# Patient Record
Sex: FEMALE | Race: WHITE | Employment: FULL TIME | URBAN - METROPOLITAN AREA
[De-identification: names, ages, dates, MRNs, and addresses within clinical notes are randomized per-mention and may not be internally consistent; named-entity substitution may affect disease eponyms.]

---

## 2017-01-30 ENCOUNTER — OFFICE VISIT (OUTPATIENT)
Dept: INTERNAL MEDICINE | Age: 62
End: 2017-01-30

## 2017-01-30 VITALS
TEMPERATURE: 101.2 F | BODY MASS INDEX: 50.94 KG/M2 | HEIGHT: 62 IN | HEART RATE: 100 BPM | WEIGHT: 276.8 LBS | SYSTOLIC BLOOD PRESSURE: 158 MMHG | DIASTOLIC BLOOD PRESSURE: 90 MMHG | OXYGEN SATURATION: 98 %

## 2017-01-30 DIAGNOSIS — J45.909 ASTHMA WITH BRONCHITIS: Primary | ICD-10-CM

## 2017-01-30 DIAGNOSIS — R50.9 FEVER, UNSPECIFIED FEVER CAUSE: ICD-10-CM

## 2017-01-30 DIAGNOSIS — R52 BODY ACHES: ICD-10-CM

## 2017-01-30 LAB
INFLUENZA A ANTIBODY: NORMAL
INFLUENZA B ANTIBODY: NORMAL

## 2017-01-30 PROCEDURE — 99213 OFFICE O/P EST LOW 20 MIN: CPT | Performed by: PHYSICIAN ASSISTANT

## 2017-01-30 PROCEDURE — 87804 INFLUENZA ASSAY W/OPTIC: CPT | Performed by: PHYSICIAN ASSISTANT

## 2017-01-30 RX ORDER — METHYLPREDNISOLONE 4 MG/1
TABLET ORAL
Qty: 1 KIT | Refills: 0 | Status: SHIPPED | OUTPATIENT
Start: 2017-01-30 | End: 2017-02-05

## 2017-01-30 RX ORDER — CLINDAMYCIN HYDROCHLORIDE 150 MG/1
150 CAPSULE ORAL 3 TIMES DAILY
Qty: 30 CAPSULE | Refills: 0 | Status: SHIPPED | OUTPATIENT
Start: 2017-01-30 | End: 2017-02-09 | Stop reason: ALTCHOICE

## 2017-01-30 RX ORDER — BENZONATATE 200 MG/1
200 CAPSULE ORAL 3 TIMES DAILY PRN
Qty: 30 CAPSULE | Refills: 0 | Status: SHIPPED | OUTPATIENT
Start: 2017-01-30 | End: 2017-02-09 | Stop reason: ALTCHOICE

## 2017-01-30 RX ORDER — ALBUTEROL SULFATE 90 UG/1
2 AEROSOL, METERED RESPIRATORY (INHALATION) 4 TIMES DAILY PRN
Qty: 3 INHALER | Refills: 3 | Status: SHIPPED | OUTPATIENT
Start: 2017-01-30 | End: 2018-06-25 | Stop reason: SDUPTHER

## 2017-01-30 ASSESSMENT — ENCOUNTER SYMPTOMS
SINUS PRESSURE: 1
COUGH: 1

## 2017-02-01 ENCOUNTER — TELEPHONE (OUTPATIENT)
Dept: INTERNAL MEDICINE | Age: 62
End: 2017-02-01

## 2017-02-02 ENCOUNTER — OFFICE VISIT (OUTPATIENT)
Dept: INTERNAL MEDICINE | Age: 62
End: 2017-02-02

## 2017-02-02 ENCOUNTER — TELEPHONE (OUTPATIENT)
Dept: INTERNAL MEDICINE | Age: 62
End: 2017-02-02

## 2017-02-02 VITALS
RESPIRATION RATE: 20 BRPM | OXYGEN SATURATION: 99 % | BODY MASS INDEX: 50.68 KG/M2 | TEMPERATURE: 97.9 F | HEART RATE: 57 BPM | WEIGHT: 275.4 LBS | HEIGHT: 62 IN | SYSTOLIC BLOOD PRESSURE: 136 MMHG | DIASTOLIC BLOOD PRESSURE: 86 MMHG

## 2017-02-02 DIAGNOSIS — J45.31 MILD PERSISTENT ASTHMA WITH ACUTE EXACERBATION: Primary | ICD-10-CM

## 2017-02-02 PROCEDURE — 99213 OFFICE O/P EST LOW 20 MIN: CPT | Performed by: FAMILY MEDICINE

## 2017-02-02 PROCEDURE — 96372 THER/PROPH/DIAG INJ SC/IM: CPT | Performed by: FAMILY MEDICINE

## 2017-02-02 RX ORDER — ALBUTEROL SULFATE 2.5 MG/3ML
2.5 SOLUTION RESPIRATORY (INHALATION) EVERY 6 HOURS PRN
Qty: 120 VIAL | Refills: 6 | Status: SHIPPED | OUTPATIENT
Start: 2017-02-02 | End: 2017-02-09 | Stop reason: SDUPTHER

## 2017-02-02 RX ORDER — PREDNISONE 10 MG/1
TABLET ORAL
Qty: 40 TABLET | Refills: 0 | Status: SHIPPED | OUTPATIENT
Start: 2017-02-02 | End: 2017-03-07 | Stop reason: ALTCHOICE

## 2017-02-02 RX ORDER — METHYLPREDNISOLONE SODIUM SUCCINATE 125 MG/2ML
125 INJECTION, POWDER, LYOPHILIZED, FOR SOLUTION INTRAMUSCULAR; INTRAVENOUS ONCE
Status: COMPLETED | OUTPATIENT
Start: 2017-02-02 | End: 2017-02-02

## 2017-02-02 RX ADMIN — METHYLPREDNISOLONE SODIUM SUCCINATE 125 MG: 125 INJECTION, POWDER, LYOPHILIZED, FOR SOLUTION INTRAMUSCULAR; INTRAVENOUS at 15:54

## 2017-02-09 ENCOUNTER — TELEPHONE (OUTPATIENT)
Dept: INTERNAL MEDICINE | Age: 62
End: 2017-02-09

## 2017-02-09 ENCOUNTER — OFFICE VISIT (OUTPATIENT)
Dept: INTERNAL MEDICINE | Age: 62
End: 2017-02-09

## 2017-02-09 VITALS
HEART RATE: 77 BPM | SYSTOLIC BLOOD PRESSURE: 128 MMHG | BODY MASS INDEX: 50.42 KG/M2 | DIASTOLIC BLOOD PRESSURE: 86 MMHG | OXYGEN SATURATION: 98 % | RESPIRATION RATE: 16 BRPM | TEMPERATURE: 98.4 F | HEIGHT: 62 IN | WEIGHT: 274 LBS

## 2017-02-09 DIAGNOSIS — J45.41 MODERATE PERSISTENT ASTHMA WITH ACUTE EXACERBATION: Primary | ICD-10-CM

## 2017-02-09 PROCEDURE — 99213 OFFICE O/P EST LOW 20 MIN: CPT | Performed by: FAMILY MEDICINE

## 2017-02-09 RX ORDER — CLOTRIMAZOLE AND BETAMETHASONE DIPROPIONATE 10; .64 MG/G; MG/G
CREAM TOPICAL
Qty: 15 G | Refills: 0 | Status: SHIPPED | OUTPATIENT
Start: 2017-02-09 | End: 2017-06-13 | Stop reason: ALTCHOICE

## 2017-02-09 RX ORDER — FLUTICASONE FUROATE AND VILANTEROL 100; 25 UG/1; UG/1
1 POWDER RESPIRATORY (INHALATION) DAILY
Qty: 1 EACH | Refills: 5 | Status: SHIPPED | OUTPATIENT
Start: 2017-02-09 | End: 2017-03-07

## 2017-02-09 ASSESSMENT — PATIENT HEALTH QUESTIONNAIRE - PHQ9
SUM OF ALL RESPONSES TO PHQ9 QUESTIONS 1 & 2: 0
1. LITTLE INTEREST OR PLEASURE IN DOING THINGS: 0
2. FEELING DOWN, DEPRESSED OR HOPELESS: 0
SUM OF ALL RESPONSES TO PHQ QUESTIONS 1-9: 0

## 2017-03-07 ENCOUNTER — OFFICE VISIT (OUTPATIENT)
Dept: INTERNAL MEDICINE | Age: 62
End: 2017-03-07

## 2017-03-07 VITALS
HEART RATE: 73 BPM | TEMPERATURE: 98.6 F | BODY MASS INDEX: 51.45 KG/M2 | WEIGHT: 279.6 LBS | HEIGHT: 62 IN | RESPIRATION RATE: 16 BRPM | DIASTOLIC BLOOD PRESSURE: 88 MMHG | SYSTOLIC BLOOD PRESSURE: 134 MMHG | OXYGEN SATURATION: 98 %

## 2017-03-07 DIAGNOSIS — J45.40 MODERATE PERSISTENT ASTHMA WITHOUT COMPLICATION: Primary | ICD-10-CM

## 2017-03-07 DIAGNOSIS — M19.90 ARTHRITIS: ICD-10-CM

## 2017-03-07 DIAGNOSIS — J34.89 RHINORRHEA: ICD-10-CM

## 2017-03-07 DIAGNOSIS — I10 ESSENTIAL HYPERTENSION: ICD-10-CM

## 2017-03-07 PROCEDURE — 99213 OFFICE O/P EST LOW 20 MIN: CPT | Performed by: FAMILY MEDICINE

## 2017-03-07 RX ORDER — IPRATROPIUM BROMIDE 42 UG/1
2 SPRAY, METERED NASAL 3 TIMES DAILY
Qty: 1 BOTTLE | Refills: 3 | Status: SHIPPED | OUTPATIENT
Start: 2017-03-07 | End: 2017-06-13

## 2017-06-02 ENCOUNTER — OFFICE VISIT (OUTPATIENT)
Dept: CARDIOLOGY | Age: 62
End: 2017-06-02

## 2017-06-02 VITALS
HEART RATE: 79 BPM | OXYGEN SATURATION: 99 % | DIASTOLIC BLOOD PRESSURE: 84 MMHG | SYSTOLIC BLOOD PRESSURE: 136 MMHG | RESPIRATION RATE: 16 BRPM | WEIGHT: 270 LBS | BODY MASS INDEX: 49.69 KG/M2 | HEIGHT: 62 IN

## 2017-06-02 DIAGNOSIS — I25.10 CORONARY ARTERY DISEASE INVOLVING NATIVE CORONARY ARTERY OF NATIVE HEART, ANGINA PRESENCE UNSPECIFIED: Primary | ICD-10-CM

## 2017-06-02 PROCEDURE — 99212 OFFICE O/P EST SF 10 MIN: CPT | Performed by: INTERNAL MEDICINE

## 2017-06-02 RX ORDER — AMLODIPINE BESYLATE 10 MG/1
10 TABLET ORAL DAILY
Qty: 90 TABLET | Refills: 3 | Status: SHIPPED | OUTPATIENT
Start: 2017-06-02 | End: 2018-07-27 | Stop reason: SDUPTHER

## 2017-06-02 RX ORDER — LISINOPRIL AND HYDROCHLOROTHIAZIDE 20; 12.5 MG/1; MG/1
1 TABLET ORAL DAILY
Qty: 90 TABLET | Refills: 3 | Status: SHIPPED | OUTPATIENT
Start: 2017-06-02 | End: 2019-06-04 | Stop reason: SDUPTHER

## 2017-06-09 DIAGNOSIS — Z87.11 PERSONAL HISTORY OF GASTRIC ULCER: ICD-10-CM

## 2017-06-09 RX ORDER — PANTOPRAZOLE SODIUM 40 MG/1
40 TABLET, DELAYED RELEASE ORAL DAILY
Qty: 90 TABLET | Refills: 3 | Status: SHIPPED | OUTPATIENT
Start: 2017-06-09 | End: 2018-11-05 | Stop reason: SDUPTHER

## 2017-06-09 RX ORDER — CHOLESTYRAMINE 4 G/9G
1 POWDER, FOR SUSPENSION ORAL 2 TIMES DAILY
Qty: 180 PACKET | Refills: 5 | Status: SHIPPED | OUTPATIENT
Start: 2017-06-09 | End: 2019-06-04 | Stop reason: SDUPTHER

## 2017-06-13 ENCOUNTER — OFFICE VISIT (OUTPATIENT)
Dept: INTERNAL MEDICINE | Age: 62
End: 2017-06-13

## 2017-06-13 VITALS
OXYGEN SATURATION: 98 % | SYSTOLIC BLOOD PRESSURE: 130 MMHG | DIASTOLIC BLOOD PRESSURE: 74 MMHG | HEIGHT: 62 IN | RESPIRATION RATE: 16 BRPM | WEIGHT: 268.4 LBS | HEART RATE: 66 BPM | TEMPERATURE: 98.3 F | BODY MASS INDEX: 49.39 KG/M2

## 2017-06-13 DIAGNOSIS — J45.40 MODERATE PERSISTENT ASTHMA WITHOUT COMPLICATION: ICD-10-CM

## 2017-06-13 DIAGNOSIS — E55.9 HYPOVITAMINOSIS D: ICD-10-CM

## 2017-06-13 DIAGNOSIS — M25.562 ACUTE PAIN OF LEFT KNEE: ICD-10-CM

## 2017-06-13 DIAGNOSIS — I10 ESSENTIAL HYPERTENSION: Primary | ICD-10-CM

## 2017-06-13 DIAGNOSIS — R10.12 ABDOMINAL PAIN, CHRONIC, LEFT UPPER QUADRANT: ICD-10-CM

## 2017-06-13 DIAGNOSIS — I10 ESSENTIAL HYPERTENSION: ICD-10-CM

## 2017-06-13 DIAGNOSIS — G89.29 ABDOMINAL PAIN, CHRONIC, LEFT UPPER QUADRANT: ICD-10-CM

## 2017-06-13 LAB
ANION GAP SERPL CALCULATED.3IONS-SCNC: 13 MEQ/L (ref 7–13)
BUN BLDV-MCNC: 17 MG/DL (ref 8–23)
CALCIUM SERPL-MCNC: 8.4 MG/DL (ref 8.6–10.2)
CHLORIDE BLD-SCNC: 99 MEQ/L (ref 98–107)
CO2: 26 MEQ/L (ref 22–29)
CREAT SERPL-MCNC: 0.47 MG/DL (ref 0.5–0.9)
GFR AFRICAN AMERICAN: >60
GFR NON-AFRICAN AMERICAN: >60
GLUCOSE BLD-MCNC: 97 MG/DL (ref 74–109)
POTASSIUM SERPL-SCNC: 3.4 MEQ/L (ref 3.5–5.1)
SODIUM BLD-SCNC: 138 MEQ/L (ref 132–144)

## 2017-06-13 PROCEDURE — 99214 OFFICE O/P EST MOD 30 MIN: CPT | Performed by: FAMILY MEDICINE

## 2017-06-14 ENCOUNTER — TELEPHONE (OUTPATIENT)
Dept: INTERNAL MEDICINE | Age: 62
End: 2017-06-14

## 2017-06-14 DIAGNOSIS — M25.561 ACUTE PAIN OF RIGHT KNEE: Primary | ICD-10-CM

## 2017-06-14 RX ORDER — POTASSIUM CHLORIDE 750 MG/1
10 TABLET, EXTENDED RELEASE ORAL DAILY
Qty: 30 TABLET | Refills: 5 | Status: SHIPPED | OUTPATIENT
Start: 2017-06-14 | End: 2018-06-12 | Stop reason: ALTCHOICE

## 2017-12-12 ENCOUNTER — OFFICE VISIT (OUTPATIENT)
Dept: INTERNAL MEDICINE | Age: 62
End: 2017-12-12

## 2017-12-12 VITALS
OXYGEN SATURATION: 97 % | HEART RATE: 76 BPM | WEIGHT: 276 LBS | HEIGHT: 62 IN | TEMPERATURE: 98.5 F | DIASTOLIC BLOOD PRESSURE: 86 MMHG | BODY MASS INDEX: 50.79 KG/M2 | RESPIRATION RATE: 16 BRPM | SYSTOLIC BLOOD PRESSURE: 136 MMHG

## 2017-12-12 DIAGNOSIS — I10 ESSENTIAL HYPERTENSION: Primary | ICD-10-CM

## 2017-12-12 DIAGNOSIS — J45.40 MODERATE PERSISTENT ASTHMA WITHOUT COMPLICATION: ICD-10-CM

## 2017-12-12 DIAGNOSIS — Z23 NEED FOR PNEUMOCOCCAL VACCINATION: ICD-10-CM

## 2017-12-12 PROBLEM — J34.89 RHINORRHEA: Status: RESOLVED | Noted: 2017-03-07 | Resolved: 2017-12-12

## 2017-12-12 PROCEDURE — 90471 IMMUNIZATION ADMIN: CPT | Performed by: FAMILY MEDICINE

## 2017-12-12 PROCEDURE — 99213 OFFICE O/P EST LOW 20 MIN: CPT | Performed by: FAMILY MEDICINE

## 2017-12-12 PROCEDURE — 90670 PCV13 VACCINE IM: CPT | Performed by: FAMILY MEDICINE

## 2017-12-12 NOTE — PROGRESS NOTES
Patient: Cary Taylor    YOB: 1955    Date: 12/13/17    Chief Complaint   Patient presents with    Hypertension     6 month f/u     Health Maintenance     declined Tdap will have Pneumonia and Dm screen after speaking with Dr       Patient Active Problem List    Diagnosis Date Noted    Essential hypertension 06/13/2017    Arthritis 03/07/2017    Hypovitaminosis D 09/15/2014    BUNNY (obstructive sleep apnea) 07/25/2012    CAD (coronary artery disease) 04/25/2012    BMI 45.0-49.9, adult ([facility] [place] [address] 03/28/2012    Asthma     HA (headache)        Allergies   Allergen Reactions    Codeine      Cramps    Imitrex [Sumatriptan]     Theophyllines     Diflucan [Fluconazole] Rash    Nizoral [Ketoconazole] Rash    Zoloft Rash       Vitals:    12/12/17 1646 12/12/17 1650   BP: (!) 170/100 136/86   Site: Right Arm    Position: Sitting    Cuff Size: Large Adult    Pulse: 76    Resp: 16    Temp: 98.5 °F (36.9 °C)    TempSrc: Oral    SpO2: 97%    Weight: 276 lb (125.2 kg)    Height: 5' 2\" (1.575 m)       Body mass index is 50.48 kg/m². HPI She feels good and is her for Fu on her BP and her breathing. Headaches OK. Gaining weight. Review of Systems    Constitutional: Negative for fatigue, fever and sweats. HEENT: Negative for eye discharge and vision loss. Negative for ear drainage, hearing loss and nasal drainage. Respiratory: Negative for cough, dyspnea and wheezing. Cardiovascular:  Negative for chest pain, claudication and irregular heartbeat/palpitations. Gastrointestinal: Negative for abdominal pain, nausea, constipation and diarrhea. Genitourinary: Negative for dysuria, hematuria. Patient had hysterectomy. Metabolic/Endocrine: Negative for cold intolerance, heat intolerance, polydipsia and polyphagia. No unintended weight loss or weight gain. Neuro/Psychiatric: Negative for gait disturbance. Negative for psychiatric symptoms.   Dermatologic: Negative for pruritus and rash.  Musculoskeletal: Negative for bone/joint symptoms. No numbness or tingling. No loss of function. Hematology: Negative for bleeding and easy bruising. Immunology:  Negative for environmental allergies and food allergies. Physical Exam    Patient's medication, allergies, past medical, surgical, social and family histories were reviewed and updated as appropriate. PHYSICAL EXAM   General Appearance:  awake, alert, oriented, in no acute distress  Skin:  Skin color, texture, turgor normal. No rashes or lesions. Head/face:  NCAT  Eyes:  No gross abnormalities. Ears:  Hearing- normal to conversation  Neck:  neck- supple, no mass, non-tender  Back:  no pain to palpation  Lungs:  No kyphosis or scoliosis. , Breathing Pattern: regular, no distress,Slightly decreased through out  Heart:  Heart sounds are normal.  Regular rate and rhythm without murmur, gallop or rub. Extremities: Extremities warm to touch, pink, with no edema. Assessment:  1. Essential hypertension  Good, better if she will loose some weight. 2. Moderate persistent asthma without complication  Stable   3.  Need for pneumococcal vaccination  Pneumococcal conjugate vaccine 13-valent IM (PREVNAR 13)               Plan:  Current Outpatient Prescriptions   Medication Sig Dispense Refill    potassium chloride (KLOR-CON M) 10 MEQ extended release tablet Take 1 tablet by mouth daily 30 tablet 5    Cholecalciferol (VITAMIN D3) 5000 units CAPS Take 1 capsule by mouth daily 30 capsule 5    cholestyramine (QUESTRAN) 4 G packet Take 1 packet by mouth 2 times daily 180 packet 5    pantoprazole (PROTONIX) 40 MG tablet Take 1 tablet by mouth daily 90 tablet 3    amLODIPine (NORVASC) 10 MG tablet Take 1 tablet by mouth daily 90 tablet 3    lisinopril-hydrochlorothiazide (PRINZIDE;ZESTORETIC) 20-12.5 MG per tablet Take 1 tablet by mouth daily 90 tablet 3    diclofenac sodium 1 % GEL Apply 2 g topically 3 times daily as needed for Pain 1 Tube 3    albuterol sulfate  (90 BASE) MCG/ACT inhaler Inhale 2 puffs into the lungs 4 times daily as needed for Wheezing 3 Inhaler 3    albuterol (PROVENTIL) (2.5 MG/3ML) 0.083% nebulizer solution Take 3 mLs by nebulization every 6 hours as needed for Wheezing 300 vial 3    Loperamide HCl (IMODIUM PO) Take by mouth      CPAP Machine MISC        No current facility-administered medications for this visit. Orders Placed This Encounter   Procedures    Pneumococcal conjugate vaccine 13-valent IM (PREVNAR 13)       No orders of the defined types were placed in this encounter. Return in about 6 months (around 6/12/2018).     Dr. Tita Cabello      12/13/17  6:58 AM

## 2018-01-25 ENCOUNTER — APPOINTMENT (OUTPATIENT)
Dept: ULTRASOUND IMAGING | Age: 63
End: 2018-01-25
Payer: COMMERCIAL

## 2018-01-25 ENCOUNTER — HOSPITAL ENCOUNTER (OUTPATIENT)
Dept: WOMENS IMAGING | Age: 63
Discharge: HOME | End: 2018-01-25
Payer: COMMERCIAL

## 2018-01-25 DIAGNOSIS — Z85.3 HX OF BREAST CANCER: ICD-10-CM

## 2018-01-25 PROCEDURE — G0279 TOMOSYNTHESIS, MAMMO: HCPCS

## 2018-06-12 ENCOUNTER — OFFICE VISIT (OUTPATIENT)
Dept: INTERNAL MEDICINE CLINIC | Age: 63
End: 2018-06-12
Payer: COMMERCIAL

## 2018-06-12 VITALS
BODY MASS INDEX: 48.55 KG/M2 | WEIGHT: 274 LBS | HEART RATE: 71 BPM | SYSTOLIC BLOOD PRESSURE: 130 MMHG | RESPIRATION RATE: 16 BRPM | DIASTOLIC BLOOD PRESSURE: 82 MMHG | OXYGEN SATURATION: 98 % | TEMPERATURE: 98.2 F | HEIGHT: 63 IN

## 2018-06-12 DIAGNOSIS — L30.9 DERMATITIS: ICD-10-CM

## 2018-06-12 DIAGNOSIS — Z23 NEED FOR PNEUMOCOCCAL VACCINATION: ICD-10-CM

## 2018-06-12 DIAGNOSIS — I10 ESSENTIAL HYPERTENSION: Primary | ICD-10-CM

## 2018-06-12 DIAGNOSIS — Z13.1 SCREENING FOR DIABETES MELLITUS: ICD-10-CM

## 2018-06-12 DIAGNOSIS — R53.83 FATIGUE, UNSPECIFIED TYPE: ICD-10-CM

## 2018-06-12 DIAGNOSIS — E55.9 HYPOVITAMINOSIS D: ICD-10-CM

## 2018-06-12 DIAGNOSIS — Z13.220 SCREENING FOR CHOLESTEROL LEVEL: ICD-10-CM

## 2018-06-12 DIAGNOSIS — J45.40 MODERATE PERSISTENT ASTHMA WITHOUT COMPLICATION: ICD-10-CM

## 2018-06-12 PROCEDURE — 90471 IMMUNIZATION ADMIN: CPT | Performed by: FAMILY MEDICINE

## 2018-06-12 PROCEDURE — 90732 PPSV23 VACC 2 YRS+ SUBQ/IM: CPT | Performed by: FAMILY MEDICINE

## 2018-06-12 PROCEDURE — 99214 OFFICE O/P EST MOD 30 MIN: CPT | Performed by: FAMILY MEDICINE

## 2018-06-12 RX ORDER — HYDROXYZINE HYDROCHLORIDE 25 MG/1
25 TABLET, FILM COATED ORAL 3 TIMES DAILY PRN
Qty: 50 TABLET | Refills: 1 | Status: SHIPPED | OUTPATIENT
Start: 2018-06-12 | End: 2018-06-22

## 2018-06-12 RX ORDER — DIAPER,BRIEF,INFANT-TODD,DISP
EACH MISCELLANEOUS
Qty: 453 G | Refills: 1 | Status: SHIPPED | OUTPATIENT
Start: 2018-06-12 | End: 2018-06-19

## 2018-06-12 RX ORDER — METHYLPREDNISOLONE 4 MG/1
TABLET ORAL
Qty: 1 KIT | Refills: 0 | Status: SHIPPED | OUTPATIENT
Start: 2018-06-12 | End: 2018-10-25 | Stop reason: ALTCHOICE

## 2018-06-12 ASSESSMENT — PATIENT HEALTH QUESTIONNAIRE - PHQ9
1. LITTLE INTEREST OR PLEASURE IN DOING THINGS: 0
SUM OF ALL RESPONSES TO PHQ9 QUESTIONS 1 & 2: 0
2. FEELING DOWN, DEPRESSED OR HOPELESS: 0
SUM OF ALL RESPONSES TO PHQ QUESTIONS 1-9: 0

## 2018-06-16 DIAGNOSIS — R53.83 FATIGUE, UNSPECIFIED TYPE: ICD-10-CM

## 2018-06-16 DIAGNOSIS — E55.9 HYPOVITAMINOSIS D: ICD-10-CM

## 2018-06-16 DIAGNOSIS — Z13.1 SCREENING FOR DIABETES MELLITUS: ICD-10-CM

## 2018-06-16 DIAGNOSIS — Z13.220 SCREENING FOR CHOLESTEROL LEVEL: ICD-10-CM

## 2018-06-16 DIAGNOSIS — I10 ESSENTIAL HYPERTENSION: ICD-10-CM

## 2018-06-16 LAB
ALBUMIN SERPL-MCNC: 4.1 G/DL (ref 3.9–4.9)
ALP BLD-CCNC: 133 U/L (ref 40–130)
ALT SERPL-CCNC: 14 U/L (ref 0–33)
ANION GAP SERPL CALCULATED.3IONS-SCNC: 14 MEQ/L (ref 7–13)
AST SERPL-CCNC: 10 U/L (ref 0–35)
BILIRUB SERPL-MCNC: 0.4 MG/DL (ref 0–1.2)
BUN BLDV-MCNC: 17 MG/DL (ref 8–23)
CALCIUM SERPL-MCNC: 8.8 MG/DL (ref 8.6–10.2)
CHLORIDE BLD-SCNC: 97 MEQ/L (ref 98–107)
CHOLESTEROL, FASTING: 189 MG/DL (ref 0–199)
CO2: 28 MEQ/L (ref 22–29)
CREAT SERPL-MCNC: 0.66 MG/DL (ref 0.5–0.9)
GFR AFRICAN AMERICAN: >60
GFR NON-AFRICAN AMERICAN: >60
GLOBULIN: 2.7 G/DL (ref 2.3–3.5)
GLUCOSE BLD-MCNC: 196 MG/DL (ref 74–109)
HBA1C MFR BLD: 6.6 % (ref 4.8–5.9)
HDLC SERPL-MCNC: 76 MG/DL (ref 40–59)
LDL CHOLESTEROL CALCULATED: 97 MG/DL (ref 0–129)
POTASSIUM SERPL-SCNC: 4.4 MEQ/L (ref 3.5–5.1)
SODIUM BLD-SCNC: 139 MEQ/L (ref 132–144)
TOTAL PROTEIN: 6.8 G/DL (ref 6.4–8.1)
TRIGLYCERIDE, FASTING: 78 MG/DL (ref 0–200)
TSH REFLEX: 0.89 UIU/ML (ref 0.27–4.2)
VITAMIN D 25-HYDROXY: 9.4 NG/ML (ref 30–100)

## 2018-06-21 DIAGNOSIS — Z87.11 PERSONAL HISTORY OF GASTRIC ULCER: ICD-10-CM

## 2018-06-21 RX ORDER — ERGOCALCIFEROL 1.25 MG/1
50000 CAPSULE ORAL WEEKLY
Qty: 12 CAPSULE | Refills: 3 | Status: SHIPPED | OUTPATIENT
Start: 2018-06-21 | End: 2018-10-25

## 2018-06-22 RX ORDER — LISINOPRIL AND HYDROCHLOROTHIAZIDE 20; 12.5 MG/1; MG/1
TABLET ORAL
Qty: 90 TABLET | Refills: 2 | OUTPATIENT
Start: 2018-06-22

## 2018-06-22 RX ORDER — PANTOPRAZOLE SODIUM 40 MG/1
TABLET, DELAYED RELEASE ORAL
Qty: 30 TABLET | Refills: 2 | Status: SHIPPED | OUTPATIENT
Start: 2018-06-22 | End: 2018-10-25 | Stop reason: SDUPTHER

## 2018-06-22 RX ORDER — ALBUTEROL SULFATE 2.5 MG/3ML
SOLUTION RESPIRATORY (INHALATION)
Qty: 375 ML | Refills: 5 | Status: SHIPPED | OUTPATIENT
Start: 2018-06-22 | End: 2018-10-25 | Stop reason: SDUPTHER

## 2018-06-25 RX ORDER — ALBUTEROL SULFATE 90 UG/1
2 AEROSOL, METERED RESPIRATORY (INHALATION) 4 TIMES DAILY PRN
Qty: 3 INHALER | Refills: 3 | Status: SHIPPED | OUTPATIENT
Start: 2018-06-25 | End: 2019-06-04 | Stop reason: SDUPTHER

## 2018-08-13 ENCOUNTER — TELEPHONE (OUTPATIENT)
Dept: INTERNAL MEDICINE CLINIC | Age: 63
End: 2018-08-13

## 2018-08-31 ENCOUNTER — TELEPHONE (OUTPATIENT)
Dept: CARDIOLOGY CLINIC | Age: 63
End: 2018-08-31

## 2018-08-31 ENCOUNTER — OFFICE VISIT (OUTPATIENT)
Dept: CARDIOLOGY CLINIC | Age: 63
End: 2018-08-31
Payer: COMMERCIAL

## 2018-08-31 VITALS
HEIGHT: 63 IN | SYSTOLIC BLOOD PRESSURE: 126 MMHG | TEMPERATURE: 96.9 F | RESPIRATION RATE: 20 BRPM | HEART RATE: 74 BPM | OXYGEN SATURATION: 98 % | DIASTOLIC BLOOD PRESSURE: 84 MMHG | WEIGHT: 270.6 LBS | BODY MASS INDEX: 47.95 KG/M2

## 2018-08-31 DIAGNOSIS — I25.10 CORONARY ARTERY DISEASE INVOLVING NATIVE CORONARY ARTERY OF NATIVE HEART, ANGINA PRESENCE UNSPECIFIED: Primary | ICD-10-CM

## 2018-08-31 DIAGNOSIS — I10 ESSENTIAL HYPERTENSION: ICD-10-CM

## 2018-08-31 DIAGNOSIS — G47.33 OSA (OBSTRUCTIVE SLEEP APNEA): ICD-10-CM

## 2018-08-31 PROCEDURE — 93000 ELECTROCARDIOGRAM COMPLETE: CPT | Performed by: INTERNAL MEDICINE

## 2018-08-31 PROCEDURE — 99213 OFFICE O/P EST LOW 20 MIN: CPT | Performed by: INTERNAL MEDICINE

## 2018-08-31 RX ORDER — PHENTERMINE HYDROCHLORIDE 37.5 MG/1
37.5 TABLET ORAL
Qty: 30 TABLET | Refills: 0 | Status: SHIPPED | OUTPATIENT
Start: 2018-08-31 | End: 2018-09-28 | Stop reason: SDUPTHER

## 2018-08-31 NOTE — PATIENT INSTRUCTIONS
Patient Education        Starting a Weight Loss Plan: Care Instructions  Your Care Instructions    If you are thinking about losing weight, it can be hard to know where to start. Your doctor can help you set up a weight loss plan that best meets your needs. You may want to take a class on nutrition or exercise, or join a weight loss support group. If you have questions about how to make changes to your eating or exercise habits, ask your doctor about seeing a registered dietitian or an exercise specialist.  It can be a big challenge to lose weight. But you do not have to make huge changes at once. Make small changes, and stick with them. When those changes become habit, add a few more changes. If you do not think you are ready to make changes right now, try to pick a date in the future. Make an appointment to see your doctor to discuss whether the time is right for you to start a plan. Follow-up care is a key part of your treatment and safety. Be sure to make and go to all appointments, and call your doctor if you are having problems. It's also a good idea to know your test results and keep a list of the medicines you take. How can you care for yourself at home? · Set realistic goals. Many people expect to lose much more weight than is likely. A weight loss of 5% to 10% of your body weight may be enough to improve your health. · Get family and friends involved to provide support. Talk to them about why you are trying to lose weight, and ask them to help. They can help by participating in exercise and having meals with you, even if they may be eating something different. · Find what works best for you. If you do not have time or do not like to cook, a program that offers meal replacement bars or shakes may be better for you.  Or if you like to prepare meals, finding a plan that includes daily menus and recipes may be best.  · Ask your doctor about other health professionals who can help you achieve your weight loss goals. ¨ A dietitian can help you make healthy changes in your diet. ¨ An exercise specialist or  can help you develop a safe and effective exercise program.  ¨ A counselor or psychiatrist can help you cope with issues such as depression, anxiety, or family problems that can make it hard to focus on weight loss. · Consider joining a support group for people who are trying to lose weight. Your doctor can suggest groups in your area. Where can you learn more? Go to https://fitaborate.[employer]at[employer]. org and sign in to your [employer] account. Enter U711 in the [employer] box to learn more about \"Starting a Weight Loss Plan: Care Instructions. \"     If you do not have an account, please click on the \"Sign Up Now\" link. Current as of: October 9, 2017  Content Version: 11.7  © 4135-5391 [employer]. Care instructions adapted under license by [facility] [place] ([facility]). If you have questions about a medical condition or this instruction, always ask your healthcare professional. [address] any warranty or liability for your use of this information.

## 2018-08-31 NOTE — PROGRESS NOTES
Chief Complaint   Patient presents with    Follow-up     9 month f/u    Coronary Artery Disease    Hypertension       10-17-14: Patient presents for initial medical evaluation. Patient is followed on a regular basis by Dr. Helen Villarreal MD. [facility] office) Previous [facility] pt. Hx of mild CAD s/p LHC in 4/2012. Echo in 3/2012 with EF of 55-60%, mild LVH, grade II DD. Pt denies   nausea, vomiting, diarrhea, constipation, motor weakness, insomnia, weight loss, syncope, dizziness, lightheadedness, palpitations, PND, orthopnea, or claudication. Admits to \"real bad\" CP last week, feels like stretching pain in midsternal area, associated with SOB. Gets fatigued easily. Denies any GERD type symptoms. Compliant with CPAP machine. Does have dyspnea with exertion. Does have some LE edema. 4-17-15:  As above, diagnosed with left breast cancer, s/p lumpectomy, chemo, will undergo radiation, following with dr. Deandre Petersen. Does have SOB at baseline, has not lost weight. Compliant with meds. Did have PUD/bleeding ulcers likely from chemotherapy, s/p EGD with DR. Moody Jules. Pt denies chest pain,nausea, vomiting, diarrhea, constipation, motor weakness, insomnia, weight loss, syncope, dizziness, lightheadedness, palpitations, PND, orthopnea, or claudication. Does have HA on a daily basis. Compliant with CPAP. No LE edema. 5-1-15: as above, s/p Echo with EF of 60%, no valve abn, no effusion, normal diastology. Undergoing radiation to left breast. Compliant with meds. No bleeding issues. 11-20-15: as above, Pt denies chest pain,fatigue,  nausea, vomiting, diarrhea, constipation, motor weakness, insomnia, weight loss, syncope, dizziness, lightheadedness, palpitations, PND, orthopnea, or claudication. Has had a couple of mechanical falls. BP is normal. Compliant with meds. No LE edema. Does have some SOB with exertion. 5-20-16: as above, gave up pop 6 weeks ago.  Pt denies chest pain, dyspnea, dyspnea on exertion, discoloration or ulcers. No LE edema. No CHF type symptoms. Lipid profile is normal. No recent hospitalization. No change in meds. EKG with NSR, no ischemia. LDL is normal. HDL is 76. Does have worse left LE edema than right. She is very sensitive about her weight and tearful in office.          Patient Active Problem List   Diagnosis    Asthma    HA (headache)    BMI 45.0-49.9, adult ([facility] [address]    CAD (coronary artery disease)    BUNNY (obstructive sleep apnea)    Hypovitaminosis D    Arthritis    Essential hypertension       Past Surgical History:   Procedure Laterality Date    BARIATRIC SURGERY  2004    BREAST BIOPSY Left 14    BREAST BIOPSY Left 12/10/14    BREAST LUMPECTOMY Left     with left SNB    BREAST LUMPECTOMY Left     BREAST LUMPECTOMY Left      SECTION      CHOLECYSTECTOMY  2007    KNEE SURGERY  1992    Right knee    LYMPH NODE BIOPSY Left 14    ANDREW AND BSO      Dr. Reed Romero      TUBAL LIGATION      UPPER GASTROINTESTINAL ENDOSCOPY  2/18/15    w/bx,polypectomy        Social History     Social History    Marital status:      Spouse name: N/A    Number of children: N/A    Years of education: N/A     Social History Main Topics    Smoking status: Never Smoker    Smokeless tobacco: Never Used    Alcohol use No    Drug use: No    Sexual activity: Not Currently     Other Topics Concern    None     Social History Narrative    None       Family History   Problem Relation Age of Onset    Cancer Father         melanoma       Current Outpatient Prescriptions   Medication Sig Dispense Refill    amLODIPine (NORVASC) 10 MG tablet Take 1 tablet by mouth daily 90 tablet 0    albuterol sulfate  (90 Base) MCG/ACT inhaler Inhale 2 puffs into the lungs 4 times daily as needed for Wheezing 3 Inhaler 3    pantoprazole (PROTONIX) 40 MG tablet TAKE ONE TABLET BY MOUTH EVERY DAY 30 tablet 2    albuterol (PROVENTIL) (2.5 MG/3ML) 98 %, not currently breastfeeding. Body mass index is 48.7 kg/m². Physical Examination:  General appearance - alert, well appearing, and in no distress and overweight  Mental status - alert, oriented to person, place, and time  Neck - Neck is supple, no JVD or carotid bruits. No thyromegaly or adenopathy. Chest - clear to auscultation, no wheezes, rales or rhonchi, symmetric air entry  Heart - normal rate, regular rhythm, normal S1, S2, no murmurs, rubs, clicks or gallops  Abdomen - soft, nontender, nondistended, no masses or organomegaly  Neurological - alert, oriented, normal speech, no focal findings or movement disorder noted  Extremities - peripheral pulses normal, no pedal edema, no clubbing or cyanosis  Skin - normal coloration and turgor, no rashes, no suspicious skin lesions noted    ASSESSMENT:     Diagnosis Orders   1. Coronary artery disease involving native coronary artery of native heart, angina presence unspecified  EKG 12 Lead   2. BMI 45.0-49.9, adult ([facility] [address]     3. Essential hypertension     4. BUNNY (obstructive sleep apnea)           PLAN:     Patient will need to continue to follow up with you for their general medical care and return to see me in the office in 6 months    As always, aggressive risk factor modification is strongly recommended. We should adhere to the [address] guidelines for HTN management and the NCEP ATP III guidelines for LDL-C management. Cardiac diet is always recommended with low fat, cholesterol, calories and sodium. Continue medications at current doses. Diet and exercise discussed. Adipex per patient request.     Patient was advised and encouraged to check blood pressure at home or at a pharmacy, maintain a logbook, and also call us back if blood pressure are above the target ranges or if it is low. Patient clearly understands and agrees to the instructions.      We will need to continue to monitor muscle and liver enzymes, BUN, CR, and

## 2018-09-28 ENCOUNTER — CLINICAL DOCUMENTATION (OUTPATIENT)
Dept: CARDIOLOGY CLINIC | Age: 63
End: 2018-09-28

## 2018-09-28 ENCOUNTER — OFFICE VISIT (OUTPATIENT)
Dept: CARDIOLOGY CLINIC | Age: 63
End: 2018-09-28
Payer: COMMERCIAL

## 2018-09-28 VITALS
HEART RATE: 79 BPM | SYSTOLIC BLOOD PRESSURE: 120 MMHG | WEIGHT: 259.4 LBS | HEIGHT: 63 IN | OXYGEN SATURATION: 98 % | BODY MASS INDEX: 45.96 KG/M2 | DIASTOLIC BLOOD PRESSURE: 80 MMHG

## 2018-09-28 DIAGNOSIS — I10 ESSENTIAL HYPERTENSION: ICD-10-CM

## 2018-09-28 DIAGNOSIS — I25.10 CORONARY ARTERY DISEASE INVOLVING NATIVE CORONARY ARTERY OF NATIVE HEART WITHOUT ANGINA PECTORIS: Primary | ICD-10-CM

## 2018-09-28 PROCEDURE — 99213 OFFICE O/P EST LOW 20 MIN: CPT | Performed by: INTERNAL MEDICINE

## 2018-09-28 RX ORDER — PHENTERMINE HYDROCHLORIDE 37.5 MG/1
37.5 TABLET ORAL
Qty: 30 TABLET | Refills: 0 | Status: SHIPPED | OUTPATIENT
Start: 2018-09-28 | End: 2018-10-26 | Stop reason: SDUPTHER

## 2018-09-28 NOTE — PROGRESS NOTES
PA for adipex approved    Prior Authorization: Approved      Prior authorization approved Case ID: [id number]      Payer: [id number] [address] [facility]; Review Type:Prior Auth; Coverage Start Date:08/29/2018; Coverage End Date:12/27/2018;

## 2018-09-28 NOTE — PROGRESS NOTES
Chief Complaint   Patient presents with    1 Month Follow-Up    Medication Check     ADIPEX       10-17-14: Patient presents for initial medical evaluation. Patient is followed on a regular basis by Dr. Carolin Taylor MD. [facility] office) Previous [facility] pt. Hx of mild CAD s/p LHC in 4/2012. Echo in 3/2012 with EF of 55-60%, mild LVH, grade II DD. Pt denies   nausea, vomiting, diarrhea, constipation, motor weakness, insomnia, weight loss, syncope, dizziness, lightheadedness, palpitations, PND, orthopnea, or claudication. Admits to \"real bad\" CP last week, feels like stretching pain in midsternal area, associated with SOB. Gets fatigued easily. Denies any GERD type symptoms. Compliant with CPAP machine. Does have dyspnea with exertion. Does have some LE edema. 4-17-15:  As above, diagnosed with left breast cancer, s/p lumpectomy, chemo, will undergo radiation, following with dr. Cherelle Daily. Does have SOB at baseline, has not lost weight. Compliant with meds. Did have PUD/bleeding ulcers likely from chemotherapy, s/p EGD with DR. Jc Sánchez. Pt denies chest pain,nausea, vomiting, diarrhea, constipation, motor weakness, insomnia, weight loss, syncope, dizziness, lightheadedness, palpitations, PND, orthopnea, or claudication. Does have HA on a daily basis. Compliant with CPAP. No LE edema. 5-1-15: as above, s/p Echo with EF of 60%, no valve abn, no effusion, normal diastology. Undergoing radiation to left breast. Compliant with meds. No bleeding issues. 11-20-15: as above, Pt denies chest pain,fatigue,  nausea, vomiting, diarrhea, constipation, motor weakness, insomnia, weight loss, syncope, dizziness, lightheadedness, palpitations, PND, orthopnea, or claudication. Has had a couple of mechanical falls. BP is normal. Compliant with meds. No LE edema. Does have some SOB with exertion. 5-20-16: as above, gave up pop 6 weeks ago.  Pt denies chest pain, dyspnea, dyspnea on exertion, change in exercise file     Social History Narrative    No narrative on file       Family History   Problem Relation Age of Onset    Cancer Father         melanoma       Current Outpatient Prescriptions   Medication Sig Dispense Refill    phentermine (ADIPEX-P) 37.5 MG tablet Take 1 tablet by mouth every morning (before breakfast) for 30 days. . 30 tablet 0    amLODIPine (NORVASC) 10 MG tablet Take 1 tablet by mouth daily 90 tablet 0    albuterol sulfate  (90 Base) MCG/ACT inhaler Inhale 2 puffs into the lungs 4 times daily as needed for Wheezing 3 Inhaler 3    pantoprazole (PROTONIX) 40 MG tablet TAKE ONE TABLET BY MOUTH EVERY DAY 30 tablet 2    albuterol (PROVENTIL) (2.5 MG/3ML) 0.083% nebulizer solution inhale 3 ml (1 vial) via nebulizer every 6 hours as needed for wheezing or shortness of breath 375 mL 5    vitamin D (ERGOCALCIFEROL) 99878 units CAPS capsule Take 1 capsule by mouth once a week 12 capsule 3    methylPREDNISolone (MEDROL, STACIE,) 4 MG tablet Take as directed 1 kit 0    Cholecalciferol (VITAMIN D3) 5000 units CAPS Take 1 capsule by mouth daily 30 capsule 5    cholestyramine (QUESTRAN) 4 G packet Take 1 packet by mouth 2 times daily 180 packet 5    pantoprazole (PROTONIX) 40 MG tablet Take 1 tablet by mouth daily 90 tablet 3    lisinopril-hydrochlorothiazide (PRINZIDE;ZESTORETIC) 20-12.5 MG per tablet Take 1 tablet by mouth daily 90 tablet 3    diclofenac sodium 1 % GEL Apply 2 g topically 3 times daily as needed for Pain 1 Tube 3    albuterol (PROVENTIL) (2.5 MG/3ML) 0.083% nebulizer solution Take 3 mLs by nebulization every 6 hours as needed for Wheezing 300 vial 3    Loperamide HCl (IMODIUM PO) Take by mouth      CPAP Machine MISC        No current facility-administered medications for this visit. Codeine; Imitrex [sumatriptan]; Theophyllines; Diflucan [fluconazole];  Nizoral [ketoconazole]; and Zoloft    Review of Systems:  General ROS: positive for  - fatigue  Psychological ROS:

## 2018-10-25 ENCOUNTER — OFFICE VISIT (OUTPATIENT)
Dept: INTERNAL MEDICINE CLINIC | Age: 63
End: 2018-10-25
Payer: COMMERCIAL

## 2018-10-25 VITALS
TEMPERATURE: 98.6 F | HEIGHT: 62 IN | DIASTOLIC BLOOD PRESSURE: 82 MMHG | BODY MASS INDEX: 45.93 KG/M2 | RESPIRATION RATE: 16 BRPM | SYSTOLIC BLOOD PRESSURE: 128 MMHG | HEART RATE: 87 BPM | OXYGEN SATURATION: 92 % | WEIGHT: 249.6 LBS

## 2018-10-25 DIAGNOSIS — J01.00 ACUTE NON-RECURRENT MAXILLARY SINUSITIS: Primary | ICD-10-CM

## 2018-10-25 DIAGNOSIS — J45.41 MODERATE PERSISTENT ASTHMA WITH ACUTE EXACERBATION: ICD-10-CM

## 2018-10-25 DIAGNOSIS — J20.9 ACUTE BRONCHITIS, UNSPECIFIED ORGANISM: ICD-10-CM

## 2018-10-25 PROCEDURE — 96372 THER/PROPH/DIAG INJ SC/IM: CPT | Performed by: FAMILY MEDICINE

## 2018-10-25 PROCEDURE — 99213 OFFICE O/P EST LOW 20 MIN: CPT | Performed by: FAMILY MEDICINE

## 2018-10-25 RX ORDER — METHYLPREDNISOLONE SODIUM SUCCINATE 125 MG/2ML
125 INJECTION, POWDER, LYOPHILIZED, FOR SOLUTION INTRAMUSCULAR; INTRAVENOUS ONCE
Status: COMPLETED | OUTPATIENT
Start: 2018-10-25 | End: 2018-10-25

## 2018-10-25 RX ORDER — BENZONATATE 200 MG/1
200 CAPSULE ORAL 3 TIMES DAILY PRN
Qty: 30 CAPSULE | Refills: 0 | Status: SHIPPED | OUTPATIENT
Start: 2018-10-25 | End: 2018-11-04

## 2018-10-25 RX ORDER — CEFUROXIME AXETIL 500 MG/1
500 TABLET ORAL 2 TIMES DAILY
Qty: 20 TABLET | Refills: 0 | Status: SHIPPED | OUTPATIENT
Start: 2018-10-25 | End: 2018-11-04

## 2018-10-25 RX ORDER — PREDNISONE 10 MG/1
TABLET ORAL
Qty: 30 TABLET | Refills: 0 | Status: SHIPPED | OUTPATIENT
Start: 2018-10-25 | End: 2019-02-04 | Stop reason: ALTCHOICE

## 2018-10-25 RX ADMIN — METHYLPREDNISOLONE SODIUM SUCCINATE 125 MG: 125 INJECTION, POWDER, LYOPHILIZED, FOR SOLUTION INTRAMUSCULAR; INTRAVENOUS at 10:49

## 2018-10-25 NOTE — PROGRESS NOTES
Metabolic/Endocrine: Negative for cold intolerance, heat intolerance, polydipsia and polyphagia. No unintended weight loss or weight gain. Neuro/Psychiatric: Negative for gait disturbance. Negative for psychiatric symptoms. Dermatologic: Negative for pruritus and rash. Musculoskeletal: positive for bone/joint symptoms. No numbness or tingling. No loss of function. Hematology: Negative for bleeding and easy bruising. Immunology:  Negative for environmental allergies and food allergies. Physical Exam    Patient's medication, allergies, past medical, surgical, social and family histories were reviewed and updated as appropriate. PHYSICAL EXAM   General appearance: Alert oriented pleasant cooperative in no acute distress but she does have laryngitis. HEENT: Eyes clear nonicteric ears TMs intact with erythema bilaterally oropharynx minimally hyperemic no pain to palpation of sinuses minimal nasal congestion  Neck: Soft nontender no adenopathy or masses [place] lungs: Patient no wheezes rhonchi or rales  Heart: Regular rate and rhythm without murmurs rubs or gallops. Assessment:   Diagnosis Orders   1. Acute non-recurrent maxillary sinusitis  cefUROXime (CEFTIN) 500 MG tablet    benzonatate (TESSALON) 200 MG capsule    methylPREDNISolone sodium (SOLU-MEDROL) injection 125 mg   2. Acute bronchitis, unspecified organism  cefUROXime (CEFTIN) 500 MG tablet   3.  Moderate persistent asthma with acute exacerbation  predniSONE (DELTASONE) 10 MG tablet    methylPREDNISolone sodium (SOLU-MEDROL) injection 125 mg               Plan:  Current Outpatient Prescriptions   Medication Sig Dispense Refill    predniSONE (DELTASONE) 10 MG tablet 4 PO each day for three days, 3 PO each day for three days, 2 PO each day for three days ,1 PO each day until gone 30 tablet 0    cefUROXime (CEFTIN) 500 MG tablet Take 1 tablet by mouth 2 times daily for 10 days 20 tablet 0    benzonatate (TESSALON) 200 MG capsule Take 1 capsule by mouth 3 times daily as needed for Cough 30 capsule 0    phentermine (ADIPEX-P) 37.5 MG tablet Take 1 tablet by mouth every morning (before breakfast) for 30 days. . 30 tablet 0    amLODIPine (NORVASC) 10 MG tablet Take 1 tablet by mouth daily 90 tablet 0    albuterol sulfate  (90 Base) MCG/ACT inhaler Inhale 2 puffs into the lungs 4 times daily as needed for Wheezing 3 Inhaler 3    cholestyramine (QUESTRAN) 4 G packet Take 1 packet by mouth 2 times daily 180 packet 5    pantoprazole (PROTONIX) 40 MG tablet Take 1 tablet by mouth daily 90 tablet 3    lisinopril-hydrochlorothiazide (PRINZIDE;ZESTORETIC) 20-12.5 MG per tablet Take 1 tablet by mouth daily 90 tablet 3    diclofenac sodium 1 % GEL Apply 2 g topically 3 times daily as needed for Pain 1 Tube 3    albuterol (PROVENTIL) (2.5 MG/3ML) 0.083% nebulizer solution Take 3 mLs by nebulization every 6 hours as needed for Wheezing 300 vial 3    Loperamide HCl (IMODIUM PO) Take by mouth      CPAP Machine MISC        No current facility-administered medications for this visit. No orders of the defined types were placed in this encounter.     Administrations This Visit     methylPREDNISolone sodium (SOLU-MEDROL) injection 125 mg     Admin Date  10/25/2018 Action  Given Dose  125 mg Route  Intravenous Administered By  Cierra Montaño MA              Orders Placed This Encounter   Medications    predniSONE (DELTASONE) 10 MG tablet     Si PO each day for three days, 3 PO each day for three days, 2 PO each day for three days ,1 PO each day until gone     Dispense:  30 tablet     Refill:  0    cefUROXime (CEFTIN) 500 MG tablet     Sig: Take 1 tablet by mouth 2 times daily for 10 days     Dispense:  20 tablet     Refill:  0    benzonatate (TESSALON) 200 MG capsule     Sig: Take 1 capsule by mouth 3 times daily as needed for Cough     Dispense:  30 capsule     Refill:  0    methylPREDNISolone sodium (SOLU-MEDROL) injection 125 mg

## 2018-10-26 ENCOUNTER — OFFICE VISIT (OUTPATIENT)
Dept: CARDIOLOGY CLINIC | Age: 63
End: 2018-10-26
Payer: COMMERCIAL

## 2018-10-26 VITALS
BODY MASS INDEX: 45.71 KG/M2 | SYSTOLIC BLOOD PRESSURE: 122 MMHG | WEIGHT: 248.4 LBS | HEIGHT: 62 IN | RESPIRATION RATE: 14 BRPM | OXYGEN SATURATION: 98 % | DIASTOLIC BLOOD PRESSURE: 88 MMHG | HEART RATE: 100 BPM

## 2018-10-26 DIAGNOSIS — G47.33 OSA (OBSTRUCTIVE SLEEP APNEA): ICD-10-CM

## 2018-10-26 DIAGNOSIS — I10 ESSENTIAL HYPERTENSION: Primary | ICD-10-CM

## 2018-10-26 DIAGNOSIS — I25.10 CORONARY ARTERY DISEASE INVOLVING NATIVE CORONARY ARTERY OF NATIVE HEART WITHOUT ANGINA PECTORIS: ICD-10-CM

## 2018-10-26 PROCEDURE — 99212 OFFICE O/P EST SF 10 MIN: CPT | Performed by: INTERNAL MEDICINE

## 2018-10-26 RX ORDER — PHENTERMINE HYDROCHLORIDE 37.5 MG/1
37.5 TABLET ORAL
Qty: 30 TABLET | Refills: 0 | Status: SHIPPED | OUTPATIENT
Start: 2018-10-26 | End: 2018-11-25

## 2018-11-05 ENCOUNTER — OFFICE VISIT (OUTPATIENT)
Dept: INTERNAL MEDICINE CLINIC | Age: 63
End: 2018-11-05
Payer: COMMERCIAL

## 2018-11-05 VITALS
HEIGHT: 63 IN | BODY MASS INDEX: 44.23 KG/M2 | HEART RATE: 86 BPM | TEMPERATURE: 98.1 F | OXYGEN SATURATION: 99 % | WEIGHT: 249.6 LBS | RESPIRATION RATE: 20 BRPM | DIASTOLIC BLOOD PRESSURE: 86 MMHG | SYSTOLIC BLOOD PRESSURE: 136 MMHG

## 2018-11-05 DIAGNOSIS — Z87.11 PERSONAL HISTORY OF GASTRIC ULCER: ICD-10-CM

## 2018-11-05 DIAGNOSIS — J01.90 ACUTE BACTERIAL SINUSITIS: Primary | ICD-10-CM

## 2018-11-05 DIAGNOSIS — E55.9 HYPOVITAMINOSIS D: ICD-10-CM

## 2018-11-05 DIAGNOSIS — K21.9 GASTROESOPHAGEAL REFLUX DISEASE WITHOUT ESOPHAGITIS: ICD-10-CM

## 2018-11-05 DIAGNOSIS — B96.89 ACUTE BACTERIAL SINUSITIS: Primary | ICD-10-CM

## 2018-11-05 PROCEDURE — 99213 OFFICE O/P EST LOW 20 MIN: CPT | Performed by: FAMILY MEDICINE

## 2018-11-05 RX ORDER — PANTOPRAZOLE SODIUM 40 MG/1
40 TABLET, DELAYED RELEASE ORAL DAILY
Qty: 90 TABLET | Refills: 3 | Status: SHIPPED | OUTPATIENT
Start: 2018-11-05 | End: 2019-06-04 | Stop reason: SDUPTHER

## 2018-11-05 RX ORDER — SULFAMETHOXAZOLE AND TRIMETHOPRIM 800; 160 MG/1; MG/1
1 TABLET ORAL 2 TIMES DAILY
Qty: 20 TABLET | Refills: 0 | Status: SHIPPED | OUTPATIENT
Start: 2018-11-05 | End: 2018-11-15

## 2018-11-05 NOTE — PROGRESS NOTES
Patient: Darryl Nguyen    YOB: 1955    Date: 11/5/18    Chief Complaint   Patient presents with    Sinusitis     2 week follow up, patient states she feels somewhat better, but still has some sinus drainage    Health Maintenance     wishes to discuss shingrix vaccine       Patient Active Problem List    Diagnosis Date Noted    GERD (gastroesophageal reflux disease)     Essential hypertension 06/13/2017    Arthritis 03/07/2017    Hypovitaminosis D 09/15/2014    BUNNY (obstructive sleep apnea) 07/25/2012    CAD (coronary artery disease) 04/25/2012    BMI 45.0-49.9, adult ([facility] [address] 03/28/2012    Asthma     HA (headache)        Allergies   Allergen Reactions    Codeine      Cramps    Imitrex [Sumatriptan]     Theophyllines     Diflucan [Fluconazole] Rash    Nizoral [Ketoconazole] Rash    Zoloft Rash       Vitals:    11/05/18 1705   BP: 136/86   Site: Right Upper Arm   Position: Sitting   Cuff Size: Large Adult   Pulse: 86   Resp: 20   Temp: 98.1 °F (36.7 °C)   TempSrc: Oral   SpO2: 99%   Weight: 249 lb 9.6 oz (113.2 kg)   Height: 5' 2.5\" (1.588 m)      Body mass index is 44.92 kg/m². HPI    She comes in to follow-up and is actually doing much better however her right ear still hurting her and she has a lot of postnasal drip with a mild sore throat. Her cough is throat clearing no chest pressure or shortness of breath. No wheezing    Review of Systems    Constitutional: Negative for fatigue, fever and sweats. HEENT: Negative for eye discharge and vision loss. Negative for ear drainage, hearing loss and nasal drainage. Respiratory: Positive for cough, negative for dyspnea and wheezing. Cardiovascular:  Negative for chest pain, claudication and irregular heartbeat/palpitations. Gastrointestinal: Negative for abdominal pain, nausea, constipation and diarrhea. Genitourinary: Negative for dysuria, hematuria, polyuria. Patient had a hysterectomy.   Metabolic/Endocrine: Negative for cold

## 2018-12-13 ENCOUNTER — OFFICE VISIT (OUTPATIENT)
Dept: INTERNAL MEDICINE CLINIC | Age: 63
End: 2018-12-13
Payer: COMMERCIAL

## 2018-12-13 VITALS
HEART RATE: 75 BPM | BODY MASS INDEX: 44.56 KG/M2 | WEIGHT: 247.6 LBS | SYSTOLIC BLOOD PRESSURE: 136 MMHG | DIASTOLIC BLOOD PRESSURE: 85 MMHG | TEMPERATURE: 97.6 F | RESPIRATION RATE: 12 BRPM | OXYGEN SATURATION: 98 %

## 2018-12-13 DIAGNOSIS — Z17.1 MALIGNANT NEOPLASM OF LOWER-INNER QUADRANT OF LEFT BREAST IN FEMALE, ESTROGEN RECEPTOR NEGATIVE (HCC): ICD-10-CM

## 2018-12-13 DIAGNOSIS — I10 ESSENTIAL HYPERTENSION: ICD-10-CM

## 2018-12-13 DIAGNOSIS — E55.9 HYPOVITAMINOSIS D: Primary | ICD-10-CM

## 2018-12-13 DIAGNOSIS — J01.91 ACUTE RECURRENT SINUSITIS, UNSPECIFIED LOCATION: ICD-10-CM

## 2018-12-13 DIAGNOSIS — J45.41 MODERATE PERSISTENT ASTHMA WITH ACUTE EXACERBATION: ICD-10-CM

## 2018-12-13 DIAGNOSIS — E11.9 TYPE 2 DIABETES MELLITUS WITHOUT COMPLICATION, WITHOUT LONG-TERM CURRENT USE OF INSULIN (HCC): ICD-10-CM

## 2018-12-13 DIAGNOSIS — C50.312 MALIGNANT NEOPLASM OF LOWER-INNER QUADRANT OF LEFT BREAST IN FEMALE, ESTROGEN RECEPTOR NEGATIVE (HCC): ICD-10-CM

## 2018-12-13 LAB
ALBUMIN SERPL-MCNC: 3.8 G/DL (ref 3.9–4.9)
ALP BLD-CCNC: 107 U/L (ref 40–130)
ALT SERPL-CCNC: 11 U/L (ref 0–33)
ANION GAP SERPL CALCULATED.3IONS-SCNC: 13 MEQ/L (ref 7–13)
AST SERPL-CCNC: 17 U/L (ref 0–35)
BILIRUB SERPL-MCNC: 0.8 MG/DL (ref 0–1.2)
BUN BLDV-MCNC: 11 MG/DL (ref 8–23)
CALCIUM SERPL-MCNC: 8.2 MG/DL (ref 8.6–10.2)
CHLORIDE BLD-SCNC: 102 MEQ/L (ref 98–107)
CO2: 26 MEQ/L (ref 22–29)
CREAT SERPL-MCNC: 0.65 MG/DL (ref 0.5–0.9)
GFR AFRICAN AMERICAN: >60
GFR NON-AFRICAN AMERICAN: >60
GLOBULIN: 2.8 G/DL (ref 2.3–3.5)
GLUCOSE BLD-MCNC: 114 MG/DL (ref 74–109)
HBA1C MFR BLD: 6.2 %
POTASSIUM SERPL-SCNC: 3.3 MEQ/L (ref 3.5–5.1)
SODIUM BLD-SCNC: 141 MEQ/L (ref 132–144)
TOTAL PROTEIN: 6.6 G/DL (ref 6.4–8.1)

## 2018-12-13 PROCEDURE — 99214 OFFICE O/P EST MOD 30 MIN: CPT | Performed by: FAMILY MEDICINE

## 2018-12-13 PROCEDURE — 83036 HEMOGLOBIN GLYCOSYLATED A1C: CPT | Performed by: FAMILY MEDICINE

## 2018-12-13 RX ORDER — AZITHROMYCIN 250 MG/1
TABLET, FILM COATED ORAL
Qty: 1 PACKET | Refills: 0 | Status: SHIPPED | OUTPATIENT
Start: 2018-12-13 | End: 2019-12-03 | Stop reason: SDUPTHER

## 2018-12-13 RX ORDER — MULTIVIT-MIN/IRON/FOLIC ACID/K 18-600-40
1 CAPSULE ORAL DAILY
Qty: 30 CAPSULE | Refills: 5 | Status: SHIPPED | OUTPATIENT
Start: 2018-12-13 | End: 2019-02-04

## 2018-12-13 ASSESSMENT — PATIENT HEALTH QUESTIONNAIRE - PHQ9
DEPRESSION UNABLE TO ASSESS: FUNCTIONAL CAPACITY MOTIVATION LIMITS ACCURACY
SUM OF ALL RESPONSES TO PHQ QUESTIONS 1-9: 0
2. FEELING DOWN, DEPRESSED OR HOPELESS: 0
SUM OF ALL RESPONSES TO PHQ9 QUESTIONS 1 & 2: 0
SUM OF ALL RESPONSES TO PHQ QUESTIONS 1-9: 0
1. LITTLE INTEREST OR PLEASURE IN DOING THINGS: 0

## 2018-12-13 NOTE — PROGRESS NOTES
Patient: Aubrie Avila    YOB: 1955    Date: 12/13/18    Chief Complaint   Patient presents with    Hypertension     Patient presents here for a 6 month follow up    Sinus Problem     Patient  is here complaining of sinus issues and is affecting her breathing.  Health Maintenance     declines shingles. Patient Active Problem List    Diagnosis Date Noted    Diabetes ([facility]     GERD (gastroesophageal reflux disease)     Essential hypertension 06/13/2017    Arthritis 03/07/2017    Hypovitaminosis D 09/15/2014    BUNNY (obstructive sleep apnea) 07/25/2012    CAD (coronary artery disease) 04/25/2012    BMI 45.0-49.9, adult ([facility] 03/28/2012    Asthma     HA (headache)        Allergies   Allergen Reactions    Codeine      Cramps    Imitrex [Sumatriptan]     Theophyllines     Diflucan [Fluconazole] Rash    Nizoral [Ketoconazole] Rash    Zoloft Rash       Vitals:    12/13/18 1554   BP: 136/85   Site: Right Upper Arm   Position: Sitting   Cuff Size: Large Adult   Pulse: 75   Resp: 12   Temp: 97.6 °F (36.4 °C)   TempSrc: Oral   SpO2: 98%   Weight: 247 lb 9.6 oz (112.3 kg)      Body mass index is 44.56 kg/m². HPI    She is here to follow-up on her blood pressure her asthma and her diabetes. She is actually starting to get sick again with postnasal drip sinus pressure headache and because of this her lips and nose are chapping. Her weight she's been doing a good job slowly getting it down again. She has no fever or chills occasional cough no nausea vomiting or diarrhea. She just saw her cancer doctors today. She's not taking her vitamin D is no reason to check in at this time. She declines going on cholesterol medicine at this time as she is afraid she will be up to afford it. Review of Systems    Constitutional: Negative for fatigue, fever and sweats. HEENT: Negative for eye discharge and vision loss. Negative for ear drainage, hearing loss and nasal drainage.

## 2018-12-15 LAB — CA 27-29: 20 U/ML (ref 0–38)

## 2019-01-28 ENCOUNTER — HOSPITAL ENCOUNTER (OUTPATIENT)
Dept: WOMENS IMAGING | Age: 64
Discharge: HOME | End: 2019-01-30
Payer: COMMERCIAL

## 2019-01-28 DIAGNOSIS — Z17.1 MALIGNANT NEOPLASM OF LOWER-INNER QUADRANT OF LEFT BREAST IN FEMALE, ESTROGEN RECEPTOR NEGATIVE (HCC): ICD-10-CM

## 2019-01-28 DIAGNOSIS — C50.312 MALIGNANT NEOPLASM OF LOWER-INNER QUADRANT OF LEFT BREAST IN FEMALE, ESTROGEN RECEPTOR NEGATIVE (HCC): ICD-10-CM

## 2019-01-28 PROCEDURE — G0279 TOMOSYNTHESIS, MAMMO: HCPCS

## 2019-02-04 ENCOUNTER — OFFICE VISIT (OUTPATIENT)
Dept: INTERNAL MEDICINE CLINIC | Age: 64
End: 2019-02-04
Payer: COMMERCIAL

## 2019-02-04 VITALS
TEMPERATURE: 98 F | BODY MASS INDEX: 46.85 KG/M2 | WEIGHT: 254.6 LBS | DIASTOLIC BLOOD PRESSURE: 80 MMHG | RESPIRATION RATE: 16 BRPM | OXYGEN SATURATION: 99 % | SYSTOLIC BLOOD PRESSURE: 138 MMHG | HEIGHT: 62 IN | HEART RATE: 79 BPM

## 2019-02-04 DIAGNOSIS — I10 ESSENTIAL HYPERTENSION: Primary | ICD-10-CM

## 2019-02-04 DIAGNOSIS — J45.41 MODERATE PERSISTENT ASTHMA WITH ACUTE EXACERBATION: ICD-10-CM

## 2019-02-04 DIAGNOSIS — E11.9 TYPE 2 DIABETES MELLITUS WITHOUT COMPLICATION, WITHOUT LONG-TERM CURRENT USE OF INSULIN (HCC): ICD-10-CM

## 2019-02-04 DIAGNOSIS — E55.9 HYPOVITAMINOSIS D: ICD-10-CM

## 2019-02-04 PROCEDURE — 99213 OFFICE O/P EST LOW 20 MIN: CPT | Performed by: FAMILY MEDICINE

## 2019-02-04 RX ORDER — ASPIRIN 81 MG/1
TABLET ORAL
Qty: 90 TABLET | Refills: 1
Start: 2019-02-04 | End: 2019-06-04

## 2019-06-04 ENCOUNTER — OFFICE VISIT (OUTPATIENT)
Dept: FAMILY MEDICINE CLINIC | Age: 64
End: 2019-06-04
Payer: COMMERCIAL

## 2019-06-04 VITALS
HEART RATE: 78 BPM | TEMPERATURE: 97.6 F | BODY MASS INDEX: 48.14 KG/M2 | SYSTOLIC BLOOD PRESSURE: 160 MMHG | DIASTOLIC BLOOD PRESSURE: 94 MMHG | OXYGEN SATURATION: 99 % | HEIGHT: 62 IN | RESPIRATION RATE: 16 BRPM | WEIGHT: 261.6 LBS

## 2019-06-04 DIAGNOSIS — M79.671 PAIN IN BOTH FEET: Primary | ICD-10-CM

## 2019-06-04 DIAGNOSIS — E11.9 TYPE 2 DIABETES MELLITUS WITHOUT COMPLICATION, WITHOUT LONG-TERM CURRENT USE OF INSULIN (HCC): ICD-10-CM

## 2019-06-04 DIAGNOSIS — Z87.11 PERSONAL HISTORY OF GASTRIC ULCER: ICD-10-CM

## 2019-06-04 DIAGNOSIS — K58.0 IRRITABLE BOWEL SYNDROME WITH DIARRHEA: ICD-10-CM

## 2019-06-04 DIAGNOSIS — M79.672 PAIN IN BOTH FEET: Primary | ICD-10-CM

## 2019-06-04 DIAGNOSIS — K21.9 GASTROESOPHAGEAL REFLUX DISEASE WITHOUT ESOPHAGITIS: ICD-10-CM

## 2019-06-04 DIAGNOSIS — J01.01 ACUTE RECURRENT MAXILLARY SINUSITIS: ICD-10-CM

## 2019-06-04 DIAGNOSIS — J45.41 MODERATE PERSISTENT ASTHMA WITH ACUTE EXACERBATION: ICD-10-CM

## 2019-06-04 DIAGNOSIS — I10 ESSENTIAL HYPERTENSION: ICD-10-CM

## 2019-06-04 DIAGNOSIS — E55.9 HYPOVITAMINOSIS D: ICD-10-CM

## 2019-06-04 PROCEDURE — 99214 OFFICE O/P EST MOD 30 MIN: CPT | Performed by: FAMILY MEDICINE

## 2019-06-04 RX ORDER — AMLODIPINE BESYLATE 10 MG/1
10 TABLET ORAL DAILY
Qty: 90 TABLET | Refills: 0 | Status: SHIPPED | OUTPATIENT
Start: 2019-06-04 | End: 2020-06-09 | Stop reason: SDUPTHER

## 2019-06-04 RX ORDER — METHYLPREDNISOLONE 4 MG/1
TABLET ORAL
Qty: 1 KIT | Refills: 0 | Status: SHIPPED | OUTPATIENT
Start: 2019-06-04 | End: 2019-12-03 | Stop reason: ALTCHOICE

## 2019-06-04 RX ORDER — AMOXICILLIN 500 MG/1
500 CAPSULE ORAL 3 TIMES DAILY
Qty: 30 CAPSULE | Refills: 0 | Status: SHIPPED | OUTPATIENT
Start: 2019-06-04 | End: 2019-06-14

## 2019-06-04 RX ORDER — ALBUTEROL SULFATE 90 UG/1
2 AEROSOL, METERED RESPIRATORY (INHALATION) 4 TIMES DAILY PRN
Qty: 3 INHALER | Refills: 3 | Status: SHIPPED | OUTPATIENT
Start: 2019-06-04

## 2019-06-04 RX ORDER — PANTOPRAZOLE SODIUM 40 MG/1
40 TABLET, DELAYED RELEASE ORAL DAILY
Qty: 90 TABLET | Refills: 3 | Status: SHIPPED | OUTPATIENT
Start: 2019-06-04 | End: 2020-06-09 | Stop reason: SDUPTHER

## 2019-06-04 RX ORDER — LISINOPRIL AND HYDROCHLOROTHIAZIDE 20; 12.5 MG/1; MG/1
1 TABLET ORAL DAILY
Qty: 90 TABLET | Refills: 3 | Status: SHIPPED | OUTPATIENT
Start: 2019-06-04 | End: 2021-09-28

## 2019-06-04 RX ORDER — ALBUTEROL SULFATE 2.5 MG/3ML
2.5 SOLUTION RESPIRATORY (INHALATION) EVERY 6 HOURS PRN
Qty: 300 VIAL | Refills: 3 | Status: SHIPPED | OUTPATIENT
Start: 2019-06-04

## 2019-06-04 RX ORDER — CHOLESTYRAMINE 4 G/9G
1 POWDER, FOR SUSPENSION ORAL 2 TIMES DAILY
Qty: 180 PACKET | Refills: 5 | Status: SHIPPED | OUTPATIENT
Start: 2019-06-04 | End: 2021-05-05 | Stop reason: SDUPTHER

## 2019-06-04 ASSESSMENT — PATIENT HEALTH QUESTIONNAIRE - PHQ9
SUM OF ALL RESPONSES TO PHQ9 QUESTIONS 1 & 2: 0
SUM OF ALL RESPONSES TO PHQ QUESTIONS 1-9: 0
2. FEELING DOWN, DEPRESSED OR HOPELESS: 0
SUM OF ALL RESPONSES TO PHQ QUESTIONS 1-9: 0
1. LITTLE INTEREST OR PLEASURE IN DOING THINGS: 0

## 2019-06-04 NOTE — PROGRESS NOTES
Patient: Usama De La Fuente    YOB: 1955    Date: 6/4/19    Chief Complaint   Patient presents with    Diabetes     4 month follow up    Hypertension     4 month follow up   [facility] Maintenance     She declines diabetic foot exam today. Patient Active Problem List    Diagnosis Date Noted    Diabetes ([address]     GERD (gastroesophageal reflux disease)     Essential hypertension 06/13/2017    Arthritis 03/07/2017    Hypovitaminosis D 09/15/2014    BUNNY (obstructive sleep apnea) 07/25/2012    CAD (coronary artery disease) 04/25/2012    Asthma     HA (headache)        Allergies   Allergen Reactions    Codeine      Cramps    Imitrex [Sumatriptan]     Theophyllines     Diflucan [Fluconazole] Rash    Nizoral [Ketoconazole] Rash    Zoloft Rash       Vitals:    06/04/19 1653 06/04/19 1654   BP: (!) 162/100 (!) 160/94   Site: Right Upper Arm Right Upper Arm   Position: Sitting Sitting   Cuff Size: Large Adult Large Adult   Pulse: 78    Resp: 16    Temp: 97.6 °F (36.4 °C)    TempSrc: Oral    SpO2: 99%    Weight: 261 lb 9.6 oz (118.7 kg)    Height: 5' 2\" (1.575 m)       Body mass index is 47.85 kg/m². Treatment Adherence:   Medication compliance:  noncompliant: does not take medications as directed  Diet compliance:  torres not follow diet  Weight trend: increasing  Current exercise: no regular exercise  Diabetes Mellitus Type 2: Current symptoms/problems include none. Home blood sugar records:  patient does not test  Any episodes of hypoglycemia? no  Eye exam current (within one year): yes  Tobacco history: She  reports that she has never smoked. She has never used smokeless tobacco.   Daily Aspirin? No:   Known diabetic complications: none        HPI    She comes in today for follow-up she hasn't taken her blood pressure medicine a few days if she just forgets and she feels very stressed. Her weight is going up and she's convinced if she goes back on Adipex a look it better.   She is looking forward to seeing cardiology who she says will put her on the Adipex. On forced Lantus Nava Bevels of the things can add to her blood pressure. She needs to get her lifestyle under control but she's not been able to do. Her asthma is under good control she still has some postnasal drip and facial pressure no ear pain sore throat cough no nausea vomiting or diarrhea she has some back pain that developed on the left she's not sure why she has the back of her left leg has a bit of pain every now and then she.she felt a cyst.  Her feet are hurting her more and more. She declined a foot exam.    Review of Systems    Constitutional: positive for fatigue, negative for fever and sweats. HEENT: Negative for eye discharge and vision loss. Negative for ear drainage, hearing loss and positive for nasal drainage. Respiratory: Negative for cough and wheezing. Positive for dyspnea  Cardiovascular:  Negative for chest pain, claudication and irregular heartbeat/palpitations. Gastrointestinal: Negative for abdominal pain, nausea, constipation and positive for diarrhea. Genitourinary: Negative for dysuria, patient had a hysterectomy. Metabolic/Endocrine: Negative for cold intolerance, heat intolerance, polydipsia and polyphagia. No unintended weight loss or weight gain. Neuro/Psychiatric: Negative for gait disturbance. Negative for psychiatric symptoms. Dermatologic: Negative for pruritus and rash. Musculoskeletal:positive for bone/joint symptoms. No numbness or tingling. No loss of function. Hematology: Negative for bleeding and easy bruising. Immunology:  Negative for environmental allergies and food allergies. Physical Exam    Patient's medication, allergies, past medical, surgical, social and family histories were reviewed and updated as appropriate.     PHYSICAL EXAM   General appearance: Alert oriented pleasant cooperative in no acute distress weight increased  HEENT: Eyes clear nonicteric oropharynx mildly hyperemic ears TMs intact no erythema some mild tenderness to palpation of the maxillary sinuses bilaterally  Neck: Soft nontender no adenopathy no carotid bruits  Lungs: Clear to auscultation without wheezes rhonchi or rales  Heart: Regular rate and rhythm no murmurs rubs or gallops  Extremities: She has no edema no calf tenderness no erythema of the left knee no synovitis I couldn't palpate any masses in the knee had full range of motion. Assessment:   Diagnosis Orders   1. Pain in both feet  Recommend over-the-counter shoe inserts to help with her foot pain and also neck it recommend weight loss. 2. Type 2 diabetes mellitus without complication, without long-term current use of insulin ([facility])  Microalbumin / Creatinine Urine Ratio    Hemoglobin A1C    Comprehensive Metabolic Panel   3. Hypovitaminosis D  Vitamin D 25 Hydroxy   4. Essential hypertension  Comprehensive Metabolic Panel    amLODIPine (NORVASC) 10 MG tablet    lisinopril-hydrochlorothiazide (PRINZIDE;ZESTORETIC) 20-12.5 MG per tablet  She needs this or taking her blood pressure medicine especially before she was that the cardiologist.     5. Moderate persistent asthma with acute exacerbation  albuterol sulfate  (90 Base) MCG/ACT inhaler    albuterol (PROVENTIL) (2.5 MG/3ML) 0.083% nebulizer solution   6. Personal history of gastric ulcer  pantoprazole (PROTONIX) 40 MG tablet   7. Gastroesophageal reflux disease without esophagitis  pantoprazole (PROTONIX) 40 MG tablet   8. Acute recurrent maxillary sinusitis  methylPREDNISolone (MEDROL, STACIE,) 4 MG tablet    amoxicillin (AMOXIL) 500 MG capsule   9.  Irritable bowel syndrome with diarrhea  cholestyramine (QUESTRAN) 4 g packet               Plan:  Current Outpatient Medications   Medication Sig Dispense Refill    methylPREDNISolone (MEDROL, STACIE,) 4 MG tablet Take as directed 1 kit 0    amoxicillin (AMOXIL) 500 MG capsule Take 1 capsule by mouth 3 times daily for 10 days 30 capsule 0  pantoprazole (PROTONIX) 40 MG tablet Take 1 tablet by mouth daily 90 tablet 3    amLODIPine (NORVASC) 10 MG tablet Take 1 tablet by mouth daily 90 tablet 0    albuterol sulfate  (90 Base) MCG/ACT inhaler Inhale 2 puffs into the lungs 4 times daily as needed for Wheezing 3 Inhaler 3    cholestyramine (QUESTRAN) 4 g packet Take 1 packet by mouth 2 times daily 180 packet 5    lisinopril-hydrochlorothiazide (PRINZIDE;ZESTORETIC) 20-12.5 MG per tablet Take 1 tablet by mouth daily 90 tablet 3    albuterol (PROVENTIL) (2.5 MG/3ML) 0.083% nebulizer solution Take 3 mLs by nebulization every 6 hours as needed for Wheezing 300 vial 3    Loperamide HCl (IMODIUM PO) Take by mouth      CPAP Machine MISC        No current facility-administered medications for this visit.       Orders Placed This Encounter   Procedures    Microalbumin / Creatinine Urine Ratio     Standing Status:   Future     Standing Expiration Date:   6/3/2020    Hemoglobin A1C     Standing Status:   Future     Standing Expiration Date:   6/4/2020    Comprehensive Metabolic Panel     Standing Status:   Future     Standing Expiration Date:   6/3/2020    Vitamin D 25 Hydroxy     Standing Status:   Future     Standing Expiration Date:   6/3/2020       Orders Placed This Encounter   Medications    methylPREDNISolone (MEDROL, STACIE,) 4 MG tablet     Sig: Take as directed     Dispense:  1 kit     Refill:  0    amoxicillin (AMOXIL) 500 MG capsule     Sig: Take 1 capsule by mouth 3 times daily for 10 days     Dispense:  30 capsule     Refill:  0    pantoprazole (PROTONIX) 40 MG tablet     Sig: Take 1 tablet by mouth daily     Dispense:  90 tablet     Refill:  3    amLODIPine (NORVASC) 10 MG tablet     Sig: Take 1 tablet by mouth daily     Dispense:  90 tablet     Refill:  0    albuterol sulfate  (90 Base) MCG/ACT inhaler     Sig: Inhale 2 puffs into the lungs 4 times daily as needed for Wheezing     Dispense:  3 Inhaler     Refill:  3    cholestyramine (QUESTRAN) 4 g packet     Sig: Take 1 packet by mouth 2 times daily     Dispense:  180 packet     Refill:  5    lisinopril-hydrochlorothiazide (PRINZIDE;ZESTORETIC) 20-12.5 MG per tablet     Sig: Take 1 tablet by mouth daily     Dispense:  90 tablet     Refill:  3    albuterol (PROVENTIL) (2.5 MG/3ML) 0.083% nebulizer solution     Sig: Take 3 mLs by nebulization every 6 hours as needed for Wheezing     Dispense:  300 vial     Refill:  3              Return in about 6 months (around 12/4/2019).     Dr. Miya Quan      6/4/19  5:31 PM

## 2019-06-07 ENCOUNTER — OFFICE VISIT (OUTPATIENT)
Dept: CARDIOLOGY CLINIC | Age: 64
End: 2019-06-07
Payer: COMMERCIAL

## 2019-06-07 VITALS
SYSTOLIC BLOOD PRESSURE: 136 MMHG | OXYGEN SATURATION: 98 % | HEART RATE: 74 BPM | DIASTOLIC BLOOD PRESSURE: 84 MMHG | BODY MASS INDEX: 47.37 KG/M2 | RESPIRATION RATE: 14 BRPM | WEIGHT: 259 LBS

## 2019-06-07 DIAGNOSIS — I25.10 CORONARY ARTERY DISEASE INVOLVING NATIVE CORONARY ARTERY OF NATIVE HEART WITHOUT ANGINA PECTORIS: Primary | ICD-10-CM

## 2019-06-07 DIAGNOSIS — I10 ESSENTIAL HYPERTENSION: ICD-10-CM

## 2019-06-07 PROCEDURE — 99213 OFFICE O/P EST LOW 20 MIN: CPT | Performed by: INTERNAL MEDICINE

## 2019-06-07 RX ORDER — PHENTERMINE HYDROCHLORIDE 37.5 MG/1
37.5 TABLET ORAL
Qty: 30 TABLET | Refills: 0 | Status: SHIPPED | OUTPATIENT
Start: 2019-06-07 | End: 2019-07-07

## 2019-06-07 NOTE — PROGRESS NOTES
Chief Complaint   Patient presents with    6 Month Follow-Up     [facility] 8/31/18    Coronary Artery Disease    Hypertension    Weight Gain     DISCUSS ADIPEX       10-17-14: Patient presents for initial medical evaluation. Patient is followed on a regular basis by Dr. Jose Byrnes MD. [facility]IL[facility]'s [facility] office) Previous [facility] pt. Hx of mild CAD s/p LHC in 4/2012. Echo in 3/2012 with EF of 55-60%, mild LVH, grade II DD. Pt denies   nausea, vomiting, diarrhea, constipation, motor weakness, insomnia, weight loss, syncope, dizziness, lightheadedness, palpitations, PND, orthopnea, or claudication. Admits to \"real bad\" CP last week, feels like stretching pain in midsternal area, associated with SOB. Gets fatigued easily. Denies any GERD type symptoms. Compliant with CPAP machine. Does have dyspnea with exertion. Does have some LE edema. 4-17-15:  As above, diagnosed with left breast cancer, s/p lumpectomy, chemo, will undergo radiation, following with dr. Tino Lainez. Does have SOB at baseline, has not lost weight. Compliant with meds. Did have PUD/bleeding ulcers likely from chemotherapy, s/p EGD with DR. Rome Bahena. Pt denies chest pain,nausea, vomiting, diarrhea, constipation, motor weakness, insomnia, weight loss, syncope, dizziness, lightheadedness, palpitations, PND, orthopnea, or claudication. Does have HA on a daily basis. Compliant with CPAP. No LE edema. 5-1-15: as above, s/p Echo with EF of 60%, no valve abn, no effusion, normal diastology. Undergoing radiation to left breast. Compliant with meds. No bleeding issues. 11-20-15: as above, Pt denies chest pain,fatigue,  nausea, vomiting, diarrhea, constipation, motor weakness, insomnia, weight loss, syncope, dizziness, lightheadedness, palpitations, PND, orthopnea, or claudication. Has had a couple of mechanical falls. BP is normal. Compliant with meds. No LE edema. Does have some SOB with exertion. 5-20-16: as above, gave up pop 6 weeks ago.  Pt denies chest pain, dyspnea, dyspnea on exertion, change in exercise capacity, fatigue,  nausea, vomiting, diarrhea, constipation, motor weakness, insomnia, weight loss, syncope, dizziness, lightheadedness, palpitations, PND, orthopnea, or claudication. Compliant with CPAP machine at night. Has lost 3 pounds. BP is good. HR is good. No hospitalizations. NO LE edema. 12-2-16: BP is high today. States has not been taking her meds lately. Pt denies chest pain, dyspnea, dyspnea on exertion, change in exercise capacity, fatigue,  nausea, vomiting, diarrhea, constipation, motor weakness, insomnia, weight loss, syncope, dizziness, lightheadedness, palpitations, PND, orthopnea. No nitro use. BP and hr are good. CAD is stable. No LE discoloration or ulcers. No LE edema. No CHF type symptoms. Lipid profile is normal. Non compliatnt with CPAP at night. Not been able to lose weight. Did have a mechanical fall and fractured ribs. EKG in NSR, no sig. ischemia    6-2-17: does have some ankle edema. Not really compliant with her CPAP machine. Has been taking her meds now. Pt denies chest pain, dyspnea, nausea, vomiting, diarrhea, constipation, motor weakness, insomnia, weight loss, syncope, dizziness, lightheadedness, palpitations, PND, orthopnea. No bleeding issues. No nitro use. BP and hr are good. CAD is stable. No LE discoloration or ulcers. No LE edema. No CHF type symptoms. Lipid profile is normal.   Does have SOB with exertion with walking less than 200 feet. No smoking hx. Does have some varicose veins. Has lost 14 pounds since last visit. 8-31-18: has sinus and OA issues. Somewhat compliant with CPAP. Does have SOB and [place]. Doing ok overall. Pt denies chest pain, dyspnea, dyspnea on exertion, change in exercise capacity, fatigue,  nausea, vomiting, diarrhea, constipation, motor weakness, insomnia, weight loss, syncope, dizziness, lightheadedness, palpitations, PND, orthopnea, or claudication. No nitro use.  BP and hr are good. CAD is stable. No LE discoloration or ulcers. No LE edema. No CHF type symptoms. Lipid profile is normal. No recent hospitalization. No change in meds. EKG with NSR, no ischemia. LDL is normal. HDL is 76. Does have worse left LE edema than right. She is very sensitive about her weight and tearful in office. 9-28-18: on adipex for almost one month now. Tolerating well. Has lost 11 pounds so far. Pt denies chest pain, dyspnea, dyspnea on exertion, change in exercise capacity, fatigue,  nausea, vomiting, diarrhea, constipation, motor weakness, insomnia, weight loss, syncope, dizziness, lightheadedness, palpitations, PND, orthopnea, or claudication. No nitro use. BP and hr are good. CAD is stable. No LE discoloration or ulcers. No LE edema. No CHF type symptoms. Lipid profile is normal. No recent hospitalization. No change in meds. Somewhat compliant with CPAP.     10-26-18: ON aDIPEX. Doing well overall. Has URI now. Eating better overall. Pt denies chest pain, dyspnea, dyspnea on exertion, change in exercise capacity, fatigue,  nausea, vomiting, diarrhea, constipation, motor weakness, insomnia,  syncope, dizziness, lightheadedness, palpitations, PND, orthopnea, or claudication. Has lost 22 pounds in 2 months.     6-7-19: Pt denies chest pain, dyspnea, dyspnea on exertion, change in exercise capacity, fatigue,  nausea, vomiting, diarrhea, constipation, motor weakness, insomnia, weight loss, syncope, dizziness, lightheadedness, palpitations, PND, orthopnea, or claudication. Under some stress. Did put some weight back on. She would like to go back on adipex.      Patient Active Problem List   Diagnosis    Asthma    HA (headache)    CAD (coronary artery disease)    BUNNY (obstructive sleep apnea)    Hypovitaminosis D    Arthritis    Essential hypertension    GERD (gastroesophageal reflux disease)    Diabetes ([facility]       Past Surgical History:   Procedure Laterality Date    BARIATRIC SURGERY phentermine (ADIPEX-P) 37.5 MG tablet Take 1 tablet by mouth every morning (before breakfast) for 30 days. Body mass index is 47.37 kg/m². 30 tablet 0    phentermine (ADIPEX-P) 37.5 MG tablet Take 1 tablet by mouth every morning (before breakfast) for 30 days. 30 tablet 0    methylPREDNISolone (MEDROL, STACIE,) 4 MG tablet Take as directed 1 kit 0    amoxicillin (AMOXIL) 500 MG capsule Take 1 capsule by mouth 3 times daily for 10 days 30 capsule 0    pantoprazole (PROTONIX) 40 MG tablet Take 1 tablet by mouth daily 90 tablet 3    amLODIPine (NORVASC) 10 MG tablet Take 1 tablet by mouth daily 90 tablet 0    albuterol sulfate  (90 Base) MCG/ACT inhaler Inhale 2 puffs into the lungs 4 times daily as needed for Wheezing 3 Inhaler 3    cholestyramine (QUESTRAN) 4 g packet Take 1 packet by mouth 2 times daily 180 packet 5    lisinopril-hydrochlorothiazide (PRINZIDE;ZESTORETIC) 20-12.5 MG per tablet Take 1 tablet by mouth daily 90 tablet 3    albuterol (PROVENTIL) (2.5 MG/3ML) 0.083% nebulizer solution Take 3 mLs by nebulization every 6 hours as needed for Wheezing 300 vial 3    Loperamide HCl (IMODIUM PO) Take by mouth      CPAP Machine MISC        No current facility-administered medications for this visit. Codeine; Imitrex [sumatriptan]; Theophyllines; Diflucan [fluconazole]; Nizoral [ketoconazole]; and Zoloft    Review of Systems:  General ROS: positive for  - fatigue  Psychological ROS: negative  Hematological and Lymphatic ROS: No history of blood clots or bleeding disorder.    Respiratory ROS: positive for - shortness of breath  Cardiovascular ROS: positive for - chest pain, dyspnea on exertion and shortness of breath  Gastrointestinal ROS: no abdominal pain, change in bowel habits, or black or bloody stools  negative  Genito-Urinary ROS: no dysuria, trouble voiding, or hematuria  Musculoskeletal ROS: negative  Neurological ROS: no TIA or stroke symptoms  Dermatological ROS: negative    VITALS:  Blood pressure 136/84, pulse 74, resp. rate 14, weight 259 lb (117.5 kg), SpO2 98 %, not currently breastfeeding. Body mass index is 47.37 kg/m². Physical Examination:  General appearance - alert, well appearing, and in no distress and overweight  Mental status - alert, oriented to person, place, and time  Neck - Neck is supple, no JVD or carotid bruits. No thyromegaly or adenopathy. Chest - clear to auscultation, no wheezes, rales or rhonchi, symmetric air entry  Heart - normal rate, regular rhythm, normal S1, S2, no murmurs, rubs, clicks or gallops  Abdomen - soft, nontender, nondistended, no masses or organomegaly  Neurological - alert, oriented, normal speech, no focal findings or movement disorder noted  Extremities - peripheral pulses normal, no pedal edema, no clubbing or cyanosis  Skin - normal coloration and turgor, no rashes, no suspicious skin lesions noted    ASSESSMENT:     Diagnosis Orders   1. Coronary artery disease involving native coronary artery of native heart without angina pectoris     2. Essential hypertension     3. BMI 45.0-49.9, adult (HCC)  phentermine (ADIPEX-P) 37.5 MG tablet    phentermine (ADIPEX-P) 37.5 MG tablet         PLAN:     Patient will need to continue to follow up with you for their general medical care and return to see me in the office in 6 months    As always, aggressive risk factor modification is strongly recommended. We should adhere to the [address] guidelines for HTN management and the NCEP ATP III guidelines for LDL-C management. Cardiac diet is always recommended with low fat, cholesterol, calories and sodium. Continue medications at current doses. Diet and exercise discussed. Patient was advised and encouraged to check blood pressure at home or at a pharmacy, maintain a logbook, and also call us back if blood pressure are above the target ranges or if it is low. Patient clearly understands and agrees to the instructions. We will need to continue to monitor muscle and liver enzymes, BUN, CR, and electrolytes. CPAP q hs. adipex for 3 months. Details of medical condition explained and patient was warned about adverse consequences of uncontrolled medical conditions and possible side effects of prescribed medications. Patient was advised to go to the ER if he starts experiencing adverse effects of the medications. patient was instructed to call us back or go to nearby emergency room immediately if symptoms get worse or do not improve. Thank you for allowing me to participate in the care of your patient, please don't hesitate to contact me if you have any further questions.

## 2019-06-08 DIAGNOSIS — E55.9 HYPOVITAMINOSIS D: ICD-10-CM

## 2019-06-08 DIAGNOSIS — I10 ESSENTIAL HYPERTENSION: ICD-10-CM

## 2019-06-08 DIAGNOSIS — E11.9 TYPE 2 DIABETES MELLITUS WITHOUT COMPLICATION, WITHOUT LONG-TERM CURRENT USE OF INSULIN (HCC): ICD-10-CM

## 2019-06-08 LAB
ALBUMIN SERPL-MCNC: 4 G/DL (ref 3.5–4.6)
ALP BLD-CCNC: 125 U/L (ref 40–130)
ALT SERPL-CCNC: 12 U/L (ref 0–33)
ANION GAP SERPL CALCULATED.3IONS-SCNC: 11 MEQ/L (ref 9–15)
AST SERPL-CCNC: 14 U/L (ref 0–35)
BILIRUB SERPL-MCNC: 0.8 MG/DL (ref 0.2–0.7)
BUN BLDV-MCNC: 11 MG/DL (ref 8–23)
CALCIUM SERPL-MCNC: 8.7 MG/DL (ref 8.5–9.9)
CHLORIDE BLD-SCNC: 102 MEQ/L (ref 95–107)
CO2: 28 MEQ/L (ref 20–31)
CREAT SERPL-MCNC: 0.41 MG/DL (ref 0.5–0.9)
CREATININE URINE: 139.9 MG/DL
GFR AFRICAN AMERICAN: >60
GFR NON-AFRICAN AMERICAN: >60
GLOBULIN: 2.8 G/DL (ref 2.3–3.5)
GLUCOSE BLD-MCNC: 153 MG/DL (ref 70–99)
HBA1C MFR BLD: 6 % (ref 4.8–5.9)
MICROALBUMIN UR-MCNC: <1.2 MG/DL
MICROALBUMIN/CREAT UR-RTO: NORMAL MG/G (ref 0–30)
POTASSIUM SERPL-SCNC: 3.3 MEQ/L (ref 3.4–4.9)
SODIUM BLD-SCNC: 141 MEQ/L (ref 135–144)
TOTAL PROTEIN: 6.8 G/DL (ref 6.3–8)
VITAMIN D 25-HYDROXY: 8.5 NG/ML (ref 30–100)

## 2019-06-09 DIAGNOSIS — E55.9 HYPOVITAMINOSIS D: ICD-10-CM

## 2019-06-09 DIAGNOSIS — I10 ESSENTIAL HYPERTENSION: Primary | ICD-10-CM

## 2019-06-09 RX ORDER — ERGOCALCIFEROL 1.25 MG/1
50000 CAPSULE ORAL WEEKLY
Qty: 12 CAPSULE | Refills: 1 | Status: SHIPPED | OUTPATIENT
Start: 2019-06-09 | End: 2019-06-10 | Stop reason: SDUPTHER

## 2019-06-09 RX ORDER — POTASSIUM CHLORIDE 750 MG/1
10 TABLET, FILM COATED, EXTENDED RELEASE ORAL DAILY
Qty: 90 TABLET | Refills: 3 | Status: SHIPPED | OUTPATIENT
Start: 2019-06-09 | End: 2019-06-10 | Stop reason: SDUPTHER

## 2019-06-10 DIAGNOSIS — E55.9 HYPOVITAMINOSIS D: Primary | ICD-10-CM

## 2019-06-10 DIAGNOSIS — E87.6 LOW SERUM POTASSIUM: ICD-10-CM

## 2019-06-10 RX ORDER — ERGOCALCIFEROL 1.25 MG/1
50000 CAPSULE ORAL WEEKLY
Qty: 12 CAPSULE | Refills: 1 | Status: SHIPPED | OUTPATIENT
Start: 2019-06-10 | End: 2019-12-03

## 2019-06-10 RX ORDER — POTASSIUM CHLORIDE 750 MG/1
10 TABLET, FILM COATED, EXTENDED RELEASE ORAL DAILY
Qty: 90 TABLET | Refills: 3 | Status: SHIPPED | OUTPATIENT
Start: 2019-06-10 | End: 2019-12-03

## 2019-12-03 ENCOUNTER — OFFICE VISIT (OUTPATIENT)
Dept: FAMILY MEDICINE CLINIC | Age: 64
End: 2019-12-03
Payer: COMMERCIAL

## 2019-12-03 VITALS
WEIGHT: 260 LBS | DIASTOLIC BLOOD PRESSURE: 78 MMHG | OXYGEN SATURATION: 98 % | SYSTOLIC BLOOD PRESSURE: 122 MMHG | HEART RATE: 68 BPM | TEMPERATURE: 97.8 F | RESPIRATION RATE: 22 BRPM | HEIGHT: 62 IN | BODY MASS INDEX: 47.84 KG/M2

## 2019-12-03 DIAGNOSIS — I10 ESSENTIAL HYPERTENSION: Primary | ICD-10-CM

## 2019-12-03 DIAGNOSIS — E55.9 HYPOVITAMINOSIS D: ICD-10-CM

## 2019-12-03 DIAGNOSIS — J01.91 ACUTE RECURRENT SINUSITIS, UNSPECIFIED LOCATION: ICD-10-CM

## 2019-12-03 DIAGNOSIS — Z12.31 VISIT FOR SCREENING MAMMOGRAM: ICD-10-CM

## 2019-12-03 DIAGNOSIS — L20.84 INTRINSIC ECZEMA: ICD-10-CM

## 2019-12-03 DIAGNOSIS — J45.41 MODERATE PERSISTENT ASTHMA WITH ACUTE EXACERBATION: ICD-10-CM

## 2019-12-03 DIAGNOSIS — J01.01 ACUTE RECURRENT MAXILLARY SINUSITIS: ICD-10-CM

## 2019-12-03 PROCEDURE — 99214 OFFICE O/P EST MOD 30 MIN: CPT | Performed by: FAMILY MEDICINE

## 2019-12-03 RX ORDER — METHYLPREDNISOLONE 4 MG/1
TABLET ORAL
Qty: 1 KIT | Refills: 0 | Status: SHIPPED | OUTPATIENT
Start: 2019-12-03 | End: 2020-03-21 | Stop reason: ALTCHOICE

## 2019-12-03 RX ORDER — ERGOCALCIFEROL 1.25 MG/1
50000 CAPSULE ORAL WEEKLY
Qty: 12 CAPSULE | Refills: 3 | Status: SHIPPED | OUTPATIENT
Start: 2019-12-03 | End: 2020-04-29

## 2019-12-03 RX ORDER — TRIAMCINOLONE ACETONIDE 0.25 MG/G
CREAM TOPICAL
Qty: 454 G | Refills: 1 | Status: SHIPPED | OUTPATIENT
Start: 2019-12-03 | End: 2020-06-09 | Stop reason: ALTCHOICE

## 2019-12-03 RX ORDER — AZITHROMYCIN 250 MG/1
TABLET, FILM COATED ORAL
Qty: 1 PACKET | Refills: 0 | Status: SHIPPED | OUTPATIENT
Start: 2019-12-03 | End: 2019-12-13

## 2020-01-29 ENCOUNTER — HOSPITAL ENCOUNTER (OUTPATIENT)
Dept: WOMENS IMAGING | Age: 65
Discharge: HOME | End: 2020-01-31
Payer: COMMERCIAL

## 2020-01-29 ENCOUNTER — APPOINTMENT (OUTPATIENT)
Dept: ULTRASOUND IMAGING | Age: 65
End: 2020-01-29
Payer: COMMERCIAL

## 2020-01-29 PROCEDURE — 77063 BREAST TOMOSYNTHESIS BI: CPT

## 2020-03-21 ENCOUNTER — OFFICE VISIT (OUTPATIENT)
Dept: PRIMARY CARE CLINIC | Age: 65
End: 2020-03-21
Payer: COMMERCIAL

## 2020-03-21 VITALS
HEART RATE: 60 BPM | TEMPERATURE: 97 F | BODY MASS INDEX: 47.55 KG/M2 | DIASTOLIC BLOOD PRESSURE: 88 MMHG | WEIGHT: 260 LBS | SYSTOLIC BLOOD PRESSURE: 138 MMHG

## 2020-03-21 PROCEDURE — 87804 INFLUENZA ASSAY W/OPTIC: CPT | Performed by: PHYSICIAN ASSISTANT

## 2020-03-21 PROCEDURE — 99213 OFFICE O/P EST LOW 20 MIN: CPT | Performed by: PHYSICIAN ASSISTANT

## 2020-03-21 RX ORDER — AZITHROMYCIN 250 MG/1
TABLET, FILM COATED ORAL
Qty: 1 PACKET | Refills: 0 | Status: SHIPPED | OUTPATIENT
Start: 2020-03-21 | End: 2020-04-29 | Stop reason: ALTCHOICE

## 2020-03-21 RX ORDER — METHYLPREDNISOLONE 4 MG/1
TABLET ORAL
Qty: 1 KIT | Refills: 0 | Status: SHIPPED | OUTPATIENT
Start: 2020-03-21 | End: 2020-04-29 | Stop reason: ALTCHOICE

## 2020-03-21 RX ORDER — BENZONATATE 100 MG/1
100-200 CAPSULE ORAL 3 TIMES DAILY PRN
Qty: 40 CAPSULE | Refills: 0 | Status: SHIPPED | OUTPATIENT
Start: 2020-03-21 | End: 2020-04-29 | Stop reason: ALTCHOICE

## 2020-03-21 NOTE — PROGRESS NOTES
Status: She is alert. Assessment/Plan:  1. Cough  - POCT Influenza B  - benzonatate (TESSALON) 100 MG capsule; Take 1-2 capsules by mouth 3 times daily as needed for Cough  Dispense: 40 capsule; Refill: 0    2. Acute URI  - azithromycin (ZITHROMAX) 250 MG tablet; Take 2 tabs (500 mg) on Day 1, and take 1 tab (250 mg) on days 2 through 5. Dispense: 1 packet; Refill: 0    3. Mild intermittent asthma, unspecified whether complicated  - methylPREDNISolone (MEDROL, STACIE,) 4 MG tablet; Take by mouth. Dispense: 1 kit; Refill: [address], [place]  3/21/20     This visit was provided as a focused evaluation during the COVID -19 pandemic/national emergency. A comprehensive review of all previous patient history and testing was not conducted. Pertinent findings were elicited during the visit.

## 2020-04-29 ENCOUNTER — OFFICE VISIT (OUTPATIENT)
Dept: FAMILY MEDICINE CLINIC | Age: 65
End: 2020-04-29
Payer: COMMERCIAL

## 2020-04-29 VITALS
WEIGHT: 264 LBS | RESPIRATION RATE: 16 BRPM | BODY MASS INDEX: 48.58 KG/M2 | SYSTOLIC BLOOD PRESSURE: 136 MMHG | TEMPERATURE: 98.1 F | HEIGHT: 62 IN | HEART RATE: 83 BPM | DIASTOLIC BLOOD PRESSURE: 84 MMHG | OXYGEN SATURATION: 98 %

## 2020-04-29 LAB — HBA1C MFR BLD: 7 %

## 2020-04-29 PROCEDURE — 99214 OFFICE O/P EST MOD 30 MIN: CPT | Performed by: FAMILY MEDICINE

## 2020-04-29 PROCEDURE — 3051F HG A1C>EQUAL 7.0%<8.0%: CPT | Performed by: FAMILY MEDICINE

## 2020-04-29 PROCEDURE — 83036 HEMOGLOBIN GLYCOSYLATED A1C: CPT | Performed by: FAMILY MEDICINE

## 2020-04-29 RX ORDER — CLINDAMYCIN HYDROCHLORIDE 300 MG/1
300 CAPSULE ORAL 3 TIMES DAILY
Qty: 30 CAPSULE | Refills: 0 | Status: SHIPPED | OUTPATIENT
Start: 2020-04-29 | End: 2020-05-09

## 2020-04-29 RX ORDER — BENZONATATE 200 MG/1
200 CAPSULE ORAL 3 TIMES DAILY PRN
Qty: 30 CAPSULE | Refills: 3 | Status: SHIPPED | OUTPATIENT
Start: 2020-04-29 | End: 2020-05-09

## 2020-04-29 RX ORDER — PREDNISONE 10 MG/1
TABLET ORAL
Qty: 30 TABLET | Refills: 0 | Status: SHIPPED | OUTPATIENT
Start: 2020-04-29 | End: 2020-06-09 | Stop reason: ALTCHOICE

## 2020-04-29 ASSESSMENT — PATIENT HEALTH QUESTIONNAIRE - PHQ9
SUM OF ALL RESPONSES TO PHQ9 QUESTIONS 1 & 2: 0
2. FEELING DOWN, DEPRESSED OR HOPELESS: 0
SUM OF ALL RESPONSES TO PHQ QUESTIONS 1-9: 0
1. LITTLE INTEREST OR PLEASURE IN DOING THINGS: 0
SUM OF ALL RESPONSES TO PHQ QUESTIONS 1-9: 0

## 2020-04-29 NOTE — PROGRESS NOTES
reviewed and updated as appropriate. PHYSICAL EXAM   General appearance: Alert oriented pleasant cooperative no acute distress. HEENT: Eyes clear nonicteric facial muscles symmetrical.  Color good ears TMs and canals normal oropharynx clear  Neck: Soft nontender no adenopathy no masses  Lungs: Clear to auscultation she does have decreased chest wall excursion no wheezes rhonchi or rales no diminished breath sounds  Heart: Regular rate and rhythm without murmurs rubs or gallops  Extremities: No rashes or edema grossly neurologically intact. Assessment:   Diagnosis Orders   1. Moderate persistent asthma with acute exacerbation  Respiratory Therapy Supplies (FULL KIT NEBULIZER SET) MISC    clindamycin (CLEOCIN) 300 MG capsule    benzonatate (TESSALON) 200 MG capsule    predniSONE (DELTASONE) 10 MG tablet   2. Essential hypertension     3. Hypovitaminosis D  Vitamin D 25 Hydroxy   4. Screening for hypercholesterolemia  Lipid, Fasting    Comprehensive Metabolic Panel   5. Need for prophylactic vaccination and inoculation against varicella  zoster recombinant adjuvanted vaccine (SHINGRIX) 50 MCG/0.5ML SUSR injection   6. Type 2 diabetes mellitus without complication, without long-term current use of insulin ([facility])  POCT glycosylated hemoglobin (Hb A1C)               Plan:  Current Outpatient Medications   Medication Sig Dispense Refill    zoster recombinant adjuvanted vaccine (SHINGRIX) 50 MCG/0.5ML SUSR injection Inject 0.5 mLs into the muscle once for 1 dose 50 MCG IM then repeat 2-6 months.  1 each 1    Respiratory Therapy Supplies (FULL KIT NEBULIZER SET) MISC 1 Units by Does not apply route 4 times daily 1 each 1    clindamycin (CLEOCIN) 300 MG capsule Take 1 capsule by mouth 3 times daily for 10 days 30 capsule 0    benzonatate (TESSALON) 200 MG capsule Take 1 capsule by mouth 3 times daily as needed for Cough 30 capsule 3    predniSONE (DELTASONE) 10 MG tablet 4 PO each day for three days, 3 PO each

## 2020-05-14 ENCOUNTER — TELEPHONE (OUTPATIENT)
Dept: FAMILY MEDICINE CLINIC | Age: 65
End: 2020-05-14

## 2020-06-06 DIAGNOSIS — E55.9 HYPOVITAMINOSIS D: ICD-10-CM

## 2020-06-06 DIAGNOSIS — Z13.220 SCREENING FOR HYPERCHOLESTEROLEMIA: ICD-10-CM

## 2020-06-06 LAB
ALBUMIN SERPL-MCNC: 3.9 G/DL (ref 3.5–4.6)
ALP BLD-CCNC: 133 U/L (ref 40–130)
ALT SERPL-CCNC: 18 U/L (ref 0–33)
ANION GAP SERPL CALCULATED.3IONS-SCNC: 10 MEQ/L (ref 9–15)
AST SERPL-CCNC: 18 U/L (ref 0–35)
BILIRUB SERPL-MCNC: 0.8 MG/DL (ref 0.2–0.7)
BUN BLDV-MCNC: 8 MG/DL (ref 8–23)
CALCIUM SERPL-MCNC: 8.9 MG/DL (ref 8.5–9.9)
CHLORIDE BLD-SCNC: 103 MEQ/L (ref 95–107)
CHOLESTEROL, FASTING: 167 MG/DL (ref 0–199)
CO2: 29 MEQ/L (ref 20–31)
CREAT SERPL-MCNC: 0.52 MG/DL (ref 0.5–0.9)
GFR AFRICAN AMERICAN: >60
GFR NON-AFRICAN AMERICAN: >60
GLOBULIN: 2.5 G/DL (ref 2.3–3.5)
GLUCOSE BLD-MCNC: 186 MG/DL (ref 70–99)
HDLC SERPL-MCNC: 70 MG/DL (ref 40–59)
LDL CHOLESTEROL CALCULATED: 79 MG/DL (ref 0–129)
POTASSIUM SERPL-SCNC: 3.4 MEQ/L (ref 3.4–4.9)
SODIUM BLD-SCNC: 142 MEQ/L (ref 135–144)
TOTAL PROTEIN: 6.4 G/DL (ref 6.3–8)
TRIGLYCERIDE, FASTING: 90 MG/DL (ref 0–150)
VITAMIN D 25-HYDROXY: 9.5 NG/ML (ref 30–100)

## 2020-06-08 RX ORDER — ERGOCALCIFEROL 1.25 MG/1
50000 CAPSULE ORAL
Qty: 24 CAPSULE | Refills: 2 | Status: SHIPPED | OUTPATIENT
Start: 2020-06-08 | End: 2021-03-30

## 2020-06-09 ENCOUNTER — OFFICE VISIT (OUTPATIENT)
Dept: FAMILY MEDICINE CLINIC | Age: 65
End: 2020-06-09
Payer: COMMERCIAL

## 2020-06-09 VITALS
HEART RATE: 72 BPM | RESPIRATION RATE: 16 BRPM | TEMPERATURE: 97.8 F | WEIGHT: 262 LBS | BODY MASS INDEX: 48.21 KG/M2 | HEIGHT: 62 IN | OXYGEN SATURATION: 98 % | DIASTOLIC BLOOD PRESSURE: 72 MMHG | SYSTOLIC BLOOD PRESSURE: 124 MMHG

## 2020-06-09 PROCEDURE — 3051F HG A1C>EQUAL 7.0%<8.0%: CPT | Performed by: FAMILY MEDICINE

## 2020-06-09 PROCEDURE — 99214 OFFICE O/P EST MOD 30 MIN: CPT | Performed by: FAMILY MEDICINE

## 2020-06-09 RX ORDER — PANTOPRAZOLE SODIUM 40 MG/1
40 TABLET, DELAYED RELEASE ORAL DAILY
Qty: 90 TABLET | Refills: 3 | Status: SHIPPED | OUTPATIENT
Start: 2020-06-09 | End: 2021-06-22

## 2020-06-09 RX ORDER — AMLODIPINE BESYLATE 10 MG/1
10 TABLET ORAL DAILY
Qty: 90 TABLET | Refills: 3 | Status: SHIPPED | OUTPATIENT
Start: 2020-06-09 | End: 2021-06-22

## 2020-06-09 NOTE — PROGRESS NOTES
over her lab work. Her labs were good except for the fact that she is now frankly diabetic with a high hemoglobin A1c. If her insurance will pay for she will go to diabetic education. Also she has been feeling very breathy of late. She has no cough no fever no chills she said some postnasal drip and a headache from the allergies and no but is sneezing and watery eyes. She does not feel sick    Review of Systems    Constitutional: positive for fatigue, negative for fever and sweats. HEENT: Negative for eye discharge and vision loss. Negative for ear drainage, hearing loss and positive for nasal drainage. Respiratory: Negative for cough and wheezing. Positive for dyspnea. Cardiovascular:  Negative for chest pain, claudication and irregular heartbeat/palpitations. Gastrointestinal: Negative for abdominal pain, nausea, constipation and diarrhea. Genitourinary: Negative for dysuria, patient had a hysterectomy. Metabolic/Endocrine: Negative for cold intolerance, heat intolerance, polydipsia and polyphagia. No unintended weight loss or weight gain. Neuro/Psychiatric: Negative for gait disturbance. Negative for psychiatric symptoms. Dermatologic: Negative for pruritus and rash. Musculoskeletal: positive for bone/joint symptoms. No numbness or tingling. No loss of function. Hematology: Negative for bleeding and easy bruising. Immunology:  Negative for environmental allergies and food allergies. Physical Exam    Patient's medication, allergies, past medical, surgical, social and family histories were reviewed and updated as appropriate. PHYSICAL EXAM   General appearance: Alert oriented pleasant cooperative no acute distress. Lungs: Clear to auscultation today no wheezes rhonchi or rales  Heart: Regular rate and rhythm without murmurs rubs or gallops          Assessment:   Diagnosis Orders   1. Senile osteoporosis  DEXA Bone Density Axial Skeleton   2.  Type 2 diabetes mellitus without complication,

## 2020-07-18 ENCOUNTER — HOSPITAL ENCOUNTER (EMERGENCY)
Age: 65
Discharge: HOME | End: 2020-07-18
Payer: COMMERCIAL

## 2020-07-18 ENCOUNTER — APPOINTMENT (OUTPATIENT)
Dept: CT IMAGING | Age: 65
End: 2020-07-18
Payer: COMMERCIAL

## 2020-07-18 ENCOUNTER — NURSE TRIAGE (OUTPATIENT)
Dept: OTHER | Facility: CLINIC | Age: 65
End: 2020-07-18

## 2020-07-18 VITALS
OXYGEN SATURATION: 98 % | HEART RATE: 88 BPM | DIASTOLIC BLOOD PRESSURE: 73 MMHG | RESPIRATION RATE: 16 BRPM | BODY MASS INDEX: 48.21 KG/M2 | SYSTOLIC BLOOD PRESSURE: 119 MMHG | WEIGHT: 262 LBS | TEMPERATURE: 99.2 F | HEIGHT: 62 IN

## 2020-07-18 LAB
ALBUMIN SERPL-MCNC: 3.6 G/DL (ref 3.5–4.6)
ALP BLD-CCNC: 232 U/L (ref 40–130)
ALT SERPL-CCNC: 67 U/L (ref 0–33)
ANION GAP SERPL CALCULATED.3IONS-SCNC: 14 MEQ/L (ref 9–15)
AST SERPL-CCNC: 38 U/L (ref 0–35)
BACTERIA: ABNORMAL /HPF
BASOPHILS ABSOLUTE: 0 K/UL (ref 0–0.2)
BASOPHILS RELATIVE PERCENT: 0.7 %
BILIRUB SERPL-MCNC: 1.1 MG/DL (ref 0.2–0.7)
BILIRUBIN URINE: NEGATIVE
BLOOD, URINE: NEGATIVE
BUN BLDV-MCNC: 13 MG/DL (ref 8–23)
CALCIUM SERPL-MCNC: 8.3 MG/DL (ref 8.5–9.9)
CHLORIDE BLD-SCNC: 99 MEQ/L (ref 95–107)
CLARITY: ABNORMAL
CO2: 25 MEQ/L (ref 20–31)
COLOR: ABNORMAL
CREAT SERPL-MCNC: 0.56 MG/DL (ref 0.5–0.9)
EOSINOPHILS ABSOLUTE: 0.2 K/UL (ref 0–0.7)
EOSINOPHILS RELATIVE PERCENT: 3 %
EPITHELIAL CELLS, UA: ABNORMAL /HPF (ref 0–5)
GFR AFRICAN AMERICAN: >60
GFR NON-AFRICAN AMERICAN: >60
GLOBULIN: 2.8 G/DL (ref 2.3–3.5)
GLUCOSE BLD-MCNC: 110 MG/DL (ref 70–99)
GLUCOSE URINE: NEGATIVE MG/DL
HCT VFR BLD CALC: 36.7 % (ref 37–47)
HEMOGLOBIN: 12.3 G/DL (ref 12–16)
HYALINE CASTS: ABNORMAL /HPF (ref 0–5)
KETONES, URINE: >=80 MG/DL
LEUKOCYTE ESTERASE, URINE: ABNORMAL
LIPASE: 8 U/L (ref 12–95)
LYMPHOCYTES ABSOLUTE: 0.5 K/UL (ref 1–4.8)
LYMPHOCYTES RELATIVE PERCENT: 8.7 %
MCH RBC QN AUTO: 28.4 PG (ref 27–31.3)
MCHC RBC AUTO-ENTMCNC: 33.5 % (ref 33–37)
MCV RBC AUTO: 84.7 FL (ref 82–100)
MONOCYTES ABSOLUTE: 0.5 K/UL (ref 0.2–0.8)
MONOCYTES RELATIVE PERCENT: 7.6 %
MUCUS: PRESENT /LPF
NEUTROPHILS ABSOLUTE: 5 K/UL (ref 1.4–6.5)
NEUTROPHILS RELATIVE PERCENT: 80 %
NITRITE, URINE: NEGATIVE
PDW BLD-RTO: 13.5 % (ref 11.5–14.5)
PH UA: 6 (ref 5–9)
PLATELET # BLD: 186 K/UL (ref 130–400)
POC CREATININE WHOLE BLOOD: 0.6
POTASSIUM SERPL-SCNC: 2.9 MEQ/L (ref 3.4–4.9)
PROTEIN UA: ABNORMAL MG/DL
RBC # BLD: 4.33 M/UL (ref 4.2–5.4)
RBC UA: ABNORMAL /HPF (ref 0–2)
SODIUM BLD-SCNC: 138 MEQ/L (ref 135–144)
SPECIFIC GRAVITY UA: 1.02 (ref 1–1.03)
TOTAL PROTEIN: 6.4 G/DL (ref 6.3–8)
URINE REFLEX TO CULTURE: YES
UROBILINOGEN, URINE: 2 E.U./DL
WBC # BLD: 6.3 K/UL (ref 4.8–10.8)
WBC UA: ABNORMAL /HPF (ref 0–5)

## 2020-07-18 PROCEDURE — 99284 EMERGENCY DEPT VISIT MOD MDM: CPT

## 2020-07-18 PROCEDURE — 83690 ASSAY OF LIPASE: CPT

## 2020-07-18 PROCEDURE — 36415 COLL VENOUS BLD VENIPUNCTURE: CPT

## 2020-07-18 PROCEDURE — 6370000000 HC RX 637 (ALT 250 FOR IP): Performed by: NURSE PRACTITIONER

## 2020-07-18 PROCEDURE — 85025 COMPLETE CBC W/AUTO DIFF WBC: CPT

## 2020-07-18 PROCEDURE — 80053 COMPREHEN METABOLIC PANEL: CPT

## 2020-07-18 PROCEDURE — 6360000004 HC RX CONTRAST MEDICATION: Performed by: NURSE PRACTITIONER

## 2020-07-18 PROCEDURE — U0003 INFECTIOUS AGENT DETECTION BY NUCLEIC ACID (DNA OR RNA); SEVERE ACUTE RESPIRATORY SYNDROME CORONAVIRUS 2 (SARS-COV-2) (CORONAVIRUS DISEASE [COVID-19]), AMPLIFIED PROBE TECHNIQUE, MAKING USE OF HIGH THROUGHPUT TECHNOLOGIES AS DESCRIBED BY CMS-2020-01-R: HCPCS

## 2020-07-18 PROCEDURE — 2580000003 HC RX 258: Performed by: NURSE PRACTITIONER

## 2020-07-18 PROCEDURE — 74177 CT ABD & PELVIS W/CONTRAST: CPT

## 2020-07-18 PROCEDURE — 81001 URINALYSIS AUTO W/SCOPE: CPT

## 2020-07-18 PROCEDURE — 87086 URINE CULTURE/COLONY COUNT: CPT

## 2020-07-18 RX ORDER — 0.9 % SODIUM CHLORIDE 0.9 %
1000 INTRAVENOUS SOLUTION INTRAVENOUS ONCE
Status: COMPLETED | OUTPATIENT
Start: 2020-07-18 | End: 2020-07-18

## 2020-07-18 RX ORDER — POTASSIUM CHLORIDE 20 MEQ/1
40 TABLET, EXTENDED RELEASE ORAL ONCE
Status: COMPLETED | OUTPATIENT
Start: 2020-07-18 | End: 2020-07-18

## 2020-07-18 RX ORDER — CIPROFLOXACIN 500 MG/1
500 TABLET, FILM COATED ORAL 2 TIMES DAILY
Qty: 10 TABLET | Refills: 0 | Status: SHIPPED | OUTPATIENT
Start: 2020-07-18 | End: 2020-07-23

## 2020-07-18 RX ADMIN — SODIUM CHLORIDE 1000 ML: 9 INJECTION, SOLUTION INTRAVENOUS at 15:44

## 2020-07-18 RX ADMIN — IOPAMIDOL 100 ML: 612 INJECTION, SOLUTION INTRAVENOUS at 15:12

## 2020-07-18 RX ADMIN — POTASSIUM CHLORIDE 40 MEQ: 1500 TABLET, EXTENDED RELEASE ORAL at 16:49

## 2020-07-18 ASSESSMENT — ENCOUNTER SYMPTOMS
DIARRHEA: 1
BACK PAIN: 0
ABDOMINAL PAIN: 0
SHORTNESS OF BREATH: 0
NAUSEA: 0
SORE THROAT: 0
VOMITING: 0
COUGH: 0
TROUBLE SWALLOWING: 0

## 2020-07-18 NOTE — ED TRIAGE NOTES
Pt presents to the Er with complaints of chills, fever, pain, shortness of breath, and diarrhea. Patient spoke with Doctor and hotline today who told patient tot come to the Er and be tested for Covid. Respirations even and unlabored.

## 2020-07-18 NOTE — TELEPHONE ENCOUNTER
Reason for Disposition   HIGH RISK patient (e.g., age > 59 years, diabetes, heart or lung disease, weak immune system)    Answer Assessment - Initial Assessment Questions  1. COVID-19 DIAGNOSIS: \"Who made your Coronavirus (COVID-19) diagnosis? \" \"Was it confirmed by a positive lab test?\" If not diagnosed by a HCP, ask \"Are there lots of cases (community spread) where you live? \" (See [facility] health department website, if unsure)      [facility]  2. ONSET: \"When did the COVID-19 symptoms start? \"       7/16/2020  3. WORST SYMPTOM: \"What is your worst symptom? \" (e.g., cough, fever, shortness of breath, muscle aches)      Fever, abdominal pain  4. COUGH: \"Do you have a cough? \" If so, ask: \"How bad is the cough? \"        Cough, minor  5. FEVER: \"Do you have a fever? \" If so, ask: \"What is your temperature, how was it measured, and when did it start? \"      Yes, 100.5 - last night 100.0 this am  6. RESPIRATORY STATUS: \"Describe your breathing? \" (e.g., shortness of breath, wheezing, unable to speak)       SOB but I have asthma and this is normal for me  7. BETTER-SAME-WORSE: [place] you getting better, staying the same or getting worse compared to yesterday? \"  If getting worse, ask, \"In what way? \"      Better   8. HIGH RISK DISEASE: \"Do you have any chronic medical problems? \" (e.g., asthma, heart or lung disease, weak immune system, etc.)      Asthma, CAD  9. PREGNANCY: \"Is there any chance you are pregnant? \" \"When was your last menstrual period? \"      n/a  10. OTHER SYMPTOMS: \"Do you have any other symptoms? \"  (e.g., chills, fatigue, headache, loss of smell or taste, muscle pain, sore throat)        Chills, fever, vertigo, diarrhea, abdominal pain, joint, muscle pain, headaches, SOB- normal, fatigue and cough.     Protocols used: CORONAVIRUS (COVID-19) DIAGNOSED OR SUSPECTED-ADULT-    Caller was exposed 7/6/2020 and started with s/s Chills, fever, vertigo, diarrhea, abdominal pain, joint, muscle pain, headaches,

## 2020-07-18 NOTE — ED PROVIDER NOTES
LYMPH NODE BIOPSY Left 11/18/14    ANDREW AND BSO      Dr. Maxwell Yung    TONSILLECTOMY  2002    TUBAL LIGATION      UPPER GASTROINTESTINAL ENDOSCOPY  2/18/15    w/bx,polypectomy          CURRENT MEDICATIONS       Previous Medications    ALBUTEROL (PROVENTIL) (2.5 MG/3ML) 0.083% NEBULIZER SOLUTION    Take 3 mLs by nebulization every 6 hours as needed for Wheezing    ALBUTEROL SULFATE  (90 BASE) MCG/ACT INHALER    Inhale 2 puffs into the lungs 4 times daily as needed for Wheezing    AMLODIPINE (NORVASC) 10 MG TABLET    Take 1 tablet by mouth daily    BUDESONIDE (PULMICORT FLEXHALER) 90 MCG/ACT AEPB INHALER    Inhale 2 puffs into the lungs 2 times daily    CHOLESTYRAMINE (QUESTRAN) 4 G PACKET    Take 1 packet by mouth 2 times daily    CPAP MACHINE MISC        LISINOPRIL-HYDROCHLOROTHIAZIDE (PRINZIDE;ZESTORETIC) 20-12.5 MG PER TABLET    Take 1 tablet by mouth daily    LOPERAMIDE HCL (IMODIUM PO)    Take by mouth    METFORMIN (GLUCOPHAGE) 500 MG TABLET    Take 1 tablet by mouth 2 times daily (with meals)    PANTOPRAZOLE (PROTONIX) 40 MG TABLET    Take 1 tablet by mouth daily    RESPIRATORY THERAPY SUPPLIES (FULL KIT NEBULIZER SET) MISC    1 Units by Does not apply route 4 times daily    VITAMIN D (ERGOCALCIFEROL) 1.25 MG (66266 UT) CAPS CAPSULE    Take 1 capsule by mouth twice a week       ALLERGIES     Codeine; Imitrex [sumatriptan]; Theophyllines; Diflucan [fluconazole];  Nizoral [ketoconazole]; and Zoloft    FAMILY HISTORY       Family History   Problem Relation Age of Onset    Cancer Father         melanoma    Prostate Cancer Father     Cancer Sister         Melanoma    Cancer Other         Throat          SOCIAL HISTORY       Social History     Socioeconomic History    Marital status:      Spouse name: None    Number of children: None    Years of education: None    Highest education level: None   Occupational History    None   Social Needs    Financial resource strain: None   [employer] insecurity     Worry: None     Inability: None    Transportation needs     Medical: None     Non-medical: None   Tobacco Use    Smoking status: Never Smoker    Smokeless tobacco: Never Used   Substance and Sexual Activity    Alcohol use: No    Drug use: No    Sexual activity: None   Lifestyle    Physical activity     Days per week: None     Minutes per session: None    Stress: None   Relationships    Social connections     Talks on phone: None     Gets together: None     Attends Pentecostalism service: None     Active member of club or organization: None     Attends meetings of clubs or organizations: None     Relationship status: None    Intimate partner violence     Fear of current or ex partner: None     Emotionally abused: None     Physically abused: None     Forced sexual activity: None   Other Topics Concern    None   Social History Narrative    None       SCREENINGS      @FLOW([id number])@      PHYSICAL EXAM    (up to 7 for level 4, 8 or more for level 5)     ED Triage Vitals [07/18/20 1316]   BP Temp Temp Source Pulse Resp SpO2 Height Weight   111/71 99.2 °F (37.3 °C) Oral 95 20 98 % 5' 2\" (1.575 m) 262 lb (118.8 kg)       Physical Exam  Vitals signs and nursing note reviewed. Constitutional:       Appearance: She is well-developed. HENT:      Head: Normocephalic and atraumatic. Right Ear: Hearing, tympanic membrane, ear canal and external ear normal.      Left Ear: Hearing, tympanic membrane, ear canal and external ear normal.      Nose: Nose normal.      Mouth/Throat:      Lips: Pink. Mouth: Mucous membranes are moist.      Pharynx: Oropharynx is clear. Uvula midline. Eyes:      Conjunctiva/sclera: Conjunctivae normal.      Pupils: Pupils are equal, round, and reactive to light. Neck:      Musculoskeletal: Normal range of motion and neck supple. Cardiovascular:      Rate and Rhythm: Normal rate and regular rhythm.    Pulmonary:      Effort: Pulmonary effort is normal. No accessory muscle usage or respiratory distress. Breath sounds: Normal breath sounds. No decreased air movement. No decreased breath sounds or wheezing. Abdominal:      General: Bowel sounds are normal. There is no distension. Palpations: Abdomen is soft. Tenderness: There is no abdominal tenderness. Musculoskeletal: Normal range of motion. Skin:     General: Skin is warm and dry. Neurological:      Mental Status: She is alert and oriented to person, place, and time. Deep Tendon Reflexes: Reflexes are normal and symmetric. Psychiatric:         Judgment: Judgment normal.           All other labs were within normal range or not returned as of this dictation. EMERGENCY DEPARTMENT COURSE and DIFFERENTIALDIAGNOSIS/MDM:   Vitals:    Vitals:    07/18/20 1316   BP: 111/71   Pulse: 95   Resp: 20   Temp: 99.2 °F (37.3 °C)   TempSrc: Oral   SpO2: 98%   Weight: 262 lb (118.8 kg)   Height: 5' 2\" (1.575 m)            72 yr old female with diarrhea, suspect infectious. Prescription for Cipro was given to the patient. F/U with PCP in 3 days. Patient COVID test is pending. She is to stay in isolation until results are reported as discussed. Patient is comfortable at discharge and verbalizes understanding. PROCEDURES:  Unless otherwise noted below, none     Procedures      FINAL IMPRESSION      1.  Diarrhea of presumed infectious origin          DISPOSITION/PLAN   DISPOSITION Decision To Discharge 07/18/2020 04:09:40 PM          YAKELIN Pryor CNP (electronically signed)  Attending Emergency Physician     YAKELIN Pryor CNP  07/18/20 0825

## 2020-07-20 LAB
GFR AFRICAN AMERICAN: >60
GFR NON-AFRICAN AMERICAN: >60
PERFORMED ON: NORMAL
POC CREATININE: 0.6 MG/DL (ref 0.6–1.2)
POC SAMPLE TYPE: NORMAL
URINE CULTURE, ROUTINE: NORMAL

## 2020-07-22 LAB
SARS-COV-2: NOT DETECTED
SOURCE: NORMAL

## 2020-07-23 ENCOUNTER — VIRTUAL VISIT (OUTPATIENT)
Dept: FAMILY MEDICINE CLINIC | Age: 65
End: 2020-07-23
Payer: COMMERCIAL

## 2020-07-23 PROBLEM — C50.312 MALIGNANT NEOPLASM OF LOWER-INNER QUADRANT OF LEFT BREAST IN FEMALE, ESTROGEN RECEPTOR NEGATIVE (HCC): Status: ACTIVE | Noted: 2020-07-23

## 2020-07-23 PROBLEM — Z17.1 MALIGNANT NEOPLASM OF LOWER-INNER QUADRANT OF LEFT BREAST IN FEMALE, ESTROGEN RECEPTOR NEGATIVE (HCC): Status: ACTIVE | Noted: 2020-07-23

## 2020-07-23 PROCEDURE — 99443 PR PHYS/QHP TELEPHONE EVALUATION 21-30 MIN: CPT | Performed by: FAMILY MEDICINE

## 2020-07-23 RX ORDER — PREDNISONE 1 MG/1
TABLET ORAL
Qty: 30 TABLET | Refills: 0 | Status: ON HOLD | OUTPATIENT
Start: 2020-07-23 | End: 2020-09-24

## 2020-07-23 RX ORDER — AMOXICILLIN 500 MG/1
500 CAPSULE ORAL 3 TIMES DAILY
Qty: 30 CAPSULE | Refills: 0 | Status: SHIPPED | OUTPATIENT
Start: 2020-07-23 | End: 2020-08-02

## 2020-07-23 NOTE — PROGRESS NOTES
Patient: Suki Welsh    YOB: 1955    Date: 7/23/20    No chief complaint on file. Patient Active Problem List    Diagnosis Date Noted    Malignant neoplasm of lower-inner quadrant of left breast in female, estrogen receptor negative ([facility] 07/23/2020    Diabetes ([facility]     GERD (gastroesophageal reflux disease)     Essential hypertension 06/13/2017    Arthritis 03/07/2017    Hypovitaminosis D 09/15/2014    BUNNY (obstructive sleep apnea) 07/25/2012    CAD (coronary artery disease) 04/25/2012    BMI 45.0-49.9, adult ([facility] 03/28/2012    Asthma     HA (headache)        Allergies   Allergen Reactions    Codeine      Cramps    Imitrex [Sumatriptan]     Theophyllines     Diflucan [Fluconazole] Rash    Nizoral [Ketoconazole] Rash    Zoloft Rash           There were no vitals filed for this visit. There is no height or weight on file to calculate BMI. HPI    TELEHEALTH EVALUATION -- Audio/Visual (During [id number] public health emergency        Due to COVID 19 outbreak, patient's office visit was converted to a virtual visit. The patient was identified at the start of the visit. Patient was contacted and agreed to proceed with a virtual visit via Telephone Visit Time spent in visit with management in direct patient care 23 minutes. The risks and benefits of converting to a virtual visit were discussed in light of the current infectious disease epidemic. Patient also understood that insurance coverage and co-pays are up to their individual insurance plans and this is a billable visit. Pursuant to the emergency declaration under the [facility], [facility] waiver authority and the [email or web address] Act, this Virtual  Visit was conducted, with patient's consent, to reduce the patient's risk of exposure to COVID-19 and provide continuity of care for an established patient.     Services were provided through a environmental allergies and food allergies. Physical Exam    Not able to be done as this was a phone visit. Patient was alert, oriented, appropriate, not SOB with talking and not in distress. Assessment:   Diagnosis Orders   1. Elevated liver enzymes  Comprehensive Metabolic Panel   2. BMI 45.0-49.9, adult [facility])   patient continues to want to work on diet   3. Malignant neoplasm of lower-inner quadrant of left breast in female, estrogen receptor negative (HCC)   stable none recurrent   4. Moderate persistent asthma, unspecified whether complicated  predniSONE (DELTASONE) 5 MG tablet   5. Acute recurrent sinusitis, unspecified location  amoxicillin (AMOXIL) 500 MG capsule    predniSONE (DELTASONE) 5 MG tablet   6. Type 2 diabetes mellitus without complication, without long-term current use of insulin ([facility])  Hemoglobin A1C   7. Colitis   she may have a viral colitis that could be why her liver enzymes are up we will can repeat that and see if they have come down.               Plan:  Current Outpatient Medications   Medication Sig Dispense Refill    amoxicillin (AMOXIL) 500 MG capsule Take 1 capsule by mouth 3 times daily for 10 days 30 capsule 0    predniSONE (DELTASONE) 5 MG tablet 4 PO each day for three days, 3 PO each day for three days, 2 PO each day for three days ,1 PO each day until gone 30 tablet 0    ciprofloxacin (CIPRO) 500 MG tablet Take 1 tablet by mouth 2 times daily for 5 days 10 tablet 0    pantoprazole (PROTONIX) 40 MG tablet Take 1 tablet by mouth daily 90 tablet 3    amLODIPine (NORVASC) 10 MG tablet Take 1 tablet by mouth daily 90 tablet 3    metFORMIN (GLUCOPHAGE) 500 MG tablet Take 1 tablet by mouth 2 times daily (with meals) (Patient not taking: Reported on 6/9/2020) 60 tablet 5    vitamin D (ERGOCALCIFEROL) 1.25 MG (61573 UT) CAPS capsule Take 1 capsule by mouth twice a week 24 capsule 2    Respiratory Therapy Supplies (FULL KIT NEBULIZER SET) MISC 1 Units by Does not apply

## 2020-09-23 LAB
EKG ATRIAL RATE: 92 BPM
EKG P-R INTERVAL: 176 MS
EKG Q-T INTERVAL: 400 MS
EKG QRS DURATION: 102 MS
EKG QTC CALCULATION (BAZETT): 494 MS
EKG R AXIS: 210 DEGREES
EKG T AXIS: 153 DEGREES
EKG VENTRICULAR RATE: 92 BPM

## 2020-09-23 PROCEDURE — 93005 ELECTROCARDIOGRAM TRACING: CPT | Performed by: EMERGENCY MEDICINE

## 2020-09-24 ENCOUNTER — APPOINTMENT (OUTPATIENT)
Dept: MRI IMAGING | Age: 65
DRG: 872 | End: 2020-09-24
Payer: COMMERCIAL

## 2020-09-24 ENCOUNTER — APPOINTMENT (OUTPATIENT)
Dept: CT IMAGING | Age: 65
DRG: 872 | End: 2020-09-24
Payer: COMMERCIAL

## 2020-09-24 ENCOUNTER — HOSPITAL ENCOUNTER (INPATIENT)
Age: 65
LOS: 2 days | Discharge: HOME | DRG: 872 | End: 2020-09-26
Attending: EMERGENCY MEDICINE | Admitting: INTERNAL MEDICINE
Payer: COMMERCIAL

## 2020-09-24 ENCOUNTER — NURSE TRIAGE (OUTPATIENT)
Dept: OTHER | Facility: CLINIC | Age: 65
End: 2020-09-24

## 2020-09-24 ENCOUNTER — APPOINTMENT (OUTPATIENT)
Dept: GENERAL RADIOLOGY | Age: 65
DRG: 872 | End: 2020-09-24
Payer: COMMERCIAL

## 2020-09-24 PROBLEM — A41.9 SEPSIS (HCC): Status: ACTIVE | Noted: 2020-09-24

## 2020-09-24 LAB
ALBUMIN SERPL-MCNC: 4 G/DL (ref 3.5–4.6)
ALP BLD-CCNC: 540 U/L (ref 40–130)
ALT SERPL-CCNC: 404 U/L (ref 0–33)
ANION GAP SERPL CALCULATED.3IONS-SCNC: 11 MEQ/L (ref 9–15)
AST SERPL-CCNC: 657 U/L (ref 0–35)
BACTERIA: ABNORMAL /HPF
BASOPHILS ABSOLUTE: 0 K/UL (ref 0–0.2)
BASOPHILS RELATIVE PERCENT: 0.6 %
BILIRUB SERPL-MCNC: 2.7 MG/DL (ref 0.2–0.7)
BILIRUBIN URINE: ABNORMAL
BILIRUBIN URINE: ABNORMAL
BLOOD, URINE: NEGATIVE
BLOOD, URINE: NEGATIVE
BUN BLDV-MCNC: 11 MG/DL (ref 8–23)
CALCIUM SERPL-MCNC: 8.3 MG/DL (ref 8.5–9.9)
CHLORIDE BLD-SCNC: 97 MEQ/L (ref 95–107)
CLARITY: ABNORMAL
CLARITY: CLEAR
CO2: 29 MEQ/L (ref 20–31)
COLOR: ABNORMAL
COLOR: ABNORMAL
CREAT SERPL-MCNC: 0.47 MG/DL (ref 0.5–0.9)
EOSINOPHILS ABSOLUTE: 0.1 K/UL (ref 0–0.7)
EOSINOPHILS RELATIVE PERCENT: 1.3 %
EPITHELIAL CELLS, UA: ABNORMAL /HPF (ref 0–5)
GFR AFRICAN AMERICAN: >60
GFR AFRICAN AMERICAN: >60
GFR NON-AFRICAN AMERICAN: >60
GFR NON-AFRICAN AMERICAN: >60
GLOBULIN: 2.8 G/DL (ref 2.3–3.5)
GLUCOSE BLD-MCNC: 185 MG/DL (ref 70–99)
GLUCOSE URINE: NEGATIVE MG/DL
GLUCOSE URINE: NEGATIVE MG/DL
HCT VFR BLD CALC: 40.3 % (ref 37–47)
HEMOGLOBIN: 13.3 G/DL (ref 12–16)
HYALINE CASTS: ABNORMAL /HPF (ref 0–5)
KETONES, URINE: ABNORMAL MG/DL
KETONES, URINE: NEGATIVE MG/DL
LACTIC ACID, SEPSIS: 0.9 MMOL/L (ref 0.5–1.9)
LACTIC ACID, SEPSIS: 1.9 MMOL/L (ref 0.5–1.9)
LEUKOCYTE ESTERASE, URINE: ABNORMAL
LEUKOCYTE ESTERASE, URINE: NEGATIVE
LIPASE: 8 U/L (ref 12–95)
LYMPHOCYTES ABSOLUTE: 0.3 K/UL (ref 1–4.8)
LYMPHOCYTES RELATIVE PERCENT: 4.5 %
MAGNESIUM: 1.8 MG/DL (ref 1.7–2.4)
MCH RBC QN AUTO: 27.6 PG (ref 27–31.3)
MCHC RBC AUTO-ENTMCNC: 32.9 % (ref 33–37)
MCV RBC AUTO: 83.9 FL (ref 82–100)
MONOCYTES ABSOLUTE: 0.4 K/UL (ref 0.2–0.8)
MONOCYTES RELATIVE PERCENT: 5.9 %
NEUTROPHILS ABSOLUTE: 6.5 K/UL (ref 1.4–6.5)
NEUTROPHILS RELATIVE PERCENT: 87.7 %
NITRITE, URINE: NEGATIVE
NITRITE, URINE: NEGATIVE
PDW BLD-RTO: 13.5 % (ref 11.5–14.5)
PERFORMED ON: NORMAL
PH UA: 7.5 (ref 5–9)
PH UA: 7.5 (ref 5–9)
PLATELET # BLD: 227 K/UL (ref 130–400)
POC CREATININE: 0.6 MG/DL (ref 0.6–1.2)
POC SAMPLE TYPE: NORMAL
POTASSIUM SERPL-SCNC: 3.4 MEQ/L (ref 3.4–4.9)
PROTEIN UA: ABNORMAL MG/DL
PROTEIN UA: ABNORMAL MG/DL
RBC # BLD: 4.8 M/UL (ref 4.2–5.4)
RBC UA: ABNORMAL /HPF (ref 0–2)
SODIUM BLD-SCNC: 137 MEQ/L (ref 135–144)
SPECIFIC GRAVITY UA: 1.02 (ref 1–1.03)
SPECIFIC GRAVITY UA: 1.06 (ref 1–1.03)
TOTAL PROTEIN: 6.8 G/DL (ref 6.3–8)
TROPONIN: <0.01 NG/ML (ref 0–0.01)
UROBILINOGEN, URINE: 2 E.U./DL
UROBILINOGEN, URINE: 4 E.U./DL
WBC # BLD: 7.4 K/UL (ref 4.8–10.8)
WBC UA: ABNORMAL /HPF (ref 0–5)

## 2020-09-24 PROCEDURE — 2580000003 HC RX 258: Performed by: EMERGENCY MEDICINE

## 2020-09-24 PROCEDURE — 83735 ASSAY OF MAGNESIUM: CPT

## 2020-09-24 PROCEDURE — 6370000000 HC RX 637 (ALT 250 FOR IP): Performed by: EMERGENCY MEDICINE

## 2020-09-24 PROCEDURE — 74181 MRI ABDOMEN W/O CONTRAST: CPT

## 2020-09-24 PROCEDURE — 2500000003 HC RX 250 WO HCPCS: Performed by: EMERGENCY MEDICINE

## 2020-09-24 PROCEDURE — 94664 DEMO&/EVAL PT USE INHALER: CPT

## 2020-09-24 PROCEDURE — 6360000004 HC RX CONTRAST MEDICATION: Performed by: EMERGENCY MEDICINE

## 2020-09-24 PROCEDURE — 81001 URINALYSIS AUTO W/SCOPE: CPT

## 2020-09-24 PROCEDURE — 85025 COMPLETE CBC W/AUTO DIFF WBC: CPT

## 2020-09-24 PROCEDURE — 6370000000 HC RX 637 (ALT 250 FOR IP): Performed by: INTERNAL MEDICINE

## 2020-09-24 PROCEDURE — 93010 ELECTROCARDIOGRAM REPORT: CPT | Performed by: INTERNAL MEDICINE

## 2020-09-24 PROCEDURE — 6360000002 HC RX W HCPCS: Performed by: INTERNAL MEDICINE

## 2020-09-24 PROCEDURE — 2580000003 HC RX 258: Performed by: INTERNAL MEDICINE

## 2020-09-24 PROCEDURE — 36415 COLL VENOUS BLD VENIPUNCTURE: CPT

## 2020-09-24 PROCEDURE — 99284 EMERGENCY DEPT VISIT MOD MDM: CPT

## 2020-09-24 PROCEDURE — 83605 ASSAY OF LACTIC ACID: CPT

## 2020-09-24 PROCEDURE — 84484 ASSAY OF TROPONIN QUANT: CPT

## 2020-09-24 PROCEDURE — 96375 TX/PRO/DX INJ NEW DRUG ADDON: CPT

## 2020-09-24 PROCEDURE — 96374 THER/PROPH/DIAG INJ IV PUSH: CPT

## 2020-09-24 PROCEDURE — 71045 X-RAY EXAM CHEST 1 VIEW: CPT

## 2020-09-24 PROCEDURE — 87040 BLOOD CULTURE FOR BACTERIA: CPT

## 2020-09-24 PROCEDURE — 93005 ELECTROCARDIOGRAM TRACING: CPT | Performed by: EMERGENCY MEDICINE

## 2020-09-24 PROCEDURE — 80053 COMPREHEN METABOLIC PANEL: CPT

## 2020-09-24 PROCEDURE — 74177 CT ABD & PELVIS W/CONTRAST: CPT

## 2020-09-24 PROCEDURE — 81003 URINALYSIS AUTO W/O SCOPE: CPT

## 2020-09-24 PROCEDURE — 2060000000 HC ICU INTERMEDIATE R&B

## 2020-09-24 PROCEDURE — 6360000002 HC RX W HCPCS: Performed by: EMERGENCY MEDICINE

## 2020-09-24 PROCEDURE — 83690 ASSAY OF LIPASE: CPT

## 2020-09-24 RX ORDER — 0.9 % SODIUM CHLORIDE 0.9 %
1000 INTRAVENOUS SOLUTION INTRAVENOUS ONCE
Status: DISCONTINUED | OUTPATIENT
Start: 2020-09-24 | End: 2020-09-25

## 2020-09-24 RX ORDER — ACETAMINOPHEN 650 MG/1
650 SUPPOSITORY RECTAL EVERY 6 HOURS PRN
Status: DISCONTINUED | OUTPATIENT
Start: 2020-09-24 | End: 2020-09-24

## 2020-09-24 RX ORDER — CHOLESTYRAMINE LIGHT 4 G/5.7G
1 POWDER, FOR SUSPENSION ORAL 2 TIMES DAILY
Status: DISCONTINUED | OUTPATIENT
Start: 2020-09-25 | End: 2020-09-26 | Stop reason: HOSPADM

## 2020-09-24 RX ORDER — OXYCODONE HYDROCHLORIDE 5 MG/1
5 TABLET ORAL EVERY 4 HOURS PRN
Status: DISCONTINUED | OUTPATIENT
Start: 2020-09-24 | End: 2020-09-26 | Stop reason: HOSPADM

## 2020-09-24 RX ORDER — ONDANSETRON 2 MG/ML
4 INJECTION INTRAMUSCULAR; INTRAVENOUS ONCE
Status: COMPLETED | OUTPATIENT
Start: 2020-09-24 | End: 2020-09-24

## 2020-09-24 RX ORDER — ACETAMINOPHEN 325 MG/1
650 TABLET ORAL EVERY 6 HOURS PRN
Status: DISCONTINUED | OUTPATIENT
Start: 2020-09-24 | End: 2020-09-24

## 2020-09-24 RX ORDER — ALBUTEROL SULFATE 2.5 MG/3ML
2.5 SOLUTION RESPIRATORY (INHALATION) EVERY 6 HOURS PRN
Status: DISCONTINUED | OUTPATIENT
Start: 2020-09-24 | End: 2020-09-26 | Stop reason: HOSPADM

## 2020-09-24 RX ORDER — 0.9 % SODIUM CHLORIDE 0.9 %
1000 INTRAVENOUS SOLUTION INTRAVENOUS ONCE
Status: COMPLETED | OUTPATIENT
Start: 2020-09-24 | End: 2020-09-24

## 2020-09-24 RX ORDER — MORPHINE SULFATE 2 MG/ML
4 INJECTION, SOLUTION INTRAMUSCULAR; INTRAVENOUS
Status: DISCONTINUED | OUTPATIENT
Start: 2020-09-24 | End: 2020-09-26 | Stop reason: HOSPADM

## 2020-09-24 RX ORDER — ACETAMINOPHEN 500 MG
1000 TABLET ORAL ONCE
Status: COMPLETED | OUTPATIENT
Start: 2020-09-24 | End: 2020-09-24

## 2020-09-24 RX ORDER — SODIUM CHLORIDE 0.9 % (FLUSH) 0.9 %
10 SYRINGE (ML) INJECTION PRN
Status: DISCONTINUED | OUTPATIENT
Start: 2020-09-24 | End: 2020-09-26 | Stop reason: HOSPADM

## 2020-09-24 RX ORDER — PANTOPRAZOLE SODIUM 40 MG/1
40 TABLET, DELAYED RELEASE ORAL DAILY
Status: DISCONTINUED | OUTPATIENT
Start: 2020-09-24 | End: 2020-09-26 | Stop reason: HOSPADM

## 2020-09-24 RX ORDER — SODIUM CHLORIDE 9 MG/ML
INJECTION, SOLUTION INTRAVENOUS CONTINUOUS
Status: DISCONTINUED | OUTPATIENT
Start: 2020-09-24 | End: 2020-09-25

## 2020-09-24 RX ORDER — SODIUM CHLORIDE 0.9 % (FLUSH) 0.9 %
10 SYRINGE (ML) INJECTION EVERY 12 HOURS SCHEDULED
Status: DISCONTINUED | OUTPATIENT
Start: 2020-09-24 | End: 2020-09-26 | Stop reason: HOSPADM

## 2020-09-24 RX ADMIN — SODIUM CHLORIDE: 9 INJECTION, SOLUTION INTRAVENOUS at 13:55

## 2020-09-24 RX ADMIN — PIPERACILLIN AND TAZOBACTAM 3.38 G: 3; .375 INJECTION, POWDER, LYOPHILIZED, FOR SOLUTION INTRAVENOUS at 12:52

## 2020-09-24 RX ADMIN — SODIUM CHLORIDE 1000 ML: 9 INJECTION, SOLUTION INTRAVENOUS at 09:58

## 2020-09-24 RX ADMIN — Medication 10 ML: at 22:34

## 2020-09-24 RX ADMIN — OXYCODONE 5 MG: 5 TABLET ORAL at 17:00

## 2020-09-24 RX ADMIN — FAMOTIDINE 20 MG: 10 INJECTION, SOLUTION INTRAVENOUS at 09:58

## 2020-09-24 RX ADMIN — IOPAMIDOL 100 ML: 612 INJECTION, SOLUTION INTRAVENOUS at 10:23

## 2020-09-24 RX ADMIN — PIPERACILLIN AND TAZOBACTAM 3.38 G: 3; .375 INJECTION, POWDER, LYOPHILIZED, FOR SOLUTION INTRAVENOUS at 22:34

## 2020-09-24 RX ADMIN — ONDANSETRON 4 MG: 2 INJECTION INTRAMUSCULAR; INTRAVENOUS at 09:58

## 2020-09-24 RX ADMIN — MORPHINE SULFATE 4 MG: 2 INJECTION, SOLUTION INTRAMUSCULAR; INTRAVENOUS at 09:58

## 2020-09-24 RX ADMIN — PANTOPRAZOLE SODIUM 40 MG: 40 TABLET, DELAYED RELEASE ORAL at 14:17

## 2020-09-24 RX ADMIN — ACETAMINOPHEN 1000 MG: 500 TABLET ORAL at 09:58

## 2020-09-24 ASSESSMENT — PAIN DESCRIPTION - PAIN TYPE
TYPE: ACUTE PAIN

## 2020-09-24 ASSESSMENT — PAIN DESCRIPTION - LOCATION
LOCATION: ABDOMEN

## 2020-09-24 ASSESSMENT — ENCOUNTER SYMPTOMS
SHORTNESS OF BREATH: 0
BACK PAIN: 0
COUGH: 0
DIARRHEA: 0
ABDOMINAL PAIN: 1
VOMITING: 1
SORE THROAT: 0
NAUSEA: 1

## 2020-09-24 ASSESSMENT — PAIN SCALES - GENERAL
PAINLEVEL_OUTOF10: 5
PAINLEVEL_OUTOF10: 2
PAINLEVEL_OUTOF10: 2
PAINLEVEL_OUTOF10: 0
PAINLEVEL_OUTOF10: 5
PAINLEVEL_OUTOF10: 4
PAINLEVEL_OUTOF10: 1

## 2020-09-24 ASSESSMENT — PAIN DESCRIPTION - ORIENTATION
ORIENTATION: UPPER
ORIENTATION: MID;UPPER

## 2020-09-24 NOTE — TELEPHONE ENCOUNTER
Reason for Disposition   Pain lasting > 10 minutes and over 48years old    Answer Assessment - Initial Assessment Questions  1. LOCATION: \"Where does it hurt? \"       Sternal/epigastric pain  2. RADIATION: \"Does the pain shoot anywhere else? \" (e.g., chest, back)  no  3. ONSET: \"When did the pain begin? \" (e.g., minutes, hours or days ago)    saturday  4. SUDDEN: \"Gradual or sudden onset? \"    sudden  5. PATTERN \"Does the pain come and go, or is it constant? \"     - If constant: \"Is it getting better, staying the same, or worsening? \"       (Note: Constant means the pain never goes away completely; most serious pain is constant and it progresses)      - If intermittent: \"How long does it last?\" \"Do you have pain now? \"      (Note: Intermittent means the pain goes away completely between bouts)   constant  6. SEVERITY: \"How bad is the pain? \"  (e.g., Scale 1-10; mild, moderate, or severe)     - MILD (1-3): doesn't interfere with normal activities, abdomen soft and not tender to touch      - MODERATE (4-7): interferes with normal activities or awakens from sleep, tender to touch      - SEVERE (8-10): excruciating pain, doubled over, unable to do any normal activities    moderate  7. RECURRENT SYMPTOM: \"Have you ever had this type of abdominal pain before? \" If so, ask: \"When was the last time? \" and \"What happened that time? \"      Yes, stomach flu  8. AGGRAVATING FACTORS: \"Does anything seem to cause this pain? \" (e.g., foods, stress, alcohol)     no  9. CARDIAC SYMPTOMS: \"Do you have any of the following symptoms: chest pain, difficulty breathing, sweating, nausea? \"     Chest pain, chills, nausea  10. OTHER SYMPTOMS: \"Do you have any other symptoms? \" (e.g., fever, vomiting, diarrhea)      Fever, 101    Protocols used: ABDOMINAL PAIN - UPPER-ADULT-OH

## 2020-09-24 NOTE — ED PROVIDER NOTES
REPLACEMENT      Knee    KNEE SURGERY  1992    Right knee    LYMPH NODE BIOPSY Left 11/18/14    ANDREW AND BSO      Dr. Elmer Barrow    TONSILLECTOMY  2002    TUBAL LIGATION      UPPER GASTROINTESTINAL ENDOSCOPY  2/18/15    w/bx,polypectomy          CURRENTMEDICATIONS       Previous Medications    ALBUTEROL (PROVENTIL) (2.5 MG/3ML) 0.083% NEBULIZER SOLUTION    Take 3 mLs by nebulization every 6 hours as needed for Wheezing    ALBUTEROL SULFATE  (90 BASE) MCG/ACT INHALER    Inhale 2 puffs into the lungs 4 times daily as needed for Wheezing    AMLODIPINE (NORVASC) 10 MG TABLET    Take 1 tablet by mouth daily    CHOLESTYRAMINE (QUESTRAN) 4 G PACKET    Take 1 packet by mouth 2 times daily    CPAP MACHINE MISC        LISINOPRIL-HYDROCHLOROTHIAZIDE (PRINZIDE;ZESTORETIC) 20-12.5 MG PER TABLET    Take 1 tablet by mouth daily    LOPERAMIDE HCL (IMODIUM PO)    Take by mouth    METFORMIN (GLUCOPHAGE) 500 MG TABLET    Take 1 tablet by mouth 2 times daily (with meals)    PANTOPRAZOLE (PROTONIX) 40 MG TABLET    Take 1 tablet by mouth daily    PREDNISONE (DELTASONE) 5 MG TABLET    4 PO each day for three days, 3 PO each day for three days, 2 PO each day for three days ,1 PO each day until gone    RESPIRATORY THERAPY SUPPLIES (FULL KIT NEBULIZER SET) MISC    1 Units by Does not apply route 4 times daily    VITAMIN D (ERGOCALCIFEROL) 1.25 MG (33272 UT) CAPS CAPSULE    Take 1 capsule by mouth twice a week       ALLERGIES     Codeine; Imitrex [sumatriptan]; Theophyllines; Diflucan [fluconazole];  Nizoral [ketoconazole]; and Zoloft    FAMILY HISTORY       Family History   Problem Relation Age of Onset    Cancer Father         melanoma    Prostate Cancer Father     Cancer Sister         Melanoma    Cancer Other         Throat          SOCIAL HISTORY       Social History     Socioeconomic History    Marital status:      Spouse name: Not on file    Number of children: Not on file    Years of education: Not on file    Highest education level: Not on file   Occupational History    Not on file   Social Needs    Financial resource strain: Not on file    Food insecurity     Worry: Not on file     Inability: Not on file    Transportation needs     Medical: Not on file     Non-medical: Not on file   Tobacco Use    Smoking status: Never Smoker    Smokeless tobacco: Never Used   Substance and Sexual Activity    Alcohol use: No    Drug use: No    Sexual activity: Not on file   Lifestyle    Physical activity     Days per week: Not on file     Minutes per session: Not on file    Stress: Not on file   Relationships    Social connections     Talks on phone: Not on file     Gets together: Not on file     Attends Sikhism service: Not on file     Active member of club or organization: Not on file     Attends meetings of clubs or organizations: Not on file     Relationship status: Not on file    Intimate partner violence     Fear of current or ex partner: Not on file     Emotionally abused: Not on file     Physically abused: Not on file     Forced sexual activity: Not on file   Other Topics Concern    Not on file   Social History Narrative    Not on file         PHYSICAL EXAM       ED Triage Vitals [09/24/20 0917]   BP Temp Temp Source Pulse Resp SpO2 Height Weight   (!) 152/85 100.6 °F (38.1 °C) Oral 107 16 98 % 5' 2\" (1.575 m) 263 lb (119.3 kg)       Physical Exam  Vitals signs and nursing note reviewed. Constitutional:       Appearance: She is well-developed. HENT:      Head: Normocephalic. Right Ear: External ear normal.      Left Ear: External ear normal.   Eyes:      Conjunctiva/sclera: Conjunctivae normal.      Pupils: Pupils are equal, round, and reactive to light. Neck:      Musculoskeletal: Normal range of motion and neck supple. Cardiovascular:      Rate and Rhythm: Normal rate and regular rhythm. Heart sounds: Normal heart sounds.    Pulmonary:      Effort: Pulmonary effort is normal. Breath sounds: Normal breath sounds. Abdominal:      General: Bowel sounds are normal. There is no distension. Palpations: Abdomen is soft. Tenderness: There is abdominal tenderness in the epigastric area. There is no guarding or rebound. Musculoskeletal: Normal range of motion. Skin:     General: Skin is warm and dry. Neurological:      Mental Status: She is alert and oriented to person, place, and time. Psychiatric:         Mood and Affect: Mood normal.           MDM  71 yo female presents to the ED with ab pain, n/v.  Pt noted to have temp of 100.6 in the ED however hemodynamically stable. Pt given 1 L NS, IV morphine, IV zofran, IV pepcid, PO tylenol with moderate relief. EKG shows NSR with HR 92, normal axis, QTc 494, no ST changes. Labs remarkable for glucose 185, alk phos 540, , . UA shows UTI. Ct AP shows persistent intra and extrahepatic bile duct dilatation similar to previous examination with maximum diameter of CBD is 2.3 cm. Given fever, worsening LFTs, epigastric ab pain, concern is for choledocholithiasis. Blood cultures drawn and pt given IV zosyn in the ED. Given concern for choledocholithiasis, case discussed with Dr. Sundar Llamas and pt admitted to medicine in stable condition. FINAL IMPRESSION      1. Abdominal pain, unspecified abdominal location    2. Fever, unspecified fever cause    3. Abnormal LFTs    4.  Acute cystitis without hematuria          DISPOSITION/PLAN   DISPOSITION Decision To Admit 09/24/2020 11:30:20 AM        DISCHARGE MEDICATIONS:  [unfilled]         Ji Mauro MD(electronically signed)  Attending Emergency Physician            Ji Mauro MD  09/24/20 298 Edna Hooper MD  09/24/20 2435

## 2020-09-24 NOTE — ACP (ADVANCE CARE PLANNING)
Advance Care Planning     Advance Care Planning Activator (Inpatient)  Conversation Note      Date of ACP Conversation: 9/24/2020    Conversation Conducted with: Patient with Decision Making Capacity    ACP Activator: [address] makes decisions on behalf of the incapacitated patient: Decision Maker is asked to consider and make decisions based on patient values, known preferences, or best interests. Health Care Decision Maker:  Bong Marsh    Current Designated Health Care Decision Maker:   (If there is a valid Health Care Decision Maker named in the [address] [place] [address] Makers\" box in the ACP activity, but it is not visible above, be sure to open that field and then select the health care decision maker relationship (ie \"primary\") in the blank space to the right of the name.) Validate  this information as still accurate & up-to-date; edit [address] field as needed.)    Note: Assess and validate information in current ACP documents, as indicated. If no Decision Maker listed above or available through scanned documents, then:    If no Authorized Decision Maker has previously been identified, then patient chooses [address]:  \"Who would you like to name as your primary health care decision-maker? \"               Name: Bong Marsh        Relationship: daughter          Phone number: [phone]  Marily Boyer this person be reached easily? \" Yes       Note: If the relationship of these Decision-Makers to the patient does NOT follow your state's Next of Kin hierarchy, recommend that patient complete ACP document that meets state-specific requirements to allow them to act on the patient's behalf when appropriate. Care Preferences    Ventilation:   \"If you were in your present state of health and suddenly became very ill and were unable to breathe on your own, what would your preference be about the use of a ventilator (breathing machine) if it were available to you? \"      Would the patient desire the use of ventilator (breathing machine)?: yes    \"If your health worsens and it becomes clear that your chance of recovery is unlikely, what would your preference be about the use of a ventilator (breathing machine) if it were available to you? \"     Would the patient desire the use of ventilator (breathing machine)?: yes      Resuscitation  \"CPR works best to restart the heart when there is a sudden event, like a heart attack, in someone who is otherwise healthy. Unfortunately, CPR does not typically restart the heart for people who have serious health conditions or who are very sick. \"    \"In the event your heart stopped as a result of an underlying serious health condition, would you want attempts to be made to restart your heart (answer \"yes\" for attempt to resuscitate) or would you prefer a natural death (answer \"no\" for do not attempt to resuscitate)? \" yes         NOTE: If the patient has a valid advance directive AND now provides care preference(s) that are inconsistent with that prior directive, advise the patient to consider either: creating a new advance directive that complies with state-specific requirements; or, if that is not possible, orally revoking that prior directive in accordance with state-specific requirements, which must be documented in the EHR. [x] Yes   [] No   Educated Patient / Marvene Deal regarding differences between Advance Directives and portable DNR orders.     Length of ACP Conversation in minutes:  10  Conversation Outcomes:  [x] ACP discussion completed  [] Existing advance directive reviewed with patient; no changes to patient's previously recorded wishes  [] New Advance Directive completed  [] Portable Do Not Rescitate prepared for Provider review and signature  [] POLST/POST/MOLST/MOST prepared for Provider review and signature      Follow-up plan:    [] Schedule follow-up conversation to continue planning  [] Referred individual to Provider for additional questions/concerns   [] Advised patient/agent/surrogate to review completed ACP document and update if needed with changes in condition, patient preferences or care setting    [x] This note routed to one or more involved healthcare providers

## 2020-09-24 NOTE — PROGRESS NOTES
[facility] EMERGENCY [facility] AT [place] Respiratory Therapy Evaluation   Current Order:  Albuterol q6 prn      Home Regimen: prn      Ordering Physician: mihai  Re-evaluation Date:  Eval done     Diagnosis: sepsis      Patient Status: Stable / Unstable + Physician notified    The following MDI Criteria must be met in order to convert aerosol to MDI with spacer. If unable to meet, MDI will be converted to aerosol:  []  Patient able to demonstrate the ability to use MDI effectively  []  Patient alert and cooperative  []  Patient able to take deep breath with 5-10 second hold  []  Medication(s) available in this delivery method   []  Peak flow greater than or equal to 200 ml/min            Current Order Substituted To  (same drug, same frequency)   Aerosol to MDI [] Albuterol Sulfate 0.083% unit dose by aerosol Albuterol Sulfate MDI 2 puffs by inhalation with spacer    [] Levalbuterol 1.25 mg unit dose by aerosol Levalbuterol MDI 2 puffs by inhalation with spacer    [] Levalbuterol 0.63 mg unit dose by aerosol Levalbuterol MDI 2 puffs by inhalation with spacer    [] Ipratropium Bromide 0.02% unit dose by aerosol Ipratropium Bromide MDI 2 puffs by inhalation with spacer    [] Duoneb (Ipratropium + Albuterol) unit dose by aerosol Ipratropium MDI + Albuterol MDI 2 puffs by inhalation w/spacer   MDI to Aerosol [] Albuterol Sulfate MDI Albuterol Sulfate 0.083% unit dose by aerosol    [] Levalbuterol MDI 2 puffs by inhalation Levalbuterol 1.25 mg unit dose by aerosol    [] Ipratropium Bromide MDI by inhalation Ipratropium Bromide 0.02% unit dose by aerosol    [] Combivent (Ipratropium + Albuterol) MDI by inhalation Duoneb (Ipratropium + Albuterol) unit dose by aerosol   Treatment Assessment [Frequency/Schedule]:  Change frequency to: _______no change___________________________________________per Protocol, P&T, MEC      Points 0 1 2 3 4   Pulmonary Status  Non-Smoker  []   Smoking history   < 20 pack years  []   Smoking history  ?  20 pack years  [] Pulmonary Disorder  (acute or chronic)  [x]   Severe or Chronic w/ Exacerbation  []     Surgical Status No [x]   Surgeries     General []   Surgery Lower []   Abdominal Thoracic or []   Upper Abdominal Thoracic with  PulmonaryDisorder  []     Chest X-ray Clear/Not  Ordered     [x]  Chronic Changes  Results Pending  []  Infiltrates, atelectasis, pleural effusion, or edema  []  Infiltrates in more than one lobe []  Infiltrate + Atelectasis, &/or pleural effusion  []    Respiratory Pattern Regular,  RR = 12-20 [x]  Increased,  RR = 21-25 []  [place], irregular,  or RR = 26-30 []  Decreased FEV1  or RR = 31-35 []  Severe SOB, use  of accessory muscles, or RR ? 35  []    Mental Status Alert, oriented,  Cooperative [x]  Confused but Follows commands []  Lethargic or unable to follow commands []  Obtunded  []  Comatose  []    Breath Sounds Clear to  auscultation  [x]  Decreased unilaterally or  in bases only []  Decreased  bilaterally  []  Crackles or intermittent wheezes []  Wheezes []    Cough Strong, Spontan., & nonproductive [x]  Strong,  spontaneous, &  productive []  Weak,  Nonproductive []  Weak, productive or  with wheezes []  No spontaneous  cough or may require suctioning []    Level of Activity Ambulatory [x]  Ambulatory w/ Assist  []  Non-ambulatory []  Paraplegic []  Quadriplegic []    Total    Score:___3____     Triage Score:____5____      Tri       Triage:     1. (>20) Freq: Q3    2. (16-20) Freq: Q4   3. (11-15) Freq: QID & Albuterol Q2 PRN    4. (6-10) Freq: TID & Albuterol Q2 PRN    5. (0-5) Freq Q4prn

## 2020-09-24 NOTE — CARE COORDINATION
[facility] AT [place] Case Management Initial Discharge Assessment    Met with Patient to discuss discharge plan. PCP: Janis Oliva MD                                Date of Last Visit: june    If no PCP, list provided? N/A    Discharge Planning    Living Arrangements: independently at home    Who do you live with? son    Who helps you with your care:  self    If lives at home:     Do you have any barriers navigating in your home? no    Patient can perform ADL? Yes    Current Services (outpatient and in home) :  None    Dialysis: No    Is transportation available to get to your appointments? Yes    DME Equipment:  no    Respiratory equipment: Nebulizer    Respiratory provider:  no     Pharmacy:  yes - [facility]    Consult with Medication Assistance Program?  She may need some assistance        Does Patient Have a High-Risk for Readmission Diagnosis (CHF, PN, MI, COPD)? No          Initial Discharge Plan? (Note: please see concurrent daily documentation for any updates after initial note). Pt states that affording meds are a problem at times. CM to assess for further d/c needs.       Electronically signed by Adam Sierra on 9/24/2020 at 6:15 PM

## 2020-09-24 NOTE — CONSULTS
Spiritual Care Services     Summary of Visit:  Consult placed to update Health Care Power of  document. Patient completed new document and a copy was placed in her chart. The patient named her daughter, Rhonda Dong, as her primary agent. Spiritual Assessment/Intervention/Outcomes:    Encounter Summary  Services provided to[de-identified] Patient  Referral/Consult From[de-identified] Nurse  Support System: Children  Continue Visiting: No  Complexity of Encounter: Low  Length of Encounter: 30 minutes  Advance Care Planning: Yes  Routine  Type: Initial              Advance Directives (For Healthcare)  Pre-existing DNR Comfort Care/DNR Arrest/DNI Order: No  Healthcare Directive: Yes, patient has an advance directive for healthcare treatment  Type of Healthcare Directive: Durable power of  for health care  Copy in Chart: Yes, copy in chart  Chart Copy Status [de-identified] Active, Current  Date Reviewed and Current[de-identified] 09/24/20  Information on Healthcare Directives Requested: Yes  Patient Requests Assistance: Yes, referral made to [address] [place] [address] Appointed: Douglas Chen Agent's Name: [id number] Agent's Phone Number: [phone]  If you are unable to speak for yourself, does your Healthcare Agent or Legal Spokesperson know your healthcare wishes?: Yes           Values / Beliefs  Do you have any ethnic, cultural, sacramental, or spiritual Protestant needs you would like us to be aware of while you are in the hospital?: No    Care Plan:    Continue to follow up to provide support. Spiritual Care Services   Electronically signed by Sandrine Bishop on 9/24/20 at 2:47 PM EDT     To reach a  for emotional and spiritual support, place an [place] [address] consult request.   If a  is needed immediately, dial 0 and ask to page the on-call .

## 2020-09-24 NOTE — ED TRIAGE NOTES
Pt here with complaints of mid upper abdominal pain. She reports the pain starting last night but experienced this same pain four and five days ago. She reports a fever; highest reading at home being 100.5. Pt has not taken anything for pain; she states the pain has been so bad she hasn't been able to move to take anything. Pt reports nausea; denies vomiting and diarrhea. She reports sinus drainage; history of sinus issues.

## 2020-09-24 NOTE — H&P
 LYMPH NODE BIOPSY Left 11/18/14    ANDREW AND BSO      Dr. Mayo Pederson    TONSILLECTOMY  2002    TUBAL LIGATION      UPPER GASTROINTESTINAL ENDOSCOPY  2/18/15    w/bx,polypectomy        Medications Prior to Admission:    Prior to Admission medications    Medication Sig Start Date End Date Taking? Authorizing Provider   pantoprazole (PROTONIX) 40 MG tablet Take 1 tablet by mouth daily 6/9/20  Yes Rito Floyd MD   amLODIPine (NORVASC) 10 MG tablet Take 1 tablet by mouth daily 6/9/20  Yes Rito Floyd MD   metFORMIN (GLUCOPHAGE) 500 MG tablet Take 1 tablet by mouth 2 times daily (with meals) 6/8/20  Yes Rito Floyd MD   vitamin D (ERGOCALCIFEROL) 1.25 MG (40648 UT) CAPS capsule Take 1 capsule by mouth twice a week 6/8/20  Yes Rito Floyd MD   cholestyramine Jelani Ghosh) 4 g packet Take 1 packet by mouth 2 times daily 6/4/19  Yes Rito Floyd MD   lisinopril-hydrochlorothiazide (PRINZIDE;ZESTORETIC) 20-12.5 MG per tablet Take 1 tablet by mouth daily 6/4/19  Yes Rito Floyd MD   albuterol (PROVENTIL) (2.5 MG/3ML) 0.083% nebulizer solution Take 3 mLs by nebulization every 6 hours as needed for Wheezing 6/4/19  Yes Rito Floyd MD   Loperamide HCl (IMODIUM PO) Take by mouth daily as needed    Yes Historical Provider, MD   CPAP Machine MISC    Yes Historical Provider, MD   Respiratory Therapy Supplies (FULL KIT NEBULIZER SET) MISC 1 Units by Does not apply route 4 times daily 4/29/20   Rito Floyd MD   albuterol sulfate  (90 Base) MCG/ACT inhaler Inhale 2 puffs into the lungs 4 times daily as needed for Wheezing 6/4/19 6/9/20  Rito Floyd MD       Allergies:  Codeine; Imitrex [sumatriptan]; Theophyllines; Diflucan [fluconazole]; Nizoral [ketoconazole]; and Zoloft    Social History:   TOBACCO:   reports that she has never smoked. She has never used smokeless tobacco.  ETOH:   reports no history of alcohol use.       Family History:       Problem Relation Age of Onset    Cancer Father melanoma    Prostate Cancer Father     Cancer Sister         Melanoma    Cancer Other         Throat       REVIEW OF SYSTEMS:  Ten systems reviewed and negative except for stated in HPI    Physical Exam:    Vitals: /75   Pulse 71   Temp 98.4 °F (36.9 °C) (Oral)   Resp 18   Ht 5' 2\" (1.575 m)   Wt 257 lb 4.8 oz (116.7 kg)   SpO2 99%   BMI 47.06 kg/m²   General appearance: alert, appears stated age and cooperative. NAD. Obese  Skin: Skin color, texture, turgor normal. No rashes or lesions  HEENT: eomi, perrla. MMM  Neck: No JVD or lymphadenopathy  Lungs: CTA bilaterally. No wheeze  Heart: RRR, no murmur or gallp  Lower chest pain reproducible with palpation. Abdomen: soft, nontender. Bsx4. No masses or organomegaly  Extremities: no edema, redness, or tenderness in calves. Neurologic: No focal deficits     Recent Labs     09/24/20  0930   WBC 7.4   HGB 13.3        Recent Labs     09/24/20  0930      K 3.4   CL 97   CO2 29   BUN 11   CREATININE 0.47*   GLUCOSE 185*   *   *   BILITOT 2.7*   ALKPHOS 540*     Troponin T:   Recent Labs     09/24/20  0930   TROPONINI <0.010       ABGs: No results found for: PHART, PO2ART, AWA4LNL  INR: No results for input(s): INR in the last 72 hours. URINALYSIS:  Recent Labs     09/24/20  0930   COLORU DARK YELLOW*   PHUR 7.5   WBCUA 0-2   RBCUA 0-2   BACTERIA MANY*   CLARITYU CLOUDY*   SPECGRAV 1.022   LEUKOCYTESUR TRACE*   UROBILINOGEN 4.0*   BILIRUBINUR MODERATE*   BLOODU Negative   GLUCOSEU Negative     -----------------------------------------------------------------   Ct Abdomen Pelvis W Iv Contrast Additional Contrast? None    Result Date: 9/24/2020  EXAMINATION: CT ABDOMEN PELVIS W IV CONTRAST DATE AND TIME:9/24/2020 10:22 AM CLINICAL HISTORY: Acute abdominal pain. Epigastric pain.    Ab pain  COMPARISON: CT abdomen and pelvis from July 18, 2020 and January 15, 2015 TECHNIQUE: Contiguous axial CT sections of the abdomen and pelvis. Imaging was obtained after IV contrast administration. .  All CT scans at this facility use dose modulation, iterative reconstruction, and/or weight based dosing when appropriate to reduce radiation dose to as low as reasonably achievable. FINDINGS  Lung bases:Visualized lung bases show no significant pathology Liver: The liver is normal in size and attenuation. There is persistent intra and extrahepatic bile duct dilatation which is similar to the prior studies. There are no radiopaque stones within the biliary system. The common bile duct has a maximum diameter of 2.3 cm. Gallbladder: Patient is status post cholecystectomy. Spleen: There are no focal splenic lesions or areas of abnormal enhancement. The spleen is prominent with a maximum diameter of 14 cm. Pancreas: The pancreas is normal in size and attenuation without focal lesions or dilatation of the pancreatic duct. Kidney: No solid renal  lesions, or hydronephrosis. No renal or ureteral stone. Adrenal glands are negative. Bowel: There are numerous surgical clips in the region of the stomach indicating prior surgery. There is no dilatation of the stomach. There is decreased attenuation of the stomach wall which may indicate inflammation, gastritis. The small bowel is within normal limits there is no bowel dilatation or evidence of obstruction. There are diverticula of the descending and sigmoid colon without surrounding inflammatory changes. Appendix: There  is no CT evidence for appendicitis. Nodes: No lymphadenopathy. Aorta: No aneurysm  Peritoneum: No free fluid or free air. The abdominal wall is intact. Pelvis: No abnormal soft tissue mass. The bladder is normal. The pelvic organs are not identified and may be surgically absent. Bones: No acute osseous abnormalities. 1. There is no evidence of bowel distention or obstruction.  There is decreased attenuation of the gastric wall which may indicate inflammation, nonspecific gastritis. 2. There is persistent intra and extrahepatic bile duct dilatation similar to previous examinations. Maximum diameter of the common bile duct is 2.3 cm. Assessment and Plan     70-year-old female with obesity, diabetes, hypertension, breast cancer s/p lumpectomy, chemo and radiation, microscopic colitis, gastric bypass surgery presents from home after experiencing persistent pain in her lower chest/upper abdomen as well as fever to 101.5 and chills. 1.  Sepsis (fever, HR>90) of unclear etiology but possibly due to cholangitis  -Noted 2.3 cm common bile duct dilation-similar to prior CT scans however there is new elevation in bilirubin, transaminases, and alkaline phosphatase. Will better assess with MRCP. GI consulted for consideration of ERCP  -Cultures pending. Continue empiric IV Zosyn. -IVF support  -Maintain n.p.o. except ice and meds for now    Abnormal LFTs  History of diabetes on metformin.   A1c 6  Hypertension-hold medicine  History of breast cancer treated with lumpectomy, chemotherapy, radiation  Microscopic colitis  History gastric bypass  Class III obesity  BUNNY    SCDs  Full code    Inpatient    Sendy Velez DO  Admitting Hospitalist

## 2020-09-25 LAB
ALBUMIN SERPL-MCNC: 3.3 G/DL (ref 3.5–4.6)
ALP BLD-CCNC: 379 U/L (ref 40–130)
ALT SERPL-CCNC: 238 U/L (ref 0–33)
ANION GAP SERPL CALCULATED.3IONS-SCNC: 11 MEQ/L (ref 9–15)
AST SERPL-CCNC: 184 U/L (ref 0–35)
BASOPHILS ABSOLUTE: 0 K/UL (ref 0–0.2)
BASOPHILS RELATIVE PERCENT: 0.7 %
BILIRUB SERPL-MCNC: 1.5 MG/DL (ref 0.2–0.7)
BILIRUBIN DIRECT: 0.7 MG/DL (ref 0–0.4)
BILIRUBIN, INDIRECT: 0.8 MG/DL (ref 0–0.6)
BUN BLDV-MCNC: 14 MG/DL (ref 8–23)
CALCIUM SERPL-MCNC: 8 MG/DL (ref 8.5–9.9)
CHLORIDE BLD-SCNC: 106 MEQ/L (ref 95–107)
CO2: 23 MEQ/L (ref 20–31)
CREAT SERPL-MCNC: 0.57 MG/DL (ref 0.5–0.9)
EOSINOPHILS ABSOLUTE: 0.5 K/UL (ref 0–0.7)
EOSINOPHILS RELATIVE PERCENT: 9.3 %
GFR AFRICAN AMERICAN: >60
GFR NON-AFRICAN AMERICAN: >60
GLUCOSE BLD-MCNC: 79 MG/DL (ref 70–99)
HBV SURFACE AB TITR SER: NORMAL MIU/ML
HCT VFR BLD CALC: 33.7 % (ref 37–47)
HEMOGLOBIN: 11.2 G/DL (ref 12–16)
HEPATITIS B SURFACE ANTIGEN INTERPRETATION: NORMAL
HEPATITIS C ANTIBODY INTERPRETATION: NORMAL
INR BLD: 1.3
LYMPHOCYTES ABSOLUTE: 0.9 K/UL (ref 1–4.8)
LYMPHOCYTES RELATIVE PERCENT: 17.2 %
MAGNESIUM: 1.9 MG/DL (ref 1.7–2.4)
MCH RBC QN AUTO: 28.5 PG (ref 27–31.3)
MCHC RBC AUTO-ENTMCNC: 33.1 % (ref 33–37)
MCV RBC AUTO: 85.9 FL (ref 82–100)
MONOCYTES ABSOLUTE: 0.5 K/UL (ref 0.2–0.8)
MONOCYTES RELATIVE PERCENT: 9 %
NEUTROPHILS ABSOLUTE: 3.3 K/UL (ref 1.4–6.5)
NEUTROPHILS RELATIVE PERCENT: 63.8 %
PDW BLD-RTO: 14 % (ref 11.5–14.5)
PLATELET # BLD: 190 K/UL (ref 130–400)
POTASSIUM REFLEX MAGNESIUM: 3.1 MEQ/L (ref 3.4–4.9)
PROTHROMBIN TIME: 16.1 SEC (ref 12.3–14.9)
RBC # BLD: 3.92 M/UL (ref 4.2–5.4)
SODIUM BLD-SCNC: 140 MEQ/L (ref 135–144)
TOTAL PROTEIN: 5.9 G/DL (ref 6.3–8)
WBC # BLD: 5.2 K/UL (ref 4.8–10.8)

## 2020-09-25 PROCEDURE — 87340 HEPATITIS B SURFACE AG IA: CPT

## 2020-09-25 PROCEDURE — 36415 COLL VENOUS BLD VENIPUNCTURE: CPT

## 2020-09-25 PROCEDURE — 94660 CPAP INITIATION&MGMT: CPT

## 2020-09-25 PROCEDURE — 80048 BASIC METABOLIC PNL TOTAL CA: CPT

## 2020-09-25 PROCEDURE — 6360000002 HC RX W HCPCS: Performed by: INTERNAL MEDICINE

## 2020-09-25 PROCEDURE — 86706 HEP B SURFACE ANTIBODY: CPT

## 2020-09-25 PROCEDURE — 2060000000 HC ICU INTERMEDIATE R&B

## 2020-09-25 PROCEDURE — 86645 CMV ANTIBODY IGM: CPT

## 2020-09-25 PROCEDURE — 86665 EPSTEIN-BARR CAPSID VCA: CPT

## 2020-09-25 PROCEDURE — 85025 COMPLETE CBC W/AUTO DIFF WBC: CPT

## 2020-09-25 PROCEDURE — 80076 HEPATIC FUNCTION PANEL: CPT

## 2020-09-25 PROCEDURE — 2580000003 HC RX 258: Performed by: INTERNAL MEDICINE

## 2020-09-25 PROCEDURE — 86803 HEPATITIS C AB TEST: CPT

## 2020-09-25 PROCEDURE — 83735 ASSAY OF MAGNESIUM: CPT

## 2020-09-25 PROCEDURE — 6370000000 HC RX 637 (ALT 250 FOR IP): Performed by: INTERNAL MEDICINE

## 2020-09-25 PROCEDURE — 86039 ANTINUCLEAR ANTIBODIES (ANA): CPT

## 2020-09-25 PROCEDURE — 83516 IMMUNOASSAY NONANTIBODY: CPT

## 2020-09-25 PROCEDURE — 99221 1ST HOSP IP/OBS SF/LOW 40: CPT | Performed by: INTERNAL MEDICINE

## 2020-09-25 PROCEDURE — 86376 MICROSOMAL ANTIBODY EACH: CPT

## 2020-09-25 PROCEDURE — 85610 PROTHROMBIN TIME: CPT

## 2020-09-25 RX ORDER — POTASSIUM CHLORIDE 7.45 MG/ML
10 INJECTION INTRAVENOUS
Status: DISCONTINUED | OUTPATIENT
Start: 2020-09-25 | End: 2020-09-25

## 2020-09-25 RX ORDER — POTASSIUM CHLORIDE 20 MEQ/1
40 TABLET, EXTENDED RELEASE ORAL ONCE
Status: COMPLETED | OUTPATIENT
Start: 2020-09-25 | End: 2020-09-25

## 2020-09-25 RX ADMIN — SODIUM CHLORIDE: 9 INJECTION, SOLUTION INTRAVENOUS at 07:52

## 2020-09-25 RX ADMIN — Medication 10 ML: at 20:51

## 2020-09-25 RX ADMIN — CHOLESTYRAMINE 4 G: 4 POWDER, FOR SUSPENSION ORAL at 20:51

## 2020-09-25 RX ADMIN — PANTOPRAZOLE SODIUM 40 MG: 40 TABLET, DELAYED RELEASE ORAL at 07:52

## 2020-09-25 RX ADMIN — PIPERACILLIN AND TAZOBACTAM 3.38 G: 3; .375 INJECTION, POWDER, LYOPHILIZED, FOR SOLUTION INTRAVENOUS at 20:51

## 2020-09-25 RX ADMIN — POTASSIUM CHLORIDE 40 MEQ: 20 TABLET, EXTENDED RELEASE ORAL at 10:52

## 2020-09-25 RX ADMIN — SODIUM CHLORIDE: 9 INJECTION, SOLUTION INTRAVENOUS at 00:15

## 2020-09-25 RX ADMIN — PIPERACILLIN AND TAZOBACTAM 3.38 G: 3; .375 INJECTION, POWDER, LYOPHILIZED, FOR SOLUTION INTRAVENOUS at 14:16

## 2020-09-25 RX ADMIN — CHOLESTYRAMINE 4 G: 4 POWDER, FOR SUSPENSION ORAL at 07:52

## 2020-09-25 RX ADMIN — PIPERACILLIN AND TAZOBACTAM 3.38 G: 3; .375 INJECTION, POWDER, LYOPHILIZED, FOR SOLUTION INTRAVENOUS at 05:25

## 2020-09-25 ASSESSMENT — PAIN SCALES - GENERAL
PAINLEVEL_OUTOF10: 3
PAINLEVEL_OUTOF10: 0
PAINLEVEL_OUTOF10: 3
PAINLEVEL_OUTOF10: 0
PAINLEVEL_OUTOF10: 0
PAINLEVEL_OUTOF10: 3
PAINLEVEL_OUTOF10: 0

## 2020-09-25 ASSESSMENT — PAIN DESCRIPTION - PAIN TYPE
TYPE: CHRONIC PAIN

## 2020-09-25 ASSESSMENT — PAIN DESCRIPTION - LOCATION
LOCATION: HEAD

## 2020-09-25 NOTE — PROGRESS NOTES
Physician Progress Note    9/25/2020   11:57 AM    Name:  Maite Quintanilla  MRN:    [id number]     IP Day: 1     Admit Date: 9/24/2020  9:20 AM  PCP: Aurea Street MD    Code Status:  Full Code    Subjective:     Patient is feeling better. Upper abdominal/lower chest pain is only present with palpation. She denies any nausea, vomiting, or diarrhea. Otherwise no dyspnea or other abdominal pain.     Current Facility-Administered Medications   Medication Dose Route Frequency Provider Last Rate Last Dose    morphine (PF) injection 4 mg  4 mg Intravenous Q15 Min PRN Radha Co, DO   4 mg at 09/24/20 0958    piperacillin-tazobactam (ZOSYN) 3.375 g in dextrose 5 % 50 mL IVPB extended infusion (mini-bag)  3.375 g Intravenous Q8H Radha Co, DO   Stopped at 09/25/20 0920    sodium chloride flush 0.9 % injection 10 mL  10 mL Intravenous 2 times per day Radha Co, DO   10 mL at 09/24/20 2234    sodium chloride flush 0.9 % injection 10 mL  10 mL Intravenous PRN Radha Co, DO        0.9 % sodium chloride bolus  1,000 mL Intravenous Once Radha Co, DO        Followed by   Radha Abarca 0.9 % sodium chloride infusion   Intravenous Continuous Radha Co,  mL/hr at 09/25/20 4100      cholestyramine light packet 4 g  1 packet Oral BID Radha Co, DO   4 g at 09/25/20 0752    pantoprazole (PROTONIX) tablet 40 mg  40 mg Oral Daily Radha Co, DO   40 mg at 09/25/20 0752    albuterol (PROVENTIL) nebulizer solution 2.5 mg  2.5 mg Nebulization Q6H PRN Radha Co, DO        oxyCODONE (ROXICODONE) immediate release tablet 5 mg  5 mg Oral Q4H PRN Radha Co, DO   5 mg at 09/24/20 1700    HYDROmorphone (DILAUDID) injection 0.5 mg  0.5 mg Intravenous Q4H PRN Radha Co, DO           Physical Examination:      Vitals:  /82   Pulse 75   Temp 98 °F (36.7 °C) (Oral)   Resp 20   Ht 5' 2\" (1.575 m)   Wt 257 lb 4.4 oz (116.7 kg)   SpO2 98%   BMI 47.06 kg/m²   Temp (24hrs), Av °F (37.2 °C), Min:98 °F (36.7 °C), Max:100 °F (37.8 °C)      General appearance: alert, cooperative and no distress. Obese  Mental Status: oriented to person, place and time and normal affect  Lungs: clear to auscultation bilaterally, normal effort  Heart: regular rate and rhythm, no murmur  Lower chest pain reproducible with palpation. Abdomen: soft, nontender, nondistended, bowel sounds present, no masses  Extremities: no edema, redness, tenderness in the calves  Skin: no gross lesions, rashes    Data:     Labs:  Recent Labs     20  0920  0604   WBC 7.4 5.2   HGB 13.3 11.2*    190     Recent Labs     20  0957 20  0604     --  140   K 3.4  --  3.1*   CL 97  --  106   CO2 29  --  23   BUN 11  --  14   CREATININE 0.47* 0.6 0.57   GLUCOSE 185*  --  79     Recent Labs     20  0604   * 184*   * 238*   BILITOT 2.7* 1.5*   ALKPHOS 540* 379*     MRCP:  MODERATE TO MARKED PREDOMINANTLY EXTRAHEPATIC BILIARY DILATATION, LIKELY CHRONIC POST CHOLECYSTECTOMY. DISTAL COMMON DUCT STRICTURE AND OCCULT PERIAMPULLARY MALIGNANCY ARE ALSO CONSIDERATIONS. NO EVIDENCE OF CHOLEDOCHOLITHIASIS. BORDERLINE SPLENOMEGALY. Assessment and Plan:        80-year-old female with obesity, diabetes, hypertension, breast cancer s/p lumpectomy, chemo and radiation, microscopic colitis, gastric bypass surgery presents from home after experiencing persistent pain in her lower chest/upper abdomen as well as fever to 101.5 and chills.     1. Sepsis (fever, HR>90) of unclear etiology but possibly due to cholangitis  -Noted moderate to marked extrahepatic biliary dilatation however this is not much different from prior scans. Distal common duct stricture and occult periampullary malignancy are considerations. GI following to determine if further testing or procedures are necessary. LFTs improving.  -Cultures pending.   Continue empiric IV Zosyn.  -IVF support  -Clears    2. Chronic epigastric / lower chest pain:   - possible endoscopic work up as outpatient. Cont PPI for now     Abnormal LFTs  History of diabetes on metformin.   A1c 6  Hypertension-hold medicine  History of breast cancer treated with lumpectomy, chemotherapy, radiation  Microscopic colitis  History gastric bypass  Class III obesity  BUNNY     SCDs  Full code     Inpatient      Electronically signed by King Schuler DO on 9/25/2020 at 11:57 AM

## 2020-09-25 NOTE — CONSULTS
Inpatient consult to GI  Consult performed by: Shey Montenegro MD  Consult ordered by: Marlen Bradford DO      Patient Name: Minnie Rodriguez  Admit Date: 2020  9:20 AM  MR #: [id number]  : 1955    Attending Physician: Marlen Bradford DO  Reason for consult: Abdominal pain, nausea, fever/chills, abnormal MRCP    History of Presenting Illness:      Minnie Rodriguez is a 72 y.o. female on hospital day 1 with a history of CAD, Breast cancer-invasive ductal left breast, DM, GERD, Fibromyalgia, cholecystectomy, Gastric Bypass, HTN, Obesity, sleep apnea. History Obtained From:  patient  Patient reports coming into the ED yesterday with upper abdominal pain (above epigastric area), nausea without vomiting, fever and chills over the past 2 days. Patient reports she has been dealing with abdominal pain for many years. However, reports same symptoms roughly 2 months ago but was diagnosed with viral gastroenteritis. No weight loss or loss of appetite. No recent travel. [place] at [employer] [facility]. No alcohol, smoking or recreational drug use. Reports gastric bypass roughly 20 years ago, she is not sure if she has a Jose-en-Y. Cholecystectomy 15 years ago. She denies diarrhea, constipation, or bleeding. No shortness of breath or palpitations.   History:      Past Medical History:   Diagnosis Date    Allergic rhinitis     Asthma     CAD (coronary artery disease)     Cancer ([facility] [address] 2014    Invasive ductal left breast    Diabetes (HCC)     Fibromyalgia     GERD (gastroesophageal reflux disease)     HA (headache)     Herpes simplex     Nose    HTN (hypertension)     Obesity     Sleep apnea      Past Surgical History:   Procedure Laterality Date    BARIATRIC SURGERY  2004    BREAST BIOPSY Left 14    BREAST BIOPSY Left 12/10/14    BREAST LUMPECTOMY Left     with left SNB    BREAST LUMPECTOMY Left     BREAST LUMPECTOMY Left      SECTION      x4    CHOLECYSTECTOMY 02/2007    GASTRIC BYPASS SURGERY  2003    JOINT REPLACEMENT      Knee    KNEE SURGERY  1992    Right knee    LYMPH NODE BIOPSY Left 11/18/14    Robert Neal Many TONSILLECTOMY  2002    TUBAL LIGATION      UPPER GASTROINTESTINAL ENDOSCOPY  2/18/15    w/bx,polypectomy      Family History  Family History   Problem Relation Age of Onset    Cancer Father         melanoma    Prostate Cancer Father     Cancer Sister         Melanoma    Cancer Other         Throat     Social History     Socioeconomic History    Marital status:      Spouse name: Not on file    Number of children: Not on file    Years of education: Not on file    Highest education level: Not on file   Occupational History    Not on file   Social Needs    Financial resource strain: Not on file    Food insecurity     Worry: Not on file     Inability: Not on file    Transportation needs     Medical: Not on file     Non-medical: Not on file   Tobacco Use    Smoking status: Never Smoker    Smokeless tobacco: Never Used   Substance and Sexual Activity    Alcohol use: No    Drug use: No    Sexual activity: Not on file   Lifestyle    Physical activity     Days per week: Not on file     Minutes per session: Not on file    Stress: Not on file   Relationships    Social connections     Talks on phone: Not on file     Gets together: Not on file     Attends Congregation service: Not on file     Active member of club or organization: Not on file     Attends meetings of clubs or organizations: Not on file     Relationship status: Not on file    Intimate partner violence     Fear of current or ex partner: Not on file     Emotionally abused: Not on file     Physically abused: Not on file     Forced sexual activity: Not on file   Other Topics Concern    Not on file   Social History Narrative    Not on file      Home Medications:      Medications Prior to Admission: pantoprazole (PROTONIX) 40 MG tablet, Take 1 tablet by mouth daily  amLODIPine (NORVASC) 10 MG tablet, Take 1 tablet by mouth daily  metFORMIN (GLUCOPHAGE) 500 MG tablet, Take 1 tablet by mouth 2 times daily (with meals)  vitamin D (ERGOCALCIFEROL) 1.25 MG (66648 UT) CAPS capsule, Take 1 capsule by mouth twice a week  cholestyramine (QUESTRAN) 4 g packet, Take 1 packet by mouth 2 times daily  lisinopril-hydrochlorothiazide (PRINZIDE;ZESTORETIC) 20-12.5 MG per tablet, Take 1 tablet by mouth daily  albuterol (PROVENTIL) (2.5 MG/3ML) 0.083% nebulizer solution, Take 3 mLs by nebulization every 6 hours as needed for Wheezing  Loperamide HCl (IMODIUM PO), Take by mouth daily as needed   CPAP Machine MISC,   [DISCONTINUED] predniSONE (DELTASONE) 5 MG tablet, 4 PO each day for three days, 3 PO each day for three days, 2 PO each day for three days ,1 PO each day until gone  Respiratory Therapy Supplies (FULL KIT NEBULIZER SET) MISC, 1 Units by Does not apply route 4 times daily  albuterol sulfate  (90 Base) MCG/ACT inhaler, Inhale 2 puffs into the lungs 4 times daily as needed for Wheezing    Current Hospital Medications:   Scheduled Meds:   potassium chloride  40 mEq Oral Once    piperacillin-tazobactam  3.375 g Intravenous Q8H    sodium chloride flush  10 mL Intravenous 2 times per day    sodium chloride  1,000 mL Intravenous Once    cholestyramine light  1 packet Oral BID    pantoprazole  40 mg Oral Daily     Continuous Infusions:   sodium chloride 125 mL/hr at 09/25/20 0752     PRN Meds:.morphine, sodium chloride flush, albuterol, oxyCODONE, HYDROmorphone   sodium chloride 125 mL/hr at 09/25/20 0752      Allergies: Allergies   Allergen Reactions    Codeine      Cramps    Imitrex [Sumatriptan]     Theophyllines     Diflucan [Fluconazole] Rash    Nizoral [Ketoconazole] Rash    Zoloft Rash      Review of Systems:       [x] CV, Resp, Neuro, , and all other systems reviewed and negative other than listed in HPI.   Objective Findings:     Vitals: Vitals:    09/24/20 1907 09/25/20 0428 09/25/20 0726 09/25/20 0755   BP:   136/82    Pulse:   75 75   Resp:   20    Temp: 100 °F (37.8 °C)  98 °F (36.7 °C)    TempSrc: Oral  Oral    SpO2:   98%    Weight:  257 lb 4.4 oz (116.7 kg)     Height:          Physical Examination:  General: No apparent distress, A/Ox3  HEENT: Normocephalic, no scleral icterus. Neck: No JVD. Heart: Regular, no murmur, no rub/gallop. No RV heave. Lungs: Clear to ascultation, no rales/wheezing/rhonchi. Good chest wall excursion. Abdomen: Appearance:, Distension -none, Soft , tenderness -above epigastrium, Bowel sounds normal, central obesity   Extremities: No clubbing/cyanosis, no edema. Skin: Warm, dry, normal turgor, no rash, no bruise, no petichiae. Neuro: No myoclonus or tremor.    Psych: Normal affect    Results/ Medications reviewed 9/25/2020, 10:36 AM     Laboratory, Microbiology, Pathology, Radiology, Cardiology, Medications and Transcriptions reviewed  Scheduled Meds:   potassium chloride  40 mEq Oral Once    piperacillin-tazobactam  3.375 g Intravenous Q8H    sodium chloride flush  10 mL Intravenous 2 times per day    sodium chloride  1,000 mL Intravenous Once    cholestyramine light  1 packet Oral BID    pantoprazole  40 mg Oral Daily     Continuous Infusions:   sodium chloride 125 mL/hr at 09/25/20 0752       Recent Labs     09/24/20  0930 09/25/20  0604   WBC 7.4 5.2   HGB 13.3 11.2*   HCT 40.3 33.7*   MCV 83.9 85.9    190     Recent Labs     09/24/20  0930 09/24/20  0957 09/25/20  0604     --  140   K 3.4  --  3.1*   CL 97  --  106   CO2 29  --  23   BUN 11  --  14   CREATININE 0.47* 0.6 0.57     Recent Labs     09/24/20  0930 09/25/20  0604   * 184*   * 238*   BILIDIR  --  0.7*   BILITOT 2.7* 1.5*   ALKPHOS 540* 379*     Recent Labs     09/24/20  0930   LIPASE 8*     Recent Labs     09/24/20  0930 09/25/20  0604   PROT 6.8 5.9*   INR  --  1.3     Ct Abdomen Pelvis W Iv Contrast Additional Contrast? None  Result Date: 9/24/2020  1. There is no evidence of bowel distention or obstruction. There is decreased attenuation of the gastric wall which may indicate inflammation, nonspecific gastritis. 2. There is persistent intra and extrahepatic bile duct dilatation similar to previous examinations. Maximum diameter of the common bile duct is 2.3 cm. Xr Chest Portable  Result Date: 9/25/2020  NO ACUTE CARDIOPULMONARY DISEASE. Mri Abdomen Wo Contrast Mrcp  Result Date: 9/25/2020  MODERATE TO MARKED PREDOMINANTLY EXTRAHEPATIC BILIARY DILATATION, LIKELY CHRONIC POST CHOLECYSTECTOMY. DISTAL COMMON DUCT STRICTURE AND OCCULT PERIAMPULLARY MALIGNANCY ARE ALSO CONSIDERATIONS. NO EVIDENCE OF CHOLEDOCHOLITHIASIS. BORDERLINE SPLENOMEGALY. Impression:   72year old female patient admitted with abdominal pain, nausea, F/C. Symptoms  started roughly 2 days ago. Patient  with these same symptoms in July, but was thought to have viral gastroenteritis. No leukocytosis. Blood cultures pending. Noted elevated liver function tests on admission, trending down. INR 1.3. MRCP reports marked predominantly extrahepatic biliary dilation. No evidence of choledocholithiasis. Borderline splenomegaly. Patient does have history of left breast cancer. No alarming symptoms such as weight loss or loss of appetite. Currently afebrile. History of gastric bypass and cholecystectomy roughly 15 years ago. Will proceed with liver work-up to rule out viral or autoimmune etiologies.   In the setting of sepsis, hypoxic hepatopathy in differential, other differential include spontaneous passage of a retained bile duct stone (given improving LFTs)  Plan:   - Infectious workup IgM to HSV, CMV, and EBV  - Antibody and PCR for HBV and HCV  - Anti smooth muscle Ab, anti-LKM1 Ab, HERMES for workup of autoimmune process  - Repeat LFT, CBC, INR  - If no clear cause, pursue liver biopsy  - Await BC results  -If ERCP is contemplated, will require transfer to a tertiary care center given history of gastric Jose-en-Y bypass  Comments: Thank you for allowing us to participate in the care of this patient. Will continue to follow. Please call if questions or concerns arise. Electronically signed by Demi Piper MD on 9/25/2020 at 10:36 AM    Please note this report has been partially produced using speech recognition software and may cause contain errors related to that system including grammar, punctuation and spelling as well as words and phrases that may seem inappropriate. If there are questions or concerns please feel free to contact me to clarify.

## 2020-09-25 NOTE — FLOWSHEET NOTE
AM assessment completed. Patient resting in bed at this time. Denies any chest pain at this time. Remains NSR on the monitor. +1 pitting edema noted to bilateral lower extremities. Pedal pulses palpable. Denies any shortness of breath at this time. Lungs are clear bilaterally. SATS 98% on RA. She is A/Ox3 and is up independently in the room. Denies any pain with elimination. Last BM 9/23/20. Voids dark dawn colored urine. Skin remains warm, dry and intact. Slight reddened rash(eczema) noted to all extremities. #20g SL to right AC with NS infusing at 125ml/hr. Vital signs stable and AM medications provided. Patient states that she has a sinus headache from seasonal allergies, but states that she doesn't need any pain medication at this time, and will let me know if it gets worse. Call light remains in reach.  Electronically signed by Claudio Skinner RN on 9/25/2020 at 8:13 AM

## 2020-09-25 NOTE — PROGRESS NOTES
[facility] CALLED TO INFORM THAT [facility] HAS NO BEDS AVAILABLE AT THIS TIME Electronically signed by Paula Carranza on 9/25/2020 at 6:42 PM

## 2020-09-26 VITALS
DIASTOLIC BLOOD PRESSURE: 84 MMHG | BODY MASS INDEX: 47.34 KG/M2 | RESPIRATION RATE: 18 BRPM | HEIGHT: 62 IN | WEIGHT: 257.27 LBS | TEMPERATURE: 97.9 F | OXYGEN SATURATION: 99 % | HEART RATE: 82 BPM | SYSTOLIC BLOOD PRESSURE: 150 MMHG

## 2020-09-26 PROBLEM — R79.89 ABNORMAL LFTS: Status: ACTIVE | Noted: 2020-09-26

## 2020-09-26 PROBLEM — R10.13 EPIGASTRIC PAIN: Status: ACTIVE | Noted: 2020-09-26

## 2020-09-26 PROBLEM — K83.8 DILATED BILE DUCT: Status: ACTIVE | Noted: 2020-09-26

## 2020-09-26 LAB
ALBUMIN SERPL-MCNC: 3.6 G/DL (ref 3.5–4.6)
ALP BLD-CCNC: 333 U/L (ref 40–130)
ALT SERPL-CCNC: 166 U/L (ref 0–33)
ANION GAP SERPL CALCULATED.3IONS-SCNC: 13 MEQ/L (ref 9–15)
AST SERPL-CCNC: 68 U/L (ref 0–35)
BASOPHILS ABSOLUTE: 0 K/UL (ref 0–0.2)
BASOPHILS RELATIVE PERCENT: 0.6 %
BILIRUB SERPL-MCNC: 0.6 MG/DL (ref 0.2–0.7)
BILIRUBIN DIRECT: <0.2 MG/DL (ref 0–0.4)
BILIRUBIN, INDIRECT: ABNORMAL MG/DL (ref 0–0.6)
BUN BLDV-MCNC: 8 MG/DL (ref 8–23)
CALCIUM SERPL-MCNC: 8.2 MG/DL (ref 8.5–9.9)
CHLORIDE BLD-SCNC: 104 MEQ/L (ref 95–107)
CO2: 24 MEQ/L (ref 20–31)
CREAT SERPL-MCNC: 0.49 MG/DL (ref 0.5–0.9)
EOSINOPHILS ABSOLUTE: 0.4 K/UL (ref 0–0.7)
EOSINOPHILS RELATIVE PERCENT: 8.8 %
GFR AFRICAN AMERICAN: >60
GFR NON-AFRICAN AMERICAN: >60
GLUCOSE BLD-MCNC: 125 MG/DL (ref 70–99)
HCT VFR BLD CALC: 33.9 % (ref 37–47)
HEMOGLOBIN: 11.2 G/DL (ref 12–16)
INR BLD: 1.1
LYMPHOCYTES ABSOLUTE: 1.1 K/UL (ref 1–4.8)
LYMPHOCYTES RELATIVE PERCENT: 22.6 %
MAGNESIUM: 2 MG/DL (ref 1.7–2.4)
MCH RBC QN AUTO: 28.1 PG (ref 27–31.3)
MCHC RBC AUTO-ENTMCNC: 33.1 % (ref 33–37)
MCV RBC AUTO: 84.9 FL (ref 82–100)
MONOCYTES ABSOLUTE: 0.4 K/UL (ref 0.2–0.8)
MONOCYTES RELATIVE PERCENT: 9.3 %
NEUTROPHILS ABSOLUTE: 2.8 K/UL (ref 1.4–6.5)
NEUTROPHILS RELATIVE PERCENT: 58.7 %
PDW BLD-RTO: 13.7 % (ref 11.5–14.5)
PLATELET # BLD: 203 K/UL (ref 130–400)
POTASSIUM REFLEX MAGNESIUM: 3.2 MEQ/L (ref 3.4–4.9)
PROTHROMBIN TIME: 14.2 SEC (ref 12.3–14.9)
RBC # BLD: 3.99 M/UL (ref 4.2–5.4)
SODIUM BLD-SCNC: 141 MEQ/L (ref 135–144)
TOTAL PROTEIN: 6.2 G/DL (ref 6.3–8)
WBC # BLD: 4.7 K/UL (ref 4.8–10.8)

## 2020-09-26 PROCEDURE — 80048 BASIC METABOLIC PNL TOTAL CA: CPT

## 2020-09-26 PROCEDURE — 85025 COMPLETE CBC W/AUTO DIFF WBC: CPT

## 2020-09-26 PROCEDURE — 99232 SBSQ HOSP IP/OBS MODERATE 35: CPT | Performed by: INTERNAL MEDICINE

## 2020-09-26 PROCEDURE — 6360000002 HC RX W HCPCS: Performed by: INTERNAL MEDICINE

## 2020-09-26 PROCEDURE — 2580000003 HC RX 258: Performed by: INTERNAL MEDICINE

## 2020-09-26 PROCEDURE — 80076 HEPATIC FUNCTION PANEL: CPT

## 2020-09-26 PROCEDURE — 85610 PROTHROMBIN TIME: CPT

## 2020-09-26 PROCEDURE — 83735 ASSAY OF MAGNESIUM: CPT

## 2020-09-26 PROCEDURE — 6370000000 HC RX 637 (ALT 250 FOR IP): Performed by: INTERNAL MEDICINE

## 2020-09-26 PROCEDURE — 36415 COLL VENOUS BLD VENIPUNCTURE: CPT

## 2020-09-26 RX ORDER — CIPROFLOXACIN 500 MG/1
500 TABLET, FILM COATED ORAL 2 TIMES DAILY
Qty: 14 TABLET | Refills: 0 | Status: SHIPPED | OUTPATIENT
Start: 2020-09-26 | End: 2020-10-03

## 2020-09-26 RX ORDER — AMLODIPINE BESYLATE 10 MG/1
10 TABLET ORAL DAILY
Status: DISCONTINUED | OUTPATIENT
Start: 2020-09-26 | End: 2020-09-26 | Stop reason: HOSPADM

## 2020-09-26 RX ORDER — METRONIDAZOLE 500 MG/1
500 TABLET ORAL 3 TIMES DAILY
Qty: 21 TABLET | Refills: 0 | Status: SHIPPED | OUTPATIENT
Start: 2020-09-26 | End: 2020-10-03

## 2020-09-26 RX ORDER — POTASSIUM CHLORIDE 20 MEQ/1
40 TABLET, EXTENDED RELEASE ORAL EVERY 4 HOURS
Status: COMPLETED | OUTPATIENT
Start: 2020-09-26 | End: 2020-09-26

## 2020-09-26 RX ADMIN — POTASSIUM CHLORIDE 40 MEQ: 20 TABLET, EXTENDED RELEASE ORAL at 13:39

## 2020-09-26 RX ADMIN — POTASSIUM CHLORIDE 40 MEQ: 20 TABLET, EXTENDED RELEASE ORAL at 10:09

## 2020-09-26 RX ADMIN — CHOLESTYRAMINE 4 G: 4 POWDER, FOR SUSPENSION ORAL at 10:10

## 2020-09-26 RX ADMIN — Medication 10 ML: at 10:10

## 2020-09-26 RX ADMIN — PANTOPRAZOLE SODIUM 40 MG: 40 TABLET, DELAYED RELEASE ORAL at 10:10

## 2020-09-26 RX ADMIN — AMLODIPINE BESYLATE 10 MG: 10 TABLET ORAL at 10:10

## 2020-09-26 RX ADMIN — PIPERACILLIN AND TAZOBACTAM 3.38 G: 3; .375 INJECTION, POWDER, LYOPHILIZED, FOR SOLUTION INTRAVENOUS at 06:02

## 2020-09-26 ASSESSMENT — PAIN SCALES - GENERAL
PAINLEVEL_OUTOF10: 0
PAINLEVEL_OUTOF10: 0

## 2020-09-26 NOTE — DISCHARGE SUMMARY
[facility] Medicine Discharge Summary    Tasha Candelario  :  [birth year]  MRN:  [id number]    Admit date:  2020  Discharge date:  2020    Admitting Physician:  Jojo Vides DO  Primary Care Physician:  Dejuan Mccarthy MD    Discharge Diagnoses: Active Problems:    Sepsis ([address]    Abnormal LFTs    Dilated bile duct    Epigastric pain  Resolved Problems:    * No resolved hospital problems. *    Chief Complaint   Patient presents with    Abdominal Pain     pt reports in her \"solar plexus\"    Fever       Condition: improved   Activity: no strenuous activity   Diet: low fat, diabetic  Disposition: home  Functional Status: ambulatory    Significant Findings:     Admission --> Discharge: Alk phos 540 --> 333   --> 166   --> 68    No growth from blood culture. UA with negative nitrite / leuk est    MRCP:  MODERATE TO MARKED PREDOMINANTLY EXTRAHEPATIC BILIARY DILATATION, LIKELY CHRONIC POST CHOLECYSTECTOMY.         DISTAL COMMON DUCT STRICTURE AND OCCULT PERIAMPULLARY MALIGNANCY ARE ALSO CONSIDERATIONS.         NO EVIDENCE OF CHOLEDOCHOLITHIASIS.         BORDERLINE SPLENOMEGALY. Hospital Course:   79-year-old female with obesity, diabetes, hypertension, breast cancer s/p lumpectomy, chemo and radiation, microscopic colitis, gastric bypass surgery presents from home after experiencing worsening of her chronic pain in her lower chest/upper abdomen as well as fever to 101.5 and chills. She was found to have significantly abnormal LFTs as shown above and imaging did show marketed dilation of her extrahepatic biliary ducts however this abnormality has been noted on prior findings. The overall findings were concerning for cholangitis. Her LFTs improved with supportive care. There is no growth from any of her cultures.   GI initially recommended transfer to tertiary center for consideration of ERCP however given her improvement, they changed their mind and recommended discharge with outpatient EGD to be performed by Dr. Rubia Cali this Monday. She was maintained on empiric Zosyn during the hospitalization and was transitioned to ciprofloxacin and Flagyl at discharge. She will also have repeat LFTs on Monday. Exam on Discharge:   BP (!) 150/84   Pulse 82   Temp 97.9 °F (36.6 °C) (Oral)   Resp 18   Ht 5' 2\" (1.575 m)   Wt 257 lb 4.4 oz (116.7 kg)   SpO2 99%   BMI 47.06 kg/m²   General appearance: alert, cooperative and no distress.  obesie  Mental Status: oriented to person, place and time and normal affect  Lungs: clear to auscultation bilaterally, normal effort  Heart: regular rate and rhythm, no murmur  Lower chest / epigastric pain reproducible with palpation  Abdomen: soft, nontender, nondistended, bowel sounds present, no masses  Extremities: no edema, redness, tenderness in the calves  Skin: no gross lesions, rashes    Discharge Medication List:   Unruly Nettles, [address] [place] [address] Medication Instructions REL:[id number]    Printed on:09/26/20 1423   Medication Information                      albuterol (PROVENTIL) (2.5 MG/3ML) 0.083% nebulizer solution  Take 3 mLs by nebulization every 6 hours as needed for Wheezing             albuterol sulfate  (90 Base) MCG/ACT inhaler  Inhale 2 puffs into the lungs 4 times daily as needed for Wheezing             amLODIPine (NORVASC) 10 MG tablet  Take 1 tablet by mouth daily             cholestyramine (QUESTRAN) 4 g packet  Take 1 packet by mouth 2 times daily             ciprofloxacin (CIPRO) 500 MG tablet  Take 1 tablet by mouth 2 times daily for 7 days             CPAP Machine MISC               lisinopril-hydrochlorothiazide (PRINZIDE;ZESTORETIC) 20-12.5 MG per tablet  Take 1 tablet by mouth daily             Loperamide HCl (IMODIUM PO)  Take by mouth daily as needed              metFORMIN (GLUCOPHAGE) 500 MG tablet  Take 1 tablet by mouth 2 times daily (with meals)             metroNIDAZOLE (FLAGYL) 500 MG tablet  Take 1 tablet by mouth 3 times daily for 7 days             pantoprazole (PROTONIX) 40 MG tablet  Take 1 tablet by mouth daily             Respiratory Therapy Supplies (FULL KIT NEBULIZER SET) MISC  1 Units by Does not apply route 4 times daily             vitamin D (ERGOCALCIFEROL) 1.25 MG (62366 UT) CAPS capsule  Take 1 capsule by mouth twice a week                 DC time 37 minutes    Signed:  Nyla Langston  9/26/2020, 2:23 PM

## 2020-09-26 NOTE — FLOWSHEET NOTE
Patient given discharge instructions and verbalized understanding. Education given on EGD which is suppose to be scheduled for Monday per Dr. Rachael Potter. Patient left ambulatory accompanied by daughter. Electronically signed by Kd Tirado on 9/26/2020 at 2:08 PM

## 2020-09-26 NOTE — PROGRESS NOTES
epigastric pain to palpation only. Will  plan on OP EGD (9/28/20) to rule out anastomotic ulcer- history of Jose-en-y. Hepatitis B/C non-reactive. Viral and autoimmune serologies pending. In the setting of sepsis, hypoxic hepatopathy in differential, other differential include spontaneous passage of a retained bile duct stone (given improving LFTs)    Given normalization of LFTs, urgent ERCP is not indicated, given normal MRCP will follow LFTs and if any concern for obstructive etiology will refer for consideration of an ERCP to a tertiary center given history of Jose-en-Y gastric bypass    Given history of epigastric pain will evaluate with upper endoscopy to exclude an anastomotic/marginal ulcer    PLAN:  - EGD Monday 9/28  - Will recheck LFTs on Monday     Thank you for allowing me to participate in the care of your patient.   Feel free to contact me with any questions or concerns    Mitzi Peres MD

## 2020-09-26 NOTE — FLOWSHEET NOTE
Spoke with Dr. Merary Perrin and he is aware of the patient's lab work. Doctor wants Rashid Newman NP to see the patient this morning to decide on the transfer to the clinic. Electronically signed by Keri Robert on 9/26/2020 at 8:43 AM

## 2020-09-28 ENCOUNTER — CARE COORDINATION (OUTPATIENT)
Dept: CASE MANAGEMENT | Age: 65
End: 2020-09-28

## 2020-09-28 ENCOUNTER — ANCILLARY PROCEDURE (OUTPATIENT)
Dept: ENDOSCOPY | Age: 65
End: 2020-09-28
Attending: INTERNAL MEDICINE
Payer: COMMERCIAL

## 2020-09-28 ENCOUNTER — ANESTHESIA (OUTPATIENT)
Dept: ENDOSCOPY | Age: 65
End: 2020-09-28
Payer: COMMERCIAL

## 2020-09-28 ENCOUNTER — ANESTHESIA EVENT (OUTPATIENT)
Dept: ENDOSCOPY | Age: 65
End: 2020-09-28
Payer: COMMERCIAL

## 2020-09-28 ENCOUNTER — HOSPITAL ENCOUNTER (OUTPATIENT)
Age: 65
Setting detail: OUTPATIENT SURGERY
Discharge: HOME | End: 2020-09-28
Attending: INTERNAL MEDICINE | Admitting: INTERNAL MEDICINE
Payer: COMMERCIAL

## 2020-09-28 VITALS
BODY MASS INDEX: 45.45 KG/M2 | RESPIRATION RATE: 16 BRPM | TEMPERATURE: 98.2 F | DIASTOLIC BLOOD PRESSURE: 68 MMHG | SYSTOLIC BLOOD PRESSURE: 128 MMHG | HEART RATE: 65 BPM | HEIGHT: 62 IN | OXYGEN SATURATION: 99 % | WEIGHT: 247 LBS

## 2020-09-28 VITALS
OXYGEN SATURATION: 99 % | DIASTOLIC BLOOD PRESSURE: 61 MMHG | RESPIRATION RATE: 10 BRPM | SYSTOLIC BLOOD PRESSURE: 136 MMHG

## 2020-09-28 LAB
CYTOMEGALOVIRUS IGM ANTIBODY: 101 AU/ML
EPSTEIN-BARR VCA IGG: 390 U/ML (ref 0–21.9)
EPSTEIN-BARR VCA IGM: >160 U/ML (ref 0–43.9)

## 2020-09-28 PROCEDURE — 43235 EGD DIAGNOSTIC BRUSH WASH: CPT | Performed by: INTERNAL MEDICINE

## 2020-09-28 PROCEDURE — 2709999900 HC NON-CHARGEABLE SUPPLY: Performed by: INTERNAL MEDICINE

## 2020-09-28 PROCEDURE — 7100000011 HC PHASE II RECOVERY - ADDTL 15 MIN: Performed by: INTERNAL MEDICINE

## 2020-09-28 PROCEDURE — 3700000000 HC ANESTHESIA ATTENDED CARE: Performed by: INTERNAL MEDICINE

## 2020-09-28 PROCEDURE — 6370000000 HC RX 637 (ALT 250 FOR IP): Performed by: INTERNAL MEDICINE

## 2020-09-28 PROCEDURE — 2580000003 HC RX 258: Performed by: INTERNAL MEDICINE

## 2020-09-28 PROCEDURE — 2580000003 HC RX 258: Performed by: NURSE ANESTHETIST, CERTIFIED REGISTERED

## 2020-09-28 PROCEDURE — 6360000002 HC RX W HCPCS: Performed by: NURSE ANESTHETIST, CERTIFIED REGISTERED

## 2020-09-28 PROCEDURE — 7100000010 HC PHASE II RECOVERY - FIRST 15 MIN: Performed by: INTERNAL MEDICINE

## 2020-09-28 PROCEDURE — 2500000003 HC RX 250 WO HCPCS: Performed by: NURSE ANESTHETIST, CERTIFIED REGISTERED

## 2020-09-28 PROCEDURE — 2580000003 HC RX 258

## 2020-09-28 PROCEDURE — 3609017100 HC EGD: Performed by: INTERNAL MEDICINE

## 2020-09-28 RX ORDER — SIMETHICONE 20 MG/.3ML
EMULSION ORAL PRN
Status: DISCONTINUED | OUTPATIENT
Start: 2020-09-28 | End: 2020-09-28 | Stop reason: ALTCHOICE

## 2020-09-28 RX ORDER — SODIUM CHLORIDE 9 MG/ML
INJECTION, SOLUTION INTRAVENOUS
Status: COMPLETED
Start: 2020-09-28 | End: 2020-09-28

## 2020-09-28 RX ORDER — PROPOFOL 10 MG/ML
INJECTION, EMULSION INTRAVENOUS PRN
Status: DISCONTINUED | OUTPATIENT
Start: 2020-09-28 | End: 2020-09-28 | Stop reason: SDUPTHER

## 2020-09-28 RX ORDER — SODIUM CHLORIDE 9 MG/ML
INJECTION, SOLUTION INTRAVENOUS CONTINUOUS PRN
Status: DISCONTINUED | OUTPATIENT
Start: 2020-09-28 | End: 2020-09-28 | Stop reason: SDUPTHER

## 2020-09-28 RX ORDER — LIDOCAINE HYDROCHLORIDE 20 MG/ML
INJECTION, SOLUTION INFILTRATION; PERINEURAL PRN
Status: DISCONTINUED | OUTPATIENT
Start: 2020-09-28 | End: 2020-09-28 | Stop reason: SDUPTHER

## 2020-09-28 RX ORDER — MAGNESIUM HYDROXIDE 1200 MG/15ML
LIQUID ORAL PRN
Status: DISCONTINUED | OUTPATIENT
Start: 2020-09-28 | End: 2020-09-28 | Stop reason: ALTCHOICE

## 2020-09-28 RX ORDER — ONDANSETRON 2 MG/ML
4 INJECTION INTRAMUSCULAR; INTRAVENOUS
Status: DISCONTINUED | OUTPATIENT
Start: 2020-09-28 | End: 2020-09-28 | Stop reason: HOSPADM

## 2020-09-28 RX ADMIN — SODIUM CHLORIDE: 9 INJECTION, SOLUTION INTRAVENOUS at 12:24

## 2020-09-28 RX ADMIN — LIDOCAINE HYDROCHLORIDE 60 MG: 20 INJECTION, SOLUTION INFILTRATION; PERINEURAL at 12:30

## 2020-09-28 RX ADMIN — PROPOFOL 200 MG: 10 INJECTION, EMULSION INTRAVENOUS at 12:30

## 2020-09-28 RX ADMIN — SODIUM CHLORIDE 500 ML: 9 INJECTION, SOLUTION INTRAVENOUS at 12:23

## 2020-09-28 ASSESSMENT — PULMONARY FUNCTION TESTS
PIF_VALUE: 0

## 2020-09-28 ASSESSMENT — PAIN SCALES - GENERAL: PAINLEVEL_OUTOF10: 0

## 2020-09-28 NOTE — ANESTHESIA PRE PROCEDURE
Department of Anesthesiology  Preprocedure Note       Name:  Jessee Smoker   Age:  72 y.o.  :  1955                                          MRN:  [id number]         Date:  2020      Surgeon: Kyra Sanderson):  Meng Hooks MD    Procedure: Procedure(s):  EGD DIAGNOSTIC ONLY    Medications prior to admission:   Prior to Admission medications    Medication Sig Start Date End Date Taking?  Authorizing Provider   ciprofloxacin (CIPRO) 500 MG tablet Take 1 tablet by mouth 2 times daily for 7 days 9/26/20 10/3/20  Simmie Nims, DO   metroNIDAZOLE (FLAGYL) 500 MG tablet Take 1 tablet by mouth 3 times daily for 7 days 9/26/20 10/3/20  Chrismie Edwards, DO   pantoprazole (PROTONIX) 40 MG tablet Take 1 tablet by mouth daily 20   Don Kulkarni MD   amLODIPine (NORVASC) 10 MG tablet Take 1 tablet by mouth daily 20   Don Kulkarni MD   metFORMIN (GLUCOPHAGE) 500 MG tablet Take 1 tablet by mouth 2 times daily (with meals) 20   Don Kulkarni MD   vitamin D (ERGOCALCIFEROL) 1.25 MG (91191 UT) CAPS capsule Take 1 capsule by mouth twice a week 20   Don Kulkarni MD   Respiratory Therapy Supplies (FULL KIT NEBULIZER SET) MISC 1 Units by Does not apply route 4 times daily 20   Don Kulkarni MD   albuterol sulfate  (90 Base) MCG/ACT inhaler Inhale 2 puffs into the lungs 4 times daily as needed for Wheezing 19  Don Kulkarni MD   cholestyramine Colleen Duncans) 4 g packet Take 1 packet by mouth 2 times daily 19   Don Kulkarni MD   lisinopril-hydrochlorothiazide (PRINZIDE;ZESTORETIC) 20-12.5 MG per tablet Take 1 tablet by mouth daily 19   Don Kulkarni MD   albuterol (PROVENTIL) (2.5 MG/3ML) 0.083% nebulizer solution Take 3 mLs by nebulization every 6 hours as needed for Wheezing 19   Don Kulkarni MD   Loperamide HCl (IMODIUM PO) Take by mouth daily as needed     Historical Provider, MD   CPAP Machine [facility]     Historical Provider, MD       Current medications:    Current Facility-Administered Medications   Medication Dose Route Frequency Provider Last Rate Last Dose    ondansetron (ZOFRAN) injection 4 mg  4 mg Intravenous Once PRN Demi Piper MD           Allergies:     Allergies   Allergen Reactions    Codeine      Cramps    Imitrex [Sumatriptan]     Theophyllines     Diflucan [Fluconazole] Rash    Nizoral [Ketoconazole] Rash    Zoloft Rash       Problem List:    Patient Active Problem List   Diagnosis Code    Asthma J45.909    HA (headache) R51    BMI 45.0-49.9, adult (HCC) Z68.42    CAD (coronary artery disease) I25.10    BUNNY (obstructive sleep apnea) G47.33    Hypovitaminosis D E55.9    Arthritis M19.90    Essential hypertension I10    GERD (gastroesophageal reflux disease) K21.9    Diabetes ([facility] E11.9    Malignant neoplasm of lower-inner quadrant of left breast in female, estrogen receptor negative ([facility] C50.312, Z17.1    Sepsis ([facility] A41.9    Abnormal LFTs R94.5    Dilated bile duct K83.8    Epigastric pain R10.13       Past Medical History:        Diagnosis Date    Allergic rhinitis     Asthma     CAD (coronary artery disease)     Cancer ([facility] 2014    Invasive ductal left breast    Diabetes ([facility]     Fibromyalgia     GERD (gastroesophageal reflux disease)     HA (headache)     Herpes simplex     Nose    HTN (hypertension)     Obesity     Sleep apnea        Past Surgical History:        Procedure Laterality Date    BARIATRIC SURGERY  2004    BREAST BIOPSY Left 14    BREAST BIOPSY Left 12/10/14    BREAST LUMPECTOMY Left     with left SNB    BREAST LUMPECTOMY Left     BREAST LUMPECTOMY Left      SECTION      x4    CHOLECYSTECTOMY  2007    GASTRIC BYPASS SURGERY      JOINT REPLACEMENT      Knee    KNEE SURGERY      Right knee    LYMPH NODE BIOPSY Left 14    ANDREW AND BSO      Dr. Vivek Ramos    TONSILLECTOMY     [facility] ENDOSCOPY  2/18/15    w/bx,polypectomy        Social History:    Social History     Tobacco Use    Smoking status: Never Smoker    Smokeless tobacco: Never Used   Substance Use Topics    Alcohol use: No                                Counseling given: Not Answered      Vital Signs (Current): There were no vitals filed for this visit. BP Readings from Last 3 Encounters:   09/26/20 (!) 150/84   07/18/20 119/73   06/09/20 124/72       NPO Status:                                                                                 BMI:   Wt Readings from Last 3 Encounters:   09/25/20 257 lb 4.4 oz (116.7 kg)   07/18/20 262 lb (118.8 kg)   06/09/20 262 lb (118.8 kg)     There is no height or weight on file to calculate BMI.    CBC:   Lab Results   Component Value Date    WBC 4.7 09/26/2020    RBC 3.99 09/26/2020    RBC 3.92 04/05/2012    HGB 11.2 09/26/2020    HCT 33.9 09/26/2020    MCV 84.9 09/26/2020    RDW 13.7 09/26/2020     09/26/2020       CMP:   Lab Results   Component Value Date     09/26/2020    K 3.2 09/26/2020     09/26/2020    CO2 24 09/26/2020    BUN 8 09/26/2020    CREATININE 0.49 09/26/2020    GFRAA >60.0 09/26/2020    LABGLOM >60.0 09/26/2020    GLUCOSE 125 09/26/2020    GLUCOSE 84 04/05/2012    PROT 6.2 09/26/2020    CALCIUM 8.2 09/26/2020    BILITOT 0.6 09/26/2020    ALKPHOS 333 09/26/2020    AST 68 09/26/2020     09/26/2020       POC Tests: No results for input(s): POCGLU, POCNA, POCK, POCCL, POCBUN, POCHEMO, POCHCT in the last 72 hours. Coags:   Lab Results   Component Value Date    PROTIME 14.2 09/26/2020    PROTIME 11.4 04/05/2012    INR 1.1 09/26/2020    APTT 30.1 11/24/2014       HCG (If Applicable): No results found for: PREGTESTUR, PREGSERUM, HCG, HCGQUANT     ABGs: No results found for: PHART, PO2ART, CPZ3JSK, YDU9SFU, BEART, X6NSKPUK     Type & Screen (If Applicable):  No results found for: LABABO, LABRH    Drug/Infectious Status (If Applicable):   No results found for: HIV, HEPCAB    COVID-19 Screening (If Applicable):   Lab Results   Component Value Date    COVID19 Not Detected 07/18/2020         Anesthesia Evaluation  Patient summary reviewed and Nursing notes reviewed  Airway: Mallampati: III  TM distance: >3 FB   Neck ROM: full  Mouth opening: > = 3 FB Dental: normal exam         Pulmonary:normal exam    (+) sleep apnea: on CPAP,  asthma:           Patient did not smoke on day of surgery. Cardiovascular:    (+) hypertension:, CAD:,          Beta Blocker:  Not on Beta Blocker         Neuro/Psych:   (+) neuromuscular disease:, headaches:,             GI/Hepatic/Renal:   (+) GERD:, morbid obesity          Endo/Other:    (+) DiabetesType II DM, , .          Pt had no PAT visit        ROS comment: Breast cancer  fibromyalgia Abdominal:           Vascular: negative vascular ROS. Anesthesia Plan      MAC     ASA 3       Induction: intravenous. Anesthetic plan and risks discussed with patient.         Attending anesthesiologist reviewed and agrees with Pre Eval content              YAKELIN Stapleton CRNA   9/28/2020

## 2020-09-28 NOTE — CARE COORDINATION
Attempted to reach pt for follow up call. Unable to leave message.      John Hernandez RN  Care Transition Coordinator  [phone]

## 2020-09-28 NOTE — H&P
Patient Name: Kylie Kumar  :   MRN: [id number]  DATE: 20      ENDOSCOPY  History and Physical    Procedure:    [] Diagnostic Colonoscopy       [] Screening Colonoscopy  [x] EGD      [] ERCP      [] EUS       [] Other    [x] Previous office notes/History and Physical reviewed from the patients chart. Please see EMR for further details of HPI. I have examined the patient's status immediately prior to the procedure and:      Indications/HPI:    [x]Abdominal Pain   []Cancer- GI/Lung  []Fhx of colon CA  []History of Polyps   []Garcias   []Melena  []Abnormal Imaging   []Dysphagia    []Persistent Pneumonia  []Anemia   []Food Impaction  []History of Polyps  []GI Bleed   []Pulmonary nodule/Mass  []Change in bowel habits  []Heartburn/Reflux  []Rectal Bleed (BRBPR)  []Chest Pain - Non Cardiac  []Heme (+) Stool  []Ulcers  []Constipation   []Hemoptysis   []Varices  []Diarrhea   []Hypoxemia  []Nausea/Vomiting   []Screening   []Crohns/Colitis  [x]Other: Patient admitted to the hospital last week with abnormal LFTs and epigastric abdominal pain. MRCP with dilated common bile duct without any stones or sludge    Anesthesia:   [x] MAC [] Moderate Sedation   [] General   [] None     ROS: 12 pt Review of Symptoms was negative unless mentioned above    Medications:   Prior to Admission medications    Medication Sig Start Date End Date Taking?  Authorizing Provider   ciprofloxacin (CIPRO) 500 MG tablet Take 1 tablet by mouth 2 times daily for 7 days 9/26/20 10/3/20 Yes Jason Fails, DO   metroNIDAZOLE (FLAGYL) 500 MG tablet Take 1 tablet by mouth 3 times daily for 7 days 9/26/20 10/3/20 Yes Jason Fails, DO   pantoprazole (PROTONIX) 40 MG tablet Take 1 tablet by mouth daily 20  Yes Toney Fried MD   amLODIPine (NORVASC) 10 MG tablet Take 1 tablet by mouth daily 20  Yes Toney Fried MD   vitamin D (ERGOCALCIFEROL) 1.25 MG (35032 UT) CAPS capsule Take 1 capsule by mouth twice a week 20  Yes Deshawn Guzman Andre Foster MD   albuterol sulfate  (90 Base) MCG/ACT inhaler Inhale 2 puffs into the lungs 4 times daily as needed for Wheezing 19 Yes Inga Box MD   cholestyramine Freddrick Cake) 4 g packet Take 1 packet by mouth 2 times daily 19  Yes Inga Box MD   lisinopril-hydrochlorothiazide (PRINZIDE;ZESTORETIC) 20-12.5 MG per tablet Take 1 tablet by mouth daily 19  Yes Inga Box MD   Loperamide HCl (IMODIUM PO) Take by mouth daily as needed    Yes Historical Provider, MD   CPAP Machine [facility]    Yes Historical Provider, MD   metFORMIN (GLUCOPHAGE) 500 MG tablet Take 1 tablet by mouth 2 times daily (with meals) 20   Inga Box MD   Respiratory Therapy Supplies (FULL KIT NEBULIZER SET) MISC 1 Units by Does not apply route 4 times daily 20   Inga Box MD   albuterol (PROVENTIL) (2.5 MG/3ML) 0.083% nebulizer solution Take 3 mLs by nebulization every 6 hours as needed for Wheezing 19   Inga Box MD       Allergies:    Allergies   Allergen Reactions    Codeine      Cramps    Imitrex [Sumatriptan]     Theophyllines     Diflucan [Fluconazole] Rash    Nizoral [Ketoconazole] Rash    Zoloft Rash        History of allergic reaction to anesthesia:  No    Past Medical History:  Past Medical History:   Diagnosis Date    Allergic rhinitis     Asthma     CAD (coronary artery disease)     Cancer ([facility] 2014    Invasive ductal left breast    Colon polyps     Diabetes ([facility]     Fibromyalgia     GERD (gastroesophageal reflux disease)     HA (headache)     Herpes simplex     Nose    HTN (hypertension)     Obesity     Sleep apnea        Past Surgical History:  Past Surgical History:   Procedure Laterality Date    BARIATRIC SURGERY  2004    BREAST BIOPSY Left 14    BREAST BIOPSY Left 12/10/14    BREAST LUMPECTOMY Left     with left SNB    BREAST LUMPECTOMY Left     BREAST LUMPECTOMY Left      SECTION      x4    CHOLECYSTECTOMY  2007    GASTRIC BYPASS SURGERY  2003    KNEE ARTHROPLASTY Right     knee reconstruction    KNEE SURGERY  1992    Right knee    LYMPH NODE BIOPSY Left 11/18/14    ANDREW AND BSO      Dr. Baron Holly  2002    TUBAL LIGATION      UPPER GASTROINTESTINAL ENDOSCOPY  2/18/15    w/bx,polypectomy     UVULOPALATOPHARYGOPLASTY         Social History:  Social History     Tobacco Use    Smoking status: Never Smoker    Smokeless tobacco: Never Used   Substance Use Topics    Alcohol use: No    Drug use: No       Vital Signs:   Vitals:    09/28/20 1208   BP: (!) 152/79   Pulse: 71   Resp: 16   Temp: 98.2 °F (36.8 °C)   SpO2: 97%        Physical Exam:  Cardiac:  [x]WNL []Comments:  Pulmonary:  [x]WNL   []Comments:   Neuro/Mental Status:  [x]WNL  []Comments:  Abdominal:  [x]WNL    []Comments:  Other:   []WNL  []Comments:    Informed Consent:  The risks and benefits of the procedure have been discussed with either the patient or if they cannot consent, their representative. Assessment:  Patient examined and appropriate for planned sedation and procedure. Plan:  Proceed with planned sedation and procedure as above. The patient was counseled at length about risks of dariel COVID-19 in the perioperative and any recovery window from the procedure. The patient was made aware that dariel COVID-19 may worsen their prognosis for recovery from their procedure and lend to a higher morbidity and-all mortality risk. The patient was given the option of postponing the procedure all material risks, benefits, and alternatives were discussed. The patient does wish to proceed with the procedure at this time.     Abraham Poe MD  12:45 PM

## 2020-09-28 NOTE — ANESTHESIA POSTPROCEDURE EVALUATION
Department of Anesthesiology  Postprocedure Note    Patient: Ashley Zazueta  MRN: [id number]  YOB: 1955  Date of evaluation: 9/28/2020  Time:  12:39 PM     Procedure Summary     Date:  09/28/20 Room / Location:  [address] [place] [address] Kinsey [address]    Anesthesia Start:  1227 Anesthesia Stop:      Procedure:  EGD DIAGNOSTIC ONLY (N/A ) Diagnosis:  (EGD)    Surgeon:  Irene Patton MD Responsible Provider:  YAKELIN Singh CRNA    Anesthesia Type:  MAC ASA Status:  3          Anesthesia Type: MAC    Héctor Phase I: Héctor Score: 10    Héctor Phase II:      Last vitals: Reviewed and per EMR flowsheets.        Anesthesia Post Evaluation    Patient location during evaluation: PACU  Patient participation: complete - patient participated  Level of consciousness: awake and alert  Pain score: 0  Airway patency: patent  Nausea & Vomiting: no nausea and no vomiting  Complications: no  Cardiovascular status: blood pressure returned to baseline and hemodynamically stable  Respiratory status: acceptable and spontaneous ventilation  Hydration status: euvolemic

## 2020-09-29 ENCOUNTER — CARE COORDINATION (OUTPATIENT)
Dept: CASE MANAGEMENT | Age: 65
End: 2020-09-29

## 2020-09-29 LAB
ANTINUCLEAR AB INTERPRETIVE COMMENT: NORMAL
ANTINUCLEAR ANTIBODY, HEP-2, IGG: NORMAL
BLOOD CULTURE, ROUTINE: NORMAL
CYTOPLASMIC PATTERN TITER: ABNORMAL
F-ACTIN AB IGA: 15.6 UNITS (ref 0–24.9)
LIVER-KIDNEY MICROSOME-1 AB IGG: 0.8 U (ref 0–24.9)

## 2020-09-29 NOTE — CARE COORDINATION
-2020 [address] Abd Pain  No CV-19 lab performed  Dr Eldon Gibson 2020  Med rec/1111F order completed. Challenges to be reviewed by the provider   Additional needs identified to be addressed with provider No  none    Discussed COVID-19 related testing which was not done at this time. Test results were not done. Patient informed of results, if available? NA         Method of communication with provider : none    Advance Care Planning:   Does patient have an Advance Directive:  not on file. Was this a readmission? No  Patient stated reason for admission: Abd pain  Patients top risk factors for readmission: Recurrent abd pain and nausea. Care Transition Nurse (CTN) contacted the patient by telephone to perform post hospital discharge assessment. Verified name and  with patient as identifiers. Provided introduction to self, and explanation of the CTN role. CTN reviewed discharge instructions, medical action plan and red flags with patient who verbalized understanding. Patient given an opportunity to ask questions and does not have any further questions or concerns at this time. Were discharge instructions available to patient? Yes. Reviewed appropriate site of care based on symptoms and resources available to patient including: When to call 911. The patient agrees to contact the PCP office for questions related to their healthcare. Medication reconciliation was performed with patient, who verbalizes understanding of administration of home medications. Advised obtaining a 90-day supply of all daily and as-needed medications. Covid Risk Education    Patient has following risk factors of: asthma. Education provided regarding infection prevention, and signs and symptoms of COVID-19 and when to seek medical attention with patient who verbalized understanding.  Discussed exposure protocols and quarantine From CDC: Are you at higher risk for severe illness?   and given an opportunity for questions and concerns. The patient agrees to contact the COVID-19 hotline [phone] or PCP office for questions related to COVID-19. For more information on steps you can take to protect yourself, see CDC's How to Protect Yourself     Patient/family/caregiver given information for [employer] and agrees to enroll no  Patient's preferred e-mail: declines  Patient's preferred phone number: declines    Discussed follow-up appointments. If no appointment was previously scheduled, appointment scheduling offered: Attended. Is follow up appointment scheduled within 7 days of discharge? Yes  Non-[facility] follow up appointment(s):     Plan for follow-up call in 7-10 days based on severity of symptoms and risk factors. Plan for next call: symptom management-Abd pain and nausea. Pt reports no current abd pain, nausea, vomiting, ot abd distention concerns. Denies fever, chills, or flu-like symptoms. CTN provided contact information for future needs.

## 2021-02-09 ENCOUNTER — HOSPITAL ENCOUNTER (EMERGENCY)
Age: 66
Discharge: HOME | End: 2021-02-09
Payer: COMMERCIAL

## 2021-02-09 ENCOUNTER — APPOINTMENT (OUTPATIENT)
Dept: CT IMAGING | Age: 66
End: 2021-02-09
Payer: COMMERCIAL

## 2021-02-09 VITALS
HEART RATE: 74 BPM | HEIGHT: 62 IN | WEIGHT: 260 LBS | TEMPERATURE: 98.8 F | OXYGEN SATURATION: 99 % | DIASTOLIC BLOOD PRESSURE: 78 MMHG | BODY MASS INDEX: 47.84 KG/M2 | SYSTOLIC BLOOD PRESSURE: 135 MMHG | RESPIRATION RATE: 18 BRPM

## 2021-02-09 DIAGNOSIS — R11.2 NON-INTRACTABLE VOMITING WITH NAUSEA, UNSPECIFIED VOMITING TYPE: ICD-10-CM

## 2021-02-09 DIAGNOSIS — R10.13 ABDOMINAL PAIN, EPIGASTRIC: Primary | ICD-10-CM

## 2021-02-09 LAB
ALBUMIN SERPL-MCNC: 3.8 G/DL (ref 3.5–4.6)
ALP BLD-CCNC: 308 U/L (ref 40–130)
ALT SERPL-CCNC: 33 U/L (ref 0–33)
ANION GAP SERPL CALCULATED.3IONS-SCNC: 9 MEQ/L (ref 9–15)
AST SERPL-CCNC: 122 U/L (ref 0–35)
BASOPHILS ABSOLUTE: 0 K/UL (ref 0–0.2)
BASOPHILS RELATIVE PERCENT: 0.4 %
BILIRUB SERPL-MCNC: 1.1 MG/DL (ref 0.2–0.7)
BILIRUBIN URINE: NEGATIVE
BLOOD, URINE: NEGATIVE
BUN BLDV-MCNC: 9 MG/DL (ref 8–23)
CALCIUM SERPL-MCNC: 8.4 MG/DL (ref 8.5–9.9)
CHLORIDE BLD-SCNC: 102 MEQ/L (ref 95–107)
CLARITY: CLEAR
CO2: 29 MEQ/L (ref 20–31)
COLOR: YELLOW
CREAT SERPL-MCNC: 0.64 MG/DL (ref 0.5–0.9)
EOSINOPHILS ABSOLUTE: 0.2 K/UL (ref 0–0.7)
EOSINOPHILS RELATIVE PERCENT: 2.2 %
GFR AFRICAN AMERICAN: >60
GFR NON-AFRICAN AMERICAN: >60
GLOBULIN: 3.1 G/DL (ref 2.3–3.5)
GLUCOSE BLD-MCNC: 255 MG/DL (ref 70–99)
GLUCOSE URINE: 500 MG/DL
HCT VFR BLD CALC: 38.7 % (ref 37–47)
HEMOGLOBIN: 13 G/DL (ref 12–16)
KETONES, URINE: NEGATIVE MG/DL
LACTIC ACID: 2.4 MMOL/L (ref 0.5–2.2)
LEUKOCYTE ESTERASE, URINE: NEGATIVE
LIPASE: 19 U/L (ref 12–95)
LYMPHOCYTES ABSOLUTE: 1 K/UL (ref 1–4.8)
LYMPHOCYTES RELATIVE PERCENT: 13.3 %
MCH RBC QN AUTO: 28.2 PG (ref 27–31.3)
MCHC RBC AUTO-ENTMCNC: 33.6 % (ref 33–37)
MCV RBC AUTO: 83.9 FL (ref 82–100)
MONOCYTES ABSOLUTE: 0.4 K/UL (ref 0.2–0.8)
MONOCYTES RELATIVE PERCENT: 5.5 %
NEUTROPHILS ABSOLUTE: 6.1 K/UL (ref 1.4–6.5)
NEUTROPHILS RELATIVE PERCENT: 78.6 %
NITRITE, URINE: NEGATIVE
PDW BLD-RTO: 13.4 % (ref 11.5–14.5)
PH UA: 7.5 (ref 5–9)
PLATELET # BLD: 240 K/UL (ref 130–400)
POTASSIUM SERPL-SCNC: 3.9 MEQ/L (ref 3.4–4.9)
PROTEIN UA: NEGATIVE MG/DL
RBC # BLD: 4.61 M/UL (ref 4.2–5.4)
SODIUM BLD-SCNC: 140 MEQ/L (ref 135–144)
SPECIFIC GRAVITY UA: 1.01 (ref 1–1.03)
TOTAL PROTEIN: 6.9 G/DL (ref 6.3–8)
URINE REFLEX TO CULTURE: ABNORMAL
UROBILINOGEN, URINE: 1 E.U./DL
WBC # BLD: 7.8 K/UL (ref 4.8–10.8)

## 2021-02-09 PROCEDURE — 80053 COMPREHEN METABOLIC PANEL: CPT

## 2021-02-09 PROCEDURE — 6360000002 HC RX W HCPCS: Performed by: NURSE PRACTITIONER

## 2021-02-09 PROCEDURE — 83605 ASSAY OF LACTIC ACID: CPT

## 2021-02-09 PROCEDURE — 96376 TX/PRO/DX INJ SAME DRUG ADON: CPT

## 2021-02-09 PROCEDURE — 36415 COLL VENOUS BLD VENIPUNCTURE: CPT

## 2021-02-09 PROCEDURE — 74176 CT ABD & PELVIS W/O CONTRAST: CPT

## 2021-02-09 PROCEDURE — 2580000003 HC RX 258: Performed by: NURSE PRACTITIONER

## 2021-02-09 PROCEDURE — 96375 TX/PRO/DX INJ NEW DRUG ADDON: CPT

## 2021-02-09 PROCEDURE — 99285 EMERGENCY DEPT VISIT HI MDM: CPT

## 2021-02-09 PROCEDURE — 6370000000 HC RX 637 (ALT 250 FOR IP): Performed by: NURSE PRACTITIONER

## 2021-02-09 PROCEDURE — 96374 THER/PROPH/DIAG INJ IV PUSH: CPT

## 2021-02-09 PROCEDURE — 81003 URINALYSIS AUTO W/O SCOPE: CPT

## 2021-02-09 PROCEDURE — 83690 ASSAY OF LIPASE: CPT

## 2021-02-09 PROCEDURE — 85025 COMPLETE CBC W/AUTO DIFF WBC: CPT

## 2021-02-09 RX ORDER — MORPHINE SULFATE 2 MG/ML
4 INJECTION, SOLUTION INTRAMUSCULAR; INTRAVENOUS ONCE
Status: COMPLETED | OUTPATIENT
Start: 2021-02-09 | End: 2021-02-09

## 2021-02-09 RX ORDER — OXYCODONE HYDROCHLORIDE AND ACETAMINOPHEN 5; 325 MG/1; MG/1
1 TABLET ORAL EVERY 6 HOURS PRN
Qty: 12 TABLET | Refills: 0 | Status: SHIPPED | OUTPATIENT
Start: 2021-02-09 | End: 2021-02-12

## 2021-02-09 RX ORDER — ONDANSETRON 4 MG/1
4 TABLET, ORALLY DISINTEGRATING ORAL EVERY 8 HOURS PRN
Qty: 15 TABLET | Refills: 0 | Status: SHIPPED | OUTPATIENT
Start: 2021-02-09 | End: 2021-02-14

## 2021-02-09 RX ORDER — ONDANSETRON 2 MG/ML
4 INJECTION INTRAMUSCULAR; INTRAVENOUS ONCE
Status: COMPLETED | OUTPATIENT
Start: 2021-02-09 | End: 2021-02-09

## 2021-02-09 RX ORDER — MORPHINE SULFATE 2 MG/ML
4 INJECTION, SOLUTION INTRAMUSCULAR; INTRAVENOUS
Status: DISCONTINUED | OUTPATIENT
Start: 2021-02-09 | End: 2021-02-09

## 2021-02-09 RX ORDER — 0.9 % SODIUM CHLORIDE 0.9 %
1000 INTRAVENOUS SOLUTION INTRAVENOUS ONCE
Status: COMPLETED | OUTPATIENT
Start: 2021-02-09 | End: 2021-02-09

## 2021-02-09 RX ORDER — SUCRALFATE 1 G/1
1 TABLET ORAL 4 TIMES DAILY
Qty: 28 TABLET | Refills: 0 | Status: SHIPPED | OUTPATIENT
Start: 2021-02-09 | End: 2021-03-30 | Stop reason: ALTCHOICE

## 2021-02-09 RX ADMIN — MORPHINE SULFATE 4 MG: 2 INJECTION, SOLUTION INTRAMUSCULAR; INTRAVENOUS at 17:39

## 2021-02-09 RX ADMIN — MORPHINE SULFATE 4 MG: 2 INJECTION, SOLUTION INTRAMUSCULAR; INTRAVENOUS at 20:33

## 2021-02-09 RX ADMIN — LIDOCAINE HYDROCHLORIDE: 20 SOLUTION ORAL; TOPICAL at 17:39

## 2021-02-09 RX ADMIN — ONDANSETRON 4 MG: 2 INJECTION INTRAMUSCULAR; INTRAVENOUS at 17:39

## 2021-02-09 RX ADMIN — SODIUM CHLORIDE 1000 ML: 9 INJECTION, SOLUTION INTRAVENOUS at 17:40

## 2021-02-09 ASSESSMENT — PAIN DESCRIPTION - DESCRIPTORS
DESCRIPTORS: ACHING
DESCRIPTORS: ACHING;SHOOTING;STABBING

## 2021-02-09 ASSESSMENT — ENCOUNTER SYMPTOMS
EYE DISCHARGE: 0
EYE REDNESS: 0
ABDOMINAL PAIN: 1
DIARRHEA: 0
BACK PAIN: 0
WHEEZING: 0
EYE PAIN: 0
SHORTNESS OF BREATH: 0
RHINORRHEA: 0
BLOOD IN STOOL: 0
COLOR CHANGE: 0
NAUSEA: 1
SORE THROAT: 0
TROUBLE SWALLOWING: 0
CONSTIPATION: 0
VOMITING: 1
COUGH: 0

## 2021-02-09 ASSESSMENT — PAIN SCALES - GENERAL
PAINLEVEL_OUTOF10: 6
PAINLEVEL_OUTOF10: 3
PAINLEVEL_OUTOF10: 7

## 2021-02-09 ASSESSMENT — PAIN DESCRIPTION - PAIN TYPE: TYPE: ACUTE PAIN

## 2021-02-09 ASSESSMENT — PAIN DESCRIPTION - FREQUENCY: FREQUENCY: CONTINUOUS

## 2021-02-09 ASSESSMENT — PAIN DESCRIPTION - LOCATION: LOCATION: ABDOMEN

## 2021-02-09 NOTE — DISCHARGE INSTR - COC
Continuity of Care Form    Patient Name: Cameron Molina   :    MRN:  [id number]    Admit date:  2021  Discharge date:  ***    Code Status Order: Prior   Advance Directives:     Admitting Physician:  No admitting provider for patient encounter.   PCP: Gus Neville MD    Discharging Nurse: [facility] Unit/Room#:   Discharging Unit Phone Number: ***    Emergency Contact:   Extended Emergency Contact Information  Primary Emergency Contact: Milana [address] Phone: [phone]  Work Phone: [phone]  Mobile Phone: [phone]  Relation: Child    Past Surgical History:  Past Surgical History:   Procedure Laterality Date    BARIATRIC SURGERY  2004    BREAST BIOPSY Left 14    BREAST BIOPSY Left 12/10/14    BREAST LUMPECTOMY Left     with left SNB    BREAST LUMPECTOMY Left     BREAST LUMPECTOMY Left      SECTION      x4    CHOLECYSTECTOMY  2007    GASTRIC BYPASS SURGERY      KNEE ARTHROPLASTY Right     knee reconstruction    KNEE SURGERY      Right knee    LYMPH NODE BIOPSY Left 14    Ligia Ayala Huong  2002   [facility] ENDOSCOPY  2/18/15    w/bx,polypectomy     UPPER GASTROINTESTINAL ENDOSCOPY N/A 2020    EGD DIAGNOSTIC ONLY performed by Tutu Pablo MD at [address]         Immunization History:   Immunization History   Administered Date(s) Administered    Influenza Virus Vaccine 10/18/2010, 10/13/2011, 10/24/2012, 10/20/2017, 10/17/2018    Influenza, High Dose (Fluzone 65 yrs and older) 10/23/2015    Influenza, MDCK Quadv, IM, PF (Flucelvax 4 yrs and older) 2018    Influenza, Triv, 3 Years and older, IM (Afluria (5 yrs and older) 10/12/2016    Pneumococcal Conjugate 13-valent ([id number]) 2017    Pneumococcal Polysaccharide (Edcrrcdtn65) 2018    Td, unspecified formulation 08/21/2003       Active Problems:  Patient Active Problem List   Diagnosis Code    Asthma J45.909    HA (headache) R51.9    BMI 45.0-49.9, adult (HCC) Z68.42    CAD (coronary artery disease) I25.10    BUNNY (obstructive sleep apnea) G47.33    Hypovitaminosis D E55.9    Arthritis M19.90    Essential hypertension I10    GERD (gastroesophageal reflux disease) K21.9    Diabetes ([facility] E11.9    Malignant neoplasm of lower-inner quadrant of left breast in female, estrogen receptor negative [facility] C50.312, Z17.1    Sepsis [facility] A41.9    Abnormal LFTs R94.5    Dilated bile duct K83.8    Epigastric pain R10.13       Isolation/Infection:   Isolation          No Isolation        Patient Infection Status     Infection Onset Added Last Indicated Last Indicated By Review Planned Expiration Resolved Resolved By    None active    Resolved    COVID-19 Rule Out 07/18/20 07/18/20 07/18/20 Covid-19 Ambulatory (Ordered)   07/22/20 Rule-Out Test Resulted          Nurse Assessment:  Last Vital Signs: BP (!) 154/82   Pulse 88   Temp 98.8 °F (37.1 °C) (Oral)   Resp 16   Ht 5' 2\" (1.575 m)   Wt 260 lb (117.9 kg)   SpO2 100%   BMI 47.55 kg/m²     Last documented pain score (0-10 scale): Pain Level: 6  Last Weight:   Wt Readings from Last 1 Encounters:   02/09/21 260 lb (117.9 kg)     Mental Status:  {IP PT MENTAL STATUS:20030}    IV Access:  { KEILY IV ACCESS:949185852}    Nursing Mobility/ADLs:  Walking   {P DME LORU:943146809}  Transfer  {P DME EFCD:036007644}  Bathing  {P DME YYNJ:982607642}  Dressing  {P DME HCSC:076524394}  Toileting  {P DME TSGO:542223179}  Feeding  {P DME FRDM:337896474}  Med Admin  {P DME KTOP:469814292}  Med Delivery   { KEILY MED Delivery:376229687}    Wound Care Documentation and Therapy:        Elimination:  Continence:   · Bowel: {YES / FJ:75232}  · Bladder: {YES / RN:19358}  Urinary Catheter: {Urinary Catheter:079169768}   Colostomy/Ileostomy/Ileal Conduit: {YES / AI:51767}       Date of Last BM: ***  No intake or output data in the 24 hours ending 21 1654  No intake/output data recorded.     Safety Concerns:     [address] Safety Concerns:432380886}    Impairments/Disabilities:      [address] Impairments/Disabilities:767519008}    Nutrition Therapy:  Current Nutrition Therapy:   [address] Diet List:721057092}    Routes of Feeding: {CHP DME Other Feedings:547371458}  Liquids: {Slp liquid thickness:70165}  Daily Fluid Restriction: {CHP DME Yes amt example:069633927}  Last Modified Barium Swallow with Video (Video Swallowing Test): {Done Not Done ZGWO:432945175}    Treatments at the Time of Hospital Discharge:   Respiratory Treatments: ***  Oxygen Therapy:  {Therapy; copd oxygen:10982}  Ventilator:    { CC Vent HTXI:409333998}    Rehab Therapies: {THERAPEUTIC INTERVENTION:4929729623}  Weight Bearing Status/Restrictions: [address]  Weight Bearin}  Other Medical Equipment (for information only, NOT a DME order):  {EQUIPMENT:120214467}  Other Treatments: ***    Patient's personal belongings (please select all that are sent with patient):  {[facility] DME Belongings:178622916}    RN SIGNATURE:  {Esignature:786734418}    CASE MANAGEMENT/SOCIAL WORK SECTION    Inpatient Status Date: ***    Readmission Risk Assessment Score:  Readmission Risk              Risk of Unplanned Readmission:        0           Discharging to Facility/ Agency   · Name:   · Address:  · Phone:  · Fax:    Dialysis Facility (if applicable)   · Name:  · Address:  · Dialysis Schedule:  · Phone:  · Fax:    / signature: {Esignature:313174259}    PHYSICIAN SECTION    Prognosis: {Prognosis:5987519267}    Condition at Discharge: [address] Patient Condition:293618016}    Rehab Potential (if transferring to Rehab): {Prognosis:8988409241}    Recommended Labs or Other Treatments After Discharge: ***    Physician Certification: I certify the above information and transfer of Christy Carmelaune  is necessary for the continuing treatment of the diagnosis listed and that she requires {Admit to Appropriate Level of Care:12120} for {GREATER/LESS:733114467} 30 days.      Update Admission H&P: {CHP DME Changes in EBPIR:253681626}    PHYSICIAN SIGNATURE:  {Esignature:712600533}

## 2021-02-09 NOTE — ED PROVIDER NOTES
[address] [facility]  EMERGENCY DEPARTMENT ENCOUNTER      Pt Name: Jose Ramon Maier  MRN: [id number]  [place] 1955  Date of evaluation: 2/9/2021  Provider: Page Ramsey       Chief Complaint   Patient presents with    Abdominal Pain         HISTORY OF PRESENT ILLNESS   (Location/Symptom, Timing/Onset,Context/Setting, Quality, Duration, Modifying Factors, Severity)  Note limiting factors. Jose Ramon Maier is a 72 y.o. female who presents to the emergency department with a chart reviewed past medical history of hypertension, GERD, sepsis, and dilated bile duct for chief complaint of 6 out of 10 epigastric abdominal pain intermittent each episode lasting for few hours ongoing for the past couple of days. Reports 1 episode of vomiting. Denies any shortness of breath or chest pain. Denies any diarrhea constipation. Has no fevers or chills. Denies alleviating or modifying factors. Nursing Notes were reviewed. REVIEW OF SYSTEMS    (2-9 systems for level 4, 10 or more for level 5)     Review of Systems   Constitutional: Negative for activity change, appetite change, fatigue and fever. HENT: Negative for congestion, ear pain, rhinorrhea, sore throat and trouble swallowing. Eyes: Negative for pain, discharge and redness. Respiratory: Negative for cough, shortness of breath and wheezing. Cardiovascular: Negative for chest pain and palpitations. Gastrointestinal: Positive for abdominal pain, nausea and vomiting. Negative for blood in stool, constipation and diarrhea. Endocrine: Negative for polydipsia and polyuria. Genitourinary: Negative for decreased urine volume, dysuria, flank pain and hematuria. Musculoskeletal: Negative for arthralgias, back pain and myalgias. Skin: Negative for color change, rash and wound. Neurological: Negative for dizziness, syncope, weakness, light-headedness and headaches.    Psychiatric/Behavioral: Negative for behavioral problems. All other systems reviewed and are negative. Except as noted above the remainder of the review of systems was reviewed and negative.        PAST MEDICAL HISTORY     Past Medical History:   Diagnosis Date    Allergic rhinitis     Asthma     CAD (coronary artery disease)     [facility]    Invasive ductal left breast    Colon polyps     Diabetes ([facility]     Fibromyalgia     GERD (gastroesophageal reflux disease)     HA (headache)     Herpes simplex     Nose    HTN (hypertension)     Obesity     Sleep apnea      Past Surgical History:   Procedure Laterality Date    BARIATRIC SURGERY  2004    BREAST BIOPSY Left 14    BREAST BIOPSY Left 12/10/14    BREAST LUMPECTOMY Left     with left SNB    BREAST LUMPECTOMY Left     BREAST LUMPECTOMY Left      SECTION      x4    CHOLECYSTECTOMY  2007    GASTRIC BYPASS SURGERY      KNEE ARTHROPLASTY Right     knee reconstruction    KNEE SURGERY      Right knee    LYMPH NODE BIOPSY Left 14    ANDREW AND BSO      Dr. Rigoberto Andrews      TUBAL LIGATION      UPPER GASTROINTESTINAL ENDOSCOPY  2/18/15    w/bx,polypectomy     UPPER GASTROINTESTINAL ENDOSCOPY N/A 2020    EGD DIAGNOSTIC ONLY performed by Magdalene Rosario MD at [address]       Social History     Socioeconomic History    Marital status:      Spouse name: None    Number of children: None    Years of education: None    Highest education level: None   Occupational History    None   Social Needs    Financial resource strain: None    Food insecurity     Worry: None     Inability: None    Transportation needs     Medical: None     Non-medical: None   Tobacco Use    Smoking status: Never Smoker    Smokeless tobacco: Never Used   Substance and Sexual Activity    Alcohol use: No    Drug use: No    Sexual activity: None   Lifestyle    Physical activity     Days per week: None     Minutes per session: None    Stress: None   Relationships    Social connections     Talks on phone: None     Gets together: None     Attends [employer] service: None     Active member of club or organization: None     Attends meetings of clubs or organizations: None     Relationship status: None    Intimate partner violence     Fear of current or ex partner: None     Emotionally abused: None     Physically abused: None     Forced sexual activity: None   Other Topics Concern    None   Social History Narrative    None       SCREENINGS    Shanna Coma Scale  Eye Opening: Spontaneous  Best Verbal Response: Oriented  Best Motor Response: Obeys commands  Shanna Coma Scale Score: 15        PHYSICAL EXAM    (up to 7 for level 4, 8 or more for level 5)     ED Triage Vitals [02/09/21 1628]   BP Temp Temp Source Pulse Resp SpO2 Height Weight   (!) 154/82 98.8 °F (37.1 °C) Oral 88 16 100 % 5' 2\" (1.575 m) 260 lb (117.9 kg)       Physical Exam  Vitals signs and nursing note reviewed. Constitutional:       General: She is not in acute distress. Appearance: She is well-developed. She is not diaphoretic. HENT:      Head: Normocephalic and atraumatic. Nose: Nose normal.   Eyes:      Conjunctiva/sclera: Conjunctivae normal.      Pupils: Pupils are equal, round, and reactive to light. Neck:      Musculoskeletal: Normal range of motion and neck supple. Cardiovascular:      Rate and Rhythm: Normal rate and regular rhythm. Heart sounds: Normal heart sounds. Abdominal:      General: Bowel sounds are normal.      Palpations: Abdomen is soft. Tenderness: There is abdominal tenderness in the epigastric area. There is no right CVA tenderness. Skin:     General: Skin is warm and dry. Capillary Refill: Capillary refill takes less than 2 seconds. Findings: No rash. Neurological:      Mental Status: She is alert and oriented to person, place, and time. Cranial Nerves:  No cranial nerve deficit. Psychiatric:         Behavior: Behavior normal.         RESULTS     EKG: All EKG's are interpreted by the Emergency Department Physician who either signs or Co-signsthis chart in the absence of a cardiologist.        RADIOLOGY:   Michelet Setswana such as CT, Ultrasound and MRI are read by the radiologist. Plain radiographic images are visualized and preliminarily interpreted by the emergency physician with the below findings:        Interpretation per the Radiologist below, if available at the time ofthis note:    CT ABDOMEN PELVIS WO CONTRAST Additional Contrast? None   Final Result            ED BEDSIDE ULTRASOUND:   Performed by ED Physician - none    LABS:  Labs Reviewed   COMPREHENSIVE METABOLIC PANEL - Abnormal; Notable for the following components:       Result Value    Glucose 255 (*)     Calcium 8.4 (*)     Total Bilirubin 1.1 (*)     Alkaline Phosphatase 308 (*)      (*)     All other components within normal limits   LACTIC ACID, PLASMA - Abnormal; Notable for the following components:    Lactic Acid 2.4 (*)     All other components within normal limits   URINE RT REFLEX TO CULTURE - Abnormal; Notable for the following components:    Glucose, Ur 500 (*)     All other components within normal limits   CBC WITH AUTO DIFFERENTIAL   LIPASE       All other labs were within normal range or not returned as of this dictation. EMERGENCY DEPARTMENT COURSE and DIFFERENTIAL DIAGNOSIS/MDM:   Vitals:    Vitals:    02/09/21 1628 02/09/21 1744 02/09/21 1851   BP: (!) 154/82 (!) 156/93 (!) 149/97   Pulse: 88 78 78   Resp: 16 16 18   Temp: 98.8 °F (37.1 °C)     TempSrc: Oral     SpO2: 100% 99% 99%   Weight: 260 lb (117.9 kg)     Height: 5' 2\" (1.575 m)              MDM   Patient is a 75-year-old female presenting to the ER with a chief complaint of abdominal pain, epigastric. Patient is hemodynamically stable nontoxic-appearing.   Medicated with a GI cocktail liter fluid 4 of morphine and 4 of Zofran. Patient will be reevaluated. Patient's presentation has improved her pain is decreasing. I have spoken with her GI specialist Dr. Oneida Magaña he has recommended to discharge home given her improving state with additional medications for symptoms. Patient is agreeable with this. She be discharged home with medication for pain discomfort and nausea. She is directed to follow-up with GI specialist Dr. Oneida Magaña tomorrow for further evaluation. Return to the ER for any onset of new concerns and worsening condition. Patient verbalized understandable given instruction education. CRITICAL CARE TIME       CONSULTS:  None  Spoke directly to Dr. Oneida Magaña her GI specialist he states that if she feels safe being discharged home he is okay with this. I discussed the findings of her CT scan and her elevated liver enzymes which appear to be decreasing compared to previous. He states that there is no obvious signs of infection and she is tolerating p.o. intake well he would prefer her to be discharged home to follow-up with the office tomorrow. Recommends medication for pain and nausea. PROCEDURES:  Unless otherwise noted below, none     Procedures    FINAL IMPRESSION      1. Abdominal pain, epigastric    2. Non-intractable vomiting with nausea, unspecified vomiting type          DISPOSITION/PLAN   DISPOSITION        [facility]:  [phone]    Call in 3 day      [facility] [place] [facility] [place] [address] [place] [address]  [phone]    Call in 1 day        DISCHARGE MEDICATIONS:  New Prescriptions    ONDANSETRON (ZOFRAN ODT) 4 MG DISINTEGRATING TABLET    Take 1 tablet by mouth every 8 hours as needed for Nausea or Vomiting    OXYCODONE-ACETAMINOPHEN (PERCOCET) 5-325 MG PER TABLET    Take 1 tablet by mouth every 6 hours as needed for Pain for up to 3 days. Intended supply: 3 days.  Take lowest dose possible to manage pain    SUCRALFATE (CARAFATE) 1 GM TABLET    Take 1 tablet by mouth 4 times daily for 7 days          (Please notethat portions of this note were completed with a voice recognition program.  Efforts were made to edit the dictations but occasionally words are mis-transcribed.)    YAKELIN Joshi CNP (electronically signed)  Attending Emergency Physician          YAKELIN Joshi CNP  02/09/21 2009

## 2021-02-09 NOTE — ED TRIAGE NOTES
Pt has co abd pain , states was seen for this before and treated for sepsis. Pt is aox4 afebrile p/w/d.

## 2021-02-09 NOTE — ED NOTES
Pt just medicated with morphine nurse updated provider advised ok to hold until needed.      Jolie Hughes RN  02/09/21 8642

## 2021-02-10 ENCOUNTER — OFFICE VISIT (OUTPATIENT)
Dept: FAMILY MEDICINE CLINIC | Age: 66
End: 2021-02-10
Payer: COMMERCIAL

## 2021-02-10 DIAGNOSIS — R51.9 NONINTRACTABLE HEADACHE, UNSPECIFIED CHRONICITY PATTERN, UNSPECIFIED HEADACHE TYPE: ICD-10-CM

## 2021-02-10 DIAGNOSIS — R50.9 FEVER, UNSPECIFIED FEVER CAUSE: ICD-10-CM

## 2021-02-10 DIAGNOSIS — R50.9 FEVER, UNSPECIFIED FEVER CAUSE: Primary | ICD-10-CM

## 2021-02-10 PROCEDURE — 99441 PR PHYS/QHP TELEPHONE EVALUATION 5-10 MIN: CPT | Performed by: PHYSICIAN ASSISTANT

## 2021-02-10 ASSESSMENT — ENCOUNTER SYMPTOMS
SINUS PRESSURE: 0
TROUBLE SWALLOWING: 0
VOMITING: 0
DIARRHEA: 0
ABDOMINAL PAIN: 0
CHEST TIGHTNESS: 0
SHORTNESS OF BREATH: 0
COUGH: 0
BACK PAIN: 0

## 2021-02-10 ASSESSMENT — PATIENT HEALTH QUESTIONNAIRE - PHQ9
2. FEELING DOWN, DEPRESSED OR HOPELESS: 0
SUM OF ALL RESPONSES TO PHQ QUESTIONS 1-9: 0
2. FEELING DOWN, DEPRESSED OR HOPELESS: NOT AT ALL
1. LITTLE INTEREST OR PLEASURE IN DOING THINGS: 0
DEPRESSION UNABLE TO ASSESS: YES
SUM OF ALL RESPONSES TO PHQ9 QUESTIONS 1 & 2: 0
SUM OF ALL RESPONSES TO PHQ9 QUESTIONS 1 & 2: 0

## 2021-02-10 NOTE — PROGRESS NOTES
TELEHEALTH EVALUATION -- Audio/Visual (During  public health emergency)    -   Christiana Tim is a 72 y.o. female being evaluated by a Virtual Visit (video visit) encounter to address concerns as mentioned above. A caregiver was present when appropriate. Due to this being a TeleHealth encounter (During  public health emergency), evaluation of the following organ systems was limited: Vitals/Constitutional/EENT/Resp/CV/GI//MS/Neuro/Skin/Heme-Lymph-Imm. Pursuant to the emergency declaration under the [address] [place] [address], [address] [place] [address] authority and the [employer] Act, this Virtual Visit was conducted with patient's (and/or legal guardian's) consent, to reduce the patient's risk of exposure to COVID-19 and provide necessary medical care. The patient (and/or legal guardian) has also been advised to contact this office for worsening conditions or problems, and seek emergency medical treatment and/or call 911 if deemed necessary. Patient was contacted and agreed to proceed with a virtual visit via Telephone Visit  The risks and benefits of converting to a virtual visit were discussed in light of the current infectious disease epidemic. Patient also understood that insurance coverage and co-pays are up to their individual insurance plans. Patient was located at their home. Provider was located at their office. 2/10/2021  Christiana Tim (:  1955) has requested an audio/video evaluation for the following concern(s):    HPI  72year old female who complains of having a 80 F degree fever and a head ache this morning. She called Dr. Rudolph Hooks office for a virtual visit in which he wanted her to get a COVID-19. She denies COVID-19 exposure. Review of Systems   Constitutional: Positive for fever.  Negative for activity change, appetite change, chills and unexpected weight change. HENT: Negative for drooling, ear pain, nosebleeds, sinus pressure and trouble swallowing. Respiratory: Negative for cough, chest tightness and shortness of breath. Cardiovascular: Negative for chest pain and leg swelling. Gastrointestinal: Negative for abdominal pain, diarrhea and vomiting. Endocrine: Negative for polydipsia and polyphagia. Genitourinary: Negative for dysuria, flank pain and frequency. Musculoskeletal: Negative for back pain and myalgias. Skin: Negative for pallor and rash. Neurological: Positive for headaches. Negative for seizures, syncope and weakness. Hematological: Does not bruise/bleed easily. Psychiatric/Behavioral: Negative for agitation, behavioral problems and confusion. All other systems reviewed and are negative. Prior to Visit Medications    Medication Sig Taking? Authorizing Provider   sucralfate (CARAFATE) 1 GM tablet Take 1 tablet by mouth 4 times daily for 7 days  YAKELIN Pepper CNP   ondansetron (ZOFRAN ODT) 4 MG disintegrating tablet Take 1 tablet by mouth every 8 hours as needed for Nausea or Vomiting  YAKELIN Pepper CNP   oxyCODONE-acetaminophen (PERCOCET) 5-325 MG per tablet Take 1 tablet by mouth every 6 hours as needed for Pain for up to 3 days. Intended supply: 3 days.  Take lowest dose possible to manage pain  YAKELIN Pepper CNP   pantoprazole (PROTONIX) 40 MG tablet Take 1 tablet by mouth daily  Vinny Stock MD   amLODIPine (NORVASC) 10 MG tablet Take 1 tablet by mouth daily  Vinny Stock MD   metFORMIN (GLUCOPHAGE) 500 MG tablet Take 1 tablet by mouth 2 times daily (with meals)  Vinny Stock MD   vitamin D (ERGOCALCIFEROL) 1.25 MG (01324 UT) CAPS capsule Take 1 capsule by mouth twice a week  Vinny Stock MD   Respiratory Therapy Supplies (FULL KIT NEBULIZER SET) MISC 1 Units by Does not apply route 4 times daily  Vinny Stock MD   albuterol sulfate  (90 Base) MCG/ACT inhaler Inhale 2 puffs into the lungs 4 times daily as needed for Wheezing  Riley Jimenez MD   cholestyramine (QUESTRAN) 4 g packet Take 1 packet by mouth 2 times daily  Riley Jimenez MD   lisinopril-hydrochlorothiazide (PRINZIDE;ZESTORETIC) 20-12.5 MG per tablet Take 1 tablet by mouth daily  Riley Jimenez MD   albuterol (PROVENTIL) (2.5 MG/3ML) 0.083% nebulizer solution Take 3 mLs by nebulization every 6 hours as needed for Wheezing  Riley Jimenez MD   Loperamide HCl (IMODIUM PO) Take by mouth daily as needed   Historical Provider, MD   CPAP Machine [facility] [place] [place]   Historical Provider, MD       Past Medical History:   Diagnosis Date    Allergic rhinitis     Asthma     CAD (coronary artery disease)     [facility] 2014    Invasive ductal left breast    Colon polyps     Diabetes ([facility]     Fibromyalgia     GERD (gastroesophageal reflux disease)     HA (headache)     Herpes simplex     Nose    HTN (hypertension)     Obesity     Sleep apnea      Past Surgical History:   Procedure Laterality Date    BARIATRIC SURGERY  2004    BREAST BIOPSY Left 14    BREAST BIOPSY Left 12/10/14    BREAST LUMPECTOMY Left     with left SNB    BREAST LUMPECTOMY Left     BREAST LUMPECTOMY Left      SECTION      x4    CHOLECYSTECTOMY  2007    GASTRIC BYPASS SURGERY      KNEE ARTHROPLASTY Right     knee reconstruction    KNEE SURGERY      Right knee    LYMPH NODE BIOPSY Left 14    [facility]    TONSILLECTOMY  2002    TUBAL LIGATION      UPPER GASTROINTESTINAL ENDOSCOPY  2/18/15    w/bx,polypectomy     UPPER GASTROINTESTINAL ENDOSCOPY N/A 2020    EGD DIAGNOSTIC ONLY performed by Richard Ramos MD at [address] [place] [address] History     Socioeconomic History    Marital status:      Spouse name: Not on file    Number of children: Not on file    Years of education: Not on file    Highest education level: Not on file   Occupational History    Not on file   Social Needs    Financial resource strain: Not on file    Food insecurity     Worry: Not on file     Inability: Not on file    Transportation needs     Medical: Not on file     Non-medical: Not on file   Tobacco Use    Smoking status: Never Smoker    Smokeless tobacco: Never Used   Substance and Sexual Activity    Alcohol use: No    Drug use: No    Sexual activity: Not on file   Lifestyle    Physical activity     Days per week: Not on file     Minutes per session: Not on file    Stress: Not on file   Relationships    Social connections     Talks on phone: Not on file     Gets together: Not on file     Attends [employer] service: Not on file     Active member of club or organization: Not on file     Attends meetings of clubs or organizations: Not on file     Relationship status: Not on file    Intimate partner violence     Fear of current or ex partner: Not on file     Emotionally abused: Not on file     Physically abused: Not on file     Forced sexual activity: Not on file   Other Topics Concern    Not on file   Social History Narrative    Not on file     Family History   Problem Relation Age of Onset    Cancer Father         melanoma    Prostate Cancer Father     Cancer Sister         Melanoma    Cancer Other         Throat     Allergies   Allergen Reactions    Codeine      Cramps    Imitrex [Sumatriptan]     Theophyllines     Diflucan [Fluconazole] Rash    Nizoral [Ketoconazole] Rash    Zoloft Rash       PMH, Surgical Hx, Family Hx, and Social Hx reviewed and updated. Health Maintenance reviewed.     PHYSICAL EXAMINATION:  \"[x]\" Indicates a positive item  \"[]\" Indicates a negative item    Vital Signs: (As obtained by patient/caregiver or practitioner observation)    Blood pressure-  Heart rate-    Respiratory rate-    Temperature-  Pulse oximetry-     Constitutional: [x] Appears well-developed and well-nourished [x] No apparent distress      [] Abnormal-   Mental status  [x] Alert and awake  [x] Oriented to person/place/time [x]Able to follow commands      Eyes:  EOM    []  Normal  [] Abnormal-  Sclera  [x]  Normal  [] Abnormal -         Discharge [x]  None visible  [] Abnormal -    HENT:   [x] Normocephalic, atraumatic.   [] Abnormal   [x] Mouth/Throat: Mucous membranes are moist.     External Ears [x] Normal  [] Abnormal-     Neck: [x] No visualized mass     Pulmonary/Chest: [x] Respiratory effort normal.  [x] No visualized signs of difficulty breathing or respiratory distress        [] Abnormal-      Musculoskeletal:   [x] Normal gait with no signs of ataxia         [x] Normal range of motion of neck        [] Abnormal-       Neurological:       [x] No Facial Asymmetry (Cranial nerve 7 motor function) (limited exam to video visit)          [x] No gaze palsy        [] Abnormal-         Skin:        [x] No significant exanthematous lesions or discoloration noted on facial skin         [] Abnormal-            Psychiatric:       [x] Normal Affect [x] No Hallucinations        [] Abnormal-     Other pertinent observable physical exam findings-   Results for orders placed or performed during the hospital encounter of 02/09/21   Comprehensive Metabolic Panel   Result Value Ref Range    Sodium 140 135 - 144 mEq/L    Potassium 3.9 3.4 - 4.9 mEq/L    Chloride 102 95 - 107 mEq/L    CO2 29 20 - 31 mEq/L    Anion Gap 9 9 - 15 mEq/L    Glucose 255 (H) 70 - 99 mg/dL    BUN 9 8 - 23 mg/dL    CREATININE 0.64 0.50 - 0.90 mg/dL    GFR Non-African American >60.0 >60    GFR  >60.0 >60    Calcium 8.4 (L) 8.5 - 9.9 mg/dL    Total Protein 6.9 6.3 - 8.0 g/dL    Albumin 3.8 3.5 - 4.6 g/dL    Total Bilirubin 1.1 (H) 0.2 - 0.7 mg/dL    Alkaline Phosphatase 308 (H) 40 - 130 U/L    ALT 33 0 - 33 U/L     (H) 0 - 35 U/L    Globulin 3.1 2.3 - 3.5 g/dL   CBC Auto Differential   Result Value Ref Range    WBC 7.8 4.8 - 10.8 K/uL    RBC 4.61 4.20 - 5.40 M/uL    Hemoglobin 13.0 12.0 - 16.0 g/dL    Hematocrit 38.7 37.0 - 47.0 %    MCV 83.9 82.0 - 100.0 fL    MCH 28.2 27.0 - 31.3 pg    MCHC 33.6 33.0 - 37.0 %    RDW 13.4 11.5 - 14.5 %    Platelets 617 651 - 786 K/uL    Neutrophils % 78.6 %    Lymphocytes % 13.3 %    Monocytes % 5.5 %    Eosinophils % 2.2 %    Basophils % 0.4 %    Neutrophils Absolute 6.1 1.4 - 6.5 K/uL    Lymphocytes Absolute 1.0 1.0 - 4.8 K/uL    Monocytes Absolute 0.4 0.2 - 0.8 K/uL    Eosinophils Absolute 0.2 0.0 - 0.7 K/uL    Basophils Absolute 0.0 0.0 - 0.2 K/uL   Lipase   Result Value Ref Range    Lipase 19 12 - 95 U/L   Lactic Acid, Plasma   Result Value Ref Range    Lactic Acid 2.4 (H) 0.5 - 2.2 mmol/L   Urine Reflex to Culture    Specimen: Urine, clean catch   Result Value Ref Range    Color, UA Yellow Straw/Yellow    Clarity, UA Clear Clear    Glucose, Ur 500 (A) Negative mg/dL    Bilirubin Urine Negative Negative    Ketones, Urine Negative Negative mg/dL    Specific Gravity, UA 1.011 1.005 - 1.030    Blood, Urine Negative Negative    pH, UA 7.5 5.0 - 9.0    Protein, UA Negative Negative mg/dL    Urobilinogen, Urine 1.0 <2.0 E.U./dL    Nitrite, Urine Negative Negative    Leukocyte Esterase, Urine Negative Negative    Urine Reflex to Culture Not Indicated        ASSESSMENT/PLAN:  Assessment & Plan   There are no diagnoses linked to this encounter. No orders of the defined types were placed in this encounter. No orders of the defined types were placed in this encounter. There are no discontinued medications. Return if symptoms worsen or fail to improve. Reviewed with the patient: current clinical status, medications, activities and diet. Side effects, adverse effects of the medication prescribed today, as well as treatment plan/ rationale and result expectations have been discussed with the patient who expresses understanding and desires to proceed.     Close follow up to evaluate treatment results and for coordination of care.  I have reviewed the patient's medical history in detail and updated the computerized patient record. Patient identification was verified at the start of the visit: Yes    Total time spent on this encounter: Not billed by time      --CHEIKH Vogt on 2/10/2021 at 5:07 PM    An electronic signature was used to authenticate this note.

## 2021-02-11 LAB
SARS-COV-2: NOT DETECTED
SOURCE: NORMAL

## 2021-03-30 ENCOUNTER — OFFICE VISIT (OUTPATIENT)
Dept: FAMILY MEDICINE CLINIC | Age: 66
End: 2021-03-30
Payer: COMMERCIAL

## 2021-03-30 VITALS
SYSTOLIC BLOOD PRESSURE: 126 MMHG | HEIGHT: 62 IN | HEART RATE: 72 BPM | BODY MASS INDEX: 48.21 KG/M2 | OXYGEN SATURATION: 98 % | DIASTOLIC BLOOD PRESSURE: 82 MMHG | RESPIRATION RATE: 16 BRPM | WEIGHT: 262 LBS | TEMPERATURE: 97.9 F

## 2021-03-30 DIAGNOSIS — E11.9 TYPE 2 DIABETES MELLITUS WITHOUT COMPLICATION, WITHOUT LONG-TERM CURRENT USE OF INSULIN (HCC): Primary | ICD-10-CM

## 2021-03-30 DIAGNOSIS — C50.312 MALIGNANT NEOPLASM OF LOWER-INNER QUADRANT OF LEFT BREAST IN FEMALE, ESTROGEN RECEPTOR NEGATIVE (HCC): ICD-10-CM

## 2021-03-30 DIAGNOSIS — Z13.220 SCREENING FOR HYPERCHOLESTEROLEMIA: ICD-10-CM

## 2021-03-30 DIAGNOSIS — J45.40 MODERATE PERSISTENT ASTHMA, UNSPECIFIED WHETHER COMPLICATED: ICD-10-CM

## 2021-03-30 DIAGNOSIS — E55.9 HYPOVITAMINOSIS D: ICD-10-CM

## 2021-03-30 DIAGNOSIS — Z17.1 MALIGNANT NEOPLASM OF LOWER-INNER QUADRANT OF LEFT BREAST IN FEMALE, ESTROGEN RECEPTOR NEGATIVE (HCC): ICD-10-CM

## 2021-03-30 DIAGNOSIS — E11.9 TYPE 2 DIABETES MELLITUS WITHOUT COMPLICATION, WITHOUT LONG-TERM CURRENT USE OF INSULIN (HCC): ICD-10-CM

## 2021-03-30 DIAGNOSIS — Z12.31 VISIT FOR SCREENING MAMMOGRAM: ICD-10-CM

## 2021-03-30 LAB
CREATININE URINE: 13.1 MG/DL
HBA1C MFR BLD: 7.7 %
MICROALBUMIN UR-MCNC: <1.2 MG/DL
MICROALBUMIN/CREAT UR-RTO: NORMAL MG/G (ref 0–30)

## 2021-03-30 PROCEDURE — 3051F HG A1C>EQUAL 7.0%<8.0%: CPT | Performed by: FAMILY MEDICINE

## 2021-03-30 PROCEDURE — 83036 HEMOGLOBIN GLYCOSYLATED A1C: CPT | Performed by: FAMILY MEDICINE

## 2021-03-30 PROCEDURE — 99214 OFFICE O/P EST MOD 30 MIN: CPT | Performed by: FAMILY MEDICINE

## 2021-03-30 SDOH — ECONOMIC STABILITY: TRANSPORTATION INSECURITY
IN THE PAST 12 MONTHS, HAS THE LACK OF TRANSPORTATION KEPT YOU FROM MEDICAL APPOINTMENTS OR FROM GETTING MEDICATIONS?: NO

## 2021-03-30 SDOH — ECONOMIC STABILITY: FOOD INSECURITY: WITHIN THE PAST 12 MONTHS, THE FOOD YOU BOUGHT JUST DIDN'T LAST AND YOU DIDN'T HAVE MONEY TO GET MORE.: NEVER TRUE

## 2021-03-30 SDOH — ECONOMIC STABILITY: TRANSPORTATION INSECURITY
IN THE PAST 12 MONTHS, HAS LACK OF TRANSPORTATION KEPT YOU FROM MEETINGS, WORK, OR FROM GETTING THINGS NEEDED FOR DAILY LIVING?: NO

## 2021-03-30 NOTE — PROGRESS NOTES
Patient: Kandy Martinez (: 1955) is a 72 y.o. female,Established patient, here for evaluation of the following chief complaint(s):  Chief Complaint   Patient presents with    Diabetes     6 month follow up. She is due for labs and diabetic foot exam.     Hypertension     6 month follow up. Date: 3/30/21    Allergies   Allergen Reactions    Codeine      Cramps    Imitrex [Sumatriptan]     Theophyllines     Diflucan [Fluconazole] Rash    Nizoral [Ketoconazole] Rash    Zoloft Rash       Vitals:    21 1515 21 1548   BP: 136/84 126/82   Site: Right Upper Arm    Position: Sitting    Cuff Size: Large Adult    Pulse: 72    Resp: 16    Temp: 97.9 °F (36.6 °C)    TempSrc: Oral    SpO2: 98%    Weight: 262 lb (118.8 kg)    Height: 5' 2\" (1.575 m)       Body mass index is 47.92 kg/m². PHQ Scores 2/10/2021 2020 2019 2018 2018 2017 9/15/2014   PHQ2 Score 0 0 0 0 0 0 0   PHQ9 Score 0 0 0 0 0 0 0     Interpretation of Total Score Depression Severity: 1-4 = Minimal depression, 5-9 = Mild depression, 10-14 = Moderate depression, 15-19 = Moderately severe depression, 20-27 = Severe depression      Treatment Adherence:   Medication compliance:  compliant all of the time  Diet compliance:  does not follow diet  Weight trend: increasing  Current exercise: no regular exercise      Diabetes Mellitus Type 2: Current symptoms/problems include none. Home blood sugar records:  patient does not test  Any episodes of hypoglycemia? no  Eye exam current (within one year): no  Tobacco history: She  reports that she has never smoked. She has never used smokeless tobacco.   Daily Aspirin? No:   Known diabetic complications: none        HPI    She comes in today to follow-up on her blood pressure her asthma and her overall health. She is really had a difficult winter because she lost her sister to COPD and her mom who is 80. She is still trying to recover from that.   She is extremely stressed she is not following her diet she did not stay on the Metformin as she things instructed she does not have a glucometer. She has not had the Covid vaccine as yet. She has no focal complaints otherwise today. Discussed diabetic foot exam.    Review of Systems    Constitutional: positive for fatigue, fever and sweats. HEENT: Negative for eye discharge and vision loss. Negative for ear drainage, hearing loss and nasal drainage. Respiratory: positive for cough, dyspnea and wheezing. Cardiovascular:  Negative for chest pain, claudication and irregular heartbeat/palpitations. Gastrointestinal: Negative for abdominal pain, nausea, constipation and diarrhea. Genitourinary: Negative for dysuria, patient had a hysterectomy. Metabolic/Endocrine: Negative for cold intolerance, heat intolerance, polydipsia and polyphagia. No unintended weight loss or weight gain. Neuro/Psychiatric: Negative for gait disturbance. Negative for psychiatric symptoms. Dermatologic: Negative for pruritus and rash. Musculoskeletal: positive for bone/joint symptoms. No numbness or tingling. No loss of function. Hematology: Negative for bleeding and easy bruising. Immunology:  Negative for environmental allergies and food allergies. Physical Exam    Patient's medication, allergies, past medical, surgical, social and family histories were reviewed and updated as appropriate. PHYSICAL EXAM     General: Alert oriented pleasant cooperative no acute distress color good nonicteric  Neck: Soft nontender no adenopathy or masses no carotid bruits  Lungs today clear to auscultation without wheezes rhonchi or rales  Heart: Regular rate and rhythm no murmurs  Extremities: She has some mild nonpitting edema of her feet diabetic foot exam: She is intact to sensation she has no deformities no skin changes no callus no ulceration and no erythema              Assessment:   Diagnosis Orders   1.  Type 2 diabetes mellitus without complication, without long-term current use of insulin (HCC)   DIABETES FOOT EXAM    Microalbumin / Creatinine Urine Ratio    POCT glycosylated hemoglobin (Hb A1C) 7.7 I reinitiated her Metformin we will see her back in follow-up and recheck her hemoglobin A C   2. Visit for screening mammogram  PALOMA DIGITAL SCREEN W OR WO CAD BILATERAL   3. Screening for hypercholesterolemia  Lipid, Fasting   4. Hypovitaminosis D  Vitamin D 25 Hydroxy   5. Moderate persistent asthma, unspecified whether complicated   stable   6. BMI 45.0-49.9, adult [facility])   try to get patient to focus on healthier eating   7. Malignant neoplasm of lower-inner quadrant of left breast in female, estrogen receptor negative ([facility] [address]   she follows up with oncology regularly         On this date 03/30/21 I have spent 33 minutes reviewing previous notes, test results and face to face with the patient discussing the diagnosis and importance of compliance with the treatment plan.        Plan:  Current Outpatient Medications   Medication Sig Dispense Refill    metFORMIN (GLUCOPHAGE) 500 MG tablet Take 2 tablets by mouth 2 times daily (with meals) 120 tablet 5    pantoprazole (PROTONIX) 40 MG tablet Take 1 tablet by mouth daily 90 tablet 3    amLODIPine (NORVASC) 10 MG tablet Take 1 tablet by mouth daily 90 tablet 3    Respiratory Therapy Supplies (FULL KIT NEBULIZER SET) MISC 1 Units by Does not apply route 4 times daily 1 each 1    albuterol sulfate  (90 Base) MCG/ACT inhaler Inhale 2 puffs into the lungs 4 times daily as needed for Wheezing 3 Inhaler 3    cholestyramine (QUESTRAN) 4 g packet Take 1 packet by mouth 2 times daily 180 packet 5    lisinopril-hydrochlorothiazide (PRINZIDE;ZESTORETIC) 20-12.5 MG per tablet Take 1 tablet by mouth daily 90 tablet 3    albuterol (PROVENTIL) (2.5 MG/3ML) 0.083% nebulizer solution Take 3 mLs by nebulization every 6 hours as needed for Wheezing 300 vial 3    Loperamide HCl (IMODIUM PO) Take by mouth daily as needed  CPAP Machine MISC        No current facility-administered medications for this visit. Orders Placed This Encounter   Procedures    PALOMA DIGITAL SCREEN W OR WO CAD BILATERAL     Standing Status:   Future     Standing Expiration Date:   5/30/2022     Scheduling Instructions:      US if needed     Order Specific Question:   Reason for exam:     Answer:   routine    Lipid, Fasting     Standing Status:   Future     Standing Expiration Date:   3/30/2022    Microalbumin / Creatinine Urine Ratio     Standing Status:   Future     Number of Occurrences:   1     Standing Expiration Date:   3/30/2022    Vitamin D 25 Hydroxy     Standing Status:   Future     Standing Expiration Date:   3/30/2022    POCT glycosylated hemoglobin (Hb A1C)    HM DIABETES FOOT EXAM       Orders Placed This Encounter   Medications    metFORMIN (GLUCOPHAGE) 500 MG tablet     Sig: Take 2 tablets by mouth 2 times daily (with meals)     Dispense:  120 tablet     Refill:  5              Return in about 3 months (around 6/30/2021). An electronic signature was used to authenticate this note.   Dr. Ashley Maciel      3/30/21  4:10 PM

## 2021-03-31 ENCOUNTER — TELEPHONE (OUTPATIENT)
Dept: FAMILY MEDICINE CLINIC | Age: 66
End: 2021-03-31

## 2021-03-31 DIAGNOSIS — Z85.3 HISTORY OF BREAST CANCER: Primary | ICD-10-CM

## 2021-03-31 NOTE — TELEPHONE ENCOUNTER
Patient called and is asking if her mammogram order can be changed to a diagnostic bilateral. She states that she gets this type because of her breast cancer history . Please advise, thank you.

## 2021-04-09 ENCOUNTER — APPOINTMENT (OUTPATIENT)
Dept: ULTRASOUND IMAGING | Age: 66
End: 2021-04-09
Payer: COMMERCIAL

## 2021-04-09 ENCOUNTER — HOSPITAL ENCOUNTER (OUTPATIENT)
Dept: WOMENS IMAGING | Age: 66
Discharge: HOME | End: 2021-04-11
Payer: COMMERCIAL

## 2021-04-09 DIAGNOSIS — Z85.3 HISTORY OF BREAST CANCER: ICD-10-CM

## 2021-04-09 PROCEDURE — G0279 TOMOSYNTHESIS, MAMMO: HCPCS

## 2021-04-16 ENCOUNTER — HOSPITAL ENCOUNTER (OUTPATIENT)
Dept: CT IMAGING | Age: 66
Discharge: HOME | End: 2021-04-18
Payer: COMMERCIAL

## 2021-04-16 DIAGNOSIS — C50.312 MALIGNANT NEOPLASM OF LOWER-INNER QUADRANT OF LEFT FEMALE BREAST, UNSPECIFIED ESTROGEN RECEPTOR STATUS (HCC): ICD-10-CM

## 2021-04-16 PROCEDURE — 74177 CT ABD & PELVIS W/CONTRAST: CPT

## 2021-04-16 PROCEDURE — 6360000004 HC RX CONTRAST MEDICATION: Performed by: INTERNAL MEDICINE

## 2021-04-16 PROCEDURE — 2500000003 HC RX 250 WO HCPCS: Performed by: INTERNAL MEDICINE

## 2021-04-16 PROCEDURE — 2580000003 HC RX 258: Performed by: INTERNAL MEDICINE

## 2021-04-16 RX ORDER — SODIUM CHLORIDE 0.9 % (FLUSH) 0.9 %
10 SYRINGE (ML) INJECTION
Status: COMPLETED | OUTPATIENT
Start: 2021-04-16 | End: 2021-04-16

## 2021-04-16 RX ADMIN — BARIUM SULFATE 450 ML: 20 SUSPENSION ORAL at 12:24

## 2021-04-16 RX ADMIN — IOPAMIDOL 100 ML: 612 INJECTION, SOLUTION INTRAVENOUS at 12:24

## 2021-04-16 RX ADMIN — SODIUM CHLORIDE, PRESERVATIVE FREE 10 ML: 5 INJECTION INTRAVENOUS at 12:25

## 2021-05-05 ENCOUNTER — TELEPHONE (OUTPATIENT)
Dept: GASTROENTEROLOGY | Age: 66
End: 2021-05-05

## 2021-05-05 ENCOUNTER — OFFICE VISIT (OUTPATIENT)
Dept: GASTROENTEROLOGY | Age: 66
End: 2021-05-05
Payer: COMMERCIAL

## 2021-05-05 VITALS — WEIGHT: 260 LBS | OXYGEN SATURATION: 99 % | HEIGHT: 62 IN | HEART RATE: 72 BPM | BODY MASS INDEX: 47.84 KG/M2

## 2021-05-05 DIAGNOSIS — Z98.84 S/P GASTRIC BYPASS: ICD-10-CM

## 2021-05-05 DIAGNOSIS — K58.0 IRRITABLE BOWEL SYNDROME WITH DIARRHEA: ICD-10-CM

## 2021-05-05 DIAGNOSIS — R10.13 EPIGASTRIC PAIN: Primary | ICD-10-CM

## 2021-05-05 DIAGNOSIS — R79.89 ABNORMAL LFTS: ICD-10-CM

## 2021-05-05 PROCEDURE — 99214 OFFICE O/P EST MOD 30 MIN: CPT | Performed by: INTERNAL MEDICINE

## 2021-05-05 RX ORDER — DICYCLOMINE HYDROCHLORIDE 10 MG/1
10 CAPSULE ORAL
Qty: 120 CAPSULE | Refills: 3 | Status: SHIPPED | OUTPATIENT
Start: 2021-05-05

## 2021-05-05 RX ORDER — CHOLESTYRAMINE 4 G/9G
1 POWDER, FOR SUSPENSION ORAL 2 TIMES DAILY
Qty: 180 PACKET | Refills: 5 | Status: SHIPPED | OUTPATIENT
Start: 2021-05-05

## 2021-05-05 NOTE — TELEPHONE ENCOUNTER
Patient called states she wants to know how many times a week should she get her labs drawn when she has pain please adivse

## 2021-05-05 NOTE — PROGRESS NOTES
Gastroenterology Clinic Visit    Bushra Thomas  [id number]  Chief Complaint   Patient presents with    Follow-up     HPI: 77 y.o. female referred to the GI clinic after she presented to the emergency room on 2/9/2021 with nausea vomiting and abdominal pain. Patient was admitted with similar complaints and symptoms in September 2020 at which time the LFTs improved prior to discharge. Work-up for other liver conditions was negative given that her LFTs had a hepatocellular picture. Patient known to have dilation of the common bile duct over the last 6 years, appears to be more prominent at the melissa hepatis on the imaging in September 2020 including MRI. Patient with history of cholecystectomy and gastric bypass. Since the ER visit patient reports intermittent epigastric pain, intermittent nausea. Patient has had upper abdominal pain for many years, however she feels the frequency may have increased over the last year. Patient additionally has diarrhea  She reports daily bowel movements has intermittent episodes of diarrhea with abdominal cramping. She has significant central obesity, history of gastric bypass, weight has been stable over the last few months    Previous GI work up/Endoscopic investigations:   EGD: 9/28/2020: 4 cm hiatal hernia, Healthy-appearing gastric pouch, Long blind loop, No evidence for anastomotic ulcers or marginal ulcers    Review of Systems   All other systems reviewed and are negative.     Past Medical History:   Diagnosis Date    Allergic rhinitis     Asthma     Breast cancer ([facility] [address] 2014    invasive ductal carcinoma    CAD (coronary artery disease)     Cancer ([facility] [address] 11/2014    Invasive ductal left breast    Colon polyps     Diabetes (HCC)     Fibromyalgia     GERD (gastroesophageal reflux disease)     HA (headache)     Herpes simplex     Nose    History of therapeutic radiation     HTN (hypertension)     Hx antineoplastic chemo 2014    Obesity     Sleep apnea      Past Surgical History:   Procedure Laterality Date    BARIATRIC SURGERY  2004    BREAST BIOPSY Left 14    BREAST BIOPSY Left 12/10/14    BREAST LUMPECTOMY Left     with left SNB  invasive ductal carcinoma    BREAST LUMPECTOMY Left     BREAST LUMPECTOMY Left      SECTION      x4    CHOLECYSTECTOMY  2007    GASTRIC BYPASS SURGERY      HYSTERECTOMY      KNEE ARTHROPLASTY Right     knee reconstruction    KNEE SURGERY      Right knee    LYMPH NODE BIOPSY Left 14    ANDREW AND BSO      Dr. Micki Rust  [address] [place] [address] Dr GASTROINTESTINAL ENDOSCOPY  2/18/15    w/bx,polypectomy     UPPER GASTROINTESTINAL ENDOSCOPY N/A 2020    EGD DIAGNOSTIC ONLY performed by Felipa Zheng MD at [address]       Current Outpatient Medications on File Prior to Visit   Medication Sig Dispense Refill    metFORMIN (GLUCOPHAGE) 500 MG tablet Take 2 tablets by mouth 2 times daily (with meals) 360 tablet 3    pantoprazole (PROTONIX) 40 MG tablet Take 1 tablet by mouth daily 90 tablet 3    amLODIPine (NORVASC) 10 MG tablet Take 1 tablet by mouth daily 90 tablet 3    Respiratory Therapy Supplies (FULL KIT NEBULIZER SET) MISC 1 Units by Does not apply route 4 times daily 1 each 1    albuterol sulfate  (90 Base) MCG/ACT inhaler Inhale 2 puffs into the lungs 4 times daily as needed for Wheezing 3 Inhaler 3    lisinopril-hydrochlorothiazide (PRINZIDE;ZESTORETIC) 20-12.5 MG per tablet Take 1 tablet by mouth daily 90 tablet 3    albuterol (PROVENTIL) (2.5 MG/3ML) 0.083% nebulizer solution Take 3 mLs by nebulization every 6 hours as needed for Wheezing 300 vial 3    Loperamide HCl (IMODIUM PO) Take by mouth daily as needed       CPAP Machine MISC        No current facility-administered medications on file prior to visit.       Family History   Problem Relation Age of Onset    Cancer Father         melanoma    Prostate Cancer Father     Cancer Sister         Melanoma    Cancer Other         Throat    Breast Cancer Maternal Aunt      Social History     Socioeconomic History    Marital status:      Spouse name: Not on file    Number of children: Not on file    Years of education: Not on file    Highest education level: Not on file   Occupational History    Not on file   Social Needs    Financial resource strain: Somewhat hard    Food insecurity     Worry: Never true     Inability: Never true    Transportation needs     Medical: No     Non-medical: No   Tobacco Use    Smoking status: Never Smoker    Smokeless tobacco: Never Used   Substance and Sexual Activity    Alcohol use: No    Drug use: No    Sexual activity: Not on file   Lifestyle    Physical activity     Days per week: Not on file     Minutes per session: Not on file    Stress: Not on file   Relationships    Social connections     Talks on phone: Not on file     Gets together: Not on file     Attends Yarsanism service: Not on file     Active member of club or organization: Not on file     Attends meetings of clubs or organizations: Not on file     Relationship status: Not on file    Intimate partner violence     Fear of current or ex partner: Not on file     Emotionally abused: Not on file     Physically abused: Not on file     Forced sexual activity: Not on file   Other Topics Concern    Not on file   Social History Narrative    Not on file     Pulse 72, height 5' 2\" (1.575 m), weight 260 lb (117.9 kg), SpO2 99 %, not currently breastfeeding. Physical Exam  Constitutional:       General: She is not in acute distress. Appearance: Normal appearance. She is well-developed. Comments: Central obesity   Eyes:      General: No scleral icterus. Cardiovascular:      Rate and Rhythm: Normal rate and regular rhythm. Pulmonary:      Effort: Pulmonary effort is normal.      Breath sounds: Normal breath sounds.    Abdominal:      General: Bowel sounds are normal. There is no distension. Palpations: Abdomen is soft. There is no mass. Tenderness: There is no abdominal tenderness. There is no guarding or rebound. Musculoskeletal: Normal range of motion. Lymphadenopathy:      Cervical: No cervical adenopathy. Neurological:      Mental Status: She is alert and oriented to person, place, and time. Psychiatric:         Behavior: Behavior normal.         Thought Content:  Thought content normal.         Judgment: Judgment normal.       Laboratory, Pathology, Radiology reviewed indetail with relevant important investigations summarized below:  Lab Results   Component Value Date    WBC 7.8 02/09/2021    HGB 13.0 02/09/2021    HCT 38.7 02/09/2021    MCV 83.9 02/09/2021     02/09/2021     Lab Results   Component Value Date    IRON 111 01/15/2015    TIBC 269 01/15/2015    FERRITIN 374.8 (H) 01/15/2015     No results found for: [id number]   No results found for: FOLATE  Lab Results   Component Value Date    LABALBU 3.8 02/09/2021      Lab Results   Component Value Date    ALT 33 02/09/2021     (H) 02/09/2021    ALKPHOS 308 (H) 02/09/2021    BILITOT 1.1 (H) 02/09/2021     Component      Latest Ref Rng & Units 2/9/2021 9/26/2020 9/25/2020 9/24/2020           4:30 PM  5:53 AM  6:04 AM  9:30 AM   Bilirubin      0.2 - 0.7 mg/dL 1.1 (H) 0.6 1.5 (H) 2.7 (H)   Alk Phos      40 - 130 U/L 308 (H) 333 (H) 379 (H) 540 (H)   ALT      0 - 33 U/L 33 166 (H) 238 (H) 404 (H)   AST      0 - 35 U/L 122 (H) 68 (H) 184 (H) 657 (H)     Component      Latest Ref Rng & Units 7/18/2020 6/6/2020           1:45 PM  8:48 AM   Bilirubin      0.2 - 0.7 mg/dL 1.1 (H) 0.8 (H)   Alk Phos      40 - 130 U/L 232 (H) 133 (H)   ALT      0 - 33 U/L 67 (H) 18   AST      0 - 35 U/L 38 (H) 18     Component      Latest Ref Rng & Units 6/8/2019 12/13/2018 6/16/2018 6/12/2018           7:07 AM  6:02 PM  7:18 AM  6:46 PM   Bilirubin      0.2 - 0.7 mg/dL 0.8 (H) 0.8 0.4 0.6   Alk concern of progression of the dilation of the common bile duct with abnormal LFTs raises a concern for ampullary obstruction, most likely sphincter of Oddi dysfunction. ERCP would be indicated given abnormal LFT's, this is difficult due to patient being s/p gastric bypass. If ERCP is negative, consider liver biopsy    1. Irritable bowel syndrome with diarrhea  - cholestyramine (QUESTRAN) 4 g packet; Take 1 packet by mouth 2 times daily    2. Epigastric pain  3. S/P gastric bypass  4. Abnormal LFTs  -Will refer patient to tertiary care center for consideration of ERCP in altered anatomy  -Patient advised to obtain LFTs when symptomatic, standing orders placed for hepatic Function Panel    Juan Manuel Benavides MD   Staff Gastroenterologist  [facility]    Please note this report has been partially produced using speech recognition software and may cause or contain errors related to thatsystem including grammar, punctuation and spelling as well as words and phrases that may seem inappropriate. If there are questions or concerns please feel free to contact me to clarify.

## 2021-05-22 DIAGNOSIS — R79.89 ABNORMAL LFTS: ICD-10-CM

## 2021-05-22 DIAGNOSIS — Z13.220 SCREENING FOR HYPERCHOLESTEROLEMIA: ICD-10-CM

## 2021-05-22 DIAGNOSIS — R74.8 ELEVATED LIVER ENZYMES: ICD-10-CM

## 2021-05-22 DIAGNOSIS — R10.13 EPIGASTRIC PAIN: ICD-10-CM

## 2021-05-22 DIAGNOSIS — E55.9 HYPOVITAMINOSIS D: ICD-10-CM

## 2021-05-22 DIAGNOSIS — K58.0 IRRITABLE BOWEL SYNDROME WITH DIARRHEA: ICD-10-CM

## 2021-05-22 DIAGNOSIS — E11.9 TYPE 2 DIABETES MELLITUS WITHOUT COMPLICATION, WITHOUT LONG-TERM CURRENT USE OF INSULIN (HCC): ICD-10-CM

## 2021-05-22 LAB
ALBUMIN SERPL-MCNC: 3.9 G/DL (ref 3.5–4.6)
ALBUMIN SERPL-MCNC: 4 G/DL (ref 3.5–4.6)
ALP BLD-CCNC: 152 U/L (ref 40–130)
ALP BLD-CCNC: 152 U/L (ref 40–130)
ALT SERPL-CCNC: 17 U/L (ref 0–33)
ALT SERPL-CCNC: 17 U/L (ref 0–33)
ANION GAP SERPL CALCULATED.3IONS-SCNC: 11 MEQ/L (ref 9–15)
AST SERPL-CCNC: 16 U/L (ref 0–35)
AST SERPL-CCNC: 17 U/L (ref 0–35)
BILIRUB SERPL-MCNC: 0.7 MG/DL (ref 0.2–0.7)
BILIRUB SERPL-MCNC: 0.7 MG/DL (ref 0.2–0.7)
BILIRUBIN DIRECT: <0.2 MG/DL (ref 0–0.4)
BILIRUBIN, INDIRECT: ABNORMAL MG/DL (ref 0–0.6)
BUN BLDV-MCNC: 10 MG/DL (ref 8–23)
CALCIUM SERPL-MCNC: 9.2 MG/DL (ref 8.5–9.9)
CHLORIDE BLD-SCNC: 100 MEQ/L (ref 95–107)
CHOLESTEROL, FASTING: 179 MG/DL (ref 0–199)
CO2: 30 MEQ/L (ref 20–31)
CREAT SERPL-MCNC: 0.62 MG/DL (ref 0.5–0.9)
GFR AFRICAN AMERICAN: >60
GFR NON-AFRICAN AMERICAN: >60
GLOBULIN: 2.7 G/DL (ref 2.3–3.5)
GLUCOSE BLD-MCNC: 171 MG/DL (ref 70–99)
HBA1C MFR BLD: 8.1 % (ref 4.8–5.9)
HDLC SERPL-MCNC: 72 MG/DL (ref 40–59)
LDL CHOLESTEROL CALCULATED: 79 MG/DL (ref 0–129)
POTASSIUM SERPL-SCNC: 4.5 MEQ/L (ref 3.4–4.9)
SODIUM BLD-SCNC: 141 MEQ/L (ref 135–144)
TOTAL PROTEIN: 6.7 G/DL (ref 6.3–8)
TOTAL PROTEIN: 6.7 G/DL (ref 6.3–8)
TRIGLYCERIDE, FASTING: 141 MG/DL (ref 0–150)
VITAMIN D 25-HYDROXY: 9.1 NG/ML (ref 30–100)

## 2021-05-23 RX ORDER — GLYBURIDE 2.5 MG/1
2.5 TABLET ORAL
Qty: 30 TABLET | Refills: 5 | Status: SHIPPED | OUTPATIENT
Start: 2021-05-23 | End: 2022-09-14

## 2021-05-26 RX ORDER — ERGOCALCIFEROL 1.25 MG/1
50000 CAPSULE ORAL WEEKLY
Qty: 12 CAPSULE | Refills: 1 | Status: SHIPPED | OUTPATIENT
Start: 2021-05-26 | End: 2022-10-20 | Stop reason: ALTCHOICE

## 2021-06-03 ENCOUNTER — TELEPHONE (OUTPATIENT)
Dept: FAMILY MEDICINE CLINIC | Age: 66
End: 2021-06-03

## 2021-06-03 RX ORDER — NYSTATIN 100000 [USP'U]/G
POWDER TOPICAL
Qty: 45 G | Refills: 0 | Status: SHIPPED | OUTPATIENT
Start: 2021-06-03 | End: 2021-06-30 | Stop reason: SDUPTHER

## 2021-06-03 NOTE — TELEPHONE ENCOUNTER
Maya Mayberry did yard work and is a bit raw in the inner thighs and belly area. Would you order nystatin powder to [facility] for her?

## 2021-06-30 ENCOUNTER — OFFICE VISIT (OUTPATIENT)
Dept: FAMILY MEDICINE CLINIC | Age: 66
End: 2021-06-30
Payer: COMMERCIAL

## 2021-06-30 VITALS
WEIGHT: 264 LBS | OXYGEN SATURATION: 98 % | HEIGHT: 62 IN | SYSTOLIC BLOOD PRESSURE: 136 MMHG | TEMPERATURE: 98.2 F | RESPIRATION RATE: 16 BRPM | BODY MASS INDEX: 48.58 KG/M2 | DIASTOLIC BLOOD PRESSURE: 80 MMHG | HEART RATE: 78 BPM

## 2021-06-30 DIAGNOSIS — I10 ESSENTIAL HYPERTENSION: ICD-10-CM

## 2021-06-30 DIAGNOSIS — M81.0 SENILE OSTEOPOROSIS: ICD-10-CM

## 2021-06-30 DIAGNOSIS — B37.2 YEAST DERMATITIS: ICD-10-CM

## 2021-06-30 DIAGNOSIS — J45.40 MODERATE PERSISTENT ASTHMA, UNSPECIFIED WHETHER COMPLICATED: ICD-10-CM

## 2021-06-30 DIAGNOSIS — E11.9 TYPE 2 DIABETES MELLITUS WITHOUT COMPLICATION, WITHOUT LONG-TERM CURRENT USE OF INSULIN (HCC): Primary | ICD-10-CM

## 2021-06-30 DIAGNOSIS — J06.9 UPPER RESPIRATORY TRACT INFECTION, UNSPECIFIED TYPE: ICD-10-CM

## 2021-06-30 LAB — HBA1C MFR BLD: 7.4 %

## 2021-06-30 PROCEDURE — 3051F HG A1C>EQUAL 7.0%<8.0%: CPT | Performed by: FAMILY MEDICINE

## 2021-06-30 PROCEDURE — 99214 OFFICE O/P EST MOD 30 MIN: CPT | Performed by: FAMILY MEDICINE

## 2021-06-30 PROCEDURE — 83036 HEMOGLOBIN GLYCOSYLATED A1C: CPT | Performed by: FAMILY MEDICINE

## 2021-06-30 RX ORDER — AMOXICILLIN 500 MG/1
500 CAPSULE ORAL 3 TIMES DAILY
Qty: 30 CAPSULE | Refills: 0 | Status: SHIPPED | OUTPATIENT
Start: 2021-06-30 | End: 2021-07-10

## 2021-06-30 RX ORDER — NYSTATIN 100000 [USP'U]/G
POWDER TOPICAL
Qty: 45 G | Refills: 5 | Status: SHIPPED | OUTPATIENT
Start: 2021-06-30

## 2021-06-30 NOTE — PROGRESS NOTES
Patient: Macrina Stephenson (: 1955) is a 77 y.o. female,Established patient, here for evaluation of the following chief complaint(s):  Chief Complaint   Patient presents with    Diabetes     follow up. She is doing well.  Hypertension     follow up    Medication Refill     Pending. Date: 21    Allergies   Allergen Reactions    Aspirin      History of gastric ulcers    Codeine      Cramps    Imitrex [Sumatriptan]     Theophyllines     Diflucan [Fluconazole] Rash    Nizoral [Ketoconazole] Rash    Zoloft Rash       Vitals:    21 1552   BP: 136/80   Site: Right Upper Arm   Position: Sitting   Cuff Size: Large Adult   Pulse: 78   Resp: 16   Temp: 98.2 °F (36.8 °C)   TempSrc: Oral   SpO2: 98%   Weight: 264 lb (119.7 kg)   Height: 5' 2\" (1.575 m)      Body mass index is 48.29 kg/m². PHQ Scores 2/10/2021 2020 2019 2018 2018 2017 9/15/2014   PHQ2 Score 0 0 0 0 0 0 0   PHQ9 Score 0 0 0 0 0 0 0     Interpretation of Total Score Depression Severity: 1-4 = Minimal depression, 5-9 = Mild depression, 10-14 = Moderate depression, 15-19 = Moderately severe depression, 20-27 = Severe depression      Treatment Adherence:   Medication compliance:  compliant all of the time  Diet compliance:  does not follow diet  Weight trend: increasing  Current exercise: no regular exercise    Diabetes Mellitus Type 2: Current symptoms/problems include none. Home blood sugar records:  patient does not test  Any episodes of hypoglycemia? no  Eye exam current (within one year): no  Tobacco history: She  reports that she has never smoked. She has never used smokeless tobacco.   Daily Aspirin? No:   Known diabetic complications: none        HPI    She is following up on her diabetes today she feels well but is developed some yellow nasal drainage and ear fullness. She has a cough with some wheeze intermittently no fever chills change in taste or smell no sweats nausea or vomiting.     Review of Systems    Constitutional: positive for fatigue, negative for fever and sweats. HEENT: Negative for eye discharge and vision loss. Negative for ear drainage, hearing loss and nasal drainage. Respiratory: positive for cough, dyspnea and wheezing. Cardiovascular:  Negative for chest pain, claudication and irregular heartbeat/palpitations. Gastrointestinal: Negative for abdominal pain, nausea, positive for constipation and diarrhea. Genitourinary: Negative for dysuria, patient had a hysterectomy. Metabolic/Endocrine: Negative for cold intolerance, heat intolerance, polydipsia and polyphagia. No unintended weight loss or weight gain. Neuro/Psychiatric: Negative for gait disturbance. Negative for psychiatric symptoms. Dermatologic: Negative for pruritus and positive rash. Musculoskeletal: positive for bone/joint symptoms. No numbness or tingling. No loss of function. Hematology: Negative for bleeding and easy bruising. Immunology:  Negative for environmental allergies and food allergies. Physical Exam    Patient's medication, allergies, past medical, surgical, social and family histories were reviewed and updated as appropriate. PHYSICAL EXAM     General: Alert oriented pleasant cooperative in no acute distress color good nonicteric  HEENT: Eyes clear no icterus ears TMs and canals normal no erythema oropharynx not hyperemic no exudates induration or ulceration no pain to palpation of sinuses  Neck: Soft nontender no adenopathy or masses  Lungs: Clear to auscultation without wheezes rhonchi or rales  Heart: Regular rate and rhythm without murmurs rubs or gallops              Assessment:   Diagnosis Orders   1. Type 2 diabetes mellitus without complication, without long-term current use of insulin (HCC)  POCT glycosylated hemoglobin (Hb A1C) improved down to 7.4   2. Senile osteoporosis  DEXA BONE DENSITY AXIAL SKELETON   3. Essential hypertension     4.  Moderate persistent asthma, unspecified whether complicated     5. Upper respiratory tract infection, unspecified type  amoxicillin (AMOXIL) 500 MG capsule   6. Yeast dermatitis  nystatin (MYCOSTATIN) 547444 UNIT/GM powder         On this date 06/30/21 I have spent 32 minutes reviewing previous notes, test results and face to face with the patient discussing the diagnosis and importance of compliance with the treatment plan. Plan:  Current Outpatient Medications   Medication Sig Dispense Refill    nystatin (MYCOSTATIN) 502125 UNIT/GM powder Apply 3 times daily.  45 g 5    amoxicillin (AMOXIL) 500 MG capsule Take 1 capsule by mouth 3 times daily for 10 days 30 capsule 0    pantoprazole (PROTONIX) 40 MG tablet Take 1 tablet by mouth once daily 30 tablet 5    amLODIPine (NORVASC) 10 MG tablet Take 1 tablet by mouth once daily 30 tablet 5    vitamin D (ERGOCALCIFEROL) 1.25 MG (62838 UT) CAPS capsule Take 1 capsule by mouth once a week 12 capsule 1    glyBURIDE (DIABETA) 2.5 MG tablet Take 1 tablet by mouth daily (with breakfast) 30 tablet 5    cholestyramine (QUESTRAN) 4 g packet Take 1 packet by mouth 2 times daily 180 packet 5    dicyclomine (BENTYL) 10 MG capsule Take 1 capsule by mouth 2 times daily (before meals) 120 capsule 3    metFORMIN (GLUCOPHAGE) 500 MG tablet Take 2 tablets by mouth 2 times daily (with meals) 360 tablet 3    Respiratory Therapy Supplies (FULL KIT NEBULIZER SET) MISC 1 Units by Does not apply route 4 times daily 1 each 1    albuterol sulfate  (90 Base) MCG/ACT inhaler Inhale 2 puffs into the lungs 4 times daily as needed for Wheezing 3 Inhaler 3    lisinopril-hydrochlorothiazide (PRINZIDE;ZESTORETIC) 20-12.5 MG per tablet Take 1 tablet by mouth daily 90 tablet 3    albuterol (PROVENTIL) (2.5 MG/3ML) 0.083% nebulizer solution Take 3 mLs by nebulization every 6 hours as needed for Wheezing 300 vial 3    Loperamide HCl (IMODIUM PO) Take by mouth daily as needed       CPAP Machine MISC        No current facility-administered medications for this visit. Orders Placed This Encounter   Procedures    DEXA BONE DENSITY AXIAL SKELETON     Standing Status:   Future     Standing Expiration Date:   6/30/2022     Order Specific Question:   Reason for exam:     Answer:   routine    POCT glycosylated hemoglobin (Hb A1C)       Orders Placed This Encounter   Medications    nystatin (MYCOSTATIN) 302401 UNIT/GM powder     Sig: Apply 3 times daily. Dispense:  45 g     Refill:  5    amoxicillin (AMOXIL) 500 MG capsule     Sig: Take 1 capsule by mouth 3 times daily for 10 days     Dispense:  30 capsule     Refill:  0              Return in about 6 months (around 12/30/2021), or Dr. Sergio Kwan. An electronic signature was used to authenticate this note.   Dr. Rabia Molina MD      6/30/21  4:25 PM

## 2021-07-09 ENCOUNTER — HOSPITAL ENCOUNTER (OUTPATIENT)
Dept: WOMENS IMAGING | Age: 66
Discharge: HOME | End: 2021-07-11
Payer: COMMERCIAL

## 2021-07-09 DIAGNOSIS — M81.0 SENILE OSTEOPOROSIS: ICD-10-CM

## 2021-07-09 PROCEDURE — 77080 DXA BONE DENSITY AXIAL: CPT

## 2021-07-12 ENCOUNTER — TELEPHONE (OUTPATIENT)
Dept: FAMILY MEDICINE CLINIC | Age: 66
End: 2021-07-12

## 2021-07-12 DIAGNOSIS — M81.0 SENILE OSTEOPOROSIS: ICD-10-CM

## 2021-07-12 NOTE — TELEPHONE ENCOUNTER
Her bone density shows significant osteoporosis. She strongly needs to consider going on bone density medication. Because of her previous bariatric surgery I would recommend that she go on Reclast.  This medicine is given IV at the hospital once a year. It will not upset her stomach. If this something she is agreeable to I can order it to get done at [facility]. Let me know.

## 2021-07-13 NOTE — TELEPHONE ENCOUNTER
Called pt and gave her the information, she will call her insurance first to see if it is covered and see what else is covered if reclast is not

## 2021-07-13 NOTE — TELEPHONE ENCOUNTER
Patient called her insurance and they do not cover Reclast. She asked them what they do cover and they were not able to tell her. They told her to contact her doctor and see what the alternative would be. Please advise, thank you.

## 2021-07-14 RX ORDER — DIPHENHYDRAMINE HYDROCHLORIDE 50 MG/ML
50 INJECTION INTRAMUSCULAR; INTRAVENOUS ONCE
Status: CANCELLED | OUTPATIENT
Start: 2021-07-14 | End: 2021-07-14

## 2021-07-14 RX ORDER — METHYLPREDNISOLONE SODIUM SUCCINATE 125 MG/2ML
125 INJECTION, POWDER, LYOPHILIZED, FOR SOLUTION INTRAMUSCULAR; INTRAVENOUS ONCE
Status: CANCELLED | OUTPATIENT
Start: 2021-07-14 | End: 2021-07-14

## 2021-07-14 RX ORDER — EPINEPHRINE 1 MG/ML
0.3 INJECTION, SOLUTION, CONCENTRATE INTRAVENOUS PRN
Status: CANCELLED | OUTPATIENT
Start: 2021-07-14

## 2021-07-14 RX ORDER — SODIUM CHLORIDE 9 MG/ML
INJECTION, SOLUTION INTRAVENOUS CONTINUOUS
Status: CANCELLED | OUTPATIENT
Start: 2021-07-14

## 2021-08-05 ENCOUNTER — OFFICE VISIT (OUTPATIENT)
Dept: GASTROENTEROLOGY | Age: 66
End: 2021-08-05
Payer: COMMERCIAL

## 2021-08-05 VITALS — WEIGHT: 269.2 LBS | HEIGHT: 62 IN | BODY MASS INDEX: 49.54 KG/M2 | HEART RATE: 71 BPM | OXYGEN SATURATION: 98 %

## 2021-08-05 DIAGNOSIS — Z98.84 S/P GASTRIC BYPASS: ICD-10-CM

## 2021-08-05 DIAGNOSIS — R79.89 ABNORMAL LFTS: ICD-10-CM

## 2021-08-05 DIAGNOSIS — K58.0 IRRITABLE BOWEL SYNDROME WITH DIARRHEA: ICD-10-CM

## 2021-08-05 DIAGNOSIS — K21.9 GASTROESOPHAGEAL REFLUX DISEASE WITHOUT ESOPHAGITIS: ICD-10-CM

## 2021-08-05 DIAGNOSIS — R10.13 EPIGASTRIC PAIN: Primary | ICD-10-CM

## 2021-08-05 PROCEDURE — 99214 OFFICE O/P EST MOD 30 MIN: CPT | Performed by: INTERNAL MEDICINE

## 2021-08-05 NOTE — PROGRESS NOTES
Gastroenterology Clinic Follow up Visit    Pelon Mccrary  [id number]  Chief Complaint   Patient presents with    Follow-up     HPI: 77 y.o. female following up after last GI clinic on 5/5/2021. Interval change: Patient presents to the GI clinic today with improvement in her symptoms. Patient reports history of intermittent epigastric pain that has been happening for years, she states it is difficult to describe, but it will make her double over in pain. She is not currently experiencing this pain. States the last time she had a severe episode was in May of this year, she had bloodwork done and her LFTs were normal. She denies chest pain, palpitations, or worsening of SOB (states she has hx of asthma and will have occasional SOB with this). States she quit taking imodium AD as much and the pains have lessened in intensity and frequency. Of note, patient states she has history of CAD, however at her heart cath around 5 years ago the blockages were not significant enough to have a stent places. She continues to report diarrhea/ lower abdominal cramping if she doesn't take Questran and bentyl. States she titrates Questran as needed to keep her stools formed. States she has loose stools on average around 3 times per week. She tries to stay between 1-2 bowel movements per day, however her stools can get hard at times and then she will hold her Questran. She also takes Bentyl with improvement in her abdominal cramping. Patient does report ongoing history of GERD symptoms (retrosternal burning, acidic taste in her mouth). She continues to take Protonix 40mg daily with good control, minimal breakthrough symptoms. She denies nausea/vomiting, hematemesis, or weight loss. Patient does report mild choking/difficulty swallowing solids (not pills or liquids) a couple of times per week. States this is not new and has been happening for many years, even prior to her gastric bypass and cholecystectomy.       HPI from last GI clinic visit on 5/5/2021  summarized below:  77 y.o. female referred to the GI clinic after she presented to the emergency room on 2/9/2021 with nausea vomiting and abdominal pain. Patient was admitted with similar complaints and symptoms in September 2020 at which time the LFTs improved prior to discharge. Work-up for other liver conditions was negative given that her LFTs had a hepatocellular picture. Patient known to have dilation of the common bile duct over the last 6 years, appears to be more prominent at the melissa hepatis on the imaging in September 2020 including MRI. Patient with history of cholecystectomy and gastric bypass. Since the ER visit patient reports intermittent epigastric pain, intermittent nausea. Patient has had upper abdominal pain for many years, however she feels the frequency may have increased over the last year. Patient additionally has diarrhea  She reports daily bowel movements has intermittent episodes of diarrhea with abdominal cramping. She has significant central obesity, history of gastric bypass, weight has been stable over the last few months     Previous GI work up/Endoscopic investigations:   EGD: 9/28/2020: 4 cm hiatal hernia, Healthy-appearing gastric pouch, Long blind loop, No evidence for anastomotic ulcers or marginal ulcers    Review of Systems   All other systems reviewed and are negative. Past medical history, past surgical history, medication list, social and familyhistory reviewed    Pulse 71, height 5' 2\" (1.575 m), weight 269 lb 3.2 oz (122.1 kg), SpO2 98 %, not currently breastfeeding. Physical Exam  Constitutional:       General: She is not in acute distress. Appearance: Normal appearance. She is well-developed. Eyes:      General: No scleral icterus. Cardiovascular:      Rate and Rhythm: Normal rate and regular rhythm. Pulmonary:      Effort: Pulmonary effort is normal.      Breath sounds: Normal breath sounds.    Abdominal:      General: Abdomen is protuberant. Bowel sounds are normal. There is no distension. Palpations: Abdomen is soft. There is no mass. Tenderness: There is abdominal tenderness in the right upper quadrant and epigastric area. There is no guarding or rebound. Musculoskeletal:         General: Normal range of motion. Lymphadenopathy:      Cervical: No cervical adenopathy. Neurological:      Mental Status: She is alert and oriented to person, place, and time. Psychiatric:         Behavior: Behavior normal.         Thought Content: Thought content normal.         Judgment: Judgment normal.       Laboratory, Pathology, Radiology reviewed in detail with relevantimportant investigations summarized below:    Lab Results   Component Value Date    WBC 7.8 02/09/2021    HGB 13.0 02/09/2021    HCT 38.7 02/09/2021    MCV 83.9 02/09/2021     02/09/2021     Lab Results   Component Value Date    ALT 17 05/22/2021    AST 16 05/22/2021    ALKPHOS 152 (H) 05/22/2021    BILITOT 0.7 05/22/2021      Assessment and Plan:  David Quevedo 77 y.o. female with history of intermittent epigastric pain, diarrhea s/p cholecystectomy/Jose-en-Y gastric bypass, and GERD. She reports history of intermittent right upper quadrant/epigastric pain over many years with new development of abnormal LFTs over the last 2 to 3 years. Longstanding dilation of the CBD (at least 6 years) with some concern of increasing dilation up to 2.2 cm at the melissa hepatis and her last MRI September/2020. Given her history of Jose-en-Y gastric bypass and cholecystectomy, dilation of the CBD was attributed to patient's history of cholecystectomy. 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 1.  Epigastric pain  2. Abnormal LFTs  3. S/p gastric bypass  - ERCP would be recommended at a tertiary care center considering her alerted anatomy.  However, currently reports decrease in severity and frequency of her symptoms and is not keen on pursuing ERCP unless her symptoms return or increase in intensity.    -Discussed with patient her standing order for hepatic function testing at the lab. When patient experiences this epigastric pain, discussed the importance of her reporting to the lab so we can measure her LFTs. 4.  Irritable bowel syndrome with diarrhea  -Continue Questran, titrate to 1-2 bowel movements daily. 5.  Gastroesophageal reflux disease without esophagitis  -Continue Protonix 40 mg daily  - Advised on the correct dose and timing of the PPI, Preferably 1/2 hour before breakfast. If symptoms are worse at night would recommend taking it half an hour before dinner.  - Pathophysiology and etiology of reflux discussed at length, with help of images. - Anti-reflux lifestyle modification discussed at length   --Avoid spicy acidic based foods   --Limit coffee, tea, alcohol use   --Limit the amount of food during meal time, avoid gorging   --Avoid bedtime snacks and eat meals 3 to 4 hrs before lying down   --Elevate the head of the bed with blocks   --Weight reduction advised and discussed at length     Return if symptoms worsen or fail to improve. Lelo Vieira MD   StaffGastroenterologist  [facility]    Please note this report has been partially produced using speech recognition software and may cause/contain errors related to that system including grammar, punctuation and spelling as well as words andphrases that may seem inappropriate. If there are questions or concerns please feel free to contact me to clarify.

## 2021-08-07 ENCOUNTER — APPOINTMENT (OUTPATIENT)
Dept: GENERAL RADIOLOGY | Age: 66
DRG: 871 | End: 2021-08-07
Payer: COMMERCIAL

## 2021-08-07 ENCOUNTER — APPOINTMENT (OUTPATIENT)
Dept: ULTRASOUND IMAGING | Age: 66
DRG: 871 | End: 2021-08-07
Payer: COMMERCIAL

## 2021-08-07 ENCOUNTER — HOSPITAL ENCOUNTER (INPATIENT)
Age: 66
LOS: 4 days | Discharge: HOME | DRG: 871 | End: 2021-08-11
Attending: EMERGENCY MEDICINE | Admitting: INTERNAL MEDICINE
Payer: COMMERCIAL

## 2021-08-07 ENCOUNTER — APPOINTMENT (OUTPATIENT)
Dept: CT IMAGING | Age: 66
DRG: 871 | End: 2021-08-07
Payer: COMMERCIAL

## 2021-08-07 DIAGNOSIS — A41.9 SEPTICEMIA (HCC): ICD-10-CM

## 2021-08-07 DIAGNOSIS — B17.9 ACUTE HEPATITIS: ICD-10-CM

## 2021-08-07 DIAGNOSIS — R10.10 PAIN OF UPPER ABDOMEN: Primary | ICD-10-CM

## 2021-08-07 DIAGNOSIS — K85.90 ACUTE PANCREATITIS, UNSPECIFIED COMPLICATION STATUS, UNSPECIFIED PANCREATITIS TYPE: ICD-10-CM

## 2021-08-07 PROBLEM — K83.09 BILIARY SEPSIS: Status: ACTIVE | Noted: 2020-09-24

## 2021-08-07 LAB
ALBUMIN SERPL-MCNC: 4.1 G/DL (ref 3.5–4.6)
ALP BLD-CCNC: 277 U/L (ref 40–130)
ALT SERPL-CCNC: 194 U/L (ref 0–33)
ANION GAP SERPL CALCULATED.3IONS-SCNC: 16 MEQ/L (ref 9–15)
AST SERPL-CCNC: 427 U/L (ref 0–35)
BASOPHILS ABSOLUTE: 0 K/UL (ref 0–0.2)
BASOPHILS RELATIVE PERCENT: 0.1 %
BILIRUB SERPL-MCNC: 3.1 MG/DL (ref 0.2–0.7)
BILIRUBIN URINE: NEGATIVE
BLOOD, URINE: NEGATIVE
BUN BLDV-MCNC: 10 MG/DL (ref 8–23)
CALCIUM SERPL-MCNC: 8.5 MG/DL (ref 8.5–9.9)
CHLORIDE BLD-SCNC: 99 MEQ/L (ref 95–107)
CLARITY: CLEAR
CO2: 23 MEQ/L (ref 20–31)
COLOR: ABNORMAL
CREAT SERPL-MCNC: 0.47 MG/DL (ref 0.5–0.9)
EOSINOPHILS ABSOLUTE: 0 K/UL (ref 0–0.7)
EOSINOPHILS RELATIVE PERCENT: 0.3 %
GFR AFRICAN AMERICAN: >60
GFR NON-AFRICAN AMERICAN: >60
GLOBULIN: 3.2 G/DL (ref 2.3–3.5)
GLUCOSE BLD-MCNC: 129 MG/DL (ref 60–115)
GLUCOSE BLD-MCNC: 220 MG/DL (ref 60–115)
GLUCOSE BLD-MCNC: 243 MG/DL (ref 60–115)
GLUCOSE BLD-MCNC: 285 MG/DL (ref 70–99)
GLUCOSE URINE: >=1000 MG/DL
HBA1C MFR BLD: 7.4 % (ref 4.8–5.9)
HCT VFR BLD CALC: 42.7 % (ref 37–47)
HEMOGLOBIN: 14.3 G/DL (ref 12–16)
KETONES, URINE: NEGATIVE MG/DL
LACTIC ACID, SEPSIS: 2.7 MMOL/L (ref 0.5–1.9)
LACTIC ACID, SEPSIS: 3 MMOL/L (ref 0.5–1.9)
LACTIC ACID: 3.4 MMOL/L (ref 0.5–2.2)
LEUKOCYTE ESTERASE, URINE: NEGATIVE
LIPASE: 433 U/L (ref 12–95)
LIPASE: NORMAL U/L (ref 12–95)
LYMPHOCYTES ABSOLUTE: 0.3 K/UL (ref 1–4.8)
LYMPHOCYTES RELATIVE PERCENT: 3.3 %
MCH RBC QN AUTO: 28.7 PG (ref 27–31.3)
MCHC RBC AUTO-ENTMCNC: 33.6 % (ref 33–37)
MCV RBC AUTO: 85.7 FL (ref 82–100)
MONOCYTES ABSOLUTE: 0.3 K/UL (ref 0.2–0.8)
MONOCYTES RELATIVE PERCENT: 2.9 %
NEUTROPHILS ABSOLUTE: 8.9 K/UL (ref 1.4–6.5)
NEUTROPHILS RELATIVE PERCENT: 93.4 %
NITRITE, URINE: NEGATIVE
PDW BLD-RTO: 13.8 % (ref 11.5–14.5)
PERFORMED ON: ABNORMAL
PH UA: 6 (ref 5–9)
PLATELET # BLD: 225 K/UL (ref 130–400)
PLATELET SLIDE REVIEW: NORMAL
POTASSIUM SERPL-SCNC: 3.5 MEQ/L (ref 3.4–4.9)
PROCALCITONIN: 1.93 NG/ML (ref 0–0.15)
PROTEIN UA: NEGATIVE MG/DL
RBC # BLD: 4.99 M/UL (ref 4.2–5.4)
RBC # BLD: NORMAL 10*6/UL
SARS-COV-2, NAAT: NOT DETECTED
SODIUM BLD-SCNC: 138 MEQ/L (ref 135–144)
SPECIFIC GRAVITY UA: 1.02 (ref 1–1.03)
TOTAL PROTEIN: 7.3 G/DL (ref 6.3–8)
URINE REFLEX TO CULTURE: ABNORMAL
UROBILINOGEN, URINE: 1 E.U./DL
WBC # BLD: 9.5 K/UL (ref 4.8–10.8)

## 2021-08-07 PROCEDURE — 6370000000 HC RX 637 (ALT 250 FOR IP): Performed by: EMERGENCY MEDICINE

## 2021-08-07 PROCEDURE — 87449 NOS EACH ORGANISM AG IA: CPT

## 2021-08-07 PROCEDURE — 96360 HYDRATION IV INFUSION INIT: CPT

## 2021-08-07 PROCEDURE — 6360000002 HC RX W HCPCS: Performed by: NURSE PRACTITIONER

## 2021-08-07 PROCEDURE — 71045 X-RAY EXAM CHEST 1 VIEW: CPT

## 2021-08-07 PROCEDURE — 83036 HEMOGLOBIN GLYCOSYLATED A1C: CPT

## 2021-08-07 PROCEDURE — 80074 ACUTE HEPATITIS PANEL: CPT

## 2021-08-07 PROCEDURE — 74177 CT ABD & PELVIS W/CONTRAST: CPT

## 2021-08-07 PROCEDURE — 2580000003 HC RX 258: Performed by: EMERGENCY MEDICINE

## 2021-08-07 PROCEDURE — 2060000000 HC ICU INTERMEDIATE R&B

## 2021-08-07 PROCEDURE — 6360000002 HC RX W HCPCS: Performed by: EMERGENCY MEDICINE

## 2021-08-07 PROCEDURE — 87635 SARS-COV-2 COVID-19 AMP PRB: CPT

## 2021-08-07 PROCEDURE — 85025 COMPLETE CBC W/AUTO DIFF WBC: CPT

## 2021-08-07 PROCEDURE — 2580000003 HC RX 258: Performed by: INTERNAL MEDICINE

## 2021-08-07 PROCEDURE — APPNB60 APP NON BILLABLE TIME 46-60 MINS: Performed by: NURSE PRACTITIONER

## 2021-08-07 PROCEDURE — 96361 HYDRATE IV INFUSION ADD-ON: CPT

## 2021-08-07 PROCEDURE — 87324 CLOSTRIDIUM AG IA: CPT

## 2021-08-07 PROCEDURE — 6370000000 HC RX 637 (ALT 250 FOR IP): Performed by: INTERNAL MEDICINE

## 2021-08-07 PROCEDURE — 6360000004 HC RX CONTRAST MEDICATION: Performed by: EMERGENCY MEDICINE

## 2021-08-07 PROCEDURE — 2580000003 HC RX 258: Performed by: NURSE PRACTITIONER

## 2021-08-07 PROCEDURE — 83605 ASSAY OF LACTIC ACID: CPT

## 2021-08-07 PROCEDURE — 99222 1ST HOSP IP/OBS MODERATE 55: CPT | Performed by: INTERNAL MEDICINE

## 2021-08-07 PROCEDURE — 84145 PROCALCITONIN (PCT): CPT

## 2021-08-07 PROCEDURE — 99285 EMERGENCY DEPT VISIT HI MDM: CPT

## 2021-08-07 PROCEDURE — C9113 INJ PANTOPRAZOLE SODIUM, VIA: HCPCS | Performed by: NURSE PRACTITIONER

## 2021-08-07 PROCEDURE — 6360000002 HC RX W HCPCS: Performed by: INTERNAL MEDICINE

## 2021-08-07 PROCEDURE — 87040 BLOOD CULTURE FOR BACTERIA: CPT

## 2021-08-07 PROCEDURE — 83690 ASSAY OF LIPASE: CPT

## 2021-08-07 PROCEDURE — 36415 COLL VENOUS BLD VENIPUNCTURE: CPT

## 2021-08-07 PROCEDURE — 87506 IADNA-DNA/RNA PROBE TQ 6-11: CPT

## 2021-08-07 PROCEDURE — 76705 ECHO EXAM OF ABDOMEN: CPT

## 2021-08-07 PROCEDURE — 80053 COMPREHEN METABOLIC PANEL: CPT

## 2021-08-07 PROCEDURE — 81003 URINALYSIS AUTO W/O SCOPE: CPT

## 2021-08-07 RX ORDER — SODIUM CHLORIDE 9 MG/ML
INJECTION, SOLUTION INTRAVENOUS CONTINUOUS
Status: DISCONTINUED | OUTPATIENT
Start: 2021-08-07 | End: 2021-08-11

## 2021-08-07 RX ORDER — PANTOPRAZOLE SODIUM 40 MG/10ML
40 INJECTION, POWDER, LYOPHILIZED, FOR SOLUTION INTRAVENOUS DAILY
Status: DISCONTINUED | OUTPATIENT
Start: 2021-08-07 | End: 2021-08-11 | Stop reason: HOSPADM

## 2021-08-07 RX ORDER — ACETAMINOPHEN 650 MG/1
650 SUPPOSITORY RECTAL EVERY 6 HOURS PRN
Status: DISCONTINUED | OUTPATIENT
Start: 2021-08-07 | End: 2021-08-11 | Stop reason: HOSPADM

## 2021-08-07 RX ORDER — SODIUM CHLORIDE 0.9 % (FLUSH) 0.9 %
5-40 SYRINGE (ML) INJECTION PRN
Status: DISCONTINUED | OUTPATIENT
Start: 2021-08-07 | End: 2021-08-11 | Stop reason: HOSPADM

## 2021-08-07 RX ORDER — SODIUM CHLORIDE 9 MG/ML
25 INJECTION, SOLUTION INTRAVENOUS PRN
Status: DISCONTINUED | OUTPATIENT
Start: 2021-08-07 | End: 2021-08-11 | Stop reason: HOSPADM

## 2021-08-07 RX ORDER — ACETAMINOPHEN 500 MG
1000 TABLET ORAL ONCE
Status: COMPLETED | OUTPATIENT
Start: 2021-08-07 | End: 2021-08-07

## 2021-08-07 RX ORDER — 0.9 % SODIUM CHLORIDE 0.9 %
3000 INTRAVENOUS SOLUTION INTRAVENOUS ONCE
Status: COMPLETED | OUTPATIENT
Start: 2021-08-07 | End: 2021-08-07

## 2021-08-07 RX ORDER — ACETAMINOPHEN 325 MG/1
650 TABLET ORAL EVERY 6 HOURS PRN
Status: DISCONTINUED | OUTPATIENT
Start: 2021-08-07 | End: 2021-08-11 | Stop reason: HOSPADM

## 2021-08-07 RX ORDER — INSULIN GLARGINE 100 [IU]/ML
0.25 INJECTION, SOLUTION SUBCUTANEOUS NIGHTLY
Status: DISCONTINUED | OUTPATIENT
Start: 2021-08-07 | End: 2021-08-11 | Stop reason: HOSPADM

## 2021-08-07 RX ORDER — SODIUM CHLORIDE 9 MG/ML
10 INJECTION INTRAVENOUS DAILY
Status: DISCONTINUED | OUTPATIENT
Start: 2021-08-07 | End: 2021-08-11 | Stop reason: HOSPADM

## 2021-08-07 RX ORDER — L. ACIDOPHILUS/L.BULGARICUS 1MM CELL
1 TABLET ORAL 2 TIMES DAILY
Status: DISCONTINUED | OUTPATIENT
Start: 2021-08-07 | End: 2021-08-11 | Stop reason: HOSPADM

## 2021-08-07 RX ORDER — MORPHINE SULFATE 2 MG/ML
2 INJECTION, SOLUTION INTRAMUSCULAR; INTRAVENOUS EVERY 4 HOURS PRN
Status: DISCONTINUED | OUTPATIENT
Start: 2021-08-07 | End: 2021-08-11 | Stop reason: HOSPADM

## 2021-08-07 RX ORDER — SODIUM CHLORIDE 0.9 % (FLUSH) 0.9 %
5-40 SYRINGE (ML) INJECTION EVERY 12 HOURS SCHEDULED
Status: DISCONTINUED | OUTPATIENT
Start: 2021-08-07 | End: 2021-08-11 | Stop reason: HOSPADM

## 2021-08-07 RX ORDER — NICOTINE POLACRILEX 4 MG
15 LOZENGE BUCCAL PRN
Status: DISCONTINUED | OUTPATIENT
Start: 2021-08-07 | End: 2021-08-11 | Stop reason: HOSPADM

## 2021-08-07 RX ORDER — DEXTROSE MONOHYDRATE 25 G/50ML
12.5 INJECTION, SOLUTION INTRAVENOUS PRN
Status: DISCONTINUED | OUTPATIENT
Start: 2021-08-07 | End: 2021-08-11 | Stop reason: HOSPADM

## 2021-08-07 RX ORDER — ONDANSETRON 2 MG/ML
4 INJECTION INTRAMUSCULAR; INTRAVENOUS EVERY 6 HOURS PRN
Status: DISCONTINUED | OUTPATIENT
Start: 2021-08-07 | End: 2021-08-11 | Stop reason: HOSPADM

## 2021-08-07 RX ORDER — DEXTROSE MONOHYDRATE 50 MG/ML
100 INJECTION, SOLUTION INTRAVENOUS PRN
Status: DISCONTINUED | OUTPATIENT
Start: 2021-08-07 | End: 2021-08-11 | Stop reason: HOSPADM

## 2021-08-07 RX ORDER — 0.9 % SODIUM CHLORIDE 0.9 %
1000 INTRAVENOUS SOLUTION INTRAVENOUS ONCE
Status: COMPLETED | OUTPATIENT
Start: 2021-08-07 | End: 2021-08-07

## 2021-08-07 RX ADMIN — LACTOBACILLUS TAB 1 TABLET: TAB at 20:34

## 2021-08-07 RX ADMIN — ENOXAPARIN SODIUM 40 MG: 40 INJECTION SUBCUTANEOUS at 10:47

## 2021-08-07 RX ADMIN — SODIUM CHLORIDE: 9 INJECTION, SOLUTION INTRAVENOUS at 10:46

## 2021-08-07 RX ADMIN — SODIUM CHLORIDE 3000 ML: 9 INJECTION, SOLUTION INTRAVENOUS at 06:39

## 2021-08-07 RX ADMIN — PIPERACILLIN AND TAZOBACTAM 3375 MG: 3; .375 INJECTION, POWDER, LYOPHILIZED, FOR SOLUTION INTRAVENOUS at 08:34

## 2021-08-07 RX ADMIN — SODIUM CHLORIDE 1000 ML: 9 INJECTION, SOLUTION INTRAVENOUS at 05:16

## 2021-08-07 RX ADMIN — SODIUM CHLORIDE, PRESERVATIVE FREE 10 ML: 5 INJECTION INTRAVENOUS at 10:47

## 2021-08-07 RX ADMIN — SODIUM CHLORIDE, PRESERVATIVE FREE 10 ML: 5 INJECTION INTRAVENOUS at 20:33

## 2021-08-07 RX ADMIN — ACETAMINOPHEN 1000 MG: 500 TABLET ORAL at 05:16

## 2021-08-07 RX ADMIN — PANTOPRAZOLE SODIUM 40 MG: 40 INJECTION, POWDER, FOR SOLUTION INTRAVENOUS at 10:47

## 2021-08-07 RX ADMIN — SODIUM CHLORIDE, PRESERVATIVE FREE 10 ML: 5 INJECTION INTRAVENOUS at 14:09

## 2021-08-07 RX ADMIN — ONDANSETRON 4 MG: 2 INJECTION INTRAMUSCULAR; INTRAVENOUS at 14:09

## 2021-08-07 RX ADMIN — IOPAMIDOL 100 ML: 755 INJECTION, SOLUTION INTRAVENOUS at 06:02

## 2021-08-07 RX ADMIN — PIPERACILLIN SODIUM AND TAZOBACTAM SODIUM 3375 MG: 3; .375 INJECTION, POWDER, LYOPHILIZED, FOR SOLUTION INTRAVENOUS at 16:22

## 2021-08-07 ASSESSMENT — ENCOUNTER SYMPTOMS
DIARRHEA: 1
VOMITING: 1
VOMITING: 0
ABDOMINAL DISTENTION: 0
SHORTNESS OF BREATH: 1
SHORTNESS OF BREATH: 0
EYE DISCHARGE: 0
WHEEZING: 0
CHEST TIGHTNESS: 0
ABDOMINAL PAIN: 1
SORE THROAT: 0
NAUSEA: 1
PHOTOPHOBIA: 0
COUGH: 0

## 2021-08-07 ASSESSMENT — PAIN SCALES - GENERAL
PAINLEVEL_OUTOF10: 0
PAINLEVEL_OUTOF10: 5
PAINLEVEL_OUTOF10: 0
PAINLEVEL_OUTOF10: 5
PAINLEVEL_OUTOF10: 0

## 2021-08-07 ASSESSMENT — PAIN DESCRIPTION - ORIENTATION: ORIENTATION: UPPER;MID

## 2021-08-07 ASSESSMENT — PAIN DESCRIPTION - DESCRIPTORS: DESCRIPTORS: PRESSURE

## 2021-08-07 ASSESSMENT — PAIN DESCRIPTION - LOCATION: LOCATION: ABDOMEN

## 2021-08-07 ASSESSMENT — PAIN DESCRIPTION - PAIN TYPE: TYPE: ACUTE PAIN

## 2021-08-07 NOTE — H&P
[facility]  MEDICINE    HISTORY AND PHYSICAL EXAM    PATIENT NAME:  Otoniel Amezquita    MRN:  [id number]  SERVICE DATE:  8/7/2021   SERVICE TIME:  9:16 AM    Primary Care Physician: Yue Pinto MD     SUBJECTIVE  CHIEF COMPLAINT:  Abdominal pain    HPI:  Otoniel Amezquita is a 77 y.o., , female who  has a past medical history of Allergic rhinitis, Asthma, [facility] ([facility] CAD (coronary artery disease), Cancer ([facility] Colon polyps, Diabetes ([facility] Fibromyalgia, GERD (gastroesophageal reflux disease), HA (headache), Herpes simplex, History of therapeutic radiation, HTN (hypertension), Hx antineoplastic chemo, Obesity, Senile osteoporosis, and Sleep apnea. Presented to the ED with c/o epigastric pain, nausea, vomiting and diarrhea that started yesterday around 1730. Reports the epigastric pain can often times radiate across her chest pain. No angina but c/o chronic shortness of breath secondary to history of asthma. Currently uses rescue inhaler daily. Denies fever and chills at that time. Did not take any medications or seek treatment until this am. Upon arrival to the ED she was febrile with tmax 103.2. She was medicated with zosyn, tylenol, and given IVF. Imaging and labs were completed. GI was consulted. Labs were remarkable for elevated liver enzymes, bilirubin, hyperglycemia, lactic, and elevated procalcitonin. Reports long history of GI disturbances since her gastric bypass and maryann. Has chronic diarrhea 3x/week and is on Nisha. Follows with GI outpatient last seen on 8/5/21. Decision to admit under the hospitalist service with consultants.       PAST MEDICAL HISTORY:    Past Medical History:   Diagnosis Date    Allergic rhinitis     Asthma     Breast cancer ([facility] 2014    invasive ductal carcinoma    CAD (coronary artery disease)     Cancer ([facility] 11/2014    Invasive ductal left breast    Colon polyps     Diabetes (HCC)     Fibromyalgia     GERD (gastroesophageal reflux disease)     HA (headache)     Herpes simplex     Nose    History of therapeutic radiation     HTN (hypertension)     Hx antineoplastic chemo     Obesity     Senile osteoporosis     Sleep apnea      PAST SURGICAL HISTORY:    Past Surgical History:   Procedure Laterality Date    BARIATRIC SURGERY  2004    BREAST BIOPSY Left 14    BREAST BIOPSY Left 12/10/14    BREAST LUMPECTOMY Left     with left SNB  invasive ductal carcinoma    BREAST LUMPECTOMY Left     BREAST LUMPECTOMY Left      SECTION      x4    CHOLECYSTECTOMY  2007    GASTRIC BYPASS SURGERY      HYSTERECTOMY      KNEE ARTHROPLASTY Right     knee reconstruction    KNEE SURGERY      Right knee    LYMPH NODE BIOPSY Left 14   Miranda Shells      Dr. Marleen Floyd      TUBAL LIGATION      UPPER GASTROINTESTINAL ENDOSCOPY  2/18/15    w/bx,polypectomy     UPPER GASTROINTESTINAL ENDOSCOPY N/A 2020    EGD DIAGNOSTIC ONLY performed by Naa Cox MD at [id number]:    Family History   Problem Relation Age of Onset   [facility] Cancer Father         melanoma    Prostate Cancer Father     Cancer Sister         Melanoma    Cancer Other         Throat    Breast Cancer Maternal Aunt     Colon Cancer Neg Hx      SOCIAL HISTORY:    Social History     Socioeconomic History    Marital status:      Spouse name: Not on file    Number of children: Not on file    Years of education: Not on file    Highest education level: Not on file   Occupational History    Not on file   Tobacco Use    Smoking status: Never Smoker    Smokeless tobacco: Never Used   Vaping Use    Vaping Use: Never used   Substance and Sexual Activity    Alcohol use: No    Drug use: No    Sexual activity: Not on file   Other Topics Concern    Not on file   Social History Narrative    Not on file     Social Determinants of Health     Financial Resource Strain: Medium Risk    Difficulty of Paying Living Expenses: Somewhat hard   Food Insecurity: No Food Insecurity    Worried About Running Out of Food in the Last Year: Never true    Gala of Food in the Last Year: Never true   Transportation Needs: No Transportation Needs    Lack of Transportation (Medical): No    Lack of Transportation (Non-Medical): No   Physical Activity:     Days of Exercise per Week:     Minutes of Exercise per Session:    Stress:     Feeling of Stress :    Social Connections:     Frequency of Communication with Friends and Family:     Frequency of Social Gatherings with Friends and Family:     Attends [employer] Services:     Active Member of Clubs or Organizations:     Attends Club or Organization Meetings:     Marital Status:    Intimate Partner Violence:     Fear of Current or Ex-Partner:     Emotionally Abused:     Physically Abused:     Sexually Abused:      MEDICATIONS:   Prior to Admission medications    Medication Sig Start Date End Date Taking? Authorizing Provider   nystatin (MYCOSTATIN) 268334 UNIT/GM powder Apply 3 times daily.  6/30/21   Chandler Primrose, MD   pantoprazole (PROTONIX) 40 MG tablet Take 1 tablet by mouth once daily 6/22/21   Chandler Primrose, MD   amLODIPine (NORVASC) 10 MG tablet Take 1 tablet by mouth once daily 6/22/21   Chandler Primrose, MD   vitamin D (ERGOCALCIFEROL) 1.25 MG (81302 UT) CAPS capsule Take 1 capsule by mouth once a week 5/26/21   Chandler Primrose, MD   glyBURIDE (DIABETA) 2.5 MG tablet Take 1 tablet by mouth daily (with breakfast) 5/23/21   Chandler Primrose, MD   cholestyramine Lestine Cushing) 4 g packet Take 1 packet by mouth 2 times daily 5/5/21   Katie Hernandez MD   dicyclomine (BENTYL) 10 MG capsule Take 1 capsule by mouth 2 times daily (before meals) 5/5/21   Katie Hernandez MD   metFORMIN (GLUCOPHAGE) 500 MG tablet Take 2 tablets by mouth 2 times daily (with meals) 3/31/21   Chandler Primrose, MD   Respiratory Therapy Supplies (FULL KIT NEBULIZER SET) MISC 1 Units by Does not apply route 4 times daily 4/29/20   Krystyna Penaloza MD   albuterol sulfate  (90 Base) MCG/ACT inhaler Inhale 2 puffs into the lungs 4 times daily as needed for Wheezing 6/4/19   Krystyna Penaloza MD   lisinopril-hydrochlorothiazide (PRINZIDE;ZESTORETIC) 20-12.5 MG per tablet Take 1 tablet by mouth daily 6/4/19   Krystyna Penaloza MD   albuterol (PROVENTIL) (2.5 MG/3ML) 0.083% nebulizer solution Take 3 mLs by nebulization every 6 hours as needed for Wheezing 6/4/19   Krystyna Penaloza MD   Loperamide HCl (IMODIUM PO) Take by mouth daily as needed   Patient not taking: Reported on 8/5/2021    Historical Provider, MD   CPAP Machine [facility] [place], [place]     Historical Provider, MD       ALLERGIES: Aspirin, Codeine, Imitrex [sumatriptan], Theophyllines, Diflucan [fluconazole], Nizoral [ketoconazole], and Zoloft    REVIEW OF SYSTEM:   Review of Systems   Constitutional: Positive for appetite change. Respiratory: Positive for shortness of breath. Gastrointestinal: Positive for abdominal pain, diarrhea, nausea and vomiting. All other systems reviewed and are negative. OBJECTIVE  PHYSICAL EXAM:   Physical Exam  Constitutional:       General: She is not in acute distress. Appearance: She is obese. She is not toxic-appearing. HENT:      Head: Normocephalic and atraumatic. Nose: Nose normal.      Mouth/Throat:      Mouth: Mucous membranes are dry. Pharynx: Oropharynx is clear. Eyes:      Conjunctiva/sclera: Conjunctivae normal.   Cardiovascular:      Rate and Rhythm: Regular rhythm. Tachycardia present. Pulses: Normal pulses. Heart sounds: Normal heart sounds. Pulmonary:      Effort: Pulmonary effort is normal. No respiratory distress. Breath sounds: Normal breath sounds. No stridor. No wheezing, rhonchi or rales. Abdominal:      General: Bowel sounds are normal. There is no distension. Palpations: Abdomen is soft. Tenderness:  There is abdominal tenderness in the right lower quadrant, epigastric area and left lower quadrant. There is no guarding. Comments: Obese  Unable to palpate organomegaly or masses due to body habitus   Musculoskeletal:         General: Normal range of motion. Skin:     General: Skin is warm and dry. Capillary Refill: Capillary refill takes less than 2 seconds. Coloration: Skin is not jaundiced. Neurological:      General: No focal deficit present. Mental Status: She is alert and oriented to person, place, and time. Psychiatric:         Mood and Affect: Mood normal.          /74   Pulse 104   Temp 98.7 °F (37.1 °C) (Oral)   Resp 18   Ht 5' 2\" (1.575 m)   Wt 264 lb (119.7 kg)   SpO2 97%   BMI 48.29 kg/m²     DATA:     Diagnostic tests reviewed for today's visit:    Most recent labs and imaging results reviewed.      LABS:    Recent Results (from the past 24 hour(s))   Comprehensive Metabolic Panel    Collection Time: 08/07/21  5:00 AM   Result Value Ref Range    Sodium 138 135 - 144 mEq/L    Potassium 3.5 3.4 - 4.9 mEq/L    Chloride 99 95 - 107 mEq/L    CO2 23 20 - 31 mEq/L    Anion Gap 16 (H) 9 - 15 mEq/L    Glucose 285 (H) 70 - 99 mg/dL    BUN 10 8 - 23 mg/dL    CREATININE 0.47 (L) 0.50 - 0.90 mg/dL    GFR Non-African American >60.0 >60    GFR  >60.0 >60    Calcium 8.5 8.5 - 9.9 mg/dL    Total Protein 7.3 6.3 - 8.0 g/dL    Albumin 4.1 3.5 - 4.6 g/dL    Total Bilirubin 3.1 (H) 0.2 - 0.7 mg/dL    Alkaline Phosphatase 277 (H) 40 - 130 U/L     (H) 0 - 33 U/L     (H) 0 - 35 U/L    Globulin 3.2 2.3 - 3.5 g/dL   CBC Auto Differential    Collection Time: 08/07/21  5:00 AM   Result Value Ref Range    WBC 9.5 4.8 - 10.8 K/uL    RBC 4.99 4.20 - 5.40 M/uL    Hemoglobin 14.3 12.0 - 16.0 g/dL    Hematocrit 42.7 37.0 - 47.0 %    MCV 85.7 82.0 - 100.0 fL    MCH 28.7 27.0 - 31.3 pg    MCHC 33.6 33.0 - 37.0 %    RDW 13.8 11.5 - 14.5 %    Platelets 628 447 - 023 K/uL    PLATELET SLIDE REVIEW Normal     Neutrophils % 93.4 %    Lymphocytes % 3.3 %    Monocytes % 2.9 %    Eosinophils % 0.3 %    Basophils % 0.1 %    Neutrophils Absolute 8.9 (H) 1.4 - 6.5 K/uL    Lymphocytes Absolute 0.3 (L) 1.0 - 4.8 K/uL    Monocytes Absolute 0.3 0.2 - 0.8 K/uL    Eosinophils Absolute 0.0 0.0 - 0.7 K/uL    Basophils Absolute 0.0 0.0 - 0.2 K/uL    RBC Morphology Normal    Lipase    Collection Time: 08/07/21  5:00 AM   Result Value Ref Range    Lipase Diluting 12 - 95 U/L   Urine Reflex to Culture    Collection Time: 08/07/21  5:00 AM    Specimen: Urine, clean catch   Result Value Ref Range    Color, UA DARK YELLOW (A) Straw/Yellow    Clarity, UA Clear Clear    Glucose, Ur >=1000 (A) Negative mg/dL    Bilirubin Urine Negative Negative    Ketones, Urine Negative Negative mg/dL    Specific Gravity, UA 1.020 1.005 - 1.030    Blood, Urine Negative Negative    pH, UA 6.0 5.0 - 9.0    Protein, UA Negative Negative mg/dL    Urobilinogen, Urine 1.0 <2.0 E.U./dL    Nitrite, Urine Negative Negative    Leukocyte Esterase, Urine Negative Negative    Urine Reflex to Culture Not Indicated    PROCALCITONIN    Collection Time: 08/07/21  5:00 AM   Result Value Ref Range    Procalcitonin 1.93 (H) 0.00 - 0.15 ng/mL   Lactic Acid, Plasma    Collection Time: 08/07/21  5:00 AM   Result Value Ref Range    Lactic Acid 3.4 (HH) 0.5 - 2.2 mmol/L   COVID-19, Rapid    Collection Time: 08/07/21  5:36 AM    Specimen: Nasopharyngeal Swab   Result Value Ref Range    SARS-CoV-2, NAAT Not Detected Not Detected       IMAGING:  CT ABDOMEN PELVIS W IV CONTRAST Additional Contrast? None    Result Date: 8/7/2021  EXAM:  CT ABDOMEN PELVIS W IV CONTRAST History: Epigastric pain and fever. Technique: Multiple contiguous axial images were obtained of the abdomen and pelvis from an level of the lung bases through the ischial tuberosities with IV contrast. Multiplanar reformats were obtained.  Delayed images were obtained Comparison: CT abdomen pelvis April 16, 2021 Findings: Lung bases are clear. The liver is mildly enlarged measuring approximately 20 cm in craniocaudal length. Interval decrease  in size of the common bile duct in this patient who is status post cholecystectomy (with a common bile duct measuring approximately 16 mm compared to 21  mm by my measurement on prior examination). The spleen and adrenal glands are within normal limits. Minimal edema surrounding the pancreatic head. No peripancreatic fluid collection. Postsurgical changes of gastric bypass surgery. The kidneys enhance uniformly. No urinary tract calculi or hydronephrosis. Urinary bladder is well distended. The uterus is surgically absent. Abdominal aorta is nonaneurysmal  . No retroperitoneal or abdominal/pelvic lymphadenopathy. No significant interval change of a fat-containing ventral abdominal wall hernia. No small bowel obstruction. Colonic diverticuli are identified. No overt colonic mass or pericolonic inflammation. Appendix is within normal limits. No free fluid, loculated fluid collection, or pneumoperitoneum. No acute osseous abnormality. Minimal peripancreatic edema. Correlation for hepatitis recommended. Mild hepatomegaly. Colonic diverticulosis without diverticulitis. All CT scans at this facility use dose modulation, iterative reconstruction, and/or weight based dosing when appropriate to reduce radiation dose to as low as reasonably achievable. VTE Prophylaxis: lovenox    ASSESSMENT AND PLAN    Sepsis- source unknown  Transaminitis  Acute livery injury/hepatitis  Lactic acidosis  Pancreatitis? Hyperglycemia in the setting of uncontrolled DM type II  Continue broad spectrum antibiotics. BC pending. UA negative. CT abdomen and pelvis completed. Received adequate IVF in  ED. Perfusion adequate. Repeat lactic per sepsis protocol. Continue hydration Check hepatitis panel and lipase. Hold hepatotoxic medications. Abdominal US. Consult GI. NPO at this time. Pain management, and antiemetics. May need ERCP per notes. Stool for cdiff. POCT ACHS, SSI, basal insulin, hypoglycemia protocol. Check hemoglobin A1c. Hold metformin as received IV dye. Asthma: not in exacerbation; resume home meds  BUNNY: Admit to noncompliance with CPAP  HTN: Stable. Resume home medications when verified  Noncompliance with medications: Educate importance adherence to medications and need for control of chronic illnesses to decrease morbidity and mortality  Obesity: supportive care while inpatient    Plan of care discussed with: patient  I saw and evaluated the pt.  I personally obtained the key and critical portions of the history and physical exam. I reviewed the mild level documentation and agree with assessment and plan that we come up together  Electronically signed by Silvano Javier MD on 8/9/21 at 11:55 AM EDT      SIGNATURE: YAKELIN Lee NP  DATE: August 7, 2021  TIME: 9:16 AM   Silvano Javier MD  - supervising physician

## 2021-08-07 NOTE — CONSULTS
Infectious Diseases Inpatient Consult Note      Reason for Consult:   Sepsis  Requesting Physician:   Dr. Edward Ring  Primary Care Physician:  Sánchez Stern MD  History Obtained From:   Pt, EPIC    Admit Date: 8/7/2021  Hospital Day: 1      HISTORY OF PRESENT ILLNESS:  This is a 77 y.o. female was admitted to [facility]  from home through ER with acute onset nausea vomiting and severe diarrhea, watery stools, chills, dry mouth and generalized weakness. Patient was seen by Dr. Arun Chan last Thursday for chronic abdominal pain and intermittent diarrhea. Has history of cholecystectomy many years ago. Has history of breast cancer more than 5 years ago. Patient was found to be febrile with temperature of 103.2, was tachycardic and tachypneic on presentation, has elevated liver function test including bilirubin and alkaline phosphatase, AST and ALT,  Elevated lactic acid and procalcitonin. Was admitted with sepsis, was started on IV fluid and IV antibiotics. Was hospitalized with sepsis without obvious source etiology back in June. Patient reports history of chronic intermittent diarrhea associated with abdominal cramps. Has chronic dry cough. Mild choking on solid foods.   Has remote history of cholecystectomy and gastric bypass  Follow-up with Dr. Arun Chan for elevated liver function test, increasing dilatation of common bile duct up to 2.2 cm with concern for ampullary obstruction  Past medical surgical and social history were reviewed and are as detailed below    Past Medical History:   Diagnosis Date    Allergic rhinitis     Asthma     [facility] 2014    invasive ductal carcinoma    CAD (coronary artery disease)     Cancer ([facility] 11/2014    Invasive ductal left breast    Colon polyps     Diabetes ([facility]     Fibromyalgia     GERD (gastroesophageal reflux disease)     HA (headache)     Herpes simplex     Nose    History of therapeutic radiation     HTN (hypertension)     Hx antineoplastic chemo 2014    Obesity     Senile osteoporosis     Sleep apnea        Past Surgical History:   Procedure Laterality Date    BARIATRIC SURGERY  2004    BREAST BIOPSY Left 14    BREAST BIOPSY Left 12/10/14    BREAST LUMPECTOMY Left     with left SNB  invasive ductal carcinoma    BREAST LUMPECTOMY Left     BREAST LUMPECTOMY Left      SECTION      x4    CHOLECYSTECTOMY  2007    GASTRIC BYPASS SURGERY      HYSTERECTOMY      KNEE ARTHROPLASTY Right     knee reconstruction    KNEE SURGERY      Right knee    LYMPH NODE BIOPSY Left 14    ANDREW AND BSO      Dr. Marleen Floyd      TUBAL LIGATION      UPPER GASTROINTESTINAL ENDOSCOPY  2/18/15    w/bx,polypectomy     UPPER GASTROINTESTINAL ENDOSCOPY N/A 2020    EGD DIAGNOSTIC ONLY performed by Naa Cox MD at [address]         Current Medications:     sodium chloride flush  5-40 mL Intravenous 2 times per day    enoxaparin  40 mg Subcutaneous Daily    piperacillin-tazobactam  3,375 mg Intravenous Q8H    pantoprazole  40 mg Intravenous Daily    And    sodium chloride (PF)  10 mL Intravenous Daily    insulin glargine  0.25 Units/kg Subcutaneous Nightly    insulin lispro  0-12 Units Subcutaneous Q4H       Allergies:  Aspirin, Codeine, Imitrex [sumatriptan], Theophyllines, Diflucan [fluconazole], Nizoral [ketoconazole], and Zoloft    Social History     Socioeconomic History    Marital status:      Spouse name: Not on file    Number of children: Not on file    Years of education: Not on file    Highest education level: Not on file   Occupational History    Not on file   Tobacco Use    Smoking status: Never Smoker    Smokeless tobacco: Never Used   Vaping Use    Vaping Use: Never used   Substance and Sexual Activity    Alcohol use: No    Drug use: No    Sexual activity: Not on file   Other Topics Concern    Not on file   Social History Narrative    Not on file     Social Determinants of Health     Financial Resource Strain: Medium Risk    Difficulty of Paying Living Expenses: Somewhat hard   Food Insecurity: No Food Insecurity    Worried About Running Out of Food in the Last Year: Never true    Ran Out of Food in the Last Year: Never true   Transportation Needs: No Transportation Needs    Lack of Transportation (Medical): No    Lack of Transportation (Non-Medical): No   Physical Activity:     Days of Exercise per Week:     Minutes of Exercise per Session:    Stress:     Feeling of Stress :    Social Connections:     Frequency of Communication with Friends and Family:     Frequency of Social Gatherings with Friends and Family:     Attends Shinto Services:     Active Member of Clubs or Organizations:     Attends Club or Organization Meetings:     Marital Status:    Intimate Partner Violence:     Fear of Current or Ex-Partner:     Emotionally Abused:     Physically Abused:     Sexually Abused:          Family History:   Family History   Problem Relation Age of Onset    Cancer Father         melanoma    Prostate Cancer Father     Cancer Sister         Melanoma    Cancer Other         Throat    Breast Cancer Maternal Aunt     Colon Cancer Neg Hx        Review of Systems  14 system review is negative other than HPI    Physical Exam  Vitals:    08/07/21 0726 08/07/21 0745 08/07/21 0830 08/07/21 0915   BP:   125/74 127/72   Pulse:  99 104 102   Resp:   18 16   Temp: 100.1 °F (37.8 °C)  98.7 °F (37.1 °C) 98.8 °F (37.1 °C)   TempSrc: Oral  Oral Oral   SpO2:   97% 98%   Weight:    271 lb 3.2 oz (123 kg)   Height:    5' 2\" (1.575 m)     General Appearance: alert and oriented to person, place and time, well-developed and well-nourished, in no acute distress, on room air  Skin: warm and dry, no rash. Head: normocephalic and atraumatic  Eyes: extraocular eye movements intact, conjunctivae normal, + icteric sclerae  ENT: Dry mucous membranes.  No thrush  Lungs: normal respiratory effort, Clear Lungs, no rhonchi, no crackles, no wheezes  Heart:RRR, nl S1/S2, no murmur  Abdomen: soft, no tenderness, no H-S-megaly, + BS, obese  NEUROLOGICAL: alert and oriented x 3, no focal deficits  trace leg edema  No erythema, no warmth, no tenderness        DATA:    Lab Results   Component Value Date    WBC 9.5 08/07/2021    HGB 14.3 08/07/2021    HCT 42.7 08/07/2021    MCV 85.7 08/07/2021     08/07/2021     Lab Results   Component Value Date    CREATININE 0.47 (L) 08/07/2021    BUN 10 08/07/2021     08/07/2021    K 3.5 08/07/2021    CL 99 08/07/2021    CO2 23 08/07/2021       Hepatic Function Panel:   Lab Results   Component Value Date    ALKPHOS 277 08/07/2021     08/07/2021     08/07/2021    PROT 7.3 08/07/2021    BILITOT 3.1 08/07/2021    BILIDIR <0.2 05/22/2021    IBILI see below 05/22/2021    LABALBU 4.1 08/07/2021    LABALBU 3.5 04/05/2012       Imaging:   CT of abdomen and pelvis         Impression       Minimal peripancreatic edema. Correlation for hepatitis recommended.       Mild hepatomegaly.       Colonic diverticulosis without diverticulitis. IMPRESSION:    · Sepsis, biliary in nature  · Obstructive jaundice with transaminitis  · Diarrhea  · History of common bile duct pyloric malfunction    Patient Active Problem List   Diagnosis    Asthma    HA (headache)    BMI 45.0-49.9, adult ([address]    CAD (coronary artery disease)    BUNNY (obstructive sleep apnea)    Hypovitaminosis D    Arthritis    Essential hypertension    GERD (gastroesophageal reflux disease)    Diabetes ([address]    Malignant neoplasm of lower-inner quadrant of left breast in female, estrogen receptor negative ([address]    Sepsis ([facility]    Abnormal LFTs    Dilated bile duct    Epigastric pain    Fever    Senile osteoporosis       PLAN:  · Continue IV Zosyn  · Gastrointestinal DNA panel and stools for C.  Difficile  · Agree with IV fluid hydration  · Agree with GI consult  · Follow-up CBC and complete metabolic profile  · Follow-up cultures    Discussed with patient, adult child and RN    Jimenez Morelos MD

## 2021-08-07 NOTE — ED TRIAGE NOTES
Pt arrives to ED ambulatory in regards to severe epigastric pain. Pt states she was treated last September for similar episode when she was septic. Pt was hospitalized at that time for unknown source of sepsis. Pt is tachycardic during triage and febrile. A&Ox4. Skin pink, w/d. Resps even and unlabored on room air.

## 2021-08-07 NOTE — ED NOTES
Report given to [facility] ([facility] [address] [facility]  08/07/21 0702

## 2021-08-07 NOTE — CONSULTS
Inpatient consult to GI  Consult performed by: Elliot Gold MD  Consult ordered by: Sherrine Claude, MD          Patient Name: Austin Terry  Admit Date: 2021  4:13 AM  MR #: [id number]  : 1955    Attending Physician: Sherrine Claude, MD  Reason for consult:  abdominal pain, questionable duct obstruction    History of Presenting Illness:      Austin Terry is a 77 y.o. female on hospital day 0 with a history of breast CA, CAD, colon polyps, diabetes, fibromyalgia, GERD, headaches, hypertension, and sleep apnea. Past surgical history significant for Jose-en-Y, breast lumpectomy, cholecystectomy, hysterectomy, tonsillectomy, tubal ligation, multiple endoscopic procedures. Family history was reviewed and negative for GI malignancies. Social history no nicotine, EtOH or illicit drug use. History Obtained From:  patient, electronic medical record  GI consult for abdominal pain and questionable duct obstruction-admitted with abdominal pain, patient reported sudden onset of right upper quadrant abdominal pain with radiation to her back associated with nausea vomiting and diarrhea, temperature of 103.2 with tachypnea and tachycardia on presentation. Noted abnormal liver function test- TB 3.1, alk phos 277, transaminases 5 times normal.  Noted lipase of 433. Has a history of similar presentation in 2020 and opted for conservative medical management, has been followed clinically as an outpatient closely by Dr. Jermaine Lucero, since that time has had multiple intermittent episodes of similar symptoms with serial LFTs that have been normal . Patient has a history of Jose-en-Y and cholecystectomy, with previous noted abnormal CBD dilation for greater than 6 years, however significantly more dilated as of late.      History:      Past Medical History:   Diagnosis Date    Allergic rhinitis     Asthma     Breast cancer ([facility]     invasive ductal carcinoma    CAD (coronary artery disease)     Cancer ([facility] 2014    Invasive ductal left breast    Colon polyps     Diabetes (HCC)     Fibromyalgia     GERD (gastroesophageal reflux disease)     HA (headache)     Herpes simplex     Nose    History of therapeutic radiation     HTN (hypertension)     Hx antineoplastic chemo     Obesity     Senile osteoporosis     Sleep apnea      Past Surgical History:   Procedure Laterality Date    BARIATRIC SURGERY  2004    BREAST BIOPSY Left 14    BREAST BIOPSY Left 12/10/14    BREAST LUMPECTOMY Left     with left SNB  invasive ductal carcinoma    BREAST LUMPECTOMY Left     BREAST LUMPECTOMY Left      SECTION      x4    CHOLECYSTECTOMY  2007    GASTRIC BYPASS SURGERY      HYSTERECTOMY      KNEE ARTHROPLASTY Right     knee reconstruction    KNEE SURGERY      Right knee    LYMPH NODE BIOPSY Left 14    ANDREW AND BSO      Dr. Sharla Antony      TUBAL LIGATION      UPPER GASTROINTESTINAL ENDOSCOPY  2/18/15    w/bx,polypectomy     UPPER GASTROINTESTINAL ENDOSCOPY N/A 2020    EGD DIAGNOSTIC ONLY performed by Maykel Forde MD at [address]       Family History  Family History   Problem Relation Age of Onset   [facility] Father         melanoma    Prostate Cancer Father     Cancer Sister         Melanoma    Cancer Other         Throat    Breast Cancer Maternal Aunt     Colon Cancer Neg Hx      [] Unable to obtain due to ventilated and/ or neurologic status  Social History     Socioeconomic History    Marital status:      Spouse name: Not on file    Number of children: Not on file    Years of education: Not on file    Highest education level: Not on file   Occupational History    Not on file   Tobacco Use    Smoking status: Never Smoker    Smokeless tobacco: Never Used   Vaping Use    Vaping Use: Never used   Substance and Sexual Activity    Alcohol use: No    Drug use: No    Sexual activity: Not on file   Other Topics Concern    Not on file   Social History Narrative    Not on file     Social Determinants of Health     Financial Resource Strain: Medium Risk    Difficulty of Paying Living Expenses: Somewhat hard   Food Insecurity: No Food Insecurity    Worried About Running Out of Food in the Last Year: Never true    Gala of Food in the Last Year: Never true   Transportation Needs: No Transportation Needs    Lack of Transportation (Medical): No    Lack of Transportation (Non-Medical): No   Physical Activity:     Days of Exercise per Week:     Minutes of Exercise per Session:    Stress:     Feeling of Stress :    Social Connections:     Frequency of Communication with Friends and Family:     Frequency of Social Gatherings with Friends and Family:     Attends Mandaen Services:     Active Member of Clubs or Organizations:     Attends Club or Organization Meetings:     Marital Status:    Intimate Partner Violence:     Fear of Current or Ex-Partner:     Emotionally Abused:     Physically Abused:     Sexually Abused:       [] Unable to obtain due to ventilated and/ or neurologic status    Home Medications:      Medications Prior to Admission: nystatin (MYCOSTATIN) 319108 UNIT/GM powder, Apply 3 times daily.   pantoprazole (PROTONIX) 40 MG tablet, Take 1 tablet by mouth once daily  amLODIPine (NORVASC) 10 MG tablet, Take 1 tablet by mouth once daily  vitamin D (ERGOCALCIFEROL) 1.25 MG (85235 UT) CAPS capsule, Take 1 capsule by mouth once a week  glyBURIDE (DIABETA) 2.5 MG tablet, Take 1 tablet by mouth daily (with breakfast)  cholestyramine (QUESTRAN) 4 g packet, Take 1 packet by mouth 2 times daily  dicyclomine (BENTYL) 10 MG capsule, Take 1 capsule by mouth 2 times daily (before meals)  metFORMIN (GLUCOPHAGE) 500 MG tablet, Take 2 tablets by mouth 2 times daily (with meals)  lisinopril-hydrochlorothiazide (PRINZIDE;ZESTORETIC) 20-12.5 MG per tablet, Take 1 tablet by mouth daily  Respiratory Therapy Supplies (FULL KIT NEBULIZER SET) MISC, 1 Units by Does not apply route 4 times daily  albuterol sulfate  (90 Base) MCG/ACT inhaler, Inhale 2 puffs into the lungs 4 times daily as needed for Wheezing  albuterol (PROVENTIL) (2.5 MG/3ML) 0.083% nebulizer solution, Take 3 mLs by nebulization every 6 hours as needed for Wheezing  [DISCONTINUED] Loperamide HCl (IMODIUM PO), Take by mouth daily as needed  (Patient not taking: Reported on 8/5/2021)  CPAP Machine [facility],     Current Hospital Medications:   Scheduled Meds:   sodium chloride flush  5-40 mL Intravenous 2 times per day    enoxaparin  40 mg Subcutaneous Daily    piperacillin-tazobactam  3,375 mg Intravenous Q8H    pantoprazole  40 mg Intravenous Daily    And    sodium chloride (PF)  10 mL Intravenous Daily    insulin glargine  0.25 Units/kg Subcutaneous Nightly    insulin lispro  0-12 Units Subcutaneous Q4H    lactobacillus acidophilus  1 tablet Oral BID     Continuous Infusions:   sodium chloride      dextrose      sodium chloride 75 mL/hr at 08/07/21 1046     PRN Meds:.sodium chloride flush, sodium chloride, acetaminophen **OR** acetaminophen, glucose, dextrose, glucagon (rDNA), dextrose, morphine, ondansetron   sodium chloride      dextrose      sodium chloride 75 mL/hr at 08/07/21 1046      Allergies:      Allergies   Allergen Reactions    Aspirin      History of gastric ulcers    Codeine      Cramps    Imitrex [Sumatriptan]     Theophyllines     Diflucan [Fluconazole] Rash    Nizoral [Ketoconazole] Rash    Zoloft Rash      Review of Systems:       [x] CV, Resp, Neuro, , and all other systems reviewed and negative other than listed in HPI.         Objective Findings:     Vitals:   Vitals:    08/07/21 0726 08/07/21 0745 08/07/21 0830 08/07/21 0915   BP:   125/74 127/72   Pulse:  99 104 102   Resp:   18 16   Temp: 100.1 °F (37.8 °C)  98.7 °F (37.1 °C) 98.8 °F (37.1 °C)   TempSrc: Oral  Oral Oral   SpO2: 97% 98%   Weight:    271 lb 3.2 oz (123 kg)   Height:    5' 2\" (1.575 m)        Physical Examination:  General: alert   HEENT: Normocephalic, no scleral icterus. Neck: No JVD. Heart: Regular, no murmur, no rub/gallop. No RV heave. Lungs: Clear to ascultation, no rales/wheezing/rhonchi. Good chest wall excursion. Abdomen: Appearance:, no Distension , Soft, mild RUQ tenderness, Scars, Bowel sounds normal  Extremities: No clubbing/cyanosis, no edema. Skin: Warm, dry, normal turgor, no rash, no bruise, no petichiae. Neuro: No myoclonus or tremor. Psych: Normal affect    Results/ Medications reviewed 8/7/2021, 1:18 PM     Laboratory, Microbiology, Pathology, Radiology, Cardiology, Medications and Transcriptions reviewed  Scheduled Meds:   sodium chloride flush  5-40 mL Intravenous 2 times per day    enoxaparin  40 mg Subcutaneous Daily    piperacillin-tazobactam  3,375 mg Intravenous Q8H    pantoprazole  40 mg Intravenous Daily    And    sodium chloride (PF)  10 mL Intravenous Daily    insulin glargine  0.25 Units/kg Subcutaneous Nightly    insulin lispro  0-12 Units Subcutaneous Q4H    lactobacillus acidophilus  1 tablet Oral BID     Continuous Infusions:   sodium chloride      dextrose      sodium chloride 75 mL/hr at 08/07/21 1046       Recent Labs     08/07/21  0500   WBC 9.5   HGB 14.3   HCT 42.7   MCV 85.7        Recent Labs     08/07/21  0500      K 3.5   CL 99   CO2 23   BUN 10   CREATININE 0.47*     Recent Labs     08/07/21  0500   *   *   BILITOT 3.1*   ALKPHOS 277*     Recent Labs     08/07/21  0500 08/07/21  1110   LIPASE Diluting 433*     Recent Labs     08/07/21  0500   PROT 7.3     DEXA BONE DENSITY AXIAL SKELETON    Result Date: 7/15/2021  FOR CHARGE PURPOSES ONLY REPORT TO FOLLOW     CT ABDOMEN PELVIS W IV CONTRAST Additional Contrast? None    Result Date: 8/7/2021  EXAM:  CT ABDOMEN PELVIS W IV CONTRAST History: Epigastric pain and fever.  Technique: Multiple contiguous axial images were obtained of the abdomen and pelvis from an level of the lung bases through the ischial tuberosities with IV contrast. Multiplanar reformats were obtained. Delayed images were obtained Comparison: CT abdomen pelvis April 16, 2021 Findings: Lung bases are clear. The liver is mildly enlarged measuring approximately 20 cm in craniocaudal length. Interval decrease  in size of the common bile duct in this patient who is status post cholecystectomy (with a common bile duct measuring approximately 16 mm compared to 21  mm by my measurement on prior examination). The spleen and adrenal glands are within normal limits. Minimal edema surrounding the pancreatic head. No peripancreatic fluid collection. Postsurgical changes of gastric bypass surgery. The kidneys enhance uniformly. No urinary tract calculi or hydronephrosis. Urinary bladder is well distended. The uterus is surgically absent. Abdominal aorta is nonaneurysmal  . No retroperitoneal or abdominal/pelvic lymphadenopathy. No significant interval change of a fat-containing ventral abdominal wall hernia. No small bowel obstruction. Colonic diverticuli are identified. No overt colonic mass or pericolonic inflammation. Appendix is within normal limits. No free fluid, loculated fluid collection, or pneumoperitoneum. No acute osseous abnormality. Minimal peripancreatic edema. Correlation for hepatitis recommended. Mild hepatomegaly. Colonic diverticulosis without diverticulitis. All CT scans at this facility use dose modulation, iterative reconstruction, and/or weight based dosing when appropriate to reduce radiation dose to as low as reasonably achievable. Impression:   76 y/o female admitted with sudden onset of right upper quadrant abdominal pain with radiation to her back associated with nausea, vomiting, and diarrhea, temperature of 103.2 with tachypnea and tachycardia on presentation.   Noted abnormal liver function test- TB 3.1, alk phos 277, transaminases 5 times normal.  Noted lipase of 433. Pt reported hx of similar symptoms intermittently for the past year and has opted for conservative medical management, has been followed closely by Dr Karina Padilla in the OP setting with previous normal serial LFTs. Since arrival she has been initiated on IV antibiotics, pain is well managed with current pain medications, fever is improved. In patients with prior Jose-en-Y gastric bypass, ERCP is technically challenging, primarily because a standard duodenoscope can rarely reach the major papilla, these patients require a higher level of care and ideally should be managed at centers where a hepatobiliary surgeon is also available. Plan:   -continue supportive course of care and medical mgmt per primary team  -appreciate ID recommendations, agree with atbx  - given her clinical presentation and hx of Jose-en-Y, recommend transfer to [facility]/tertiary center for consideration of IR- PTC drain vs balloon enteroscopy-assisted ERCP vs surgery-assisted ERCP, pt is agreeable, this was discussed directly with Dr. Karina Padilla. Telly HAMLIN-CNP    Comments: Thank you for allowing us to participate in the care of this patient. Will continue to follow. Please call if questions or concerns arise. A/P  Agree regarding assessment and plan. Patient admitted owing to fever abdominal pain in the context of Jose and Y gastric bypass. Patient is established with advanced endoscopist at [facility]. Continue antibiotic regimen. Discussed getting referred patient to tertiary center/[facility]  Meri Floyd MD  Please note this report has been partially produced using speech recognition software and may cause contain errors related to that system including grammar, punctuation and spelling as well as words and phrases that may seem inappropriate. If there are questions or concerns please feel free to contact me to clarify.

## 2021-08-07 NOTE — ED PROVIDER NOTES
[address] [facility] ED  eMERGENCY dEPARTMENT eNCOUnter      Pt Name: Alexis Melendez  MRN: [id number]  [place] 1955  Date of evaluation: 8/7/2021  Provider: Ji Peck MD    CHIEF COMPLAINT       Chief Complaint   Patient presents with    Abdominal Pain    Emesis    Nausea    Fever         HISTORY OF PRESENT ILLNESS   (Location/Symptom, Timing/Onset,Context/Setting, Quality, Duration, Modifying Factors, Severity)  Note limiting factors. Alexis Melendez is a 77 y.o. female who presents to the emergency department for evaluation of abdominal pain nausea and fever. Symptoms began 24 hours ago and got progressively worse. She states she had \"sepsis when she had similar symptoms to this last year\". She is not sure of where the septic source was from she states they treated her with antibiotics and all her symptoms got better. Today she denies cough or shortness of breath. She has no related urinary symptoms. No diarrhea. She is having primarily epigastric pain that is nonradiating and is currently moderate. Nothing makes the pain better or worse. Prior abdominal surgeries include gastric bypass and cholecystectomy    HPI    NursingNotes were reviewed. REVIEW OF SYSTEMS    (2-9 systems for level 4, 10 or more for level 5)     Review of Systems   Constitutional: Positive for fatigue and fever. Negative for chills and diaphoresis. HENT: Negative for congestion, ear pain, mouth sores and sore throat. Eyes: Negative for photophobia and discharge. Respiratory: Negative for cough, chest tightness, shortness of breath and wheezing. Cardiovascular: Negative for chest pain and palpitations. Gastrointestinal: Positive for abdominal pain and nausea. Negative for abdominal distention and vomiting. Endocrine: Negative for cold intolerance. Genitourinary: Negative for difficulty urinating. Musculoskeletal: Negative for arthralgias. Skin: Negative for pallor and rash. Allergic/Immunologic: Negative for immunocompromised state. Neurological: Positive for weakness. Negative for dizziness and syncope. Hematological: Negative for adenopathy. Psychiatric/Behavioral: Negative for agitation and hallucinations. All other systems reviewed and are negative. Except as noted above the remainder of the review of systems was reviewed and negative.        PAST MEDICAL HISTORY     Past Medical History:   Diagnosis Date    Allergic rhinitis     Asthma     [facility]     invasive ductal carcinoma    CAD (coronary artery disease)     Cancer ([facility] 2014    Invasive ductal left breast    Colon polyps     Diabetes [facility]     Fibromyalgia     GERD (gastroesophageal reflux disease)     HA (headache)     Herpes simplex     Nose    History of therapeutic radiation     HTN (hypertension)     Hx antineoplastic chemo     Obesity     Senile osteoporosis     Sleep apnea          SURGICALHISTORY       Past Surgical History:   Procedure Laterality Date    BARIATRIC SURGERY  2004    BREAST BIOPSY Left 14    BREAST BIOPSY Left 12/10/14    BREAST LUMPECTOMY Left     with left SNB  invasive ductal carcinoma    BREAST LUMPECTOMY Left     BREAST LUMPECTOMY Left      SECTION      x4    CHOLECYSTECTOMY  2007    GASTRIC BYPASS SURGERY      HYSTERECTOMY      KNEE ARTHROPLASTY Right     knee reconstruction    KNEE SURGERY      Right knee    LYMPH NODE BIOPSY Left 14    ANDREW AND BSO      Dr. Micah Bowling    TONSILLECTOMY     [facility] ENDOSCOPY  2/18/15    w/bx,polypectomy     UPPER GASTROINTESTINAL ENDOSCOPY N/A 2020    EGD DIAGNOSTIC ONLY performed by Robb Walsh MD at [address] [place] [address]       Previous Medications    ALBUTEROL (PROVENTIL) (2.5 MG/3ML) 0.083% NEBULIZER SOLUTION    Take 3 mLs by nebulization every 6 use: No    Drug use: No    Sexual activity: None   Other Topics Concern    None   Social History Narrative    None     Social Determinants of Health     Financial Resource Strain: Medium Risk    Difficulty of Paying Living Expenses: Somewhat hard   Food Insecurity: No Food Insecurity    Worried About Running Out of Food in the Last Year: Never true    Gala of Food in the Last Year: Never true   Transportation Needs: No Transportation Needs    Lack of Transportation (Medical): No    Lack of Transportation (Non-Medical): No   Physical Activity:     Days of Exercise per Week:     Minutes of Exercise per Session:    Stress:     Feeling of Stress :    Social Connections:     Frequency of Communication with Friends and Family:     Frequency of Social Gatherings with Friends and Family:     Attends [employer] Services:     Active Member of Clubs or Organizations:     Attends Club or Organization Meetings:     Marital Status:    Intimate Partner Violence:     Fear of Current or Ex-Partner:     Emotionally Abused:     Physically Abused:     Sexually Abused:        SCREENINGS    Glady Coma Scale  Eye Opening: Spontaneous  Best Verbal Response: Oriented  Best Motor Response: Obeys commands  Shanna Coma Scale Score: 15 @FLOW([id number])@      PHYSICAL EXAM    (up to 7 for level 4, 8 or more for level 5)     ED Triage Vitals [08/07/21 0349]   BP Temp Temp Source Pulse Resp SpO2 Height Weight   125/67 102.1 °F (38.9 °C) Oral 121 30 96 % 5' 2\" (1.575 m) 264 lb (119.7 kg)       Physical Exam  Vitals and nursing note reviewed. Constitutional:       Appearance: She is well-developed. She is ill-appearing. HENT:      Head: Normocephalic. Nose: Nose normal.   Eyes:      Conjunctiva/sclera: Conjunctivae normal.      Pupils: Pupils are equal, round, and reactive to light. Cardiovascular:      Rate and Rhythm: Regular rhythm. Tachycardia present. Heart sounds: Normal heart sounds.    Pulmonary: Effort: Pulmonary effort is normal.      Breath sounds: Normal breath sounds. Abdominal:      General: Bowel sounds are normal.      Palpations: Abdomen is soft. Tenderness: There is abdominal tenderness in the epigastric area. There is no guarding. Musculoskeletal:         General: Normal range of motion. Cervical back: Normal range of motion and neck supple. Skin:     General: Skin is warm and dry. Neurological:      Mental Status: She is alert and oriented to person, place, and time. DIAGNOSTIC RESULTS     EKG: All EKG's are interpreted by the Emergency Department Physician who either signs or Co-signsthis chart in the absence of a cardiologist.      RADIOLOGY:   Katy Monks such as CT, Ultrasound and MRI are read by the radiologist. Emily Ciara radiographic images are visualized and preliminarily interpreted by the emergency physician with the below findings:    Interpretation per the Radiologist below, if available at the time ofthis note:    XR CHEST PORTABLE    (Results Pending)   CT ABDOMEN PELVIS W IV CONTRAST Additional Contrast? None    (Results Pending)         ED BEDSIDE ULTRASOUND:   Performed by ED Physician - none    LABS:  Labs Reviewed   URINE RT REFLEX TO CULTURE - Abnormal; Notable for the following components:       Result Value    Color, UA DARK YELLOW (*)     Glucose, Ur >=1000 (*)     All other components within normal limits   LACTIC ACID, PLASMA - Abnormal; Notable for the following components:    Lactic Acid 3.4 (*)     All other components within normal limits    Narrative:     Devonte Mcclain tel. [phone],  LACID results called to and read back by Mata Carter, 08/07/2021 07:03, by Marcus Penaloza   COVID-19, RAPID   CULTURE, BLOOD 1   CULTURE, BLOOD 2   COMPREHENSIVE METABOLIC PANEL   CBC WITH AUTO DIFFERENTIAL   LIPASE   PROCALCITONIN       All other labs were within normal range or not returned as of this dictation.     EMERGENCY DEPARTMENT COURSE and DIFFERENTIAL DIAGNOSIS/MDM:   Vitals:    Vitals:    08/07/21 0349 08/07/21 0430 08/07/21 0530 08/07/21 0630   BP: 125/67 (!) 140/76 139/79    Pulse: 121 115 107 99   Resp: 30 24 24 18   Temp: 102.1 °F (38.9 °C) 103.4 °F (39.7 °C)     TempSrc: Oral Oral     SpO2: 96% 96% 95% 95%   Weight: 264 lb (119.7 kg)      Height: 5' 2\" (1.575 m)           MDM patient with an unremarkable abdominal CT. Lactic acid elevated and patient treated for probable sepsis. Reviewing her chart she did have a similar visit 1 year ago that resolved with supportive care here in the hospital.        CONSULTS:  None    PROCEDURES:  Unless otherwise noted below, none     Procedures    FINAL IMPRESSION      1. Pain of upper abdomen    2. Septicemia [facility])          DISPOSITION/PLAN   DISPOSITION Decision To Admit 08/07/2021 07:10:38 AM      PATIENT REFERRED TO:  No follow-up provider specified.     DISCHARGE MEDICATIONS:  New Prescriptions    No medications on file          (Please note that portions of this note were completed with a voice recognition program.  Efforts were made to edit the dictations but occasionally words are mis-transcribed.)    Ji Peck MD (electronically signed)  Attending Emergency Physician          Ji Peck MD  08/07/21 0645       Ji Peck MD  08/07/21 0710

## 2021-08-07 NOTE — FLOWSHEET NOTE
Stool for cdiff / GI panel sent to lab per orders.  Electronically signed by Miguel Castro RN on 8/7/2021 at 6:57 PM

## 2021-08-07 NOTE — ED PROVIDER NOTES
This is Dr. Yevgeniy Keating. Special addendum, care assumed from Dr. Aidan Kahn at approximately 0700 hrs. Review of laboratory diagnostic evaluation demonstrates evidence of hep otitis acute nature as well as acute pancreatitis. CT imaging does not demonstrate obvious source of potential obstructive, but common bile duct pathology. Bilirubin is  to be elevated as well. Patient been initiated on fluids, analgesia, and antipyretics. Patient is started on antibiotics. Will facilitate admission to medical floor. Patient is established with GI, Dr. Arun Chan. Patient will likely require ERCP. This time patient's condition is significantly stabilized. Patient expresses hunger, vital signs normalizing, abdominal pain improved substantially. Will facilitate admission at this time for patient who is remarkably improved, stabilized, with findings of what appears to be a common bile duct obstructive pathology and developing septicemia.                Burgess Josefa MD  08/07/21 9749

## 2021-08-07 NOTE — PROGRESS NOTES
Transfer to [facility], Accepted the pt, Spoke with both the GI and IR and they said they are capable handle this situation, initially they they did not understand when they spoke with GI PA and thought that pt could stay at [facility]. Spoke with Dr. Halina Thomas before transfer ok with both [facility] or .

## 2021-08-08 LAB
ALBUMIN SERPL-MCNC: 2.9 G/DL (ref 3.5–4.6)
ALP BLD-CCNC: 155 U/L (ref 40–130)
ALT SERPL-CCNC: 117 U/L (ref 0–33)
ANION GAP SERPL CALCULATED.3IONS-SCNC: 10 MEQ/L (ref 9–15)
AST SERPL-CCNC: 86 U/L (ref 0–35)
BILIRUB SERPL-MCNC: 3.6 MG/DL (ref 0.2–0.7)
BUN BLDV-MCNC: 8 MG/DL (ref 8–23)
C DIFF TOXIN/ANTIGEN: NORMAL
CALCIUM SERPL-MCNC: 7.8 MG/DL (ref 8.5–9.9)
CHLORIDE BLD-SCNC: 105 MEQ/L (ref 95–107)
CO2: 23 MEQ/L (ref 20–31)
CREAT SERPL-MCNC: 0.51 MG/DL (ref 0.5–0.9)
GFR AFRICAN AMERICAN: >60
GFR AFRICAN AMERICAN: >60
GFR NON-AFRICAN AMERICAN: >60
GFR NON-AFRICAN AMERICAN: >60
GI BACTERIAL PATHOGENS BY PCR: NORMAL
GLOBULIN: 2.5 G/DL (ref 2.3–3.5)
GLUCOSE BLD-MCNC: 103 MG/DL (ref 60–115)
GLUCOSE BLD-MCNC: 107 MG/DL (ref 60–115)
GLUCOSE BLD-MCNC: 139 MG/DL (ref 70–99)
GLUCOSE BLD-MCNC: 146 MG/DL (ref 60–115)
GLUCOSE BLD-MCNC: 155 MG/DL (ref 60–115)
HAV IGM SER IA-ACNC: NORMAL
HCT VFR BLD CALC: 33.2 % (ref 37–47)
HEMOGLOBIN: 11.1 G/DL (ref 12–16)
HEPATITIS B CORE IGM ANTIBODY: NORMAL
HEPATITIS B SURFACE ANTIGEN INTERPRETATION: NORMAL
HEPATITIS C ANTIBODY INTERPRETATION: NORMAL
HEPATITIS INTERPRETATION:: NORMAL
MAGNESIUM: 1.7 MG/DL (ref 1.7–2.4)
MCH RBC QN AUTO: 28.7 PG (ref 27–31.3)
MCHC RBC AUTO-ENTMCNC: 33.3 % (ref 33–37)
MCV RBC AUTO: 86.1 FL (ref 82–100)
PDW BLD-RTO: 13.5 % (ref 11.5–14.5)
PERFORMED ON: ABNORMAL
PERFORMED ON: NORMAL
PERFORMED ON: NORMAL
PLATELET # BLD: 158 K/UL (ref 130–400)
POC CREATININE: 0.4 MG/DL (ref 0.6–1.2)
POC SAMPLE TYPE: ABNORMAL
POTASSIUM REFLEX MAGNESIUM: 2.9 MEQ/L (ref 3.4–4.9)
RBC # BLD: 3.86 M/UL (ref 4.2–5.4)
SODIUM BLD-SCNC: 138 MEQ/L (ref 135–144)
TOTAL PROTEIN: 5.4 G/DL (ref 6.3–8)
WBC # BLD: 7.6 K/UL (ref 4.8–10.8)

## 2021-08-08 PROCEDURE — 2580000003 HC RX 258: Performed by: INTERNAL MEDICINE

## 2021-08-08 PROCEDURE — C9113 INJ PANTOPRAZOLE SODIUM, VIA: HCPCS | Performed by: NURSE PRACTITIONER

## 2021-08-08 PROCEDURE — 36415 COLL VENOUS BLD VENIPUNCTURE: CPT

## 2021-08-08 PROCEDURE — 6360000002 HC RX W HCPCS: Performed by: INTERNAL MEDICINE

## 2021-08-08 PROCEDURE — 6370000000 HC RX 637 (ALT 250 FOR IP): Performed by: INTERNAL MEDICINE

## 2021-08-08 PROCEDURE — 85027 COMPLETE CBC AUTOMATED: CPT

## 2021-08-08 PROCEDURE — 6370000000 HC RX 637 (ALT 250 FOR IP): Performed by: NURSE PRACTITIONER

## 2021-08-08 PROCEDURE — 2060000000 HC ICU INTERMEDIATE R&B

## 2021-08-08 PROCEDURE — 6360000002 HC RX W HCPCS: Performed by: NURSE PRACTITIONER

## 2021-08-08 PROCEDURE — 99232 SBSQ HOSP IP/OBS MODERATE 35: CPT | Performed by: NURSE PRACTITIONER

## 2021-08-08 PROCEDURE — 83735 ASSAY OF MAGNESIUM: CPT

## 2021-08-08 PROCEDURE — 80053 COMPREHEN METABOLIC PANEL: CPT

## 2021-08-08 PROCEDURE — 2580000003 HC RX 258: Performed by: NURSE PRACTITIONER

## 2021-08-08 RX ORDER — POTASSIUM CHLORIDE 7.45 MG/ML
10 INJECTION INTRAVENOUS
Status: DISCONTINUED | OUTPATIENT
Start: 2021-08-08 | End: 2021-08-08

## 2021-08-08 RX ORDER — POTASSIUM CHLORIDE 7.45 MG/ML
10 INJECTION INTRAVENOUS
Status: COMPLETED | OUTPATIENT
Start: 2021-08-08 | End: 2021-08-08

## 2021-08-08 RX ORDER — POTASSIUM CHLORIDE 20 MEQ/1
40 TABLET, EXTENDED RELEASE ORAL ONCE
Status: COMPLETED | OUTPATIENT
Start: 2021-08-08 | End: 2021-08-08

## 2021-08-08 RX ADMIN — ENOXAPARIN SODIUM 40 MG: 40 INJECTION SUBCUTANEOUS at 09:28

## 2021-08-08 RX ADMIN — SODIUM CHLORIDE: 9 INJECTION, SOLUTION INTRAVENOUS at 13:37

## 2021-08-08 RX ADMIN — POTASSIUM CHLORIDE 10 MEQ: 7.46 INJECTION, SOLUTION INTRAVENOUS at 09:28

## 2021-08-08 RX ADMIN — POTASSIUM CHLORIDE 10 MEQ: 7.46 INJECTION, SOLUTION INTRAVENOUS at 11:24

## 2021-08-08 RX ADMIN — INSULIN LISPRO 2 UNITS: 100 INJECTION, SOLUTION INTRAVENOUS; SUBCUTANEOUS at 11:25

## 2021-08-08 RX ADMIN — PIPERACILLIN SODIUM AND TAZOBACTAM SODIUM 3375 MG: 3; .375 INJECTION, POWDER, LYOPHILIZED, FOR SOLUTION INTRAVENOUS at 09:28

## 2021-08-08 RX ADMIN — POTASSIUM CHLORIDE 10 MEQ: 7.46 INJECTION, SOLUTION INTRAVENOUS at 10:27

## 2021-08-08 RX ADMIN — POTASSIUM CHLORIDE 40 MEQ: 1500 TABLET, EXTENDED RELEASE ORAL at 11:25

## 2021-08-08 RX ADMIN — POTASSIUM CHLORIDE 10 MEQ: 7.46 INJECTION, SOLUTION INTRAVENOUS at 12:27

## 2021-08-08 RX ADMIN — SODIUM CHLORIDE, PRESERVATIVE FREE 10 ML: 5 INJECTION INTRAVENOUS at 09:33

## 2021-08-08 RX ADMIN — PIPERACILLIN SODIUM AND TAZOBACTAM SODIUM 3375 MG: 3; .375 INJECTION, POWDER, LYOPHILIZED, FOR SOLUTION INTRAVENOUS at 17:02

## 2021-08-08 RX ADMIN — SODIUM CHLORIDE: 9 INJECTION, SOLUTION INTRAVENOUS at 00:20

## 2021-08-08 RX ADMIN — LACTOBACILLUS TAB 1 TABLET: TAB at 21:37

## 2021-08-08 RX ADMIN — SODIUM CHLORIDE, PRESERVATIVE FREE 10 ML: 5 INJECTION INTRAVENOUS at 09:28

## 2021-08-08 RX ADMIN — PIPERACILLIN SODIUM AND TAZOBACTAM SODIUM 3375 MG: 3; .375 INJECTION, POWDER, LYOPHILIZED, FOR SOLUTION INTRAVENOUS at 00:19

## 2021-08-08 RX ADMIN — SODIUM CHLORIDE, PRESERVATIVE FREE 10 ML: 5 INJECTION INTRAVENOUS at 21:38

## 2021-08-08 RX ADMIN — PANTOPRAZOLE SODIUM 40 MG: 40 INJECTION, POWDER, FOR SOLUTION INTRAVENOUS at 09:33

## 2021-08-08 ASSESSMENT — PAIN SCALES - GENERAL: PAINLEVEL_OUTOF10: 0

## 2021-08-08 NOTE — PROGRESS NOTES
Gastroenterology Progress Note    Pelon Mccrary is a 77 y.o. female patient. Hospitalization Day:1    Chief C/O: abdominal pain    SUBJECTIVE: Seen and examined, low-grade temp overnight, has persistent abdominal pain well managed with current pain medications, awaiting transfer to tertiary center. ROS:  Gastrointestinal ROS:  abdominal pain managed with current pain regimen, change in bowel habits, or black or bloody stools    Physical    VITALS:  /78   Pulse 72   Temp 98.4 °F (36.9 °C) (Oral)   Resp 16   Ht 5' 2\" (1.575 m)   Wt 271 lb 3.2 oz (123 kg)   SpO2 93%   BMI 49.60 kg/m²   TEMPERATURE:  Current - Temp: 98.4 °F (36.9 °C); Max - Temp  Av.8 °F (37.1 °C)  Min: 98.2 °F (36.8 °C)  Max: 100.2 °F (37.9 °C)    General:  Alert and oriented,  No apparent distress  Skin- without jaundice  Eyes: anicteric sclera  Cardiac: RRR, Nl s1s2, without murmurs  Lungs CTA Bilaterally, normal effort  Abdomen soft, ND, NT, no HSM, Bowel sounds normal  Ext: without edema  Neuro: no asterixis     Data    Data Review:    Recent Labs     21  0500 21  0706   WBC 9.5 7.6   HGB 14.3 11.1*   HCT 42.7 33.2*   MCV 85.7 86.1    158     Recent Labs     21  0500 21  0535 21  0706     --  138   K 3.5  --  2.9*   CL 99  --  105   CO2 23  --  23   BUN 10  --  8   CREATININE 0.47* 0.4* 0.51     Recent Labs     21  0500 21  0706   * 86*   * 117*   BILITOT 3.1* 3.6*   ALKPHOS 277* 155*     Recent Labs     21  0500 21  1110   LIPASE Diluting 433*     No results for input(s): PROTIME, INR in the last 72 hours.         ASSESSMENT:  49-year-old female bit of sudden onset of right upper quadrant abdominal pain with radiation to her back associated with nausea, vomiting, and diarrhea, febrile on arrival at 103.2, noted low-grade temp overnight, was initiated on Zosyn, noted abnormal liver function tests of total bilirubin of 3.1, alk phos 277 and transaminases 5 times normal.  Noted lipase of 433. Patient reported history of similar symptoms intermittently for the past year and is opted for conservative medical management has been followed closely by Dr. Karen Bird in the outpatient setting with previous normal serial LFTs. Patient is awaiting transfer to tertiary center for further evaluation given her history of Jose-en-Y. Discussed with Dr. Chloe Moffett :  -Continue supportive course of care medical management per primary team  -await transfer to [facility] for consideration of IR- PTC drain vs balloon enteroscopy-assisted ERCP vs surgery-assisted ERCP, pt is agreeable, this was discussed directly with Dr. Kandi Rabago    Thank you for allowing me to participate in the care of your patient. Please feel free to contact me with any concerns.     Wanda Dominique, YAKELIN - CNP

## 2021-08-08 NOTE — PROGRESS NOTES
Assessment complete. See flow sheets. Patients diet changed to clear liquids and then NPO after midnight. Awaiting transfer to [facility] for ERCP. Patient has not had a second BM today. Denies nausea at time of assessment. Drank a roberto mist and had some ice chips. HS medication given. , declined Lantus. She states she does not use it at home and because she is not eating would prefer not to take it. 24-Feb-2018 15:39

## 2021-08-09 LAB
ALBUMIN SERPL-MCNC: 3.3 G/DL (ref 3.5–4.6)
ALP BLD-CCNC: 168 U/L (ref 40–130)
ALT SERPL-CCNC: 87 U/L (ref 0–33)
ANION GAP SERPL CALCULATED.3IONS-SCNC: 11 MEQ/L (ref 9–15)
AST SERPL-CCNC: 43 U/L (ref 0–35)
BILIRUB SERPL-MCNC: 1.7 MG/DL (ref 0.2–0.7)
BUN BLDV-MCNC: 5 MG/DL (ref 8–23)
CALCIUM SERPL-MCNC: 8.8 MG/DL (ref 8.5–9.9)
CHLORIDE BLD-SCNC: 105 MEQ/L (ref 95–107)
CO2: 24 MEQ/L (ref 20–31)
CREAT SERPL-MCNC: 0.55 MG/DL (ref 0.5–0.9)
GFR AFRICAN AMERICAN: >60
GFR NON-AFRICAN AMERICAN: >60
GLOBULIN: 2.9 G/DL (ref 2.3–3.5)
GLUCOSE BLD-MCNC: 122 MG/DL (ref 60–115)
GLUCOSE BLD-MCNC: 130 MG/DL (ref 60–115)
GLUCOSE BLD-MCNC: 132 MG/DL (ref 70–99)
GLUCOSE BLD-MCNC: 137 MG/DL (ref 60–115)
GLUCOSE BLD-MCNC: 165 MG/DL (ref 60–115)
HCT VFR BLD CALC: 33.9 % (ref 37–47)
HEMOGLOBIN: 11.4 G/DL (ref 12–16)
MCH RBC QN AUTO: 28.9 PG (ref 27–31.3)
MCHC RBC AUTO-ENTMCNC: 33.7 % (ref 33–37)
MCV RBC AUTO: 85.9 FL (ref 82–100)
PDW BLD-RTO: 13.5 % (ref 11.5–14.5)
PERFORMED ON: ABNORMAL
PLATELET # BLD: 149 K/UL (ref 130–400)
POTASSIUM REFLEX MAGNESIUM: 3.9 MEQ/L (ref 3.4–4.9)
RBC # BLD: 3.95 M/UL (ref 4.2–5.4)
SODIUM BLD-SCNC: 140 MEQ/L (ref 135–144)
TOTAL PROTEIN: 6.2 G/DL (ref 6.3–8)
WBC # BLD: 4.9 K/UL (ref 4.8–10.8)

## 2021-08-09 PROCEDURE — 36415 COLL VENOUS BLD VENIPUNCTURE: CPT

## 2021-08-09 PROCEDURE — 80053 COMPREHEN METABOLIC PANEL: CPT

## 2021-08-09 PROCEDURE — C9113 INJ PANTOPRAZOLE SODIUM, VIA: HCPCS | Performed by: NURSE PRACTITIONER

## 2021-08-09 PROCEDURE — 85027 COMPLETE CBC AUTOMATED: CPT

## 2021-08-09 PROCEDURE — 99232 SBSQ HOSP IP/OBS MODERATE 35: CPT | Performed by: INTERNAL MEDICINE

## 2021-08-09 PROCEDURE — 6360000002 HC RX W HCPCS: Performed by: NURSE PRACTITIONER

## 2021-08-09 PROCEDURE — 6360000002 HC RX W HCPCS: Performed by: INTERNAL MEDICINE

## 2021-08-09 PROCEDURE — 6370000000 HC RX 637 (ALT 250 FOR IP): Performed by: INTERNAL MEDICINE

## 2021-08-09 PROCEDURE — 99232 SBSQ HOSP IP/OBS MODERATE 35: CPT | Performed by: NURSE PRACTITIONER

## 2021-08-09 PROCEDURE — 2580000003 HC RX 258: Performed by: INTERNAL MEDICINE

## 2021-08-09 PROCEDURE — 2060000000 HC ICU INTERMEDIATE R&B

## 2021-08-09 PROCEDURE — 2580000003 HC RX 258: Performed by: NURSE PRACTITIONER

## 2021-08-09 RX ADMIN — LACTOBACILLUS TAB 1 TABLET: TAB at 08:03

## 2021-08-09 RX ADMIN — SODIUM CHLORIDE: 9 INJECTION, SOLUTION INTRAVENOUS at 21:09

## 2021-08-09 RX ADMIN — ENOXAPARIN SODIUM 40 MG: 40 INJECTION SUBCUTANEOUS at 08:01

## 2021-08-09 RX ADMIN — PIPERACILLIN SODIUM AND TAZOBACTAM SODIUM 3375 MG: 3; .375 INJECTION, POWDER, LYOPHILIZED, FOR SOLUTION INTRAVENOUS at 14:54

## 2021-08-09 RX ADMIN — PIPERACILLIN SODIUM AND TAZOBACTAM SODIUM 3375 MG: 3; .375 INJECTION, POWDER, LYOPHILIZED, FOR SOLUTION INTRAVENOUS at 01:28

## 2021-08-09 RX ADMIN — LACTOBACILLUS TAB 1 TABLET: TAB at 21:10

## 2021-08-09 RX ADMIN — ONDANSETRON 4 MG: 2 INJECTION INTRAMUSCULAR; INTRAVENOUS at 08:12

## 2021-08-09 RX ADMIN — PIPERACILLIN SODIUM AND TAZOBACTAM SODIUM 3375 MG: 3; .375 INJECTION, POWDER, LYOPHILIZED, FOR SOLUTION INTRAVENOUS at 08:01

## 2021-08-09 RX ADMIN — SODIUM CHLORIDE: 9 INJECTION, SOLUTION INTRAVENOUS at 01:28

## 2021-08-09 RX ADMIN — SODIUM CHLORIDE, PRESERVATIVE FREE 10 ML: 5 INJECTION INTRAVENOUS at 08:03

## 2021-08-09 RX ADMIN — PIPERACILLIN SODIUM AND TAZOBACTAM SODIUM 3375 MG: 3; .375 INJECTION, POWDER, LYOPHILIZED, FOR SOLUTION INTRAVENOUS at 23:33

## 2021-08-09 RX ADMIN — SODIUM CHLORIDE, PRESERVATIVE FREE 10 ML: 5 INJECTION INTRAVENOUS at 21:10

## 2021-08-09 RX ADMIN — PANTOPRAZOLE SODIUM 40 MG: 40 INJECTION, POWDER, FOR SOLUTION INTRAVENOUS at 08:01

## 2021-08-09 ASSESSMENT — PAIN DESCRIPTION - LOCATION
LOCATION: ABDOMEN
LOCATION: ABDOMEN;GENERALIZED
LOCATION: ABDOMEN

## 2021-08-09 ASSESSMENT — PAIN SCALES - GENERAL
PAINLEVEL_OUTOF10: 0

## 2021-08-09 ASSESSMENT — PAIN DESCRIPTION - PAIN TYPE
TYPE: ACUTE PAIN;CHRONIC PAIN

## 2021-08-09 ASSESSMENT — PAIN DESCRIPTION - DESCRIPTORS
DESCRIPTORS: DULL;DISCOMFORT

## 2021-08-09 ASSESSMENT — ENCOUNTER SYMPTOMS
DIARRHEA: 0
RESPIRATORY NEGATIVE: 1
ABDOMINAL PAIN: 1
NAUSEA: 1
COUGH: 0
SHORTNESS OF BREATH: 0
VOMITING: 0

## 2021-08-09 NOTE — PROGRESS NOTES
Assessment complete. See flow sheets. HS medication given, tolerated well. , no coverage needed and patient declined Lantus. Remains on clear liquid diet. Drinking sips of pop and eating ice chips.

## 2021-08-09 NOTE — DISCHARGE SUMMARY
Discharge Summary    Date: 8/9/2021  Patient Name: Marycruz Blackwood YOB: 1955 Age: 77 y.o. Admit Date: 8/7/2021  Discharge Date: 8/9/2021  Discharge Condition: Salma Mcguire    Admission Diagnosis  Acute hepatitis (B17.9); Septicemia ([facility] [address] (A41.9); Pain of upper abdomen (R10.10); Sepsis ([facility] [address] (A41.9); Acute pancreatitis, unspecified complication status, unspecified pancreatitis type (K85.90)     Discharge Diagnosis  Active Problems: Biliary sepsis Obstructive jaundiceResolved Problems: * No resolved hospital problems. [facility] Stay  Narrative of Hospital Course:  Patient admitted for biliary sepsis. GI evaluated patient recommended to patient to be transferred to tertiary center for ERCP secondary to dilatation of common bile duct. Patient is waiting for bed at [place] [id number] Receiving IV Zosyn for now. Consultants:  IP CONSULT TO INFECTIOUS DISEASESIP CONSULT TO GI    Surgeries/procedures Performed:       Treatments:    IV Hydration and Antibiotics    Zosyn    Discharge Plan/Disposition:  Home    Hospital/Incidental Findings Requiring Follow Up:    Patient Instructions:    Diet:    Activity:Activity as Tolerated  For number of days (if applicable): Other Instructions:    Provider Follow-Up:   No follow-ups on file.      Significant Diagnostic Studies:    Recent Labs:  Admission on 08/07/2021Sodium                                        Date: 08/07/2021Value: 138         Ref range: 135 - 144 mEq/L    Status: FinalPotassium                                     Date: 08/07/2021Value: 3.5         Ref range: 3.4 - 4.9 mEq/L    Status: FinalChloride                                      Date: 08/07/2021Value: 99          Ref range: 95 - 107 mEq/L     Status: FinalCO2                                           Date: 08/07/2021Value: 23          Ref range: 20 - 31 mEq/L      Status: FinalAnion Gap                                     Date: 08/07/2021Value: 16*         Ref range: 9 - 15 mEq/L Status: FinalGlucose                                       Date: 08/07/2021Value: 285*        Ref range: 70 - 99 mg/dL      Status: FinalBUN                                           Date: 08/07/2021Value: 10          Ref range: 8 - 23 mg/dL       Status: FinalCREATININE                                    Date: 08/07/2021Value: 0.47*       Ref range: 0.50 - 0.90 mg/dL  Status: FinalGFR Non-                      Date: 08/07/2021Value: >60.0       Ref range: >60                Status: Final              Comment: >60 mL/min/1.73m2 EGFR, calc. for ages 25 and older using theMDRD formula (not corrected for weight), is valid for stablerenal function. GFR                           Date: 08/07/2021Value: >60.0       Ref range: >60                Status: Final              Comment: >60 mL/min/1.73m2 EGFR, calc. for ages 25 and older using theMDRD formula (not corrected for weight), is valid for stablerenal function. Calcium                                       Date: 08/07/2021Value: 8.5         Ref range: 8.5 - 9.9 mg/dL    Status: FinalTotal Protein                                 Date: 08/07/2021Value: 7.3         Ref range: 6.3 - 8.0 g/dL     Status: FinalAlbumin                                       Date: 08/07/2021Value: 4.1         Ref range: 3.5 - 4.6 g/dL     Status: FinalTotal Bilirubin                               Date: 08/07/2021Value: 3.1*        Ref range: 0.2 - 0.7 mg/dL    Status: FinalAlkaline Phosphatase                          Date: 08/07/2021Value: 277*        Ref range: 40 - 130 U/L       Status: FinalALT                                           Date: 08/07/2021Value: 194*        Ref range: 0 - 33 U/L         Status: FinalAST                                           Date: 08/07/2021Value: 427*        Ref range: 0 - 35 U/L         Status: FinalGlobulin                                      Date: 08/07/2021Value: 3.2         Ref range: 2.3 - 3.5 g/dL     Status: [address] Date: 08/07/2021Value: 9.5         Ref range: 4.8 - 10.8 K/uL    Status: FinalRBC                                           Date: 08/07/2021Value: 4.99        Ref range: 4.20 - 5.40 M/uL   Status: FinalHemoglobin                                    Date: 08/07/2021Value: 14.3        Ref range: 12.0 - 16.0 g/dL   Status: FinalHematocrit                                    Date: 08/07/2021Value: 42.7        Ref range: 37.0 - 47.0 %      Status: FinalMCV                                           Date: 08/07/2021Value: 85.7        Ref range: 82.0 - 100.0 fL    [facility]                                           Date: 08/07/2021Value: 28.7        Ref range: 27.0 - 31.3 pg     Status: [address]                                          Date: 08/07/2021Value: 33.6        Ref range: 33.0 - 37.0 %      Status: FinalRDW                                           Date: 08/07/2021Value: 13.8        Ref range: 11.5 - 14.5 %      Status: FinalPlatelets                                     Date: 08/07/2021Value: 225         Ref range: 130 - 400 K/uL     Status: FinalPLATELET SLIDE REVIEW                         Date: 08/07/2021Value: Normal        Status: FinalNeutrophils %                                 Date: 08/07/2021Value: 93.4        Ref range: %                  Status: FinalLymphocytes %                                 Date: 08/07/2021Value: 3.3         Ref range: %                  Status: FinalMonocytes %                                   Date: 08/07/2021Value: 2.9         Ref range: %                  Status: FinalEosinophils %                                 Date: 08/07/2021Value: 0.3         Ref range: %                  Status: FinalBasophils %                                   Date: 08/07/2021Value: 0.1         Ref range: %                  Status: FinalNeutrophils Absolute                          Date: 08/07/2021Value: 8.9*        Ref range: 1.4 - 6.5 K/uL     Status: FinalLymphocytes Absolute                          Date: 08/07/2021Value: 0.3*        Ref range: 1.0 - 4.8 K/uL     Status: FinalMonocytes Absolute                            Date: 08/07/2021Value: 0.3         Ref range: 0.2 - 0.8 K/uL     Status: FinalEosinophils Absolute                          Date: 08/07/2021Value: 0.0         Ref range: 0.0 - 0.7 K/uL     Status: FinalBasophils Absolute                            Date: 08/07/2021Value: 0.0         Ref range: 0.0 - 0.2 K/uL     Status: FinalRBC Morphology                                Date: 08/07/2021Value: Normal        Status: FinalLipase                                        Date: 08/07/2021Value: Diluting    Ref range: 12 - 95 U/L        Status: FinalBlood Culture, Routine                        Date: 08/07/2021Value: No Growth to date. Any change in status will be *                     Status: PreliminaryCulture, Blood 2                              Date: 08/07/2021Value: No Growth to date.   Any change in status will be *                     Status: PreliminaryColor, UA                                     Date: 08/07/2021Value: DARK YELLOW*Ref range: Straw/Yellow       Status: FinalClarity, UA                                   Date: 08/07/2021Value: Clear       Ref range: Clear              Status: FinalGlucose, Ur                                   Date: 08/07/2021Value: >=1000*     Ref range: Negative mg/dL     Status: FinalBilirubin Urine                               Date: 08/07/2021Value: Negative    Ref range: Negative           Status: FinalKetones, Urine                                Date: 08/07/2021Value: Negative    Ref range: Negative mg/dL     Status: FinalSpecific Gravity, UA                          Date: 08/07/2021Value: 1.020       Ref range: 1.005 - 1.030      Status: FinalBlood, Urine                                  Date: 08/07/2021Value: Negative    Ref range: Negative           Status: FinalpH, UA Date: 08/07/2021Value: 6.0         Ref range: 5.0 - 9.0          Status: FinalProtein, UA                                   Date: 08/07/2021Value: Negative    Ref range: Negative mg/dL     Status: FinalUrobilinogen, Urine                           Date: 08/07/2021Value: 1.0         Ref range: <2.0 E.U./dL       Status: FinalNitrite, Urine                                Date: 08/07/2021Value: Negative    Ref range: Negative           Status: FinalLeukocyte Esterase, Urine                     Date: 08/07/2021Value: Negative    Ref range: Negative           Status: FinalUrine Reflex to Culture                       Date: 08/07/2021Value: Not Indicated                     Status: [id number], NAAT                              Date: 08/07/2021Value: Not Detected                   Ref range: Not Detected       Status: Final              Comment: Rapid NAAT:   Negative results should be treated as presumptive and,if inconsistent with clinical signs and symptoms or necessary forpatient management, should be tested with an alternative molecularassay. Negative results do not preclude SARS-CoV-2 infection andshould not be used as the sole basis for patient management decisions. This test has been authorized by the FDA under an Emergency UseAuthorization (EUA) for use by authorized laboratories. Fact sheet for Healthcare [employer] sheet for Patients: Patricia.dk: Isothermal Nucleic Acid AmplificationProcalcitonin                                 Date: 08/07/2021Value: 1.93*       Ref range: 0.00 - 0.15 ng/mL  Status: Final              Comment: Suspected Sepsis:Low likelihood of sepsis  <.50 ng/mLIncreased likelihood of sepsis 0.50-2.00 ng/mLAntibiotics encouragedHigh risk of sepsis/shock   >2.00 ng/mLAntibiotics strongly encouragedSuspected Lower Respiratory Tract Infections:Low likelihood of bacterial infection  <0.24 ng/mLIncreased likelihood of bacterial infection >0.24 ng/mLAntibiotics encouragedWith successful antibiotic therapy, PCT levels should decreaserapidly. (Half-life of 24 to 36 hours. )Procalcitonin values from samples collected within the first6 hours of systemic infection may still be low. Retesting may be indicated. Values from day 1 and day 4 can be entered into the Change inProcalcitonin Calculator to determine the patient'sMortality Risk http://www.mayers.info/. com)In healthy neonates, plasma Procalcitonin (PCT) concentrationsincrease gradually after birth, reaching peak values at about24 hours of age then decrease to normal values below 0.5                        ng/mLby 48-72 hours of age. Lactic Acid                                   Date: 08/07/2021Value: 3.4*        Ref range: 0.5 - 2.2 mmol/L   Status: FinalLactic Acid, Sepsis                           Date: 08/07/2021Value: 2.7*        Ref range: 0.5 - 1.9 mmol/L   Status: FinalLactic Acid, Sepsis                           Date: 08/07/2021Value: 3.0*        Ref range: 0.5 - 1.9 mmol/L   Status: FinalHep A IgM                                     Date: 08/07/2021Value: Non-reactive                     Status: FinalHep B Core Ab, IgM                            Date: 08/07/2021Value: Non-reactive                     Status: FinalHep B S Ag Interp                             Date: 08/07/2021Value: Non-reactive                     Status: FinalHep C Ab Interp                               Date: 08/07/2021Value: Non-reactive                     Status: FinalHepatitis Interpretation:                     Date: 08/07/2021Value: see below     Status: Final              Comment: The acute hepatitis panel is negative. There is no evidence ofacute hepatitis A, B, C. Lipase                                        Date: 08/07/2021Value: 433*        Ref range: 12 - 95 U/L        Status: FinalHemoglobin A1C                                Date: 08/07/2021Value: 7.4*        Ref range: 4.8 - 5.9 %        Status: FinalC. diff Toxin/Antigen                          Date: 08/07/2021Value:               Status: Final                 Value:Negative for Clostridium difficile antigen and toxinNormal Range: NegativeGI Bacterial Pathogens By PCR                 Date: 08/07/2021Value:               Status: Final                 Value:DNA of organisms below NOT DETECTEDThe following organisms have been tested using nucleic acidtesting technology. Campylobacter speciesSalmonella speciesShigella speciesVibrio speciesYersinia enterocoliticaShigatoxin 1 and 2 (STEC)NorovirusRotavirusNormal Range: Not DetectedPOC Glucose                                   Date: 08/07/2021Value: 243*        Ref range: 60 - 115 mg/dl     Status: FinalPerformed on                                  Date: 08/07/2021Value: ACCU-CHEK     Status: FinalPOC Glucose                                   Date: 08/07/2021Value: 220*        Ref range: 60 - 115 mg/dl     Status: FinalPerformed on                                  Date: 08/07/2021Value: ACCU-CHEK     Status: FinalSodium                                        Date: 08/08/2021Value: 138         Ref range: 135 - 144 mEq/L    Status: FinalPotassium reflex Magnesium                    Date: 08/08/2021Value: 2.9*        Ref range: 3.4 - 4.9 mEq/L    Status: FinalChloride                                      Date: 08/08/2021Value: 105         Ref range: 95 - 107 mEq/L     Status: FinalCO2                                           Date: 08/08/2021Value: 23          Ref range: 20 - 31 mEq/L      Status: FinalAnion Gap                                     Date: 08/08/2021Value: 10          Ref range: 9 - 15 mEq/L       Status: FinalGlucose                                       Date: 08/08/2021Value: 139*        Ref range: 70 - 99 mg/dL      Status: FinalBUN                                           Date: 08/08/2021Value: 8           Ref range: 8 - 23 mg/dL       Status: [place] [place] Date: 08/08/2021Value: 0.51        Ref range: 0.50 - 0.90 mg/dL  Status: Final              Comment: Specimen icterus has exceeded the interference as defined byRoche. Result may be affected. GFR Non-                      Date: 08/08/2021Value: >60.0       Ref range: >60                Status: Final              Comment: >60 mL/min/1.73m2 EGFR, calc. for ages 25 and older using theMDRD formula (not corrected for weight), is valid for stablerenal function. GFR                           Date: 08/08/2021Value: >60.0       Ref range: >60                Status: Final              Comment: >60 mL/min/1.73m2 EGFR, calc. for ages 25 and older using theMDRD formula (not corrected for weight), is valid for stablerenal function. Calcium                                       Date: 08/08/2021Value: 7.8*        Ref range: 8.5 - 9.9 mg/dL    Status: FinalTotal Protein                                 Date: 08/08/2021Value: 5.4*        Ref range: 6.3 - 8.0 g/dL     Status: FinalAlbumin                                       Date: 08/08/2021Value: 2.9*        Ref range: 3.5 - 4.6 g/dL     Status: FinalTotal Bilirubin                               Date: 08/08/2021Value: 3.6*        Ref range: 0.2 - 0.7 mg/dL    Status: FinalAlkaline Phosphatase                          Date: 08/08/2021Value: 155*        Ref range: 40 - 130 U/L       Status: FinalALT                                           Date: 08/08/2021Value: 117*        Ref range: 0 - 33 U/L         Status: FinalAST                                           Date: 08/08/2021Value: 86*         Ref range: 0 - 35 U/L         Status: FinalGlobulin                                      Date: 08/08/2021Value: 2.5         Ref range: 2.3 - 3.5 g/dL     Status: [facility]                                           Date: 08/08/2021Value: 7.6         Ref range: 4.8 - 10.8 K/uL    Status: FinalRB Date: 08/08/2021Value: 3.86*       Ref range: 4.20 - 5.40 M/uL   Status: FinalHemoglobin                                    Date: 08/08/2021Value: 11.1*       Ref range: 12.0 - 16.0 g/dL   Status: FinalHematocrit                                    Date: 08/08/2021Value: 33.2*       Ref range: 37.0 - 47.0 %      Status: FinalMCV                                           Date: 08/08/2021Value: 86.1        Ref range: 82.0 - 100.0 fL    Status: [address]                                           Date: 08/08/2021Value: 28.7        Ref range: 27.0 - 31.3 pg     Status: [address]                                          Date: 08/08/2021Value: 33.3        Ref range: 33.0 - 37.0 %      Status: FinalRDW                                           Date: 08/08/2021Value: 13.5        Ref range: 11.5 - 14.5 %      Status: FinalPlatelets                                     Date: 08/08/2021Value: 158         Ref range: 130 - 400 K/uL     Status: FinalPOC Glucose                                   Date: 08/07/2021Value: 129*        Ref range: 60 - 115 mg/dl     Status: FinalPerformed on                                  Date: 08/07/2021Value: ACCU-CHEK     Status: [facility]                                Date: 08/07/2021Value: 0.4*        Ref range: 0.6 - 1.2 mg/dL    Status: FinalGFR Non-                      Date: 08/07/2021Value: >60         Ref range: >60                Status: Final              Comment: >60 mL/min/1.73m2 EGFR, calc. for ages 25 and older using theMDRD formula (not corrected for weight), is valid for stablerenal function. GFR                           Date: 08/07/2021Value: >60         Ref range: >60                Status: Final              Comment: >60 mL/min/1.73m2 EGFR, calc. for ages 25 and older using theMDRD formula (not corrected for weight), is valid for stablerenal function. Sample Type                                   Date: 08/07/2021Value: RIP           Status: FinalPerformed on                                  Date: 08/07/2021Value: SEE BELOW     Status: Final              Comment: Performed on POCPOC Glucose                                   Date: 08/08/2021Value: 155*        Ref range: 60 - 115 mg/dl     Status: FinalPerformed on                                  Date: 08/08/2021Value: ACCU-CHEK     Status: FinalMagnesium                                     Date: 08/08/2021Value: 1.7         Ref range: 1.7 - 2.4 mg/dL    Status: FinalPOC Glucose                                   Date: 08/08/2021Value: 146*        Ref range: 60 - 115 mg/dl     Status: FinalPerformed on                                  Date: 08/08/2021Value: ACCU-CHEK     Status: [address] Glucose                                   Date: 08/08/2021Value: 103         Ref range: 60 - 115 mg/dl     Status: FinalPerformed on                                  Date: 08/08/2021Value: ACCU-CHEK     Status: FinalSodium                                        Date: 08/09/2021Value: 140         Ref range: 135 - 144 mEq/L    Status: FinalPotassium reflex Magnesium                    Date: 08/09/2021Value: 3.9         Ref range: 3.4 - 4.9 mEq/L    Status: FinalChloride                                      Date: 08/09/2021Value: 105         Ref range: 95 - 107 mEq/L     Status: FinalCO2                                           Date: 08/09/2021Value: 24          Ref range: 20 - 31 mEq/L      Status: FinalAnion Gap                                     Date: 08/09/2021Value: 11          Ref range: 9 - 15 mEq/L       Status: FinalGlucose                                       Date: 08/09/2021Value: 132*        Ref range: 70 - 99 mg/dL      Status: FinalBUN                                           Date: 08/09/2021Value: 5*          Ref range: 8 - 23 mg/dL       Status: FinalCREATININE                                    Date: 08/09/2021Value: 0.55        Ref range: 0.50 - 0.90 mg/dL  Status: [facility] 08/09/2021Value: 33.9*       Ref range: 37.0 - 47.0 %      Status: FinalMCV                                           Date: 08/09/2021Value: 85.9        Ref range: 82.0 - 100.0 fL    Status: [address]                                           Date: 08/09/2021Value: 28.9        Ref range: 27.0 - 31.3 pg     Status: [address]                                          Date: 08/09/2021Value: 33.7        Ref range: 33.0 - 37.0 %      Status: FinalRDW                                           Date: 08/09/2021Value: 13.5        Ref range: 11.5 - 14.5 %      Status: FinalPlatelets                                     Date: 08/09/2021Value: 149         Ref range: 130 - 400 K/uL     Status: FinalPOC Glucose                                   Date: 08/08/2021Value: 107         Ref range: 60 - 115 mg/dl     Status: FinalPerformed on                                  Date: 08/08/2021Value: ACCU-CHEK     Status: [address] Glucose                                   Date: 08/09/2021Value: 122*        Ref range: 60 - 115 mg/dl     Status: FinalPerformed on                                  Date: 08/09/2021Value: ACCU-CHEK     Status: [address] Glucose                                   Date: 08/09/2021Value: 165*        Ref range: 60 - 115 mg/dl     Status: FinalPerformed on                                  Date: 08/09/2021Value: ACCU-CHEK     Status: Final------------    Radiology last 7 days:  CT ABDOMEN PELVIS W IV CONTRAST Additional Contrast? NoneResult Date: 8/7/2021Minimal peripancreatic edema. Correlation for hepatitis recommended. Mild hepatomegaly. Colonic diverticulosis without diverticulitis. All CT scans at this facility use dose modulation, iterative reconstruction, and/or weight based dosing when appropriate to reduce radiation dose to as low as reasonably achievable. XR CHEST PORTABLEResult Date: 8/7/2021No radiographic evidence of acute intrathoracic process. US ABDOMEN LIMITED Specify organ?  LIVER, SPLEEN, PANCREASResult Date: 8/8/2021Hepatomegaly. Dilation of the common bile duct up to 16 mm may be due to the patient's postcholecystectomy state. No calculi identified within the common bile duct. Nonspecific mild prominence of the pancreatic duct, measuring approximately 4 mm in diameter. Pending Labs   Order Current Status  Culture, Blood 1 Preliminary result  Culture, Blood 2 Preliminary result      Discharge Medications    Current Discharge Medication List    Current Discharge Medication List    Current Discharge Medication ListCONTINUE these medications which have NOT CHANGEDnystatin (MYCOSTATIN) 101320 UNIT/GM powderApply 3 times daily. Qty: 45 g Refills: 5Associated Diagnoses:Yeast dermatitispantoprazole (PROTONIX) 40 MG tabletTake 1 tablet by mouth once dailyQty: 30 tablet Refills: 5Associated Diagnoses:Personal history of gastric ulcer; Gastroesophageal reflux disease without esophagitisamLODIPine (NORVASC) 10 MG tabletTake 1 tablet by mouth once dailyQty: 30 tablet Refills: 5Associated Diagnoses:Essential hypertensionvitamin D (ERGOCALCIFEROL) 1.25 MG (65992 UT) CAPS capsuleTake 1 capsule by mouth once a weekQty: 12 capsule Refills: 1glyBURIDE (DIABETA) 2.5 MG tabletTake 1 tablet by mouth daily (with breakfast)Qty: 30 tablet Refills: 5cholestyramine (QUESTRAN) 4 g packetTake 1 packet by mouth 2 times dailyQty: 180 packet Refills: 5Associated Diagnoses:Irritable bowel syndrome with diarrheadicyclomine (BENTYL) 10 MG capsuleTake 1 capsule by mouth 2 times daily (before meals)Qty: 120 capsule Refills: 3Associated Diagnoses:Epigastric painmetFORMIN (GLUCOPHAGE) 500 MG tabletTake 2 tablets by mouth 2 times daily (with meals)Qty: 360 tablet Refills: 3lisinopril-hydrochlorothiazide (PRINZIDE;ZESTORETIC) 20-12.5 MG per tabletTake 1 tablet by mouth dailyQty: 90 tablet Refills: 3Associated Diagnoses:Essential hypertensionRespiratory Therapy Supplies (FULL KIT NEBULIZER SET) MISC1 Units by Does not apply route 4 times dailyQty: 1 each Refills: 1Associated Diagnoses: Moderate persistent asthma with acute exacerbationalbuterol sulfate  (90 Base) MCG/ACT inhalerInhale 2 puffs into the lungs 4 times daily as needed for [facility]: 3 Inhaler Refills: 3Associated Diagnoses: Moderate persistent asthma with acute exacerbationalbuterol (PROVENTIL) (2.5 MG/3ML) 0.083% nebulizer solutionTake 3 mLs by nebulization every 6 hours as needed for [facility]: 300 vial Refills: 3Associated Diagnoses: Moderate persistent asthma with acute exacerbationCPAP Machine MISC    Current Discharge Medication ListSTOP taking these medicationsLoperamide HCl (IMODIUM PO)Comments:Reason for Stopping:    Time Spent on Discharge:E] minutes were spent in patient examination, evaluation, counseling as well as medication reconciliation, prescriptions for required medications, discharge plan, and follow up.     Electronically signed by Silvano Javier MD on 8/9/21 at 12:28 PM EDT   overtime on dc summary was 45 min

## 2021-08-09 NOTE — PROGRESS NOTES
Gastroenterology Progress Note    Pelon Mccrary is a 77 y.o. female patient. Hospitalization Day:2    Chief C/O: Abdominal pain    SUBJECTIVE: Seen and examined, persistent abdominal pain well controlled with current pain medications, low-grade temp overnight, continued on IV antibiotics, awaiting transfer. LFTs are trending down, total bilirubin trending down. ROS:  Gastrointestinal ROS: Persistent abdominal pain, no change in bowel habits, or black or bloody stools    Physical    VITALS:  BP (!) 154/77   Pulse 67   Temp 97.9 °F (36.6 °C) (Oral)   Resp 16   Ht 5' 2\" (1.575 m)   Wt 271 lb 3.2 oz (123 kg)   SpO2 97%   BMI 49.60 kg/m²   TEMPERATURE:  Current - Temp: 97.9 °F (36.6 °C); Max - Temp  Av °F (37.2 °C)  Min: 97.9 °F (36.6 °C)  Max: 99.9 °F (37.7 °C)    General:  Alert and oriented,  No apparent distress  Skin- without jaundice  Eyes: anicteric sclera  Cardiac: RRR, Nl s1s2, without murmurs  Lungs CTA Bilaterally, normal effort  Abdomen soft, ND, NT, no HSM, Bowel sounds normal  Ext: without edema  Neuro: no asterixis     Data    Data Review:    Recent Labs     21  0500 21  0706 21  0651   WBC 9.5 7.6 4.9   HGB 14.3 11.1* 11.4*   HCT 42.7 33.2* 33.9*   MCV 85.7 86.1 85.9    158 149     Recent Labs     21  0500 21  0535 21  0706 21  0651     --  138 140   K 3.5  --  2.9* 3.9   CL 99  --  105 105   CO2 23  --  23 24   BUN 10  --  8 5*   CREATININE 0.47* 0.4* 0.51 0.55     Recent Labs     21  0500 21  0706 21  0651   * 86* 43*   * 117* 87*   BILITOT 3.1* 3.6* 1.7*   ALKPHOS 277* 155* 168*     Recent Labs     21  0500 21  1110   LIPASE Diluting 433*     No results for input(s): PROTIME, INR in the last 72 hours.         ASSESSMENT:  24-year-old female admitted with sudden onset of right upper quadrant pain with radiation to her back associated with nausea, vomiting, and diarrhea, febrile on arrival T-max of 103.2, noted low-grade fever overnight, has been on IV Zosyn, LFTs are improving, awaiting transfer to tertiary center for further evaluation given her history of Jose-en-Y    PLAN :  -Continue supportive course care medical management per primary team  -Await transfer to [facility] for consideration of IR-PTC drain versus balloon enteroscopy-assisted ERCP versus surgery assisted ERCP, patient is agreeable and awaiting transfer    Thank you for allowing me to participate in the care of your patient. Please feel free to contact me with any concerns.     Jose Ortiz, APRN - CNP

## 2021-08-09 NOTE — PROGRESS NOTES
Progress Note  Date:2021       YXKU:[id number]  Patient Name:Brenda Elayne Fothergill     YOB: 1955     Age:66 y.o. Subjective    Subjective:  Symptoms:  She reports malaise and weakness. No shortness of breath, cough, chest pain, headache, chest pressure, anorexia, diarrhea or anxiety. Diet:  She reports  nausea. No vomiting. Review of Systems   Respiratory: Negative for cough and shortness of breath. Cardiovascular: Negative for chest pain. Gastrointestinal: Positive for nausea. Negative for anorexia, diarrhea and vomiting. Neurological: Positive for weakness. Objective         Vitals Last 24 Hours:  TEMPERATURE:  Temp  Av °F (37.2 °C)  Min: 97.9 °F (36.6 °C)  Max: 99.9 °F (37.7 °C)  RESPIRATIONS RANGE: Resp  Av.7  Min: 16  Max: 18  PULSE OXIMETRY RANGE: SpO2  Av %  Min: 97 %  Max: 100 %  PULSE RANGE: Pulse  Av.5  Min: 66  Max: 77  BLOOD PRESSURE RANGE: Systolic (04JEQ), YVS:442 , Min:141 , KVN:303   ; Diastolic (43DRV), XMA:40, Min:77, Max:91    I/O (24Hr): Intake/Output Summary (Last 24 hours) at 2021 1149  Last data filed at 2021 1104  Gross per 24 hour   Intake 1360 ml   Output 4050 ml   Net -2690 ml     Objective:  General Appearance:  Comfortable, well-appearing and in no acute distress. Vital signs: (most recent): Blood pressure (!) 154/77, pulse 66, temperature 97.9 °F (36.6 °C), temperature source Oral, resp. rate 16, height 5' 2\" (1.575 m), weight 271 lb 3.2 oz (123 kg), SpO2 97 %, not currently breastfeeding. HEENT: Normal HEENT exam.    Lungs:  Normal effort. Heart: Normal rate. Regular rhythm. S1 normal and S2 normal.    Abdomen: Abdomen is soft. Bowel sounds are normal.   There is no epigastric area or suprapubic area tenderness. Pulses: Distal pulses are intact. Neurological: Patient is alert and oriented to person, place and time. Pupils:  Pupils are equal, round, and reactive to light. Skin:  Warm. Labs/Imaging/Diagnostics    Labs:  CBC:  Recent Labs     08/07/21  0500 08/08/21  0706 08/09/21  0651   WBC 9.5 7.6 4.9   RBC 4.99 3.86* 3.95*   HGB 14.3 11.1* 11.4*   HCT 42.7 33.2* 33.9*   MCV 85.7 86.1 85.9   RDW 13.8 13.5 13.5    158 149     CHEMISTRIES:  Recent Labs     08/07/21  0500 08/07/21  0535 08/08/21  0706 08/09/21  0651     --  138 140   K 3.5  --  2.9* 3.9   CL 99  --  105 105   CO2 23  --  23 24   BUN 10  --  8 5*   CREATININE 0.47* 0.4* 0.51 0.55   GLUCOSE 285*  --  139* 132*   MG  --   --  1.7  --      PT/INR:No results for input(s): PROTIME, INR in the last 72 hours. APTT:No results for input(s): APTT in the last 72 hours. LIVER PROFILE:  Recent Labs     08/07/21  0500 08/08/21  0706 08/09/21  0651   * 86* 43*   * 117* 87*   BILITOT 3.1* 3.6* 1.7*   ALKPHOS 277* 155* 168*       Imaging Last 24 Hours:  US ABDOMEN LIMITED Specify organ? LIVER, SPLEEN, PANCREAS    Result Date: 8/8/2021  EXAM: US ABDOMEN LIMITED HISTORY: Abdominal pain TECHNIQUE: Ultrasound evaluation was performed of the upper abdomen. COMPARISON: CT August 7, 2021 FINDINGS: The liver is enlarged measuring approximately 20.4 x 12.1 x 13 x 1 cm. Normal echogenicity and contour of the liver. No liver lesion identified. No intrahepatic biliary dilatation. The gallbladder is surgically absent. Dilation of the common bile duct up  to 16 mm may be due to to the patient's postcholecystectomy state. The pancreatic duct is noted be mildly dilated measuring 4 mm. Normal appearance of the kidneys with the right kidney measuring 10.1 x 4.0 x 5.5 cm and the left kidney measuring 11.4 x 3.7 x 4.4 cm. Normal appearance of the spleen measuring 13.3 x 6.0 x 6.1 cm. Hepatomegaly. Dilation of the common bile duct up to 16 mm may be due to the patient's postcholecystectomy state. No calculi identified within the common bile duct.  Nonspecific mild prominence of the pancreatic duct, measuring approximately 4 mm in diameter.      Assessment//Plan           Hospital Problems         Last Modified POA    Biliary sepsis 8/7/2021 Yes    Obstructive jaundice 8/7/2021 Yes        Assessment & Plan  8/8: pt is doing better, c/w IV abx, start clear and advance as tolerated, awaiting bed at [facility]  Electronically signed by Emilie Layne MD on 8/9/21 at 11:49 AM EDT

## 2021-08-09 NOTE — PROGRESS NOTES
Infectious Disease     Patient Name: Molly Olivo  Date: 2021  YOB: 1955  Medical Record Number: [id number]                Ascending cholangitis  Sepsis with obstructive jaundice      Fevers decreasing from a high of 103.4  48 hours ago  No chills sweats no nausea vomiting some abdominal discomfort      Review of Systems   Constitutional: Negative for chills, diaphoresis, fatigue and fever. Respiratory: Negative. Cardiovascular: Negative. Gastrointestinal: Positive for abdominal pain and nausea. Negative for diarrhea.        Review of Systems: All 14 review of systems negative other than as stated above    Social History     Tobacco Use    Smoking status: Never Smoker    Smokeless tobacco: Never Used   Vaping Use    Vaping Use: Never used   Substance Use Topics    Alcohol use: No    Drug use: No         Past Medical History:   Diagnosis Date    Allergic rhinitis     Asthma     Breast cancer ([facility]     invasive ductal carcinoma    CAD (coronary artery disease)     Cancer ([facility] 2014    Invasive ductal left breast    Colon polyps     Diabetes ([facility]     Fibromyalgia     GERD (gastroesophageal reflux disease)     HA (headache)     Herpes simplex     Nose    History of therapeutic radiation     HTN (hypertension)     Hx antineoplastic chemo     Obesity     Senile osteoporosis     Sleep apnea            Past Surgical History:   Procedure Laterality Date    BARIATRIC SURGERY  2004    BREAST BIOPSY Left 14    BREAST BIOPSY Left 12/10/14    BREAST LUMPECTOMY Left     with left SNB  invasive ductal carcinoma    BREAST LUMPECTOMY Left     BREAST LUMPECTOMY Left      SECTION      x4    CHOLECYSTECTOMY  2007    GASTRIC BYPASS SURGERY      HYSTERECTOMY      KNEE ARTHROPLASTY Right     knee reconstruction    KNEE SURGERY      Right knee    LYMPH NODE BIOPSY Left 14   Jacquelin Mendoza Mom    TONSILLECTOMY  2002    [facility]   Globulin 2.9  2.3 - 3.5 g/dL Final 08/09/2021  6:51 AM [address] [facility]     Clostridium Difficile Toxin/Antigen [[id number]] Collected: 08/07/21 1855   Order Status: Completed Specimen: Stool Updated: 08/08/21 1151    C.diff Toxin/Antigen --    Negative for Clostridium difficile antigen and toxin   Normal Range: Negative    Narrative:     ORDER#: [id number]                          ORDERED BY: Ayesha Bloom   SOURCE: Stool Stool                        COLLECTED:  08/07/21 18:55   ANTIBIOTICS AT [facility] :                      RECEIVED :  08/07/21 18:55   Gastrointestinal Panel by DNA [[id number]] Collected: 08/07/21 1854   Order Status: Completed Specimen: Stool Updated: 08/08/21 1021    GI Bacterial Pathogens By PCR --    DNA of organisms below NOT DETECTED   The following organisms have been tested using nucleic acid   testing technology.    Campylobacter species   Salmonella species   Shigella species   Vibrio species   Yersinia enterocolitica   Shigatoxin 1 and 2 (STEC)   Norovirus   Rotavirus   Normal Range: Not Detected              ASSESSMENT:  Patient Active Problem List   Diagnosis    Asthma    HA (headache)    BMI 45.0-49.9, adult ([facility]    CAD (coronary artery disease)    BUNNY (obstructive sleep apnea)    Hypovitaminosis D    Arthritis    Essential hypertension    GERD (gastroesophageal reflux disease)    Diabetes (HCC)    Malignant neoplasm of lower-inner quadrant of left breast in female, estrogen receptor negative (HCC)    Biliary sepsis    Abnormal LFTs    Dilated bile duct    Epigastric pain    Fever    Senile osteoporosis    Obstructive jaundice         PLAN:    Ascending cholangitis  Sepsis with obstructive jaundice    Blood cultures remain negative  Continue Zosyn

## 2021-08-10 LAB
ALBUMIN SERPL-MCNC: 3 G/DL (ref 3.5–4.6)
ALP BLD-CCNC: 148 U/L (ref 40–130)
ALT SERPL-CCNC: 58 U/L (ref 0–33)
ANION GAP SERPL CALCULATED.3IONS-SCNC: 12 MEQ/L (ref 9–15)
AST SERPL-CCNC: 41 U/L (ref 0–35)
BILIRUB SERPL-MCNC: 1.2 MG/DL (ref 0.2–0.7)
BUN BLDV-MCNC: 3 MG/DL (ref 8–23)
CALCIUM SERPL-MCNC: 8.1 MG/DL (ref 8.5–9.9)
CHLORIDE BLD-SCNC: 105 MEQ/L (ref 95–107)
CO2: 22 MEQ/L (ref 20–31)
CREAT SERPL-MCNC: 0.51 MG/DL (ref 0.5–0.9)
GFR AFRICAN AMERICAN: >60
GFR NON-AFRICAN AMERICAN: >60
GLOBULIN: 3.2 G/DL (ref 2.3–3.5)
GLUCOSE BLD-MCNC: 108 MG/DL (ref 60–115)
GLUCOSE BLD-MCNC: 118 MG/DL (ref 60–115)
GLUCOSE BLD-MCNC: 132 MG/DL (ref 70–99)
GLUCOSE BLD-MCNC: 164 MG/DL (ref 60–115)
HCT VFR BLD CALC: 34 % (ref 37–47)
HEMOGLOBIN: 11.2 G/DL (ref 12–16)
MCH RBC QN AUTO: 28.2 PG (ref 27–31.3)
MCHC RBC AUTO-ENTMCNC: 33 % (ref 33–37)
MCV RBC AUTO: 85.4 FL (ref 82–100)
PDW BLD-RTO: 13.4 % (ref 11.5–14.5)
PERFORMED ON: ABNORMAL
PERFORMED ON: ABNORMAL
PERFORMED ON: NORMAL
PLATELET # BLD: 215 K/UL (ref 130–400)
POTASSIUM REFLEX MAGNESIUM: 4.1 MEQ/L (ref 3.4–4.9)
RBC # BLD: 3.98 M/UL (ref 4.2–5.4)
SODIUM BLD-SCNC: 139 MEQ/L (ref 135–144)
TOTAL PROTEIN: 6.2 G/DL (ref 6.3–8)
WBC # BLD: 6.7 K/UL (ref 4.8–10.8)

## 2021-08-10 PROCEDURE — 6370000000 HC RX 637 (ALT 250 FOR IP): Performed by: NURSE PRACTITIONER

## 2021-08-10 PROCEDURE — C9113 INJ PANTOPRAZOLE SODIUM, VIA: HCPCS | Performed by: NURSE PRACTITIONER

## 2021-08-10 PROCEDURE — 2580000003 HC RX 258: Performed by: INTERNAL MEDICINE

## 2021-08-10 PROCEDURE — 99232 SBSQ HOSP IP/OBS MODERATE 35: CPT | Performed by: INTERNAL MEDICINE

## 2021-08-10 PROCEDURE — 6370000000 HC RX 637 (ALT 250 FOR IP): Performed by: INTERNAL MEDICINE

## 2021-08-10 PROCEDURE — 85027 COMPLETE CBC AUTOMATED: CPT

## 2021-08-10 PROCEDURE — 2580000003 HC RX 258: Performed by: NURSE PRACTITIONER

## 2021-08-10 PROCEDURE — 36415 COLL VENOUS BLD VENIPUNCTURE: CPT

## 2021-08-10 PROCEDURE — 2060000000 HC ICU INTERMEDIATE R&B

## 2021-08-10 PROCEDURE — 80053 COMPREHEN METABOLIC PANEL: CPT

## 2021-08-10 PROCEDURE — 6360000002 HC RX W HCPCS: Performed by: NURSE PRACTITIONER

## 2021-08-10 PROCEDURE — 6360000002 HC RX W HCPCS: Performed by: INTERNAL MEDICINE

## 2021-08-10 RX ORDER — DOCOSANOL 100 MG/G
CREAM TOPICAL
Status: DISCONTINUED | OUTPATIENT
Start: 2021-08-10 | End: 2021-08-11 | Stop reason: HOSPADM

## 2021-08-10 RX ADMIN — PIPERACILLIN SODIUM AND TAZOBACTAM SODIUM 3375 MG: 3; .375 INJECTION, POWDER, LYOPHILIZED, FOR SOLUTION INTRAVENOUS at 10:56

## 2021-08-10 RX ADMIN — SODIUM CHLORIDE, PRESERVATIVE FREE 10 ML: 5 INJECTION INTRAVENOUS at 11:01

## 2021-08-10 RX ADMIN — PIPERACILLIN SODIUM AND TAZOBACTAM SODIUM 3375 MG: 3; .375 INJECTION, POWDER, LYOPHILIZED, FOR SOLUTION INTRAVENOUS at 17:23

## 2021-08-10 RX ADMIN — ENOXAPARIN SODIUM 40 MG: 40 INJECTION SUBCUTANEOUS at 10:55

## 2021-08-10 RX ADMIN — DOCOSANOL: 100 CREAM TOPICAL at 22:37

## 2021-08-10 RX ADMIN — LACTOBACILLUS TAB 1 TABLET: TAB at 20:25

## 2021-08-10 RX ADMIN — DOCOSANOL: 100 CREAM TOPICAL at 15:15

## 2021-08-10 RX ADMIN — INSULIN GLARGINE 31 UNITS: 100 INJECTION, SOLUTION SUBCUTANEOUS at 20:25

## 2021-08-10 RX ADMIN — PANTOPRAZOLE SODIUM 40 MG: 40 INJECTION, POWDER, FOR SOLUTION INTRAVENOUS at 10:56

## 2021-08-10 RX ADMIN — SODIUM CHLORIDE, PRESERVATIVE FREE 10 ML: 5 INJECTION INTRAVENOUS at 11:00

## 2021-08-10 RX ADMIN — LACTOBACILLUS TAB 1 TABLET: TAB at 10:56

## 2021-08-10 RX ADMIN — SODIUM CHLORIDE, PRESERVATIVE FREE 10 ML: 5 INJECTION INTRAVENOUS at 10:56

## 2021-08-10 RX ADMIN — DOCOSANOL: 100 CREAM TOPICAL at 20:25

## 2021-08-10 ASSESSMENT — ENCOUNTER SYMPTOMS
NAUSEA: 1
VOMITING: 0
RESPIRATORY NEGATIVE: 1
DIARRHEA: 0
COUGH: 0
SHORTNESS OF BREATH: 0
ABDOMINAL PAIN: 0

## 2021-08-10 NOTE — PROGRESS NOTES
COLLECTED:  08/07/21 18:55   ANTIBIOTICS AT [facility] :                      RECEIVED :  08/07/21 18:55   Gastrointestinal Panel by DNA [[id number]] Collected: 08/07/21 1854   Order Status: Completed Specimen: Stool Updated: 08/08/21 1021    GI Bacterial Pathogens By PCR --    DNA of organisms below NOT DETECTED   The following organisms have been tested using nucleic acid   testing technology. Campylobacter species   Salmonella species   Shigella species   Vibrio species   Yersinia enterocolitica   Shigatoxin 1 and 2 (STEC)   Norovirus   Rotavirus   Normal Range: Not Detected          Culture, Blood 1 [[id number]] Collected: 08/07/21 0556   Order Status: Completed Specimen: Blood Updated: 08/08/21 0815    Blood Culture, Routine No Growth to date.  Any change in status will be called. Narrative:     ORDER#: [id number]                          ORDERED BY: Alana Hoff   SOURCE: Blood                              COLLECTED:  08/07/21 05:56   ANTIBIOTICS AT [facility] :                      RECEIVED :  08/07/21 05:56   Culture, Blood 2 [[id number]] Collected: 08/07/21 0510   Order Status: Completed Specimen: Blood Updated: 08/08/21 0815    Culture, Blood 2 No Growth to date.  Any change in status will be called. Narrative:     ORDER#: [id number]                          ORDERED BY: Alana Hoff   SOURCE: Blood                              COLLECTED:  08/07/21 05:10   ANTIB[facility]OTICS AT [facility] :                      RECEIVED :  08/07/21 05:56             PLAN:    Ascending cholangitis  Sepsis with obstructive jaundice    Blood cultures remain negative  Continue Zosyn

## 2021-08-10 NOTE — PROGRESS NOTES
Progress Note  Date:8/10/2021       YNTB:V777/O869-12  Patient Name:Maryellen Rivera     YOB: 1955     Age:66 y.o. Subjective    Subjective:  Symptoms:  She reports malaise and weakness. No shortness of breath, cough, chest pain, headache, chest pressure, anorexia, diarrhea or anxiety. Diet:  She reports  nausea. No vomiting. Review of Systems   Respiratory: Negative for cough and shortness of breath. Cardiovascular: Negative for chest pain. Gastrointestinal: Positive for nausea. Negative for anorexia, diarrhea and vomiting. Neurological: Positive for weakness. Objective         Vitals Last 24 Hours:  TEMPERATURE:  Temp  Av.5 °F (36.9 °C)  Min: 97.7 °F (36.5 °C)  Max: 99.5 °F (37.5 °C)  RESPIRATIONS RANGE: Resp  Av  Min: 16  Max: 18  PULSE OXIMETRY RANGE: SpO2  Av.7 %  Min: 96 %  Max: 99 %  PULSE RANGE: Pulse  Av.3  Min: 74  Max: 78  BLOOD PRESSURE RANGE: Systolic (76TOK), YRS:285 , Min:145 , LZB:516   ; Diastolic (63RHN), OHL:76, Min:86, Max:102    I/O (24Hr): Intake/Output Summary (Last 24 hours) at 8/10/2021 1223  Last data filed at 8/10/2021 0556  Gross per 24 hour   Intake 1200 ml   Output 1500 ml   Net -300 ml     Objective:  General Appearance:  Comfortable, well-appearing and in no acute distress. Vital signs: (most recent): Blood pressure (!) 145/86, pulse 74, temperature 97.7 °F (36.5 °C), temperature source Axillary, resp. rate 18, height 5' 2\" (1.575 m), weight 273 lb (123.8 kg), SpO2 99 %, not currently breastfeeding. HEENT: Normal HEENT exam.    Lungs:  Normal effort. Heart: Normal rate. Regular rhythm. S1 normal and S2 normal.    Abdomen: Abdomen is soft. Bowel sounds are normal.   There is no epigastric area or suprapubic area tenderness. Pulses: Distal pulses are intact. Neurological: Patient is alert and oriented to person, place and time. Pupils:  Pupils are equal, round, and reactive to light. Skin:  Warm. Labs/Imaging/Diagnostics    Labs:  CBC:  Recent Labs     08/08/21  0706 08/09/21  0651 08/10/21  0620   WBC 7.6 4.9 6.7   RBC 3.86* 3.95* 3.98*   HGB 11.1* 11.4* 11.2*   HCT 33.2* 33.9* 34.0*   MCV 86.1 85.9 85.4   RDW 13.5 13.5 13.4    149 215     CHEMISTRIES:  Recent Labs     08/08/21  0706 08/09/21  0651 08/10/21  0620    140 139   K 2.9* 3.9 4.1    105 105   CO2 23 24 22   BUN 8 5* 3*   CREATININE 0.51 0.55 0.51   GLUCOSE 139* 132* 132*   MG 1.7  --   --      PT/INR:No results for input(s): PROTIME, INR in the last 72 hours. APTT:No results for input(s): APTT in the last 72 hours. LIVER PROFILE:  Recent Labs     08/08/21  0706 08/09/21  0651 08/10/21  0620   AST 86* 43* 41*   * 87* 58*   BILITOT 3.6* 1.7* 1.2*   ALKPHOS 155* 168* 148*       Imaging Last 24 Hours:  US ABDOMEN LIMITED Specify organ? LIVER, SPLEEN, PANCREAS    Result Date: 8/8/2021  EXAM: US ABDOMEN LIMITED HISTORY: Abdominal pain TECHNIQUE: Ultrasound evaluation was performed of the upper abdomen. COMPARISON: CT August 7, 2021 FINDINGS: The liver is enlarged measuring approximately 20.4 x 12.1 x 13 x 1 cm. Normal echogenicity and contour of the liver. No liver lesion identified. No intrahepatic biliary dilatation. The gallbladder is surgically absent. Dilation of the common bile duct up  to 16 mm may be due to to the patient's postcholecystectomy state. The pancreatic duct is noted be mildly dilated measuring 4 mm. Normal appearance of the kidneys with the right kidney measuring 10.1 x 4.0 x 5.5 cm and the left kidney measuring 11.4 x 3.7 x 4.4 cm. Normal appearance of the spleen measuring 13.3 x 6.0 x 6.1 cm. Hepatomegaly. Dilation of the common bile duct up to 16 mm may be due to the patient's postcholecystectomy state. No calculi identified within the common bile duct. Nonspecific mild prominence of the pancreatic duct, measuring approximately 4 mm in diameter.      Assessment//Plan           Hospital Problems

## 2021-08-11 VITALS
BODY MASS INDEX: 48.47 KG/M2 | TEMPERATURE: 98 F | SYSTOLIC BLOOD PRESSURE: 160 MMHG | RESPIRATION RATE: 16 BRPM | DIASTOLIC BLOOD PRESSURE: 84 MMHG | OXYGEN SATURATION: 98 % | WEIGHT: 263.4 LBS | HEART RATE: 69 BPM | HEIGHT: 62 IN

## 2021-08-11 LAB
ALBUMIN SERPL-MCNC: 3.3 G/DL (ref 3.5–4.6)
ALP BLD-CCNC: 158 U/L (ref 40–130)
ALT SERPL-CCNC: 48 U/L (ref 0–33)
ANION GAP SERPL CALCULATED.3IONS-SCNC: 11 MEQ/L (ref 9–15)
AST SERPL-CCNC: 19 U/L (ref 0–35)
BILIRUB SERPL-MCNC: 1 MG/DL (ref 0.2–0.7)
BUN BLDV-MCNC: 4 MG/DL (ref 8–23)
CALCIUM SERPL-MCNC: 8.3 MG/DL (ref 8.5–9.9)
CHLORIDE BLD-SCNC: 105 MEQ/L (ref 95–107)
CO2: 25 MEQ/L (ref 20–31)
CREAT SERPL-MCNC: 0.64 MG/DL (ref 0.5–0.9)
GFR AFRICAN AMERICAN: >60
GFR NON-AFRICAN AMERICAN: >60
GLOBULIN: 3.1 G/DL (ref 2.3–3.5)
GLUCOSE BLD-MCNC: 101 MG/DL (ref 60–115)
GLUCOSE BLD-MCNC: 116 MG/DL (ref 70–99)
GLUCOSE BLD-MCNC: 133 MG/DL (ref 60–115)
HCT VFR BLD CALC: 36.9 % (ref 37–47)
HEMOGLOBIN: 12 G/DL (ref 12–16)
MAGNESIUM: 2 MG/DL (ref 1.7–2.4)
MCH RBC QN AUTO: 27.8 PG (ref 27–31.3)
MCHC RBC AUTO-ENTMCNC: 32.6 % (ref 33–37)
MCV RBC AUTO: 85.4 FL (ref 82–100)
PDW BLD-RTO: 13.2 % (ref 11.5–14.5)
PERFORMED ON: ABNORMAL
PERFORMED ON: NORMAL
PLATELET # BLD: 211 K/UL (ref 130–400)
POTASSIUM REFLEX MAGNESIUM: 3.2 MEQ/L (ref 3.4–4.9)
RBC # BLD: 4.32 M/UL (ref 4.2–5.4)
SODIUM BLD-SCNC: 141 MEQ/L (ref 135–144)
TOTAL PROTEIN: 6.4 G/DL (ref 6.3–8)
WBC # BLD: 3.7 K/UL (ref 4.8–10.8)

## 2021-08-11 PROCEDURE — 99232 SBSQ HOSP IP/OBS MODERATE 35: CPT | Performed by: NURSE PRACTITIONER

## 2021-08-11 PROCEDURE — 6360000002 HC RX W HCPCS: Performed by: INTERNAL MEDICINE

## 2021-08-11 PROCEDURE — 6360000002 HC RX W HCPCS: Performed by: NURSE PRACTITIONER

## 2021-08-11 PROCEDURE — 6370000000 HC RX 637 (ALT 250 FOR IP): Performed by: INTERNAL MEDICINE

## 2021-08-11 PROCEDURE — 80053 COMPREHEN METABOLIC PANEL: CPT

## 2021-08-11 PROCEDURE — 85027 COMPLETE CBC AUTOMATED: CPT

## 2021-08-11 PROCEDURE — 2580000003 HC RX 258: Performed by: INTERNAL MEDICINE

## 2021-08-11 PROCEDURE — 83735 ASSAY OF MAGNESIUM: CPT

## 2021-08-11 PROCEDURE — 2580000003 HC RX 258: Performed by: NURSE PRACTITIONER

## 2021-08-11 PROCEDURE — 36415 COLL VENOUS BLD VENIPUNCTURE: CPT

## 2021-08-11 PROCEDURE — C9113 INJ PANTOPRAZOLE SODIUM, VIA: HCPCS | Performed by: NURSE PRACTITIONER

## 2021-08-11 RX ORDER — POTASSIUM CHLORIDE 20 MEQ/1
40 TABLET, EXTENDED RELEASE ORAL ONCE
Status: COMPLETED | OUTPATIENT
Start: 2021-08-11 | End: 2021-08-11

## 2021-08-11 RX ORDER — AMLODIPINE BESYLATE 5 MG/1
5 TABLET ORAL DAILY
Status: DISCONTINUED | OUTPATIENT
Start: 2021-08-11 | End: 2021-08-11 | Stop reason: HOSPADM

## 2021-08-11 RX ADMIN — SODIUM CHLORIDE, PRESERVATIVE FREE 10 ML: 5 INJECTION INTRAVENOUS at 09:39

## 2021-08-11 RX ADMIN — PANTOPRAZOLE SODIUM 40 MG: 40 INJECTION, POWDER, FOR SOLUTION INTRAVENOUS at 09:38

## 2021-08-11 RX ADMIN — AMLODIPINE BESYLATE 5 MG: 5 TABLET ORAL at 09:38

## 2021-08-11 RX ADMIN — ENOXAPARIN SODIUM 40 MG: 40 INJECTION SUBCUTANEOUS at 09:38

## 2021-08-11 RX ADMIN — DOCOSANOL: 100 CREAM TOPICAL at 09:41

## 2021-08-11 RX ADMIN — LACTOBACILLUS TAB 1 TABLET: TAB at 09:38

## 2021-08-11 RX ADMIN — POTASSIUM CHLORIDE 40 MEQ: 1500 TABLET, EXTENDED RELEASE ORAL at 09:38

## 2021-08-11 RX ADMIN — PIPERACILLIN SODIUM AND TAZOBACTAM SODIUM 3375 MG: 3; .375 INJECTION, POWDER, LYOPHILIZED, FOR SOLUTION INTRAVENOUS at 00:08

## 2021-08-11 RX ADMIN — ACETAMINOPHEN 650 MG: 325 TABLET ORAL at 09:49

## 2021-08-11 RX ADMIN — PIPERACILLIN SODIUM AND TAZOBACTAM SODIUM 3375 MG: 3; .375 INJECTION, POWDER, LYOPHILIZED, FOR SOLUTION INTRAVENOUS at 09:40

## 2021-08-11 RX ADMIN — SODIUM CHLORIDE: 9 INJECTION, SOLUTION INTRAVENOUS at 00:08

## 2021-08-11 ASSESSMENT — ENCOUNTER SYMPTOMS
VOMITING: 0
SHORTNESS OF BREATH: 0
NAUSEA: 1
COUGH: 0
DIARRHEA: 0

## 2021-08-11 ASSESSMENT — PAIN SCALES - GENERAL: PAINLEVEL_OUTOF10: 10

## 2021-08-11 NOTE — PROGRESS NOTES
Progress Note  Date:2021       [facility]:[id number]  Patient Name:Maryellen Pollack     YOB: 1955     Age:66 y.o. Subjective    Subjective:  Symptoms:  She reports malaise and weakness. No shortness of breath, cough, chest pain, headache, chest pressure, anorexia, diarrhea or anxiety. Diet:  She reports  nausea. No vomiting. Review of Systems   Respiratory: Negative for cough and shortness of breath. Cardiovascular: Negative for chest pain. Gastrointestinal: Positive for nausea. Negative for anorexia, diarrhea and vomiting. Neurological: Positive for weakness. Objective         Vitals Last 24 Hours:  TEMPERATURE:  Temp  Av.9 °F (36.6 °C)  Min: 97.5 °F (36.4 °C)  Max: 98.1 °F (36.7 °C)  RESPIRATIONS RANGE: Resp  Av  Min: 16  Max: 16  PULSE OXIMETRY RANGE: SpO2  Av.5 %  Min: 97 %  Max: 98 %  PULSE RANGE: Pulse  Av.6  Min: 66  Max: 76  BLOOD PRESSURE RANGE: Systolic (69IFV), TAX:023 , Min:130 , FYR:335   ; Diastolic (89XCG), SLD:12, Min:70, Max:84    I/O (24Hr): Intake/Output Summary (Last 24 hours) at 2021 1428  Last data filed at 2021 2619  Gross per 24 hour   Intake 1070 ml   Output --   Net 1070 ml     Objective:  General Appearance:  Comfortable, well-appearing and in no acute distress. Vital signs: (most recent): Blood pressure (!) 160/84, pulse 69, temperature 98 °F (36.7 °C), resp. rate 16, height 5' 2\" (1.575 m), weight 263 lb 6.4 oz (119.5 kg), SpO2 98 %, not currently breastfeeding. HEENT: Normal HEENT exam.    Lungs:  Normal effort. Heart: Normal rate. Regular rhythm. S1 normal and S2 normal.    Abdomen: Abdomen is soft. Bowel sounds are normal.   There is no epigastric area or suprapubic area tenderness. Pulses: Distal pulses are intact. Neurological: Patient is alert and oriented to person, place and time. Pupils:  Pupils are equal, round, and reactive to light. Skin:  Warm.       Labs/Imaging/Diagnostics Labs:  CBC:  Recent Labs     08/09/21  0651 08/10/21  0620 08/11/21  0706   WBC 4.9 6.7 3.7*   RBC 3.95* 3.98* 4.32   HGB 11.4* 11.2* 12.0   HCT 33.9* 34.0* 36.9*   MCV 85.9 85.4 85.4   RDW 13.5 13.4 13.2    215 211     CHEMISTRIES:  Recent Labs     08/09/21  0651 08/10/21  0620 08/11/21  0706    139 141   K 3.9 4.1 3.2*    105 105   CO2 24 22 25   BUN 5* 3* 4*   CREATININE 0.55 0.51 0.64   GLUCOSE 132* 132* 116*   MG  --   --  2.0     PT/INR:No results for input(s): PROTIME, INR in the last 72 hours. APTT:No results for input(s): APTT in the last 72 hours. LIVER PROFILE:  Recent Labs     08/09/21  0651 08/10/21  0620 08/11/21  0706   AST 43* 41* 19   ALT 87* 58* 48*   BILITOT 1.7* 1.2* 1.0*   ALKPHOS 168* 148* 158*       Imaging Last 24 Hours:  US ABDOMEN LIMITED Specify organ? LIVER, SPLEEN, PANCREAS    Result Date: 8/8/2021  EXAM: US ABDOMEN LIMITED HISTORY: Abdominal pain TECHNIQUE: Ultrasound evaluation was performed of the upper abdomen. COMPARISON: CT August 7, 2021 FINDINGS: The liver is enlarged measuring approximately 20.4 x 12.1 x 13 x 1 cm. Normal echogenicity and contour of the liver. No liver lesion identified. No intrahepatic biliary dilatation. The gallbladder is surgically absent. Dilation of the common bile duct up  to 16 mm may be due to to the patient's postcholecystectomy state. The pancreatic duct is noted be mildly dilated measuring 4 mm. Normal appearance of the kidneys with the right kidney measuring 10.1 x 4.0 x 5.5 cm and the left kidney measuring 11.4 x 3.7 x 4.4 cm. Normal appearance of the spleen measuring 13.3 x 6.0 x 6.1 cm. Hepatomegaly. Dilation of the common bile duct up to 16 mm may be due to the patient's postcholecystectomy state. No calculi identified within the common bile duct. Nonspecific mild prominence of the pancreatic duct, measuring approximately 4 mm in diameter.      Assessment//Plan           Hospital Problems         Last Modified POA Biliary sepsis 8/7/2021 Yes    Obstructive jaundice 8/7/2021 Yes        Assessment & Plan    8/8: pt is doing better, c/w IV abx, start clear and advance as tolerated, awaiting bed at [facility]  8/9 discharged  8/10: awaiting for be , advance diet as tolerated, dc/ IVF, c/w IV abx, pain is controlled, no nausea at this time. 8/11: Patient was seen and evaluated. Awaiting for bed placement. No overnight events, no new complaints.   Electronically signed by Ger Gasca MD on 8/9/21 at 11:49 AM EDT

## 2021-08-11 NOTE — PROGRESS NOTES
Gastroenterology Progress Note    Nahomy Welch is a 77 y.o. female patient. Hospitalization Day:4    Chief C/O: abdominal pain    SUBJECTIVE: Seen and examined, tolerating diet, has intermittent abdominal pain well controlled with current pain medications, no fever overnight, on IV antibiotics, LFTs are trending down, total bilirubin is trending down, awaiting transfer to     ROS:  Gastrointestinal ROS: abdominal pain well controlled with current medications, change in bowel habits, or black or bloody stools    Physical    VITALS:  BP (!) 160/84   Pulse 69   Temp 98 °F (36.7 °C)   Resp 16   Ht 5' 2\" (1.575 m)   Wt 263 lb 6.4 oz (119.5 kg)   SpO2 98%   BMI 48.18 kg/m²   TEMPERATURE:  Current - Temp: 98 °F (36.7 °C); Max - Temp  Av.9 °F (36.6 °C)  Min: 97.5 °F (36.4 °C)  Max: 98.1 °F (36.7 °C)    General:  Alert and oriented,  No apparent distress  Skin- without jaundice  Eyes: anicteric sclera  Cardiac: RRR, Nl s1s2, without murmurs  Lungs CTA Bilaterally, normal effort  Abdomen soft, ND, NT, no HSM, Bowel sounds normal  Ext: without edema  Neuro: no asterixis     Data    Data Review:    Recent Labs     21  0651 08/10/21  0620 21  0706   WBC 4.9 6.7 3.7*   HGB 11.4* 11.2* 12.0   HCT 33.9* 34.0* 36.9*   MCV 85.9 85.4 85.4    215 211     Recent Labs     21  0651 08/10/21  0620 21  0706    139 141   K 3.9 4.1 3.2*    105 105   CO2 24 22 25   BUN 5* 3* 4*   CREATININE 0.55 0.51 0.64     Recent Labs     21  0651 08/10/21  0620 21  0706   AST 43* 41* 19   ALT 87* 58* 48*   BILITOT 1.7* 1.2* 1.0*   ALKPHOS 168* 148* 158*     No results for input(s): LIPASE, AMYLASE in the last 72 hours. No results for input(s): PROTIME, INR in the last 72 hours.       ASSESSMENT:  28-year-old female admitted with clinical picture of cholangitis- sudden onset of right upper quadrant pain with radiation to her back associated with nausea, vomiting, diarrhea, febrile on arrival, T-max of 103.2, no fever overnight, has been on IV Zosyn, LFTs are improving, awaiting transfer to tertiary center for further evaluation of choledocholithiasis/cholangitis given her history of Jose-en-Y. Clinically improved since arrival has been tolerating diet. PLAN :  -Continue supportive course care medical management per primary team  -Await transfer to [facility] for consideration of IR-PTC drain versus balloon enteroscopy-assisted ERCP versus surgery assisted ERCP patient is agreeable and awaiting transfer    Thank you for allowing me to participate in the care of your patient. Please feel free to contact me with any concerns.     Wanda Dominique, APRN - CNP

## 2021-08-12 LAB
BLOOD CULTURE, ROUTINE: NORMAL
CULTURE, BLOOD 2: NORMAL

## 2021-08-13 ENCOUNTER — TELEPHONE (OUTPATIENT)
Dept: FAMILY MEDICINE CLINIC | Age: 66
End: 2021-08-13

## 2021-08-13 NOTE — TELEPHONE ENCOUNTER
----- Message from Mauro Nicholas sent at 8/12/2021  4:36 PM EDT -----  Subject: Hospital Follow Up    QUESTIONS  What hospital was the Patient Discharged from? [facility]   Date of Discharge? 2021-08-12  Discharge Location? Home  Reason for hospitalization as patient stated? CVD dilatation and stones  What question does the patient have, if applicable? Discharge KIKI Dave   from [address] [place] [address] called after hours to schedule hospital follow up. Appt   need to be scheduled within seven days. Per Discharge KIKI Dave, please   contact pt to schedule.   ---------------------------------------------------------------------------  --------------  CALL BACK INFO  What is the best way for the office to contact you? OK to leave message on   voicemail  Preferred Call Back Phone Number? [phone]  ---------------------------------------------------------------------------  --------------  SCRIPT ANSWERS  Relationship to Patient? Third Party  Representative Name? Sheila Munoz- Discharge   (Patient requests to see provider urgently. )? No  (Has the patient been discharged from the hospital within 2 business days   AND does not have a Telephone Encounter  Follow Up From [address] [place] [address]   documented in [address]?)? Yes  Do you have any questions for your primary care provider that need to be   answered prior to your appointment? (Use RN Triage if question pertains to   anything on the red flag list)? No  (Patient needs follow up visit after hospital discharge) Book first   available appointment within 7 days OF DISCHARGE, if no appt, proceed to   book the next available time slot within 14 days OF DISCHARGE AND Send   Message to Provider. [facility] Follow Up appointment cannot be booked   beyond 14 Days and should result in a Message to Provider. ?  Yes

## 2021-08-18 ENCOUNTER — TELEPHONE (OUTPATIENT)
Dept: FAMILY MEDICINE CLINIC | Age: 66
End: 2021-08-18

## 2021-08-18 NOTE — TELEPHONE ENCOUNTER
Returned call to patient - Informed patient that the first available appointment with the doctor was 9/28, but she would be able to see someone else sooner, patient stated that she did not want to see anyone but Dr. Yashira Victoria.

## 2021-08-18 NOTE — TELEPHONE ENCOUNTER
----- Message from Darlen Harada sent at 8/16/2021 10:22 AM EDT -----  Subject: Appointment Request    Reason for Call: Routine ED Follow Up Visit    QUESTIONS  Type of Appointment? New Patient/New to Provider  Reason for appointment request? No appointments available during search  Additional Information for Provider? Patient was admitted to the ED around   Friday night Saturday morning for cholangitis, pancreatitis, and sepsis. Patient was transferred on the 19th and on the 20th she was discharged. Patient needs a ED follow up but no times populated. Please call patient   to schedule.  ---------------------------------------------------------------------------  --------------  CALL BACK INFO  What is the best way for the office to contact you? OK to leave message on   voicemail  Preferred Call Back Phone Number? [phone]  ---------------------------------------------------------------------------  --------------  SCRIPT ANSWERS  Relationship to Patient? Self  (Patient requests to see provider urgently. )? No  Do you have any questions for your primary care provider that need to be   answered prior to your appointment? No  Have you been diagnosed with, awaiting test results for, or told that you   are suspected of having COVID-19 (Coronavirus)? (If patient has tested   negative or was tested as a requirement for work, school, or travel and   not based on symptoms, answer no)? No  Do you currently have flu-like symptoms including fever or chills, cough,   shortness of breath, difficulty breathing, or new loss of taste or smell? No  Have you had close contact with someone with COVID-19 in the last 14 days? No  (Service Expert  click yes below to proceed with [employer] As Usual   Scheduling)?  Yes

## 2021-09-28 ENCOUNTER — OFFICE VISIT (OUTPATIENT)
Dept: FAMILY MEDICINE CLINIC | Age: 66
End: 2021-09-28
Payer: COMMERCIAL

## 2021-09-28 VITALS
HEART RATE: 72 BPM | DIASTOLIC BLOOD PRESSURE: 80 MMHG | BODY MASS INDEX: 49.13 KG/M2 | WEIGHT: 267 LBS | RESPIRATION RATE: 14 BRPM | OXYGEN SATURATION: 98 % | HEIGHT: 62 IN | SYSTOLIC BLOOD PRESSURE: 140 MMHG

## 2021-09-28 DIAGNOSIS — G47.33 OSA (OBSTRUCTIVE SLEEP APNEA): ICD-10-CM

## 2021-09-28 DIAGNOSIS — K21.9 GASTROESOPHAGEAL REFLUX DISEASE WITHOUT ESOPHAGITIS: ICD-10-CM

## 2021-09-28 DIAGNOSIS — I10 ESSENTIAL HYPERTENSION: Primary | ICD-10-CM

## 2021-09-28 DIAGNOSIS — R21 SKIN RASH: ICD-10-CM

## 2021-09-28 DIAGNOSIS — E11.9 TYPE 2 DIABETES MELLITUS WITHOUT COMPLICATION, WITHOUT LONG-TERM CURRENT USE OF INSULIN (HCC): ICD-10-CM

## 2021-09-28 DIAGNOSIS — J45.40 MODERATE PERSISTENT ASTHMA, UNSPECIFIED WHETHER COMPLICATED: ICD-10-CM

## 2021-09-28 PROCEDURE — 3051F HG A1C>EQUAL 7.0%<8.0%: CPT | Performed by: INTERNAL MEDICINE

## 2021-09-28 PROCEDURE — 99214 OFFICE O/P EST MOD 30 MIN: CPT | Performed by: INTERNAL MEDICINE

## 2021-09-28 RX ORDER — NYSTATIN 100000 [USP'U]/G
POWDER TOPICAL
Qty: 60 G | Refills: 3 | Status: SHIPPED | OUTPATIENT
Start: 2021-09-28 | End: 2022-02-28 | Stop reason: SDUPTHER

## 2021-09-28 RX ORDER — GLUCOSAMINE HCL/CHONDROITIN SU 500-400 MG
CAPSULE ORAL
Qty: 100 STRIP | Refills: 3 | Status: ON HOLD | OUTPATIENT
Start: 2021-09-28 | End: 2022-09-14 | Stop reason: SDUPTHER

## 2021-09-28 RX ORDER — LANCETS 30 GAUGE
EACH MISCELLANEOUS
Qty: 1 EACH | Refills: 5 | Status: SHIPPED | OUTPATIENT
Start: 2021-09-28

## 2021-09-28 RX ORDER — SYRINGE AND NEEDLE,INSULIN,1ML 29 G X1/2"
SYRINGE, EMPTY DISPOSABLE MISCELLANEOUS
COMMUNITY
Start: 2021-09-15

## 2021-09-28 RX ORDER — CYANOCOBALAMIN 1000 UG/ML
INJECTION INTRAMUSCULAR; SUBCUTANEOUS
COMMUNITY
Start: 2021-09-15

## 2021-09-28 RX ORDER — LISINOPRIL AND HYDROCHLOROTHIAZIDE 25; 20 MG/1; MG/1
1 TABLET ORAL DAILY
Qty: 30 TABLET | Refills: 0 | Status: SHIPPED | OUTPATIENT
Start: 2021-09-28

## 2021-09-28 RX ORDER — NYSTATIN 100000 U/G
CREAM TOPICAL
Qty: 30 G | Refills: 3 | Status: SHIPPED | OUTPATIENT
Start: 2021-09-28

## 2021-09-28 ASSESSMENT — ENCOUNTER SYMPTOMS
DIARRHEA: 0
FACIAL SWELLING: 0
EYE ITCHING: 0
CONSTIPATION: 0
EYE DISCHARGE: 0
PHOTOPHOBIA: 0
NAUSEA: 0
ABDOMINAL PAIN: 0
RECTAL PAIN: 0
COUGH: 0
SINUS PAIN: 0
BLOOD IN STOOL: 0
CHEST TIGHTNESS: 0
RHINORRHEA: 0
BACK PAIN: 0
SHORTNESS OF BREATH: 0
WHEEZING: 0
VOMITING: 0
ABDOMINAL DISTENTION: 0
TROUBLE SWALLOWING: 0
EYE PAIN: 0
SORE THROAT: 0
VOICE CHANGE: 0
SINUS PRESSURE: 0
EYE REDNESS: 0
COLOR CHANGE: 0
APNEA: 0

## 2021-10-12 ENCOUNTER — NURSE ONLY (OUTPATIENT)
Dept: FAMILY MEDICINE CLINIC | Age: 66
End: 2021-10-12

## 2021-10-12 ENCOUNTER — TELEPHONE (OUTPATIENT)
Dept: FAMILY MEDICINE CLINIC | Age: 66
End: 2021-10-12

## 2021-10-12 VITALS — DIASTOLIC BLOOD PRESSURE: 78 MMHG | SYSTOLIC BLOOD PRESSURE: 122 MMHG

## 2021-11-16 ENCOUNTER — OFFICE VISIT (OUTPATIENT)
Dept: FAMILY MEDICINE CLINIC | Age: 66
End: 2021-11-16
Payer: COMMERCIAL

## 2021-11-16 VITALS
BODY MASS INDEX: 49.32 KG/M2 | WEIGHT: 268 LBS | RESPIRATION RATE: 14 BRPM | DIASTOLIC BLOOD PRESSURE: 80 MMHG | OXYGEN SATURATION: 100 % | HEIGHT: 62 IN | SYSTOLIC BLOOD PRESSURE: 136 MMHG | HEART RATE: 97 BPM

## 2021-11-16 DIAGNOSIS — I10 ESSENTIAL HYPERTENSION: Primary | ICD-10-CM

## 2021-11-16 DIAGNOSIS — E11.9 TYPE 2 DIABETES MELLITUS WITHOUT COMPLICATION, WITHOUT LONG-TERM CURRENT USE OF INSULIN (HCC): ICD-10-CM

## 2021-11-16 DIAGNOSIS — K21.9 GASTROESOPHAGEAL REFLUX DISEASE WITHOUT ESOPHAGITIS: ICD-10-CM

## 2021-11-16 DIAGNOSIS — J45.40 MODERATE PERSISTENT ASTHMA, UNSPECIFIED WHETHER COMPLICATED: ICD-10-CM

## 2021-11-16 DIAGNOSIS — G47.33 OSA (OBSTRUCTIVE SLEEP APNEA): ICD-10-CM

## 2021-11-16 DIAGNOSIS — R21 SKIN RASH: ICD-10-CM

## 2021-11-16 PROCEDURE — 99214 OFFICE O/P EST MOD 30 MIN: CPT | Performed by: INTERNAL MEDICINE

## 2021-11-16 PROCEDURE — 3051F HG A1C>EQUAL 7.0%<8.0%: CPT | Performed by: INTERNAL MEDICINE

## 2021-11-16 RX ORDER — BLOOD-GLUCOSE METER
EACH MISCELLANEOUS
COMMUNITY
Start: 2021-09-28

## 2021-11-16 RX ORDER — DIAPER,BRIEF,INFANT-TODD,DISP
EACH MISCELLANEOUS
COMMUNITY
Start: 2021-10-27

## 2021-11-16 RX ORDER — TERBINAFINE HYDROCHLORIDE 250 MG/1
250 TABLET ORAL DAILY
Qty: 14 TABLET | Refills: 0 | Status: SHIPPED | OUTPATIENT
Start: 2021-11-16 | End: 2021-11-30

## 2021-11-16 RX ORDER — KETOCONAZOLE 20 MG/G
CREAM TOPICAL
COMMUNITY
Start: 2021-10-27

## 2021-11-16 ASSESSMENT — ENCOUNTER SYMPTOMS
DIARRHEA: 0
COUGH: 0
BACK PAIN: 0
EYE REDNESS: 0
ABDOMINAL DISTENTION: 0
FACIAL SWELLING: 0
EYE ITCHING: 0
COLOR CHANGE: 0
VOICE CHANGE: 0
SINUS PAIN: 0
SINUS PRESSURE: 0
SHORTNESS OF BREATH: 0
NAUSEA: 0
WHEEZING: 0
ABDOMINAL PAIN: 0
BLOOD IN STOOL: 0
APNEA: 0
VOMITING: 0
SORE THROAT: 0
CHEST TIGHTNESS: 0
EYE DISCHARGE: 0
TROUBLE SWALLOWING: 0
EYE PAIN: 0
RECTAL PAIN: 0
CONSTIPATION: 0
RHINORRHEA: 0
PHOTOPHOBIA: 0

## 2021-11-16 NOTE — PROGRESS NOTES
Subjective:      Patient ID: Pradip Washington is a 77 y.o. female Established patient, here for evaluation of the following chief complaint(s):  Chief Complaint   Patient presents with    Hypertension       Hypertension  Pertinent negatives include no chest pain, headaches, neck pain, palpitations or shortness of breath. 72-year-old female with history of essential hypertension, type 2 diabetes, GERD, obstructive sleep apnea, moderate persistent asthma           Essential hypertension-the patient's blood pressure suboptimal.         DMII-A1C A7.4 8/7/2021. Blood sugars have been slightly more elevated. Skin rash: The patient previously reported  a rash in her right inguinal region that was pruritic. Is has been slow to respond to nystatin powder along. At present he denies polyuria,  Polydipsia, constitutional, sinus, visual, cardiopulmonary, urologic, gastrointestinal, immunologic/hematologic, musculoskeletal, neurologic,dermatologic, or psychiatric complaints. Review of Systems   Constitutional: Negative for chills, diaphoresis, fatigue and fever. HENT: Negative for congestion, dental problem, drooling, ear discharge, ear pain, facial swelling, hearing loss, mouth sores, nosebleeds, postnasal drip, rhinorrhea, sinus pressure, sinus pain, sneezing, sore throat, tinnitus, trouble swallowing and voice change. Eyes: Negative for photophobia, pain, discharge, redness, itching and visual disturbance. Respiratory: Negative for apnea, cough, chest tightness, shortness of breath and wheezing. Cardiovascular: Negative for chest pain, palpitations and leg swelling. Gastrointestinal: Negative for abdominal distention, abdominal pain, blood in stool, constipation, diarrhea, nausea, rectal pain and vomiting. Endocrine: Negative for cold intolerance, heat intolerance, polydipsia, polyphagia and polyuria.    Genitourinary: Negative for decreased urine volume, difficulty urinating, dysuria, flank pain, frequency, genital sores, hematuria and urgency. Musculoskeletal: Negative for arthralgias, back pain, gait problem, joint swelling, myalgias, neck pain and neck stiffness. Skin: Negative for color change, rash and wound. Allergic/Immunologic: Negative for environmental allergies and food allergies. Neurological: Negative for dizziness, tremors, seizures, syncope, facial asymmetry, speech difficulty, weakness, light-headedness, numbness and headaches. Hematological: Negative for adenopathy. Does not bruise/bleed easily. Psychiatric/Behavioral: Negative for agitation, confusion, decreased concentration, hallucinations, self-injury, sleep disturbance and suicidal ideas. The patient is not nervous/anxious. Objective:   /80   Pulse 97   Resp 14   Ht 5' 2\" (1.575 m)   Wt 268 lb (121.6 kg)   SpO2 100%   BMI 49.02 kg/m²     Physical Exam    Assessment:       Diagnosis Orders   1. Essential hypertension     2. Type 2 diabetes mellitus without complication, without long-term current use of insulin ([facility] [address]     3. Gastroesophageal reflux disease without esophagitis     4. BUNNY (obstructive sleep apnea)     5. Moderate persistent asthma, unspecified whether complicated     6. Skin rash           Plan:      Darcy Wheeler was seen today for established new doctor and diabetes. Diagnoses and all orders for this visit:    Essential hypertension continue Norvasc 10 mg orally daily, lisinopril-hydrochlorothiazide 20-25 mg daily    Type 2 diabetes mellitus without complication, without long-term current use of insulin ([facility])-Metformin 1000 mg twice daily    Gastroesophageal reflux disease without esophagitis-continue Protonix 40 mg orally daily    BUNNY (obstructive sleep apnea)    Moderate persistent asthma, unspecified whether complicated-continue    Skin rash  -Continue topical nystatin powder and add terbinafine      Other orders  -     nystatin (MYCOSTATIN) 618216 UNIT/GM powder;  Apply 3 times daily. No follow-ups on file. On this date 11/16/21 I have spent 30 minutes reviewing previous notes, test results and face to face with the patient discussing the diagnosis and importance of compliance with the treatment plan. Roque Bamberger, MD    Please note, this report has been partially produced using speech recognition software  and may cause  and /or contain errors related to that system including grammar, punctuation and spelling as well as words and phrases that may seem inappropriate. If there are questions or concerns please feel free to contact me to clarify.

## 2021-12-03 ENCOUNTER — TELEPHONE (OUTPATIENT)
Dept: ADMINISTRATIVE | Age: 66
End: 2021-12-03

## 2021-12-30 DIAGNOSIS — K21.9 GASTROESOPHAGEAL REFLUX DISEASE WITHOUT ESOPHAGITIS: ICD-10-CM

## 2021-12-30 DIAGNOSIS — I10 ESSENTIAL HYPERTENSION: ICD-10-CM

## 2021-12-30 DIAGNOSIS — Z87.11 PERSONAL HISTORY OF GASTRIC ULCER: ICD-10-CM

## 2021-12-30 RX ORDER — AMLODIPINE BESYLATE 10 MG/1
TABLET ORAL
Qty: 30 TABLET | Refills: 5 | Status: SHIPPED | OUTPATIENT
Start: 2021-12-30

## 2021-12-30 RX ORDER — PANTOPRAZOLE SODIUM 40 MG/1
TABLET, DELAYED RELEASE ORAL
Qty: 30 TABLET | Refills: 5 | Status: SHIPPED | OUTPATIENT
Start: 2021-12-30

## 2022-02-08 ENCOUNTER — OFFICE VISIT (OUTPATIENT)
Dept: FAMILY MEDICINE CLINIC | Age: 67
End: 2022-02-08
Payer: COMMERCIAL

## 2022-02-08 ENCOUNTER — NURSE TRIAGE (OUTPATIENT)
Dept: OTHER | Facility: CLINIC | Age: 67
End: 2022-02-08

## 2022-02-08 VITALS
WEIGHT: 268 LBS | BODY MASS INDEX: 49.32 KG/M2 | HEART RATE: 82 BPM | TEMPERATURE: 94.7 F | SYSTOLIC BLOOD PRESSURE: 122 MMHG | DIASTOLIC BLOOD PRESSURE: 92 MMHG | OXYGEN SATURATION: 97 % | HEIGHT: 62 IN

## 2022-02-08 DIAGNOSIS — R05.9 COUGH: ICD-10-CM

## 2022-02-08 DIAGNOSIS — J40 BRONCHITIS: Primary | ICD-10-CM

## 2022-02-08 DIAGNOSIS — C50.312 MALIGNANT NEOPLASM OF LOWER-INNER QUADRANT OF LEFT BREAST IN FEMALE, ESTROGEN RECEPTOR NEGATIVE (HCC): ICD-10-CM

## 2022-02-08 DIAGNOSIS — Z17.1 MALIGNANT NEOPLASM OF LOWER-INNER QUADRANT OF LEFT BREAST IN FEMALE, ESTROGEN RECEPTOR NEGATIVE (HCC): ICD-10-CM

## 2022-02-08 DIAGNOSIS — E11.9 TYPE 2 DIABETES MELLITUS WITHOUT COMPLICATION, WITHOUT LONG-TERM CURRENT USE OF INSULIN (HCC): ICD-10-CM

## 2022-02-08 DIAGNOSIS — J45.20 MILD INTERMITTENT ASTHMA, UNSPECIFIED WHETHER COMPLICATED: ICD-10-CM

## 2022-02-08 LAB
INFLUENZA A ANTIBODY: NORMAL
INFLUENZA B ANTIBODY: NORMAL
Lab: NORMAL
PERFORMING INSTRUMENT: NORMAL
QC PASS/FAIL: NORMAL
SARS-COV-2, POC: NORMAL

## 2022-02-08 PROCEDURE — 87426 SARSCOV CORONAVIRUS AG IA: CPT | Performed by: PHYSICIAN ASSISTANT

## 2022-02-08 PROCEDURE — 99213 OFFICE O/P EST LOW 20 MIN: CPT | Performed by: PHYSICIAN ASSISTANT

## 2022-02-08 PROCEDURE — 87804 INFLUENZA ASSAY W/OPTIC: CPT | Performed by: PHYSICIAN ASSISTANT

## 2022-02-08 RX ORDER — AMOXICILLIN 875 MG/1
875 TABLET, COATED ORAL 2 TIMES DAILY
Qty: 20 TABLET | Refills: 0 | Status: SHIPPED | OUTPATIENT
Start: 2022-02-08 | End: 2022-02-18

## 2022-02-08 RX ORDER — PREDNISONE 10 MG/1
TABLET ORAL
Qty: 45 TABLET | Refills: 0 | Status: SHIPPED | OUTPATIENT
Start: 2022-02-08 | End: 2022-09-02 | Stop reason: ALTCHOICE

## 2022-02-08 ASSESSMENT — PATIENT HEALTH QUESTIONNAIRE - PHQ9
1. LITTLE INTEREST OR PLEASURE IN DOING THINGS: 0
SUM OF ALL RESPONSES TO PHQ QUESTIONS 1-9: 0
SUM OF ALL RESPONSES TO PHQ9 QUESTIONS 1 & 2: 0
SUM OF ALL RESPONSES TO PHQ QUESTIONS 1-9: 0
SUM OF ALL RESPONSES TO PHQ QUESTIONS 1-9: 0
2. FEELING DOWN, DEPRESSED OR HOPELESS: 0
SUM OF ALL RESPONSES TO PHQ QUESTIONS 1-9: 0

## 2022-02-08 ASSESSMENT — ENCOUNTER SYMPTOMS
COUGH: 1
WHEEZING: 1
TROUBLE SWALLOWING: 0
ABDOMINAL PAIN: 0
VOMITING: 0
SINUS PRESSURE: 0
DIARRHEA: 0
CHEST TIGHTNESS: 1
SHORTNESS OF BREATH: 0
BACK PAIN: 0

## 2022-02-08 NOTE — PATIENT INSTRUCTIONS
Patient Education        Bronchitis: Care Instructions  Your Care Instructions     Bronchitis is inflammation of the bronchial tubes, which carry air to the lungs. The tubes swell and produce mucus, or phlegm. The mucus and inflamed bronchial tubes make you cough. You may have trouble breathing. Most cases of bronchitis are caused by viruses like those that cause colds. Antibiotics usually do not help and they may be harmful. Bronchitis usually develops rapidly and lasts about 2 to 3 weeks in otherwise healthy people. Follow-up care is a key part of your treatment and safety. Be sure to make and go to all appointments, and call your doctor if you are having problems. It's also a good idea to know your test results and keep a list of the medicines you take. How can you care for yourself at home? · Take all medicines exactly as prescribed. Call your doctor if you think you are having a problem with your medicine. · Get some extra rest.  · Take an over-the-counter pain medicine, such as acetaminophen (Tylenol), ibuprofen (Advil, Motrin), or naproxen (Aleve) to reduce fever and relieve body aches. Read and follow all instructions on the label. · Do not take two or more pain medicines at the same time unless the doctor told you to. Many pain medicines have acetaminophen, which is Tylenol. Too much acetaminophen (Tylenol) can be harmful. · Take an over-the-counter cough medicine to help quiet a dry, hacking cough so that you can sleep. Avoid cough medicines that have more than one active ingredient. Read and follow all instructions on the label. · Do not smoke. Smoking can make bronchitis worse. If you need help quitting, talk to your doctor about stop-smoking programs and medicines. These can increase your chances of quitting for good. When should you call for help? Call 911 anytime you think you may need emergency care. For example, call if:    · You have severe trouble breathing.    Call your doctor now or seek immediate medical care if:    · You have new or worse trouble breathing.     · You cough up dark brown or bloody mucus (sputum).     · You have a new or higher fever.     · You have a new rash. Watch closely for changes in your health, and be sure to contact your doctor if:    · You cough more deeply or more often, especially if you notice more mucus or a change in the color of your mucus.     · You are not getting better as expected. Where can you learn more? Go to https://[email or web address]. org and sign in to your [employer] account. Enter H333 in the [employer] box to learn more about \"Bronchitis: Care Instructions. \"     If you do not have an account, please click on the \"Sign Up Now\" link. Current as of: July 6, 2021               Content Version: 13.1  © 8414-6629 [employer]. Care instructions adapted under license by [facility] ([facility]). If you have questions about a medical condition or this instruction, always ask your healthcare professional. [address] any warranty or liability for your use of this information.

## 2022-02-08 NOTE — PROGRESS NOTES
Vaping Use: Never used   Substance and Sexual Activity    Alcohol use: No    Drug use: No    Sexual activity: Not on file   Other Topics Concern    Not on file   Social History Narrative    Not on file     Social Determinants of Health     Financial Resource Strain: Medium Risk    Difficulty of Paying Living Expenses: Somewhat hard   Food Insecurity: No Food Insecurity    Worried About Running Out of Food in the Last Year: Never true    Gala of Food in the Last Year: Never true   Transportation Needs: No Transportation Needs    Lack of Transportation (Medical): No    Lack of Transportation (Non-Medical):  No   Physical Activity:     Days of Exercise per Week: Not on file    Minutes of Exercise per Session: Not on file   Stress:     Feeling of Stress : Not on file   Social Connections:     Frequency of Communication with Friends and Family: Not on file    Frequency of Social Gatherings with Friends and Family: Not on file    Attends Mandaeism Services: Not on file    Active Member of [address] [place] [address] or Organizations: Not on file    Attends Club or Organization Meetings: Not on file    Marital Status: Not on file   Intimate Partner Violence:     Fear of Current or Ex-Partner: Not on file    Emotionally Abused: Not on file    Physically Abused: Not on file    Sexually Abused: Not on file   Housing Stability:     Unable to Pay for Housing in the Last Year: Not on file    Number of [place] in the Last Year: Not on file    Unstable Housing in the Last Year: Not on file     Family History   Problem Relation Age of Onset    Cancer Father         melanoma    Prostate Cancer Father     Cancer Sister         Melanoma    Cancer Other         Throat    Breast Cancer Maternal Aunt     Colon Cancer Neg Hx      Allergies   Allergen Reactions    Aspirin      History of gastric ulcers    Codeine      Cramps    Imitrex [Sumatriptan]     Theophyllines     Diflucan [Fluconazole] Rash    Nizoral [Ketoconazole] Rash    Zoloft Rash     Current Outpatient Medications   Medication Sig Dispense Refill    amoxicillin (AMOXIL) 875 MG tablet Take 1 tablet by mouth 2 times daily for 10 days 20 tablet 0    predniSONE (DELTASONE) 10 MG tablet Take 5 tabs daily x 3 days, 4 tabs daily x 3 days, 3 tabs daily x 3 days, 2 tabs daily x 3 days, 1 tab daily x 3 days 45 tablet 0    albuterol (PROVENTIL) (5 MG/ML) 0.5% nebulizer solution Take 1 mL by nebulization 4 times daily as needed for Wheezing 120 each 3    amLODIPine (NORVASC) 10 MG tablet Take 1 tablet by mouth once daily 30 tablet 5    pantoprazole (PROTONIX) 40 MG tablet Take 1 tablet by mouth once daily 30 tablet 5    hydrocortisone 1 % cream       ketoconazole (NIZORAL) 2 % cream       Blood Glucose Monitoring Suppl (ONE TOUCH ULTRA 2) w/Device KIT       cyanocobalamin 1000 MCG/ML injection INJECT 1 ML SUBCUTANEOUSLY ONCE EVERY MONTH      ULTICARE INSULIN SAFETY SYR 29G X 1/2\" 1 ML MISC USE AS DIRECTED      nystatin (MYCOSTATIN) 830325 UNIT/GM powder Apply 3 times daily. 60 g 3    nystatin (MYCOSTATIN) 143834 UNIT/GM cream Apply topically 2 times daily. 30 g 3    lisinopril-hydroCHLOROthiazide (PRINZIDE;ZESTORETIC) 20-25 MG per tablet Take 1 tablet by mouth daily 30 tablet 0    blood glucose monitor kit and supplies Dispense sufficient amount for indicated testing frequency plus additional to accommodate PRN testing needs. Dispense all needed supplies to include: monitor, strips, lancing device, lancets, control solutions, alcohol swabs. 1 kit 0    Lancets MISC DX: diabetes mellitus. Use 1-3 time(s) daily - Ok to substitute per insurance (1 each = 1 box) 1 each 5    blood glucose monitor strips Test 3 times a day & as needed for symptoms of irregular blood glucose. Dispense sufficient amount for indicated testing frequency plus additional to accommodate PRN testing needs.  100 strip 3    nystatin (MYCOSTATIN) 867728 UNIT/GM powder Apply 3 times daily. 45 g 5    vitamin D (ERGOCALCIFEROL) 1.25 MG (62788 UT) CAPS capsule Take 1 capsule by mouth once a week 12 capsule 1    glyBURIDE (DIABETA) 2.5 MG tablet Take 1 tablet by mouth daily (with breakfast) 30 tablet 5    cholestyramine (QUESTRAN) 4 g packet Take 1 packet by mouth 2 times daily 180 packet 5    dicyclomine (BENTYL) 10 MG capsule Take 1 capsule by mouth 2 times daily (before meals) 120 capsule 3    metFORMIN (GLUCOPHAGE) 500 MG tablet Take 2 tablets by mouth 2 times daily (with meals) 360 tablet 3    Respiratory Therapy Supplies (FULL KIT NEBULIZER SET) MISC 1 Units by Does not apply route 4 times daily 1 each 1    albuterol sulfate  (90 Base) MCG/ACT inhaler Inhale 2 puffs into the lungs 4 times daily as needed for Wheezing 3 Inhaler 3    albuterol (PROVENTIL) (2.5 MG/3ML) 0.083% nebulizer solution Take 3 mLs by nebulization every 6 hours as needed for Wheezing 300 vial 3    CPAP Machine MISC        No current facility-administered medications for this visit. Review of Systems   Constitutional: Negative for activity change, appetite change, chills, fever and unexpected weight change. HENT: Positive for congestion. Negative for drooling, ear pain, nosebleeds, sinus pressure and trouble swallowing. Respiratory: Positive for cough, chest tightness and wheezing. Negative for shortness of breath. Cardiovascular: Negative for chest pain and leg swelling. Gastrointestinal: Negative for abdominal pain, diarrhea and vomiting. Endocrine: Negative for polydipsia and polyphagia. Genitourinary: Negative for dysuria, flank pain and frequency. Musculoskeletal: Negative for back pain and myalgias. Skin: Negative for pallor and rash. Neurological: Negative for syncope, weakness and headaches. Hematological: Does not bruise/bleed easily. Psychiatric/Behavioral: Negative for agitation and behavioral problems.    All other systems reviewed and are negative. Objective:   BP (!) 122/92   Pulse 82   Temp 94.7 °F (34.8 °C) (Infrared)   Ht 5' 2\" (1.575 m)   Wt 268 lb (121.6 kg)   SpO2 97%   BMI 49.02 kg/m²     Physical Exam  Vitals and nursing note reviewed. Constitutional:       General: She is awake. She is not in acute distress. Appearance: Normal appearance. She is well-developed and normal weight. She is not ill-appearing, toxic-appearing or diaphoretic. Comments: No photophobia. No phonophobia. HENT:      Head: Normocephalic and atraumatic. No Merritt's sign. Right Ear: External ear normal.      Left Ear: External ear normal.      Nose: Nose normal. No congestion or rhinorrhea. Mouth/Throat:      Mouth: Mucous membranes are moist.      Pharynx: Oropharynx is clear. No oropharyngeal exudate or posterior oropharyngeal erythema. Eyes:      General: No scleral icterus. Right eye: No foreign body or discharge. Left eye: No discharge. Extraocular Movements: Extraocular movements intact. Conjunctiva/sclera: Conjunctivae normal.      Left eye: No exudate. Pupils: Pupils are equal, round, and reactive to light. Neck:      Vascular: No JVD. Trachea: No tracheal deviation. Comments: No meningismus. Cardiovascular:      Rate and Rhythm: Normal rate and regular rhythm. Heart sounds: Normal heart sounds. Heart sounds not distant. No murmur heard. No friction rub. No gallop. Pulmonary:      Effort: Pulmonary effort is normal. No respiratory distress. Breath sounds: No stridor. Wheezing present. No rhonchi or rales. Chest:      Chest wall: No tenderness. Abdominal:      General: Abdomen is flat. Bowel sounds are normal. There is no distension. Palpations: Abdomen is soft. There is no mass. Tenderness: There is no abdominal tenderness. There is no right CVA tenderness, left CVA tenderness, guarding or rebound. Hernia: No hernia is present.    Musculoskeletal: General: No swelling, tenderness, deformity or signs of injury. Normal range of motion. Cervical back: Normal range of motion and neck supple. No rigidity. Lymphadenopathy:      Head:      Right side of head: No submental adenopathy. Left side of head: No submental adenopathy. Skin:     General: Skin is warm and dry. Capillary Refill: Capillary refill takes less than 2 seconds. Coloration: Skin is not jaundiced or pale. Findings: No bruising, erythema, lesion or rash. Neurological:      General: No focal deficit present. Mental Status: She is alert and oriented to person, place, and time. Mental status is at baseline. Cranial Nerves: No cranial nerve deficit. Sensory: No sensory deficit. Motor: No weakness. Coordination: Coordination normal.      Deep Tendon Reflexes: Reflexes are normal and symmetric. Psychiatric:         Mood and Affect: Mood normal.         Behavior: Behavior normal. Behavior is cooperative. Thought Content: Thought content normal.         Judgment: Judgment normal.         Assessment:       Diagnosis Orders   1. Bronchitis  amoxicillin (AMOXIL) 875 MG tablet    predniSONE (DELTASONE) 10 MG tablet    albuterol (PROVENTIL) (5 MG/ML) 0.5% nebulizer solution   2. Cough  POCT COVID-19, Antigen    POCT Influenza A/B    amoxicillin (AMOXIL) 875 MG tablet    predniSONE (DELTASONE) 10 MG tablet    albuterol (PROVENTIL) (5 MG/ML) 0.5% nebulizer solution   3. Malignant neoplasm of lower-inner quadrant of left breast in female, estrogen receptor negative ([facility]     4. Type 2 diabetes mellitus without complication, without long-term current use of insulin ([facility]     5. Body mass index (BMI) 45.0-49.9, adult ([facility]     6.  Mild intermittent asthma, unspecified whether complicated  albuterol (PROVENTIL) (5 MG/ML) 0.5% nebulizer solution     Results for POC orders placed in visit on 02/08/22   POCT Influenza A/B   Result Value Ref Range    Influenza A Ab neg     Influenza B Ab neg       Plan:     Assessment & Plan   Ed Andes was seen today for cough, congestion and shortness of breath. Diagnoses and all orders for this visit:    Bronchitis  -     amoxicillin (AMOXIL) 875 MG tablet; Take 1 tablet by mouth 2 times daily for 10 days  -     predniSONE (DELTASONE) 10 MG tablet; Take 5 tabs daily x 3 days, 4 tabs daily x 3 days, 3 tabs daily x 3 days, 2 tabs daily x 3 days, 1 tab daily x 3 days  -     albuterol (PROVENTIL) (5 MG/ML) 0.5% nebulizer solution; Take 1 mL by nebulization 4 times daily as needed for Wheezing    Cough  -     POCT COVID-19, Antigen  -     POCT Influenza A/B  -     amoxicillin (AMOXIL) 875 MG tablet; Take 1 tablet by mouth 2 times daily for 10 days  -     predniSONE (DELTASONE) 10 MG tablet; Take 5 tabs daily x 3 days, 4 tabs daily x 3 days, 3 tabs daily x 3 days, 2 tabs daily x 3 days, 1 tab daily x 3 days  -     albuterol (PROVENTIL) (5 MG/ML) 0.5% nebulizer solution; Take 1 mL by nebulization 4 times daily as needed for Wheezing    Malignant neoplasm of lower-inner quadrant of left breast in female, estrogen receptor negative ([facility] [address]    Type 2 diabetes mellitus without complication, without long-term current use of insulin ([facility])    Body mass index (BMI) 45.0-49.9, adult ([facility])    Mild intermittent asthma, unspecified whether complicated  -     albuterol (PROVENTIL) (5 MG/ML) 0.5% nebulizer solution; Take 1 mL by nebulization 4 times daily as needed for Wheezing      Orders Placed This Encounter   Procedures    POCT COVID-19, Antigen     Order Specific Question:   Is this test for diagnosis or screening? Answer:   Screening     Order Specific Question:   Symptomatic for COVID-19 as defined by CDC? Answer:   No     Order Specific Question:   Date of Symptom Onset     Answer:   N/A     Order Specific Question:   Hospitalized for COVID-19? Answer:   No     Order Specific Question:   Admitted to ICU for COVID-19? Answer:    No Order Specific Question:   Employed in healthcare setting? Answer:   Unknown     Order Specific Question:   Resident in a congregate (group) care setting? Answer:   Unknown     Order Specific Question:   Pregnant? Answer:   No     Order Specific Question:   Previously tested for COVID-19? Answer: Yes    POCT Influenza A/B     Orders Placed This Encounter   Medications    amoxicillin (AMOXIL) 875 MG tablet     Sig: Take 1 tablet by mouth 2 times daily for 10 days     Dispense:  20 tablet     Refill:  0    predniSONE (DELTASONE) 10 MG tablet     Sig: Take 5 tabs daily x 3 days, 4 tabs daily x 3 days, 3 tabs daily x 3 days, 2 tabs daily x 3 days, 1 tab daily x 3 days     Dispense:  45 tablet     Refill:  0    albuterol (PROVENTIL) (5 MG/ML) 0.5% nebulizer solution     Sig: Take 1 mL by nebulization 4 times daily as needed for Wheezing     Dispense:  120 each     Refill:  3     There are no discontinued medications. Return if symptoms worsen or fail to improve. Reviewed with the patient/family: current clinical status & medications. Side effects of the medication prescribed today, as well as treatment plan/rationale and result expectations have been discussed with the patient/family who expresses understanding. Patient will be discharged home in stable condition. Follow up with PCP to evaluate treatment results or return if symptoms worsen or fail to improve. Discussed signs and symptoms which require immediate follow-up in ED/call to 911. Understanding verbalized. I have reviewed the patient's medical history in detail and updated the computerized patient record.     CHEIKH Garcia

## 2022-02-08 NOTE — TELEPHONE ENCOUNTER
Received call from [facility] at [facility] with [employer]. Subjective: Caller states \"Cough\"     Current Symptoms: Cough for a few days, congested and wheezing. Clear phlegm. Coughing spells. Onset: a few days ago; worsening    Associated Symptoms: NA    Pain Severity: 0/10; N/A; none    Temperature: 97.8 orally    What has been tried: Claritin and flonase    LMP: NA Pregnant: NA    Recommended disposition: office now. UCC as backup plan. Care advice provided, patient verbalizes understanding; denies any other questions or concerns; instructed to call back for any new or worsening symptoms. Writer provided warm transfer to [facility] for appointment scheduling     Attention Provider: Thank you for allowing me to participate in the care of your patient. The patient was connected to triage in response to information provided to the ECC/PSC. Please do not respond through this encounter as the response is not directed to a shared pool.         Reason for Disposition   MILD difficulty breathing (e.g., minimal/no SOB at rest, SOB with walking, pulse <100) and still present when not coughing    Protocols used: COUGH-ADULT-OH

## 2022-02-15 ENCOUNTER — OFFICE VISIT (OUTPATIENT)
Dept: FAMILY MEDICINE CLINIC | Age: 67
End: 2022-02-15
Payer: COMMERCIAL

## 2022-02-15 VITALS
OXYGEN SATURATION: 98 % | HEART RATE: 90 BPM | RESPIRATION RATE: 14 BRPM | BODY MASS INDEX: 49.13 KG/M2 | DIASTOLIC BLOOD PRESSURE: 80 MMHG | WEIGHT: 267 LBS | SYSTOLIC BLOOD PRESSURE: 138 MMHG | HEIGHT: 62 IN

## 2022-02-15 DIAGNOSIS — E11.9 TYPE 2 DIABETES MELLITUS WITHOUT COMPLICATION, WITHOUT LONG-TERM CURRENT USE OF INSULIN (HCC): Primary | ICD-10-CM

## 2022-02-15 DIAGNOSIS — I10 ESSENTIAL HYPERTENSION: ICD-10-CM

## 2022-02-15 PROCEDURE — 99214 OFFICE O/P EST MOD 30 MIN: CPT | Performed by: INTERNAL MEDICINE

## 2022-02-15 RX ORDER — GUAIFENESIN 600 MG/1
600 TABLET, EXTENDED RELEASE ORAL 2 TIMES DAILY
Qty: 30 TABLET | Refills: 0 | Status: SHIPPED | OUTPATIENT
Start: 2022-02-15 | End: 2022-03-02

## 2022-02-15 RX ORDER — BENZONATATE 200 MG/1
200 CAPSULE ORAL 3 TIMES DAILY PRN
Qty: 30 CAPSULE | Refills: 0 | Status: SHIPPED | OUTPATIENT
Start: 2022-02-15 | End: 2022-09-02 | Stop reason: SDUPTHER

## 2022-02-15 ASSESSMENT — ENCOUNTER SYMPTOMS
ABDOMINAL DISTENTION: 0
CHEST TIGHTNESS: 0
SHORTNESS OF BREATH: 0
SORE THROAT: 0
BACK PAIN: 0
BLOOD IN STOOL: 0
PHOTOPHOBIA: 0
COUGH: 0
EYE ITCHING: 0
EYE REDNESS: 0
EYE DISCHARGE: 0
SINUS PRESSURE: 0
CONSTIPATION: 0
DIARRHEA: 0
WHEEZING: 0
VOICE CHANGE: 0
VOMITING: 0
RHINORRHEA: 0
FACIAL SWELLING: 0
NAUSEA: 0
ABDOMINAL PAIN: 0
EYE PAIN: 0
APNEA: 0
COLOR CHANGE: 0
SINUS PAIN: 0
TROUBLE SWALLOWING: 0
RECTAL PAIN: 0

## 2022-02-15 ASSESSMENT — PATIENT HEALTH QUESTIONNAIRE - PHQ9
6. FEELING BAD ABOUT YOURSELF - OR THAT YOU ARE A FAILURE OR HAVE LET YOURSELF OR YOUR FAMILY DOWN: 0
7. TROUBLE CONCENTRATING ON THINGS, SUCH AS READING THE NEWSPAPER OR WATCHING TELEVISION: 0
10. IF YOU CHECKED OFF ANY PROBLEMS, HOW DIFFICULT HAVE THESE PROBLEMS MADE IT FOR YOU TO DO YOUR WORK, TAKE CARE OF THINGS AT HOME, OR GET ALONG WITH OTHER PEOPLE: 0
3. TROUBLE FALLING OR STAYING ASLEEP: 0
SUM OF ALL RESPONSES TO PHQ QUESTIONS 1-9: 0
SUM OF ALL RESPONSES TO PHQ QUESTIONS 1-9: 0
1. LITTLE INTEREST OR PLEASURE IN DOING THINGS: 0
8. MOVING OR SPEAKING SO SLOWLY THAT OTHER PEOPLE COULD HAVE NOTICED. OR THE OPPOSITE, BEING SO FIGETY OR RESTLESS THAT YOU HAVE BEEN MOVING AROUND A LOT MORE THAN USUAL: 0
4. FEELING TIRED OR HAVING LITTLE ENERGY: 0
SUM OF ALL RESPONSES TO PHQ9 QUESTIONS 1 & 2: 0
5. POOR APPETITE OR OVEREATING: 0
2. FEELING DOWN, DEPRESSED OR HOPELESS: 0
SUM OF ALL RESPONSES TO PHQ QUESTIONS 1-9: 0
SUM OF ALL RESPONSES TO PHQ QUESTIONS 1-9: 0
9. THOUGHTS THAT YOU WOULD BE BETTER OFF DEAD, OR OF HURTING YOURSELF: 0

## 2022-02-15 NOTE — PROGRESS NOTES
Subjective:      Patient ID: Roseline Hobbs is a 77 y.o. female Established patient, here for evaluation of the following chief complaint(s):  No chief complaint on file. Hypertension  Pertinent negatives include no chest pain, headaches, neck pain, palpitations or shortness of breath. 45-year-old female with history of essential hypertension, type 2 diabetes, GERD, obstructive sleep apnea, moderate persistent asthma presents for follow-up visit. Cough: The patient continues to experience a cough is productive of clear expectorant. She denies fevers chills weight loss or night sweats. Essential hypertension-the patient's blood pressure is under fair control at this time. DMII-A1C A7.4 8/7/2021. Blood sugars have been slightly more elevated as she recently was placed on prednisone. Skin rash: The patient previously reported  a rash in her right inguinal region that was pruritic. Is has been slow to respond to nystatin powder along. At present he denies polyuria,  Polydipsia, constitutional, sinus, visual, cardiopulmonary, urologic, gastrointestinal, immunologic/hematologic, musculoskeletal, neurologic,dermatologic, or psychiatric complaints. Review of Systems   Constitutional: Negative for chills, diaphoresis, fatigue and fever. HENT: Negative for congestion, dental problem, drooling, ear discharge, ear pain, facial swelling, hearing loss, mouth sores, nosebleeds, postnasal drip, rhinorrhea, sinus pressure, sinus pain, sneezing, sore throat, tinnitus, trouble swallowing and voice change. Eyes: Negative for photophobia, pain, discharge, redness, itching and visual disturbance. Respiratory: Negative for apnea, cough, chest tightness, shortness of breath and wheezing. Cardiovascular: Negative for chest pain, palpitations and leg swelling.    Gastrointestinal: Negative for abdominal distention, abdominal pain, blood in stool, constipation, diarrhea, nausea, rectal pain and vomiting. Endocrine: Negative for cold intolerance, heat intolerance, polydipsia, polyphagia and polyuria. Genitourinary: Negative for decreased urine volume, difficulty urinating, dysuria, flank pain, frequency, genital sores, hematuria and urgency. Musculoskeletal: Negative for arthralgias, back pain, gait problem, joint swelling, myalgias, neck pain and neck stiffness. Skin: Negative for color change, rash and wound. Allergic/Immunologic: Negative for environmental allergies and food allergies. Neurological: Negative for dizziness, tremors, seizures, syncope, facial asymmetry, speech difficulty, weakness, light-headedness, numbness and headaches. Hematological: Negative for adenopathy. Does not bruise/bleed easily. Psychiatric/Behavioral: Negative for agitation, confusion, decreased concentration, hallucinations, self-injury, sleep disturbance and suicidal ideas. The patient is not nervous/anxious. Objective: There were no vitals taken for this visit. Physical Exam    Assessment:       Diagnosis Orders   1. Type 2 diabetes mellitus without complication, without long-term current use of insulin ([facility]           Plan:      Javier Cherry was seen today for established new doctor and diabetes. Diagnoses and all orders for this visit:  Cough likely attributable to bronchitis-trial of Mucinex. Consider Tessalon Perles if this is ineffective. Continue prednisone taper. Consider chest x-ray if symptoms persist.      Essential hypertension continue Norvasc 10 mg orally daily, lisinopril-hydrochlorothiazide 20-25 mg daily    Type 2 diabetes mellitus without complication, without long-term current use of insulin ([facility])-blood sugar suboptimal.  Metformin 1000 mg twice daily. Resume glyburide.     Gastroesophageal reflux disease without esophagitis-continue Protonix 40 mg orally daily    BUNNY (obstructive sleep apnea)    Moderate persistent asthma, unspecified whether complicated-continue    Skin rash  -Stable at this time. Continue nystatin powder               No follow-ups on file. On this date 02/15/22 I have spent 30 minutes reviewing previous notes, test results and face to face with the patient discussing the diagnosis and importance of compliance with the treatment plan. Rodrigo Richter MD    Please note, this report has been partially produced using speech recognition software  and may cause  and /or contain errors related to that system including grammar, punctuation and spelling as well as words and phrases that may seem inappropriate. If there are questions or concerns please feel free to contact me to clarify.

## 2022-02-28 RX ORDER — NYSTATIN 100000 [USP'U]/G
POWDER TOPICAL
Qty: 60 G | Refills: 3 | Status: SHIPPED | OUTPATIENT
Start: 2022-02-28 | End: 2022-04-19 | Stop reason: SDUPTHER

## 2022-03-10 PROBLEM — R05.9 COUGH: Status: RESOLVED | Noted: 2022-02-08 | Resolved: 2022-03-10

## 2022-04-19 ENCOUNTER — OFFICE VISIT (OUTPATIENT)
Dept: FAMILY MEDICINE CLINIC | Age: 67
End: 2022-04-19
Payer: COMMERCIAL

## 2022-04-19 VITALS
WEIGHT: 270 LBS | DIASTOLIC BLOOD PRESSURE: 82 MMHG | HEART RATE: 86 BPM | SYSTOLIC BLOOD PRESSURE: 134 MMHG | BODY MASS INDEX: 49.69 KG/M2 | RESPIRATION RATE: 14 BRPM | OXYGEN SATURATION: 98 % | HEIGHT: 62 IN

## 2022-04-19 DIAGNOSIS — B35.6 TINEA CRURIS: ICD-10-CM

## 2022-04-19 DIAGNOSIS — E11.9 TYPE 2 DIABETES MELLITUS WITHOUT COMPLICATION, WITHOUT LONG-TERM CURRENT USE OF INSULIN (HCC): Primary | ICD-10-CM

## 2022-04-19 LAB — HBA1C MFR BLD: 8.9 %

## 2022-04-19 PROCEDURE — 99214 OFFICE O/P EST MOD 30 MIN: CPT | Performed by: INTERNAL MEDICINE

## 2022-04-19 PROCEDURE — 3052F HG A1C>EQUAL 8.0%<EQUAL 9.0%: CPT | Performed by: INTERNAL MEDICINE

## 2022-04-19 PROCEDURE — 83036 HEMOGLOBIN GLYCOSYLATED A1C: CPT | Performed by: INTERNAL MEDICINE

## 2022-04-19 RX ORDER — NYSTATIN 100000 [USP'U]/G
POWDER TOPICAL
Qty: 60 G | Refills: 3 | Status: SHIPPED | OUTPATIENT
Start: 2022-04-19 | End: 2022-09-23 | Stop reason: SDUPTHER

## 2022-04-19 SDOH — ECONOMIC STABILITY: FOOD INSECURITY: WITHIN THE PAST 12 MONTHS, THE FOOD YOU BOUGHT JUST DIDN'T LAST AND YOU DIDN'T HAVE MONEY TO GET MORE.: NEVER TRUE

## 2022-04-19 SDOH — ECONOMIC STABILITY: FOOD INSECURITY: WITHIN THE PAST 12 MONTHS, YOU WORRIED THAT YOUR FOOD WOULD RUN OUT BEFORE YOU GOT MONEY TO BUY MORE.: NEVER TRUE

## 2022-04-19 ASSESSMENT — ENCOUNTER SYMPTOMS
CONSTIPATION: 0
CHEST TIGHTNESS: 0
EYE DISCHARGE: 0
APNEA: 0
ABDOMINAL PAIN: 0
RHINORRHEA: 0
DIARRHEA: 0
TROUBLE SWALLOWING: 0
COLOR CHANGE: 0
VOICE CHANGE: 0
BLOOD IN STOOL: 0
SINUS PRESSURE: 0
COUGH: 0
RECTAL PAIN: 0
FACIAL SWELLING: 0
EYE PAIN: 0
PHOTOPHOBIA: 0
VOMITING: 0
NAUSEA: 0
EYE ITCHING: 0
WHEEZING: 0
ABDOMINAL DISTENTION: 0
BACK PAIN: 0
SINUS PAIN: 0
SHORTNESS OF BREATH: 0
EYE REDNESS: 0
SORE THROAT: 0

## 2022-04-19 ASSESSMENT — SOCIAL DETERMINANTS OF HEALTH (SDOH): HOW HARD IS IT FOR YOU TO PAY FOR THE VERY BASICS LIKE FOOD, HOUSING, MEDICAL CARE, AND HEATING?: NOT HARD AT ALL

## 2022-04-19 NOTE — PATIENT INSTRUCTIONS
Patient Education        Learning About Carbohydrate (Carb) Counting and Eating Out When You Have Diabetes  Why plan your meals? Meal planning can be a key part of managing diabetes. Planning meals and snacks with the right balance of carbohydrate, protein, and fat can help you keep yourblood sugar at the target level you set with your doctor. You don't have to eat special foods. You can eat what your family eats, including sweets once in a while. But you do have to pay attention to how oftenyou eat and how much you eat of certain foods. You may want to work with a dietitian or a diabetes educator. They can give you tips and meal ideas and can answer your questions about meal planning. This health professional can also help you reach a healthy weight if that is one ofyour goals. What should you know about eating carbs? Managing the amount of carbohydrate (carbs) you eat is an important part ofhealthy meals when you have diabetes. Carbohydrate is found in many foods.  Learn which foods have carbs. And learn the amounts of carbs in different foods. ? Bread, cereal, pasta, and rice have about 15 grams of carbs in a serving. A serving is 1 slice of bread (1 ounce), ½ cup of cooked cereal, or 1/3 cup of cooked pasta or rice. ? Fruits have 15 grams of carbs in a serving. A serving is 1 small fresh fruit, such as an apple or orange; ½ of a banana; ½ cup of cooked or canned fruit; ½ cup of fruit juice; 1 cup of melon or raspberries; or 2 tablespoons of dried fruit. ? Milk and no-sugar-added yogurt have 15 grams of carbs in a serving. A serving is 1 cup of milk or 3/4 cup (6 oz) of no-sugar-added yogurt. ? Starchy vegetables have 15 grams of carbs in a serving. A serving is ½ cup of mashed potatoes or sweet potato; 1 cup winter squash; ½ of a small baked potato; ½ cup of cooked beans; or ½ cup cooked corn or green peas.    Learn how much carbs to eat each day and at each meal. A dietitian or certified diabetes educator can teach you how to keep track of the amount of carbs you eat. This is called carbohydrate counting.  If you are not sure how to count carbohydrate grams, use the plate method to plan meals. It is a quick way to make sure that you have a balanced meal. It also can help you manage the amount of carbohydrate you eat at meals. ? Divide your plate by types of foods. Put non-starchy vegetables on half the plate, meat or other protein food on one-quarter of the plate, and a grain or starchy vegetable in the final quarter of the plate. To this you can add a small piece of fruit and 1 cup of milk or yogurt, depending on how many carbs you are supposed to eat at a meal.   Try to eat about the same amount of carbs at each meal. Do not \"save up\" your daily allowance of carbs to eat at one meal.   Proteins have very little or no carbs. Examples of proteins are beef, chicken, turkey, fish, eggs, tofu, cheese, cottage cheese, and peanut butter. How can you eat out and still eat healthy?  Learn to estimate the serving sizes of foods that have carbohydrate. If you measure food at home, it will be easier to estimate the amount in a serving of restaurant food.  If the meal you order has too much carbohydrate (such as potatoes, corn, or baked beans), ask to have a low-carbohydrate food instead. Ask for a salad or non-starchy vegetables like broccoli, cauliflower, green beans, or peppers.  If you eat more carbohydrate at a meal than you had planned, take a walk or do other exercise. This will help lower your blood sugar. What are some tips for eating healthy?  Limit saturated fat, such as the fat from meat and dairy products. This is a healthy choice because people who have diabetes are at higher risk of heart disease. So choose lean cuts of meat and nonfat or low-fat dairy products. Use olive or canola oil instead of butter or shortening when cooking.  Don't skip meals.  Your blood sugar may drop too low if you skip meals and take insulin or certain medicines for diabetes.  Check with your doctor before you drink alcohol. Alcohol can cause your blood sugar to drop too low. Alcohol can also cause a bad reaction if you take certain diabetes medicines. Follow-up care is a key part of your treatment and safety. Be sure to make and go to all appointments, and call your doctor if you are having problems. It's also a good idea to know your test results and keep alist of the medicines you take. Where can you learn more? Go to https://[employer]. org and sign in to your [employer] account. Enter C361 in the [employer] box to learn more about \"Learning About Carbohydrate (Carb) Counting and Eating Out When You Have Diabetes. \"     If you do not have an account, please click on the \"Sign Up Now\" link. Current as of: September 8, 2021               Content Version: 13.2  © 5790-7789 Healthwise, Incorporated. Care instructions adapted under license by [facility] ([facility]). If you have questions about a medical condition or this instruction, always ask your healthcare professional. [place] [address] any warranty or liability for your use of this information.

## 2022-04-19 NOTE — PROGRESS NOTES
Subjective:      Patient ID: Em Martines is a 79 y.o. female Established patient, here for evaluation of the following chief complaint(s):  Chief Complaint   Patient presents with    Hypertension    Diabetes       Hypertension  Pertinent negatives include no chest pain, headaches, neck pain, palpitations or shortness of breath. 70-year-old female with history of essential hypertension, type 2 diabetes, GERD, obstructive sleep apnea, moderate persistent asthma presents for follow-up visit. Cough: The patient continues to experience a cough is productive of clear expectorant. She denies fevers chills weight loss or night sweats. Essential hypertension-the patient's blood pressure is under fair control at this time. DMII-A1C A7.4 8/7/2021. Blood sugars have been slightly more elevated as she recently was placed on prednisone. Skin rash: The patient previously reported  a rash in her right inguinal region that was pruritic. Is has been slow to respond to nystatin powder along. At present he denies polyuria,  Polydipsia, constitutional, sinus, visual, cardiopulmonary, urologic, gastrointestinal, immunologic/hematologic, musculoskeletal, neurologic,dermatologic, or psychiatric complaints. Review of Systems   Constitutional: Negative for chills, diaphoresis, fatigue and fever. HENT: Negative for congestion, dental problem, drooling, ear discharge, ear pain, facial swelling, hearing loss, mouth sores, nosebleeds, postnasal drip, rhinorrhea, sinus pressure, sinus pain, sneezing, sore throat, tinnitus, trouble swallowing and voice change. Eyes: Negative for photophobia, pain, discharge, redness, itching and visual disturbance. Respiratory: Negative for apnea, cough, chest tightness, shortness of breath and wheezing. Cardiovascular: Negative for chest pain, palpitations and leg swelling.    Gastrointestinal: Negative for abdominal distention, abdominal pain, blood in stool, constipation, diarrhea, nausea, rectal pain and vomiting. Endocrine: Negative for cold intolerance, heat intolerance, polydipsia, polyphagia and polyuria. Genitourinary: Negative for decreased urine volume, difficulty urinating, dysuria, flank pain, frequency, genital sores, hematuria and urgency. Musculoskeletal: Negative for arthralgias, back pain, gait problem, joint swelling, myalgias, neck pain and neck stiffness. Skin: Negative for color change, rash and wound. Allergic/Immunologic: Negative for environmental allergies and food allergies. Neurological: Negative for dizziness, tremors, seizures, syncope, facial asymmetry, speech difficulty, weakness, light-headedness, numbness and headaches. Hematological: Negative for adenopathy. Does not bruise/bleed easily. Psychiatric/Behavioral: Negative for agitation, confusion, decreased concentration, hallucinations, self-injury, sleep disturbance and suicidal ideas. The patient is not nervous/anxious. Objective:   /82   Pulse 86   Resp 14   Ht 5' 2\" (1.575 m)   Wt 270 lb (122.5 kg)   SpO2 98%   BMI 49.38 kg/m²     Physical Exam    Assessment:       Diagnosis Orders   1. Type 2 diabetes mellitus without complication, without long-term current use of insulin ([facility])  POCT glycosylated hemoglobin (Hb A1C)    Microalbumin / Creatinine Urine Ratio    Dulaglutide 0.75 MG/0.5ML SOPN   2. Tinea cruris  nystatin (MYCOSTATIN) 959601 UNIT/GM powder         Plan:      Marbella Mesa was seen today for established new doctor and diabetes. Diagnoses and all orders for this visit:  Cough likely attributable to bronchitis-trial of Mucinex. Consider Tessalon Perles if this is ineffective. Continue prednisone taper.   Consider chest x-ray if symptoms persist.      Essential hypertension continue Norvasc 10 mg orally daily, lisinopril-hydrochlorothiazide 20-25 mg daily    Type 2 diabetes mellitus without complication, without long-term current use of insulin (HCC)-blood sugar suboptimal.  Metformin 1000 mg twice daily. Resume glyburide. Gastroesophageal reflux disease without esophagitis-continue Protonix 40 mg orally daily    BUNNY (obstructive sleep apnea)    Moderate persistent asthma, unspecified whether complicated-continue    Skin rash  -Stable at this time. Continue nystatin powder               No follow-ups on file. On this date 04/20/22 I have spent 30 minutes reviewing previous notes, test results and face to face with the patient discussing the diagnosis and importance of compliance with the treatment plan. Geremias Marx MD    Please note, this report has been partially produced using speech recognition software  and may cause  and /or contain errors related to that system including grammar, punctuation and spelling as well as words and phrases that may seem inappropriate. If there are questions or concerns please feel free to contact me to clarify.

## 2022-04-20 ENCOUNTER — TELEPHONE (OUTPATIENT)
Dept: FAMILY MEDICINE CLINIC | Age: 67
End: 2022-04-20

## 2022-04-20 NOTE — TELEPHONE ENCOUNTER
----- Message from Randa Connor sent at 4/20/2022 10:01 AM EDT -----  Subject: Medication Problem    QUESTIONS  Name of Medication? metFORMIN (GLUCOPHAGE) 500 MG tablet  Patient-reported dosage and instructions? 500mg   What question or problem do you have with the medication? pt is asking if   she needs to keep taking this [facility]  [facility]? [facility] [address] [facility]  Pharmacy phone number (if available)? [phone]  Additional Information for Provider?   ---------------------------------------------------------------------------  --------------  [address]  What is the best way for the office to contact you? OK to leave message on   voicemail  Preferred Call Back Phone Number? [phone]  ---------------------------------------------------------------------------  --------------  SCRIPT ANSWERS  Relationship to Patient?  Self

## 2022-05-02 NOTE — TELEPHONE ENCOUNTER
Patient called stating due to not meeting her out of pocket deducible, this medication will cost her around $1,000 every 3 months. Patient stated she can not afford that and is inquiring about her options.

## 2022-05-18 NOTE — PROGRESS NOTES
Subjective:      Patient ID: Molly Olivo is a 77 y.o. female Established patient, here for evaluation of the following chief complaint(s):  Chief Complaint   Patient presents with   [employer] Established New Doctor    Diabetes     last a1C 7.4       HPI     43-year-old female with history of essential hypertension, type 2 diabetes, GERD, obstructive sleep apnea, moderate persistent asthma presents to establish continuity with me as her primary doctor. Skin rash: The patient reports that she has noted a rash in her right inguinal region that is pruritic. Is been slow to respond to nystatin powder. Essential hypertension-elevated today         DMII-A1C A7.4 8/7/2021          At present he denies polyuria,  Polydipsia, constitutional, sinus, visual, cardiopulmonary, urologic, gastrointestinal, immunologic/hematologic, musculoskeletal, neurologic,dermatologic, or psychiatric complaints. Review of Systems   Constitutional: Negative for chills, diaphoresis, fatigue and fever. HENT: Negative for congestion, dental problem, drooling, ear discharge, ear pain, facial swelling, hearing loss, mouth sores, nosebleeds, postnasal drip, rhinorrhea, sinus pressure, sinus pain, sneezing, sore throat, tinnitus, trouble swallowing and voice change. Eyes: Negative for photophobia, pain, discharge, redness, itching and visual disturbance. Respiratory: Negative for apnea, cough, chest tightness, shortness of breath and wheezing. Cardiovascular: Negative for chest pain, palpitations and leg swelling. Gastrointestinal: Negative for abdominal distention, abdominal pain, blood in stool, constipation, diarrhea, nausea, rectal pain and vomiting. Endocrine: Negative for cold intolerance, heat intolerance, polydipsia, polyphagia and polyuria. Genitourinary: Negative for decreased urine volume, difficulty urinating, dysuria, flank pain, frequency, genital sores, hematuria and urgency.    Musculoskeletal: Negative for normal gait and station mouth daily  -     blood glucose monitor kit and supplies; Dispense sufficient amount for indicated testing frequency plus additional to accommodate PRN testing needs. Dispense all needed supplies to include: monitor, strips, lancing device, lancets, control solutions, alcohol swabs. -     Lancets MISC; DX: diabetes mellitus. Use 1-3 time(s) daily - Ok to substitute per insurance (1 each = 1 box)  -     blood glucose monitor strips; Test 3 times a day & as needed for symptoms of irregular blood glucose. Dispense sufficient amount for indicated testing frequency plus additional to accommodate PRN testing needs. Return in about 7 weeks (around 11/16/2021). On this date 09/28/21 I have spent 30 minutes reviewing previous notes, test results and face to face with the patient discussing the diagnosis and importance of compliance with the treatment plan. Marlon Mcmahon MD    Please note, this report has been partially produced using speech recognition software  and may cause  and /or contain errors related to that system including grammar, punctuation and spelling as well as words and phrases that may seem inappropriate. If there are questions or concerns please feel free to contact me to clarify.

## 2022-05-26 ENCOUNTER — TELEPHONE (OUTPATIENT)
Dept: FAMILY MEDICINE CLINIC | Age: 67
End: 2022-05-26

## 2022-05-27 NOTE — TELEPHONE ENCOUNTER
I spoke to the patient and explained that the paperwork is being worked on and should be faxed today

## 2022-05-31 ENCOUNTER — TELEPHONE (OUTPATIENT)
Dept: FAMILY MEDICINE CLINIC | Age: 67
End: 2022-05-31

## 2022-05-31 NOTE — TELEPHONE ENCOUNTER
I spoke to the patient papa turner spoke to me and stated they need the patient to call them regarding her insurance.  So I informed her and gave her the phone number to call

## 2022-06-30 ENCOUNTER — HOSPITAL ENCOUNTER (OUTPATIENT)
Dept: WOMENS IMAGING | Age: 67
Discharge: HOME | End: 2022-07-02
Payer: COMMERCIAL

## 2022-06-30 ENCOUNTER — APPOINTMENT (OUTPATIENT)
Dept: ULTRASOUND IMAGING | Age: 67
End: 2022-06-30
Payer: COMMERCIAL

## 2022-06-30 VITALS — HEIGHT: 62 IN | WEIGHT: 270 LBS | BODY MASS INDEX: 49.69 KG/M2

## 2022-06-30 DIAGNOSIS — Z85.3 HX OF BREAST CANCER: ICD-10-CM

## 2022-06-30 PROCEDURE — G0279 TOMOSYNTHESIS, MAMMO: HCPCS

## 2022-08-25 ENCOUNTER — TELEPHONE (OUTPATIENT)
Dept: FAMILY MEDICINE CLINIC | Age: 67
End: 2022-08-25

## 2022-08-25 NOTE — TELEPHONE ENCOUNTER
LMOM for patient to cb so they can get scheduled for their medicare annual wellness visit which can be virtual with Jas Beaulieu or Dr. Roderick Gonzalez.

## 2022-08-26 ENCOUNTER — TELEPHONE (OUTPATIENT)
Dept: FAMILY MEDICINE CLINIC | Age: 67
End: 2022-08-26

## 2022-08-26 NOTE — TELEPHONE ENCOUNTER
Attempted to contact the patient to let her know that it did not matter that she only had part A and to schedule a medicare annual wellness appointment - no answer - racheal to call the office to schedule

## 2022-09-02 ENCOUNTER — OFFICE VISIT (OUTPATIENT)
Dept: FAMILY MEDICINE CLINIC | Age: 67
End: 2022-09-02
Payer: COMMERCIAL

## 2022-09-02 VITALS
OXYGEN SATURATION: 94 % | HEART RATE: 104 BPM | BODY MASS INDEX: 47.84 KG/M2 | DIASTOLIC BLOOD PRESSURE: 78 MMHG | WEIGHT: 260 LBS | TEMPERATURE: 97.9 F | SYSTOLIC BLOOD PRESSURE: 120 MMHG | HEIGHT: 62 IN

## 2022-09-02 DIAGNOSIS — Z20.822 ENCOUNTER FOR LABORATORY TESTING FOR COVID-19 VIRUS: ICD-10-CM

## 2022-09-02 DIAGNOSIS — J40 BRONCHITIS: Primary | ICD-10-CM

## 2022-09-02 DIAGNOSIS — R05.9 COUGH: ICD-10-CM

## 2022-09-02 LAB
Lab: NORMAL
PERFORMING INSTRUMENT: NORMAL
QC PASS/FAIL: NORMAL
SARS-COV-2, POC: NORMAL

## 2022-09-02 PROCEDURE — 87426 SARSCOV CORONAVIRUS AG IA: CPT | Performed by: NURSE PRACTITIONER

## 2022-09-02 PROCEDURE — 1123F ACP DISCUSS/DSCN MKR DOCD: CPT | Performed by: NURSE PRACTITIONER

## 2022-09-02 PROCEDURE — 99213 OFFICE O/P EST LOW 20 MIN: CPT | Performed by: NURSE PRACTITIONER

## 2022-09-02 RX ORDER — AZITHROMYCIN 250 MG/1
250 TABLET, FILM COATED ORAL SEE ADMIN INSTRUCTIONS
Qty: 6 TABLET | Refills: 0 | Status: ON HOLD | OUTPATIENT
Start: 2022-09-02 | End: 2022-09-14 | Stop reason: HOSPADM

## 2022-09-02 RX ORDER — PREDNISONE 10 MG/1
TABLET ORAL
Qty: 45 TABLET | Refills: 0 | Status: ON HOLD | OUTPATIENT
Start: 2022-09-02 | End: 2022-09-14 | Stop reason: HOSPADM

## 2022-09-02 RX ORDER — BENZONATATE 200 MG/1
200 CAPSULE ORAL 3 TIMES DAILY PRN
Qty: 30 CAPSULE | Refills: 0 | Status: ON HOLD | OUTPATIENT
Start: 2022-09-02 | End: 2022-09-14 | Stop reason: HOSPADM

## 2022-09-02 ASSESSMENT — ENCOUNTER SYMPTOMS
RHINORRHEA: 1
SHORTNESS OF BREATH: 1
DIARRHEA: 1
VOMITING: 0
SORE THROAT: 0
COUGH: 1
NAUSEA: 0
WHEEZING: 1

## 2022-09-02 NOTE — PROGRESS NOTES
Subjective:      Patient ID: Yazmin Ortega is a 79 y.o. female who presents today for:  Chief Complaint   Patient presents with    Congestion    Cough     Patient states coughing and congestion started last night. Cough  Associated symptoms include headaches, rhinorrhea, shortness of breath and wheezing. Pertinent negatives include no chills, fever, myalgias, rash or sore throat.        She started getting sick last night   Someone at work is sick  This person Had fever/chills for 3 days and coughing all over her work   She was told he tested negative for covid   Coughing   She gets this every year and needs steroid, antibiotic and tessalon pearls   She has chronic SOB from asthma   She feels her SOB feels a little stronger then average   Headaches the last three days but she gets HA  She has diarrhea but has colitis   She has an inhaler and a nebulizer           Past Medical History:   Diagnosis Date    Allergic rhinitis     Asthma     [facility] ([facility]     invasive ductal carcinoma left breast    CAD (coronary artery disease)     Cancer ([facility] 2014    Invasive ductal left breast    Colon polyps     Diabetes ([facility]     Fibromyalgia     GERD (gastroesophageal reflux disease)     HA (headache)     Herpes simplex     Nose    History of therapeutic radiation     HTN (hypertension)     Hx antineoplastic chemo     Obesity     Senile osteoporosis     Sleep apnea      Past Surgical History:   Procedure Laterality Date    BARIATRIC SURGERY  2004    BREAST BIOPSY Left 14    BREAST BIOPSY Left 12/10/14    BREAST LUMPECTOMY Left     with left SNB  invasive ductal carcinoma    BREAST LUMPECTOMY Left     BREAST LUMPECTOMY Left      SECTION      x4    CHOLECYSTECTOMY  2007    GASTRIC BYPASS SURGERY      HYSTERECTOMY (CERVIX STATUS UNKNOWN)      KNEE ARTHROPLASTY Right     knee reconstruction    KNEE SURGERY      Right knee    LYMPH NODE BIOPSY Left 14    ANDREW AND BSO (CERVIX REMOVED)      Dr. Henry Roberts  2/18/15    w/bx,polypectomy     UPPER GASTROINTESTINAL ENDOSCOPY N/A 9/28/2020    EGD DIAGNOSTIC ONLY performed by Feli Valentin MD at [address]       Social History     Socioeconomic History    Marital status:      Spouse name: Not on file    Number of children: Not on file    Years of education: Not on file    Highest education level: Not on file   Occupational History    Not on file   Tobacco Use    Smoking status: Never    Smokeless tobacco: Never   Vaping Use    Vaping Use: Never used   Substance and Sexual Activity    Alcohol use: No    Drug use: No    Sexual activity: Not on file   Other Topics Concern    Not on file   Social History Narrative    Not on file     Social Determinants of Health     Financial Resource Strain: Low Risk     Difficulty of Paying Living Expenses: Not hard at all   Food Insecurity: No Food Insecurity    Worried About Running Out of Food in the Last Year: Never true    Ran Out of Food in the Last Year: Never true   Transportation Needs: Not on file   Physical Activity: Not on file   Stress: Not on file   Social Connections: Not on file   Intimate Partner Violence: Not on file   Housing Stability: Not on file     Family History   Problem Relation Age of Onset    Cancer Father         melanoma    Prostate Cancer Father     Cancer Sister         Melanoma    Cancer Other         Throat    Breast Cancer Maternal Aunt     Colon Cancer Neg Hx      Allergies   Allergen Reactions    Aspirin      History of gastric ulcers    Codeine      Cramps    Imitrex [Sumatriptan]     Theophyllines     Diflucan [Fluconazole] Rash    Nizoral [Ketoconazole] Rash    Zoloft Rash     Current Outpatient Medications   Medication Sig Dispense Refill    benzonatate (TESSALON) 200 MG capsule Take 1 capsule by mouth 3 times daily as needed for Cough 30 capsule 0    predniSONE (DELTASONE) 10 MG tablet Take 5 tabs daily x 3 days, 4 tabs daily x 3 days, 3 tabs daily x 3 days, 2 tabs daily x 3 days, 1 tab daily x 3 days 45 tablet 0    azithromycin (ZITHROMAX) 250 MG tablet Take 1 tablet by mouth See Admin Instructions for 5 days 500mg on day 1 followed by 250mg on days 2 - 5 6 tablet 0    nystatin (MYCOSTATIN) 072196 UNIT/GM powder Apply 3 times daily. 60 g 3    Dulaglutide 0.75 MG/0.5ML SOPN Inject 0.75 mg into the skin once a week 4 pen 3    albuterol (PROVENTIL) (5 MG/ML) 0.5% nebulizer solution Take 1 mL by nebulization 4 times daily as needed for Wheezing 120 each 3    amLODIPine (NORVASC) 10 MG tablet Take 1 tablet by mouth once daily 30 tablet 5    pantoprazole (PROTONIX) 40 MG tablet Take 1 tablet by mouth once daily 30 tablet 5    hydrocortisone 1 % cream       ketoconazole (NIZORAL) 2 % cream       Blood Glucose Monitoring Suppl (ONE TOUCH ULTRA 2) w/Device KIT       cyanocobalamin 1000 MCG/ML injection INJECT 1 ML SUBCUTANEOUSLY ONCE EVERY MONTH      ULTICARE INSULIN SAFETY SYR 29G X 1/2\" 1 ML MISC USE AS DIRECTED      nystatin (MYCOSTATIN) 599957 UNIT/GM cream Apply topically 2 times daily. 30 g 3    lisinopril-hydroCHLOROthiazide (PRINZIDE;ZESTORETIC) 20-25 MG per tablet Take 1 tablet by mouth daily 30 tablet 0    blood glucose monitor kit and supplies Dispense sufficient amount for indicated testing frequency plus additional to accommodate PRN testing needs. Dispense all needed supplies to include: monitor, strips, lancing device, lancets, control solutions, alcohol swabs. 1 kit 0    Lancets MISC DX: diabetes mellitus. Use 1-3 time(s) daily - Ok to substitute per insurance (1 each = 1 box) 1 each 5    blood glucose monitor strips Test 3 times a day & as needed for symptoms of irregular blood glucose. Dispense sufficient amount for indicated testing frequency plus additional to accommodate PRN testing needs.  100 strip 3    nystatin (MYCOSTATIN) 109478 UNIT/GM powder Apply 3 times daily. 45 g 5    vitamin D (ERGOCALCIFEROL) 1.25 MG (97496 UT) CAPS capsule Take 1 capsule by mouth once a week 12 capsule 1    glyBURIDE (DIABETA) 2.5 MG tablet Take 1 tablet by mouth daily (with breakfast) 30 tablet 5    cholestyramine (QUESTRAN) 4 g packet Take 1 packet by mouth 2 times daily 180 packet 5    dicyclomine (BENTYL) 10 MG capsule Take 1 capsule by mouth 2 times daily (before meals) 120 capsule 3    metFORMIN (GLUCOPHAGE) 500 MG tablet Take 2 tablets by mouth 2 times daily (with meals) 360 tablet 3    Respiratory Therapy Supplies (FULL KIT NEBULIZER SET) MISC 1 Units by Does not apply route 4 times daily 1 each 1    albuterol sulfate  (90 Base) MCG/ACT inhaler Inhale 2 puffs into the lungs 4 times daily as needed for Wheezing 3 Inhaler 3    albuterol (PROVENTIL) (2.5 MG/3ML) 0.083% nebulizer solution Take 3 mLs by nebulization every 6 hours as needed for Wheezing 300 vial 3    CPAP Machine MISC        No current facility-administered medications for this visit. Review of Systems   Constitutional:  Negative for appetite change, chills and fever. HENT:  Positive for rhinorrhea. Negative for congestion and sore throat. Respiratory:  Positive for cough, shortness of breath and wheezing. Gastrointestinal:  Positive for diarrhea. Negative for nausea and vomiting. Musculoskeletal:  Negative for myalgias. Skin:  Negative for rash. Neurological:  Positive for headaches. Negative for dizziness and light-headedness. Psychiatric/Behavioral:  Negative for agitation, confusion and hallucinations. Objective:   /78   Pulse (!) 104   Temp 97.9 °F (36.6 °C) (Infrared)   Ht 5' 2\" (1.575 m)   Wt 260 lb (117.9 kg)   SpO2 94%   BMI 47.55 kg/m²     Physical Exam  Vitals reviewed. Constitutional:       Appearance: Normal appearance. She is obese. HENT:      Head: Normocephalic and atraumatic.       Right Ear: Hearing, tympanic membrane, ear canal and external ear normal. No middle ear effusion. No foreign body. Tympanic membrane is not injected, erythematous or bulging. Left Ear: Hearing, tympanic membrane, ear canal and external ear normal.  No middle ear effusion. No foreign body. Tympanic membrane is not injected, erythematous or bulging. Nose: Nose normal. No congestion or rhinorrhea. Right Nostril: No foreign body. Left Nostril: No foreign body. Right Turbinates: Not enlarged. Left Turbinates: Not enlarged. Right Sinus: No maxillary sinus tenderness or frontal sinus tenderness. Left Sinus: No maxillary sinus tenderness or frontal sinus tenderness. Mouth/Throat:      Lips: Pink. Mouth: Mucous membranes are moist.      Pharynx: Oropharynx is clear. Uvula midline. No pharyngeal swelling, oropharyngeal exudate, posterior oropharyngeal erythema or uvula swelling. Tonsils: No tonsillar exudate or tonsillar abscesses. Eyes:      General: Lids are normal.         Right eye: No foreign body. Left eye: No foreign body. Extraocular Movements: Extraocular movements intact. Conjunctiva/sclera: Conjunctivae normal.   Cardiovascular:      Rate and Rhythm: Normal rate and regular rhythm. Heart sounds: Normal heart sounds. Pulmonary:      Effort: Pulmonary effort is normal. No tachypnea, accessory muscle usage or respiratory distress. Breath sounds: Normal breath sounds. No wheezing or rhonchi. Comments: Audible wheeze when shes coughing, very frequent cough during visit   Chest:   Breasts:     Right: No supraclavicular adenopathy. Left: No supraclavicular adenopathy. Abdominal:      Tenderness: There is no abdominal tenderness. There is no guarding. Musculoskeletal:         General: Normal range of motion. Cervical back: Normal range of motion. Lymphadenopathy:      Cervical: No cervical adenopathy.       Upper Body:      Right upper body: No supraclavicular adenopathy. Left upper body: No supraclavicular adenopathy. Skin:     General: Skin is warm and dry. Capillary Refill: Capillary refill takes less than 2 seconds. Neurological:      General: No focal deficit present. Mental Status: She is alert and oriented to person, place, and time. Cranial Nerves: Cranial nerves are intact. Gait: Gait is intact. Psychiatric:         Mood and Affect: Mood normal.         Speech: Speech normal.         Behavior: Behavior normal. Behavior is cooperative. Thought Content: Thought content normal.         Judgment: Judgment normal.       Assessment:       Diagnosis Orders   1. Bronchitis  predniSONE (DELTASONE) 10 MG tablet    azithromycin (ZITHROMAX) 250 MG tablet      2. Cough  POCT COVID-19, Antigen    benzonatate (TESSALON) 200 MG capsule    predniSONE (DELTASONE) 10 MG tablet    POCT COVID-19, Antigen      3. Encounter for laboratory testing for COVID-19 virus          No results found for this visit on 09/02/22. Plan:   Should come back Sunday to retest for covid. Will place an order   [address]  was seen today for congestion and cough. Diagnoses and all orders for this visit:    Bronchitis  -     predniSONE (DELTASONE) 10 MG tablet; Take 5 tabs daily x 3 days, 4 tabs daily x 3 days, 3 tabs daily x 3 days, 2 tabs daily x 3 days, 1 tab daily x 3 days  -     azithromycin (ZITHROMAX) 250 MG tablet; Take 1 tablet by mouth See Admin Instructions for 5 days 500mg on day 1 followed by 250mg on days 2 - 5    Cough  -     POCT COVID-19, Antigen  -     benzonatate (TESSALON) 200 MG capsule; Take 1 capsule by mouth 3 times daily as needed for Cough  -     predniSONE (DELTASONE) 10 MG tablet; Take 5 tabs daily x 3 days, 4 tabs daily x 3 days, 3 tabs daily x 3 days, 2 tabs daily x 3 days, 1 tab daily x 3 days  -     POCT COVID-19, Antigen;  Future    Encounter for laboratory testing for COVID-19 virus    Orders Placed This Encounter Procedures    POCT COVID-19, Antigen     Order Specific Question:   Is this test for diagnosis or screening? Answer:   Diagnosis of ill patient     Order Specific Question:   Symptomatic for COVID-19 as defined by CDC? Answer:   Yes     Order Specific Question:   Date of Symptom Onset     Answer:   9/1/2022     Order Specific Question:   Hospitalized for COVID-19? Answer:   No     Order Specific Question:   Admitted to ICU for COVID-19? Answer:   No     Order Specific Question:   Employed in healthcare setting? Answer:   No     Order Specific Question:   Resident in a congregate (group) care setting? Answer:   No     Order Specific Question:   Pregnant? Answer:   No     Order Specific Question:   Previously tested for COVID-19? Answer:   Unknown    POCT COVID-19, Antigen     Standing Status:   Future     Standing Expiration Date:   9/2/2023     Order Specific Question:   Is this test for diagnosis or screening? Answer:   Diagnosis of ill patient     Order Specific Question:   Symptomatic for COVID-19 as defined by CDC? Answer:   Yes     Order Specific Question:   Date of Symptom Onset     Answer:   9/2/2022     Order Specific Question:   Hospitalized for COVID-19? Answer:   No     Order Specific Question:   Admitted to ICU for COVID-19? Answer:   No     Order Specific Question:   Employed in healthcare setting? Answer:   No     Order Specific Question:   Resident in a congregate (group) care setting? Answer:   No     Order Specific Question:   Pregnant? Answer:   No     Order Specific Question:   Previously tested for COVID-19?      Answer:   Yes     Orders Placed This Encounter   Medications    benzonatate (TESSALON) 200 MG capsule     Sig: Take 1 capsule by mouth 3 times daily as needed for Cough     Dispense:  30 capsule     Refill:  0    predniSONE (DELTASONE) 10 MG tablet     Sig: Take 5 tabs daily x 3 days, 4 tabs daily x 3 days, 3 tabs daily x 3 days, 2 tabs daily x 3 days, 1 tab daily x 3 days     Dispense:  45 tablet     Refill:  0    azithromycin (ZITHROMAX) 250 MG tablet     Sig: Take 1 tablet by mouth See Admin Instructions for 5 days 500mg on day 1 followed by 250mg on days 2 - 5     Dispense:  6 tablet     Refill:  0       Medications Discontinued During This Encounter   Medication Reason    predniSONE (DELTASONE) 10 MG tablet Therapy completed    benzonatate (TESSALON) 200 MG capsule REORDER     Return if symptoms worsen or fail to improve. Reviewed with the patient/family: current clinical status & medications. Side effects of the medication prescribed today, as well as treatment plan/rationale and result expectations have been discussed with the patient/family who expresses understanding. Patient will be discharged home in stable condition. Follow up with PCP to evaluate treatment results or return if symptoms worsen or fail to improve. Discussed signs and symptoms which require immediate follow-up in ED/call to 911. Understanding verbalized. I have reviewed the patient's medical history in detail and updated the computerized patient record.     Joshua Rivera, YAKELIN - CNP

## 2022-09-04 ENCOUNTER — OFFICE VISIT (OUTPATIENT)
Dept: FAMILY MEDICINE CLINIC | Age: 67
End: 2022-09-04
Payer: COMMERCIAL

## 2022-09-04 VITALS
DIASTOLIC BLOOD PRESSURE: 80 MMHG | HEIGHT: 62 IN | OXYGEN SATURATION: 98 % | WEIGHT: 260 LBS | HEART RATE: 74 BPM | BODY MASS INDEX: 47.84 KG/M2 | TEMPERATURE: 97.7 F | SYSTOLIC BLOOD PRESSURE: 130 MMHG

## 2022-09-04 DIAGNOSIS — J20.9 ACUTE BRONCHITIS, UNSPECIFIED ORGANISM: Primary | ICD-10-CM

## 2022-09-04 DIAGNOSIS — R05.9 COUGH: ICD-10-CM

## 2022-09-04 LAB
Lab: NORMAL
PERFORMING INSTRUMENT: NORMAL
QC PASS/FAIL: NORMAL
SARS-COV-2, POC: NORMAL

## 2022-09-04 PROCEDURE — 1123F ACP DISCUSS/DSCN MKR DOCD: CPT | Performed by: PHYSICIAN ASSISTANT

## 2022-09-04 PROCEDURE — 99213 OFFICE O/P EST LOW 20 MIN: CPT | Performed by: PHYSICIAN ASSISTANT

## 2022-09-04 PROCEDURE — 87426 SARSCOV CORONAVIRUS AG IA: CPT | Performed by: NURSE PRACTITIONER

## 2022-09-04 RX ORDER — DOXYCYCLINE HYCLATE 100 MG
100 TABLET ORAL 2 TIMES DAILY
Qty: 14 TABLET | Refills: 0 | Status: ON HOLD | OUTPATIENT
Start: 2022-09-04 | End: 2022-09-14 | Stop reason: HOSPADM

## 2022-09-04 RX ORDER — FLUTICASONE PROPIONATE 50 MCG
1 SPRAY, SUSPENSION (ML) NASAL DAILY
Qty: 32 G | Refills: 1 | Status: SHIPPED | OUTPATIENT
Start: 2022-09-04

## 2022-09-04 RX ORDER — GUAIFENESIN 600 MG/1
600 TABLET, EXTENDED RELEASE ORAL 2 TIMES DAILY
Qty: 30 TABLET | Refills: 0 | Status: SHIPPED | OUTPATIENT
Start: 2022-09-04 | End: 2022-09-19

## 2022-09-04 ASSESSMENT — ENCOUNTER SYMPTOMS
SHORTNESS OF BREATH: 0
NAUSEA: 0
COUGH: 1
BLOOD IN STOOL: 0
EYE REDNESS: 0
PHOTOPHOBIA: 0
EYE PAIN: 0
VOMITING: 0
EYE DISCHARGE: 0

## 2022-09-04 NOTE — PROGRESS NOTES
Subjective:      Patient ID: Jd Alan is a 79 y.o. female who presents today for:  Chief Complaint   Patient presents with    Concern For COVID-19     Patient states been sick for 1 week. Pt returned for f/u COVID testing as she has persistent harsh, moist cough, intermittent FC.  COVID is again negative this morning. She has taken azithromycin as prescribed but concerned it is not working.   She denies SOB, cp, palp      Past Medical History:   Diagnosis Date    Allergic rhinitis     Asthma     Breast cancer ([facility]     invasive ductal carcinoma left breast    CAD (coronary artery disease)     Cancer ([facility] 2014    Invasive ductal left breast    Colon polyps     [facility]     Fibromyalgia     GERD (gastroesophageal reflux disease)     HA (headache)     Herpes simplex     Nose    History of therapeutic radiation     HTN (hypertension)     Hx antineoplastic chemo     Obesity     Senile osteoporosis     Sleep apnea      Past Surgical History:   Procedure Laterality Date    BARIATRIC SURGERY  2004    BREAST BIOPSY Left 14    BREAST BIOPSY Left 12/10/14    BREAST LUMPECTOMY Left     with left SNB  invasive ductal carcinoma    BREAST LUMPECTOMY Left     BREAST LUMPECTOMY Left      SECTION      x4    CHOLECYSTECTOMY  2007    GASTRIC BYPASS SURGERY  2003    HYSTERECTOMY (CERVIX STATUS UNKNOWN)      KNEE ARTHROPLASTY Right     knee reconstruction    KNEE SURGERY      Right knee    LYMPH NODE BIOPSY Left 14    ANDREW AND BSO (CERVIX REMOVED)      Dr. Alida Ley ENDOSCOPY  2/18/15    w/bx,polypectomy     UPPER GASTROINTESTINAL ENDOSCOPY N/A 2020    EGD DIAGNOSTIC ONLY performed by Tia Valerio MD at [address]       Family History   Problem Relation Age of Onset    Cancer Father         melanoma    Prostate Cancer Father     Cancer Sister         Melanoma Cancer Other         Throat    Breast Cancer Maternal Aunt     Colon Cancer Neg Hx      Social History     Socioeconomic History    Marital status:      Spouse name: Not on file    Number of children: Not on file    Years of education: Not on file    Highest education level: Not on file   Occupational History    Not on file   Tobacco Use    Smoking status: Never    Smokeless tobacco: Never   Vaping Use    Vaping Use: Never used   Substance and Sexual Activity    Alcohol use: No    Drug use: No    Sexual activity: Not on file   Other Topics Concern    Not on file   Social History Narrative    Not on file     Social Determinants of Health     Financial Resource Strain: Low Risk     Difficulty of Paying Living Expenses: Not hard at all   Food Insecurity: No Food Insecurity    Worried About Running Out of Food in the Last Year: Never true    Ran Out of Food in the Last Year: Never true   Transportation Needs: Not on file   Physical Activity: Not on file   Stress: Not on file   Social Connections: Not on file   Intimate Partner Violence: Not on file   Housing Stability: Not on file     Current Outpatient Medications on File Prior to Visit   Medication Sig Dispense Refill    benzonatate (TESSALON) 200 MG capsule Take 1 capsule by mouth 3 times daily as needed for Cough 30 capsule 0    predniSONE (DELTASONE) 10 MG tablet Take 5 tabs daily x 3 days, 4 tabs daily x 3 days, 3 tabs daily x 3 days, 2 tabs daily x 3 days, 1 tab daily x 3 days 45 tablet 0    azithromycin (ZITHROMAX) 250 MG tablet Take 1 tablet by mouth See Admin Instructions for 5 days 500mg on day 1 followed by 250mg on days 2 - 5 6 tablet 0    nystatin (MYCOSTATIN) 204302 UNIT/GM powder Apply 3 times daily.  60 g 3    Dulaglutide 0.75 MG/0.5ML SOPN Inject 0.75 mg into the skin once a week 4 pen 3    albuterol (PROVENTIL) (5 MG/ML) 0.5% nebulizer solution Take 1 mL by nebulization 4 times daily as needed for Wheezing 120 each 3    amLODIPine (NORVASC) 10 MG tablet Take 1 tablet by mouth once daily 30 tablet 5    pantoprazole (PROTONIX) 40 MG tablet Take 1 tablet by mouth once daily 30 tablet 5    hydrocortisone 1 % cream       ketoconazole (NIZORAL) 2 % cream       Blood Glucose Monitoring Suppl (ONE TOUCH ULTRA 2) w/Device KIT       cyanocobalamin 1000 MCG/ML injection INJECT 1 ML SUBCUTANEOUSLY ONCE EVERY MONTH      ULTICARE INSULIN SAFETY SYR 29G X 1/2\" 1 ML MISC USE AS DIRECTED      nystatin (MYCOSTATIN) 861893 UNIT/GM cream Apply topically 2 times daily. 30 g 3    lisinopril-hydroCHLOROthiazide (PRINZIDE;ZESTORETIC) 20-25 MG per tablet Take 1 tablet by mouth daily 30 tablet 0    blood glucose monitor kit and supplies Dispense sufficient amount for indicated testing frequency plus additional to accommodate PRN testing needs. Dispense all needed supplies to include: monitor, strips, lancing device, lancets, control solutions, alcohol swabs. 1 kit 0    Lancets MISC DX: diabetes mellitus. Use 1-3 time(s) daily - Ok to substitute per insurance (1 each = 1 box) 1 each 5    blood glucose monitor strips Test 3 times a day & as needed for symptoms of irregular blood glucose. Dispense sufficient amount for indicated testing frequency plus additional to accommodate PRN testing needs. 100 strip 3    nystatin (MYCOSTATIN) 407732 UNIT/GM powder Apply 3 times daily.  45 g 5    vitamin D (ERGOCALCIFEROL) 1.25 MG (21457 UT) CAPS capsule Take 1 capsule by mouth once a week 12 capsule 1    glyBURIDE (DIABETA) 2.5 MG tablet Take 1 tablet by mouth daily (with breakfast) 30 tablet 5    cholestyramine (QUESTRAN) 4 g packet Take 1 packet by mouth 2 times daily 180 packet 5    dicyclomine (BENTYL) 10 MG capsule Take 1 capsule by mouth 2 times daily (before meals) 120 capsule 3    metFORMIN (GLUCOPHAGE) 500 MG tablet Take 2 tablets by mouth 2 times daily (with meals) 360 tablet 3    Respiratory Therapy Supplies (FULL KIT NEBULIZER SET) MISC 1 Units by Does not apply route 4 times daily 1 each 1    albuterol sulfate  (90 Base) MCG/ACT inhaler Inhale 2 puffs into the lungs 4 times daily as needed for Wheezing 3 Inhaler 3    albuterol (PROVENTIL) (2.5 MG/3ML) 0.083% nebulizer solution Take 3 mLs by nebulization every 6 hours as needed for Wheezing 300 vial 3    CPAP Machine MISC        No current facility-administered medications on file prior to visit. Aspirin, Codeine, Imitrex [sumatriptan], Theophyllines, Diflucan [fluconazole], Nizoral [ketoconazole], and Zoloft    Review of Systems   Constitutional:  Positive for chills, fatigue and fever. HENT:  Positive for congestion. Negative for ear discharge, ear pain, hearing loss, nosebleeds and tinnitus. Eyes:  Negative for photophobia, pain, discharge and redness. Respiratory:  Positive for cough. Negative for shortness of breath. Cardiovascular:  Negative for chest pain. Gastrointestinal:  Negative for blood in stool, nausea and vomiting. Endocrine: Negative for polydipsia. Musculoskeletal:  Negative for myalgias. Skin:  Negative for rash. Neurological:  Negative for headaches. Hematological:  Does not bruise/bleed easily. Psychiatric/Behavioral:  Negative for hallucinations and suicidal ideas. Objective:   /80   Pulse 74   Temp 97.7 °F (36.5 °C) (Infrared)   Ht 5' 2\" (1.575 m)   Wt 260 lb (117.9 kg)   SpO2 98%   BMI 47.55 kg/m²     Physical Exam  Vitals and nursing note reviewed. Constitutional:       General: She is not in acute distress. Appearance: Normal appearance. She is well-developed. She is not ill-appearing or toxic-appearing. HENT:      Head: Normocephalic and atraumatic. Right Ear: Tympanic membrane normal.      Left Ear: Tympanic membrane normal.      Nose: Nose normal. No congestion or rhinorrhea. Mouth/Throat:      Mouth: Mucous membranes are moist.      Pharynx: Posterior oropharyngeal erythema present. No oropharyngeal exudate.    Eyes:      General: No scleral icterus. Conjunctiva/sclera: Conjunctivae normal.      Pupils: Pupils are equal, round, and reactive to light. Neck:      Thyroid: No thyromegaly. Cardiovascular:      Rate and Rhythm: Normal rate and regular rhythm. Pulses: Normal pulses. Heart sounds: Normal heart sounds. No murmur heard. Pulmonary:      Effort: Pulmonary effort is normal. No respiratory distress. Breath sounds: No stridor. Wheezing present. No rhonchi. Comments: Wheezing improved after coughing    Abdominal:      General: Abdomen is flat. Bowel sounds are normal. There is no distension. Palpations: Abdomen is soft. Musculoskeletal:         General: Normal range of motion. Cervical back: Normal range of motion and neck supple. No rigidity. Skin:     General: Skin is warm and dry. Neurological:      General: No focal deficit present. Mental Status: She is alert and oriented to person, place, and time. Cranial Nerves: No cranial nerve deficit. Psychiatric:         Mood and Affect: Mood normal.         Behavior: Behavior normal.         Thought Content: Thought content normal.         Judgment: Judgment normal.       Assessment:       Diagnosis Orders   1. Cough  POCT COVID-19, Antigen          Plan:      No orders of the defined types were placed in this encounter. No orders of the defined types were placed in this encounter. No follow-ups on file. Reviewed with the patient: current clinicalstatus, medications, activities and diet. Side effects, adverse effects of the medication prescribedtoday, as well as treatment plan/ rationale and result expectations have been discussedwith the patient who expresses understanding and desires to proceed. Close follow upto evaluate treatment results and for coordination of care. I have reviewedthe patient's medical history in detail and updated the computerized patient record.     Nanette Ch PA-C

## 2022-09-06 ENCOUNTER — HOSPITAL ENCOUNTER (EMERGENCY)
Age: 67
Discharge: HOME | DRG: 202 | End: 2022-09-06
Attending: STUDENT IN AN ORGANIZED HEALTH CARE EDUCATION/TRAINING PROGRAM
Payer: COMMERCIAL

## 2022-09-06 ENCOUNTER — APPOINTMENT (OUTPATIENT)
Dept: GENERAL RADIOLOGY | Age: 67
DRG: 202 | End: 2022-09-06
Payer: COMMERCIAL

## 2022-09-06 VITALS
DIASTOLIC BLOOD PRESSURE: 71 MMHG | SYSTOLIC BLOOD PRESSURE: 130 MMHG | HEIGHT: 62 IN | WEIGHT: 260 LBS | HEART RATE: 128 BPM | TEMPERATURE: 97.6 F | RESPIRATION RATE: 20 BRPM | OXYGEN SATURATION: 98 % | BODY MASS INDEX: 47.84 KG/M2

## 2022-09-06 DIAGNOSIS — J40 BRONCHITIS: Primary | ICD-10-CM

## 2022-09-06 DIAGNOSIS — R06.00 DYSPNEA AND RESPIRATORY ABNORMALITIES: ICD-10-CM

## 2022-09-06 DIAGNOSIS — R06.89 DYSPNEA AND RESPIRATORY ABNORMALITIES: ICD-10-CM

## 2022-09-06 LAB
ALBUMIN SERPL-MCNC: 4.4 G/DL (ref 3.5–4.6)
ALP BLD-CCNC: 140 U/L (ref 40–130)
ALT SERPL-CCNC: 18 U/L (ref 0–33)
ANION GAP SERPL CALCULATED.3IONS-SCNC: 14 MEQ/L (ref 9–15)
AST SERPL-CCNC: 13 U/L (ref 0–35)
BASOPHILS ABSOLUTE: 0 K/UL (ref 0–0.2)
BASOPHILS RELATIVE PERCENT: 0.8 %
BILIRUB SERPL-MCNC: 0.6 MG/DL (ref 0.2–0.7)
BUN BLDV-MCNC: 18 MG/DL (ref 8–23)
CALCIUM SERPL-MCNC: 9 MG/DL (ref 8.5–9.9)
CHLORIDE BLD-SCNC: 97 MEQ/L (ref 95–107)
CO2: 26 MEQ/L (ref 20–31)
CREAT SERPL-MCNC: 0.65 MG/DL (ref 0.5–0.9)
EKG ATRIAL RATE: 82 BPM
EKG P AXIS: 80 DEGREES
EKG P-R INTERVAL: 152 MS
EKG Q-T INTERVAL: 258 MS
EKG QRS DURATION: 100 MS
EKG QTC CALCULATION (BAZETT): 301 MS
EKG R AXIS: -12 DEGREES
EKG T AXIS: 180 DEGREES
EKG VENTRICULAR RATE: 82 BPM
EOSINOPHILS ABSOLUTE: 0 K/UL (ref 0–0.7)
EOSINOPHILS RELATIVE PERCENT: 0.3 %
GFR AFRICAN AMERICAN: >60
GFR NON-AFRICAN AMERICAN: >60
GLOBULIN: 2.7 G/DL (ref 2.3–3.5)
GLUCOSE BLD-MCNC: 296 MG/DL (ref 70–99)
HCT VFR BLD CALC: 36.4 % (ref 37–47)
HEMOGLOBIN: 13.1 G/DL (ref 12–16)
LYMPHOCYTES ABSOLUTE: 1.5 K/UL (ref 1–4.8)
LYMPHOCYTES RELATIVE PERCENT: 27.7 %
MAGNESIUM: 2 MG/DL (ref 1.7–2.4)
MCH RBC QN AUTO: 30.3 PG (ref 27–31.3)
MCHC RBC AUTO-ENTMCNC: 36 % (ref 33–37)
MCV RBC AUTO: 84.3 FL (ref 82–100)
MONOCYTES ABSOLUTE: 0.6 K/UL (ref 0.2–0.8)
MONOCYTES RELATIVE PERCENT: 10.8 %
NEUTROPHILS ABSOLUTE: 3.2 K/UL (ref 1.4–6.5)
NEUTROPHILS RELATIVE PERCENT: 60.4 %
PDW BLD-RTO: 13.9 % (ref 11.5–14.5)
PLATELET # BLD: 210 K/UL (ref 130–400)
POTASSIUM SERPL-SCNC: 3.5 MEQ/L (ref 3.4–4.9)
PRO-BNP: 67 PG/ML
PROCALCITONIN: 0.17 NG/ML (ref 0–0.15)
RBC # BLD: 4.32 M/UL (ref 4.2–5.4)
SODIUM BLD-SCNC: 137 MEQ/L (ref 135–144)
TOTAL PROTEIN: 7.1 G/DL (ref 6.3–8)
TROPONIN: <0.01 NG/ML (ref 0–0.01)
WBC # BLD: 5.2 K/UL (ref 4.8–10.8)

## 2022-09-06 PROCEDURE — 80053 COMPREHEN METABOLIC PANEL: CPT

## 2022-09-06 PROCEDURE — 83880 ASSAY OF NATRIURETIC PEPTIDE: CPT

## 2022-09-06 PROCEDURE — 6370000000 HC RX 637 (ALT 250 FOR IP): Performed by: STUDENT IN AN ORGANIZED HEALTH CARE EDUCATION/TRAINING PROGRAM

## 2022-09-06 PROCEDURE — 36415 COLL VENOUS BLD VENIPUNCTURE: CPT

## 2022-09-06 PROCEDURE — 2500000003 HC RX 250 WO HCPCS: Performed by: STUDENT IN AN ORGANIZED HEALTH CARE EDUCATION/TRAINING PROGRAM

## 2022-09-06 PROCEDURE — 93010 ELECTROCARDIOGRAM REPORT: CPT | Performed by: INTERNAL MEDICINE

## 2022-09-06 PROCEDURE — 93005 ELECTROCARDIOGRAM TRACING: CPT | Performed by: STUDENT IN AN ORGANIZED HEALTH CARE EDUCATION/TRAINING PROGRAM

## 2022-09-06 PROCEDURE — 96366 THER/PROPH/DIAG IV INF ADDON: CPT

## 2022-09-06 PROCEDURE — 2580000003 HC RX 258: Performed by: STUDENT IN AN ORGANIZED HEALTH CARE EDUCATION/TRAINING PROGRAM

## 2022-09-06 PROCEDURE — 99285 EMERGENCY DEPT VISIT HI MDM: CPT

## 2022-09-06 PROCEDURE — 84484 ASSAY OF TROPONIN QUANT: CPT

## 2022-09-06 PROCEDURE — 94760 N-INVAS EAR/PLS OXIMETRY 1: CPT

## 2022-09-06 PROCEDURE — 83735 ASSAY OF MAGNESIUM: CPT

## 2022-09-06 PROCEDURE — 96368 THER/DIAG CONCURRENT INF: CPT

## 2022-09-06 PROCEDURE — 94640 AIRWAY INHALATION TREATMENT: CPT

## 2022-09-06 PROCEDURE — 84145 PROCALCITONIN (PCT): CPT

## 2022-09-06 PROCEDURE — 71045 X-RAY EXAM CHEST 1 VIEW: CPT

## 2022-09-06 PROCEDURE — 96365 THER/PROPH/DIAG IV INF INIT: CPT

## 2022-09-06 PROCEDURE — 6360000002 HC RX W HCPCS: Performed by: STUDENT IN AN ORGANIZED HEALTH CARE EDUCATION/TRAINING PROGRAM

## 2022-09-06 PROCEDURE — 85025 COMPLETE CBC W/AUTO DIFF WBC: CPT

## 2022-09-06 PROCEDURE — 96375 TX/PRO/DX INJ NEW DRUG ADDON: CPT

## 2022-09-06 RX ORDER — ACETAMINOPHEN 500 MG
1000 TABLET ORAL ONCE
Status: DISCONTINUED | OUTPATIENT
Start: 2022-09-06 | End: 2022-09-06 | Stop reason: HOSPADM

## 2022-09-06 RX ORDER — GUAIFENESIN/DEXTROMETHORPHAN 100-10MG/5
5 SYRUP ORAL 3 TIMES DAILY PRN
Qty: 120 ML | Refills: 0 | Status: SHIPPED | OUTPATIENT
Start: 2022-09-06 | End: 2022-09-16

## 2022-09-06 RX ORDER — IPRATROPIUM BROMIDE AND ALBUTEROL SULFATE 2.5; .5 MG/3ML; MG/3ML
3 SOLUTION RESPIRATORY (INHALATION) ONCE
Status: COMPLETED | OUTPATIENT
Start: 2022-09-06 | End: 2022-09-06

## 2022-09-06 RX ORDER — 0.9 % SODIUM CHLORIDE 0.9 %
1000 INTRAVENOUS SOLUTION INTRAVENOUS ONCE
Status: COMPLETED | OUTPATIENT
Start: 2022-09-06 | End: 2022-09-06

## 2022-09-06 RX ORDER — IPRATROPIUM BROMIDE AND ALBUTEROL SULFATE 2.5; .5 MG/3ML; MG/3ML
1 SOLUTION RESPIRATORY (INHALATION) EVERY 4 HOURS
Qty: 360 ML | Refills: 0 | Status: SHIPPED | OUTPATIENT
Start: 2022-09-06

## 2022-09-06 RX ORDER — MAGNESIUM SULFATE IN WATER 40 MG/ML
2000 INJECTION, SOLUTION INTRAVENOUS ONCE
Status: COMPLETED | OUTPATIENT
Start: 2022-09-06 | End: 2022-09-06

## 2022-09-06 RX ORDER — GUAIFENESIN/DEXTROMETHORPHAN 100-10MG/5
5 SYRUP ORAL ONCE
Status: COMPLETED | OUTPATIENT
Start: 2022-09-06 | End: 2022-09-06

## 2022-09-06 RX ORDER — METHYLPREDNISOLONE SODIUM SUCCINATE 125 MG/2ML
125 INJECTION, POWDER, LYOPHILIZED, FOR SOLUTION INTRAMUSCULAR; INTRAVENOUS ONCE
Status: COMPLETED | OUTPATIENT
Start: 2022-09-06 | End: 2022-09-06

## 2022-09-06 RX ADMIN — DOXYCYCLINE 100 MG: 100 INJECTION, POWDER, LYOPHILIZED, FOR SOLUTION INTRAVENOUS at 04:16

## 2022-09-06 RX ADMIN — IPRATROPIUM BROMIDE AND ALBUTEROL SULFATE 3 AMPULE: 2.5; .5 SOLUTION RESPIRATORY (INHALATION) at 05:04

## 2022-09-06 RX ADMIN — GUAIFENESIN SYRUP AND DEXTROMETHORPHAN 5 ML: 100; 10 SYRUP ORAL at 06:19

## 2022-09-06 RX ADMIN — METHYLPREDNISOLONE SODIUM SUCCINATE 125 MG: 125 INJECTION, POWDER, FOR SOLUTION INTRAMUSCULAR; INTRAVENOUS at 04:15

## 2022-09-06 RX ADMIN — MAGNESIUM SULFATE HEPTAHYDRATE 2000 MG: 40 INJECTION, SOLUTION INTRAVENOUS at 04:23

## 2022-09-06 RX ADMIN — IPRATROPIUM BROMIDE AND ALBUTEROL SULFATE 3 AMPULE: 2.5; .5 SOLUTION RESPIRATORY (INHALATION) at 04:15

## 2022-09-06 RX ADMIN — SODIUM CHLORIDE 1000 ML: 9 INJECTION, SOLUTION INTRAVENOUS at 04:28

## 2022-09-06 ASSESSMENT — ENCOUNTER SYMPTOMS
BACK PAIN: 0
ABDOMINAL PAIN: 0
TROUBLE SWALLOWING: 0
CHEST TIGHTNESS: 1
COUGH: 1
EYE PAIN: 0
SHORTNESS OF BREATH: 1
NAUSEA: 0
VOMITING: 0
SORE THROAT: 1
DIARRHEA: 0
RHINORRHEA: 1
WHEEZING: 1

## 2022-09-06 ASSESSMENT — PAIN - FUNCTIONAL ASSESSMENT: PAIN_FUNCTIONAL_ASSESSMENT: NONE - DENIES PAIN

## 2022-09-06 NOTE — ED TRIAGE NOTES
Pt c/o SOB since Friday. Pt was seen at a walk in clinic and tested negative for covid and was dx with bronchitis. Pt states meds and breathing treatments are not helping. Pt denies any pain but c/o coughing and wheezing. Pt is hyperventilating with audible wheezes but able to speak in full sentences. Pt has harsh cough noted.

## 2022-09-06 NOTE — ED PROVIDER NOTES
[address] [facility] ED  eMERGENCY dEPARTMENT eNCOUnter      Pt Name: Patricia Oconnell  MRN: [id number]  [place] 1955  Date of evaluation: 9/6/2022  Provider: Hunter Engel MD      HISTORY OF PRESENT ILLNESS      Chief Complaint   Patient presents with    Shortness of Breath     Sob since friday       The history is provided by the Patient. Patricia Oconnell is a 79 y.o. female with a PMH clinically significant for HTN, Obesity, BUNNY, GERD, Bronchitis, Breast CA s/p resection and chemoradiation presenting to the ED via PV c/o congestion, rhinorrhea, postnasal drip, sore throat, frequent coughing, chest tightness, shortness of breath and wheezing that has been ongoing for the past week. Patient states she was seen at the urgent care center twice and given multiple medications without significant relief. States the coughing does seem to be a little bit worse at night, worse with lying flat and exertion. Shortness of breath also significantly worse with exertion. Denies any associated lightheadedness, dizziness, headache, chest pain, hemoptysis, abdominal pain, N/V/D/C, urinary symptoms, BLE edema/pain or F/C/D. States that she is still eating and drinking normally. Just cannot seem to stop coughing. Does have a history of asthma and grew up around smokers. Did not smoke her self. Denies any history of DVT/PE. States multiple recent negative covid tests. Per Chart Review: Evaluated on 9/2 and 9/4 for similar symptoms at urgent care facilities. On 9/2, diagnosed with bronchitis and discharged with prednisone taper, azithromycin and Tessalon Perles. COVID testing negative. REVIEW OF SYSTEMS       Review of Systems   Constitutional:  Positive for fatigue. Negative for chills and fever. HENT:  Positive for congestion, postnasal drip, rhinorrhea and sore throat. Negative for trouble swallowing. Eyes:  Negative for pain and visual disturbance.    Respiratory:  Positive for cough, chest Father     Cancer Sister         Melanoma    Cancer Other         Throat    Breast Cancer Maternal Aunt     Colon Cancer Neg Hx        SOCIAL HISTORY       Social History     Socioeconomic History    Marital status:    Tobacco Use    Smoking status: Never    Smokeless tobacco: Never   Vaping Use    Vaping Use: Never used   Substance and Sexual Activity    Alcohol use: No    Drug use: No     Social Determinants of Health     Financial Resource Strain: Low Risk     Difficulty of Paying Living Expenses: Not hard at all   Food Insecurity: No Food Insecurity    Worried About [address] in the Last Year: Never true    Ran Out of Food in the Last Year: Never true       CURRENT MEDICATIONS       Discharge Medication List as of 9/6/2022  6:55 AM        CONTINUE these medications which have NOT CHANGED    Details   guaiFENesin (MUCINEX) 600 MG extended release tablet Take 1 tablet by mouth 2 times daily for 15 days, Disp-30 tablet, R-0Normal      fluticasone (FLONASE) 50 MCG/ACT nasal spray 1 spray by Each Nostril route daily, Disp-32 g, R-1Normal      doxycycline hyclate (VIBRA-TABS) 100 MG tablet Take 1 tablet by mouth 2 times daily for 7 days, Disp-14 tablet, R-0Normal      Brompheniramine-Phenylephrine 2-5 MG/10ML LIQD Take 10 mLs by mouth every 6 hours as needed (cough), Disp-237 mL, R-0Normal      benzonatate (TESSALON) 200 MG capsule Take 1 capsule by mouth 3 times daily as needed for Cough, Disp-30 capsule, R-0Normal      predniSONE (DELTASONE) 10 MG tablet Take 5 tabs daily x 3 days, 4 tabs daily x 3 days, 3 tabs daily x 3 days, 2 tabs daily x 3 days, 1 tab daily x 3 days, Disp-45 tablet, R-0Normal      azithromycin (ZITHROMAX) 250 MG tablet Take 1 tablet by mouth See Admin Instructions for 5 days 500mg on day 1 followed by 250mg on days 2 - 5, Disp-6 tablet, R-0Normal      !! nystatin (MYCOSTATIN) 420410 UNIT/GM powder Apply 3 times daily. , Disp-60 g, R-3, Normal      Dulaglutide 0.75 MG/0.5ML SOPN Inject 0.75 mg into the skin once a week, Disp-4 pen, R-3Normal      albuterol (PROVENTIL) (5 MG/ML) 0.5% nebulizer solution Take 1 mL by nebulization 4 times daily as needed for Wheezing, Disp-120 each, R-3Normal      amLODIPine (NORVASC) 10 MG tablet Take 1 tablet by mouth once daily, Disp-30 tablet, R-5Normal      pantoprazole (PROTONIX) 40 MG tablet Take 1 tablet by mouth once daily, Disp-30 tablet, R-5Normal      hydrocortisone 1 % cream Historical Med      ketoconazole (NIZORAL) 2 % cream Historical Med      Blood Glucose Monitoring Suppl (ONE TOUCH ULTRA 2) w/Device KIT Starting Tue 9/28/2021, Historical Med      cyanocobalamin 1000 MCG/ML injection INJECT 1 ML SUBCUTANEOUSLY ONCE EVERY MONTHHistorical Med      ULTICARE INSULIN SAFETY SYR 29G X 1/2\" 1 ML MISC TERA, Historical Med      nystatin (MYCOSTATIN) 146942 UNIT/GM cream Apply topically 2 times daily. , Disp-30 g, R-3, Normal      lisinopril-hydroCHLOROthiazide (PRINZIDE;ZESTORETIC) 20-25 MG per tablet Take 1 tablet by mouth daily, Disp-30 tablet, R-0Normal      blood glucose monitor kit and supplies Dispense sufficient amount for indicated testing frequency plus additional to accommodate PRN testing needs. Dispense all needed supplies to include: monitor, strips, lancing device, lancets, control solutions, alcohol swabs., Disp-1 kit, R-0, Normal      Lancets MISC Disp-1 each, R-5, NormalDX: diabetes mellitus. Use 1-3 time(s) daily - Ok to substitute per insurance (1 each = 1 box)      blood glucose monitor strips Test 3 times a day & as needed for symptoms of irregular blood glucose. Dispense sufficient amount for indicated testing frequency plus additional to accommodate PRN testing needs. , Disp-100 strip, R-3, Normal      !! nystatin (MYCOSTATIN) 483700 UNIT/GM powder Apply 3 times daily. , Disp-45 g, R-5, Normal      vitamin D (ERGOCALCIFEROL) 1.25 MG (67428 UT) CAPS capsule Take 1 capsule by mouth once a week, Disp-12 capsule, R-1Normal      glyBURIDE (DIABETA) 2.5 MG tablet Take 1 tablet by mouth daily (with breakfast), Disp-30 tablet, R-5Normal      cholestyramine (QUESTRAN) 4 g packet Take 1 packet by mouth 2 times daily, Disp-180 packet, R-5Normal      dicyclomine (BENTYL) 10 MG capsule Take 1 capsule by mouth 2 times daily (before meals), Disp-120 capsule, R-3Normal      metFORMIN (GLUCOPHAGE) 500 MG tablet Take 2 tablets by mouth 2 times daily (with meals), Disp-360 tablet, R-3Normal      Respiratory Therapy Supplies (FULL KIT NEBULIZER SET) MISC 4 TIMES DAILY Starting Wed 4/29/2020, Disp-1 each, R-1, Print      albuterol sulfate  (90 Base) MCG/ACT inhaler Inhale 2 puffs into the lungs 4 times daily as needed for Wheezing, Disp-3 Inhaler,R-3Print      albuterol (PROVENTIL) (2.5 MG/3ML) 0.083% nebulizer solution Take 3 mLs by nebulization every 6 hours as needed for Wheezing, Disp-300 vial, R-3Print      CPAP Machine MISC Historical Med       !! - Potential duplicate medications found. Please discuss with provider. ALLERGIES     Aspirin, Codeine, Imitrex [sumatriptan], Theophyllines, Diflucan [fluconazole], Nizoral [ketoconazole], and Zoloft      PHYSICAL EXAM       ED Triage Vitals [09/06/22 0336]   BP Temp Temp src Heart Rate Resp SpO2 Height Weight   (!) 168/95 97.6 °F (36.4 °C) -- 87 30 98 % 5' 2\" (1.575 m) 260 lb (117.9 kg)       Physical Exam  Vitals and nursing note reviewed. Constitutional:       General: She is not in acute distress. Appearance: She is obese. She is ill-appearing. She is not toxic-appearing or diaphoretic. Comments: Frequent coughing on exam   HENT:      Head: Normocephalic and atraumatic. Mouth/Throat:      Mouth: Mucous membranes are dry. Pharynx: Oropharynx is clear. Eyes:      Extraocular Movements: Extraocular movements intact. Pupils: Pupils are equal, round, and reactive to light. Cardiovascular:      Rate and Rhythm: Normal rate and regular rhythm. Pulses: Normal pulses. Pulmonary:      Effort: Tachypnea, accessory muscle usage and prolonged expiration present. No respiratory distress. Breath sounds: Decreased air movement present. No stridor. Examination of the right-upper field reveals wheezing. Examination of the left-upper field reveals wheezing. Examination of the right-middle field reveals wheezing. Examination of the left-middle field reveals wheezing. Examination of the right-lower field reveals wheezing. Examination of the left-lower field reveals wheezing. Wheezing and rhonchi present. Abdominal:      General: There is no distension. Palpations: Abdomen is soft. Tenderness: There is no abdominal tenderness. There is no right CVA tenderness, left CVA tenderness, guarding or rebound. Musculoskeletal:         General: No swelling or tenderness. Cervical back: Normal range of motion and neck supple. No rigidity or tenderness. Right lower leg: No edema. Left lower leg: No edema. Skin:     General: Skin is warm and dry. Capillary Refill: Capillary refill takes less than 2 seconds. Neurological:      General: No focal deficit present. Mental Status: She is alert and oriented to person, place, and time. Sensory: No sensory deficit. Motor: No weakness.    Psychiatric:         Mood and Affect: Mood normal.         Behavior: Behavior normal.       MDM:   Chart Reviewed: PMH and additional information as noted in HPI obtained from chart review    Vitals:    Vitals:    09/06/22 0419 09/06/22 0506 09/06/22 0507 09/06/22 0600   BP:    130/71   Pulse:    (!) 128   Resp:    20   Temp:       SpO2: 97% 100% 100% 98%   Weight:       Height:           PROCEDURES:  Unless otherwise noted below, none  Procedures    LABS:  Labs Reviewed   COMPREHENSIVE METABOLIC PANEL - Abnormal; Notable for the following components:       Result Value    Glucose 296 (*)     Alkaline Phosphatase 140 (*)     All other components within normal limits   CBC WITH AUTO DIFFERENTIAL - Abnormal; Notable for the following components:    Hematocrit 36.4 (*)     All other components within normal limits   PROCALCITONIN - Abnormal; Notable for the following components:    Procalcitonin 0.17 (*)     All other components within normal limits   MAGNESIUM   TROPONIN   BRAIN NATRIURETIC PEPTIDE       XR CHEST PORTABLE   Final Result   NORMAL PORTABLE CHEST. ED Course as of 09/07/22 1108   Tue Sep 06, 2022   0438 XR CHEST PORTABLE  Possible mild increased Insterstitial opacities in the bases, otherwise no gross acute cardiopulmonary abnls. [NA]   [id number] Magnesium: 2.0 [NA]   0514 Troponin: <0.010  Low suspicion for atypical ACS. [NA]   0514 Pro-BNP: 67  Low suspicion for ADHFE [NA]   0515 Glucose, Random(!): 296  Significantly elevated. [NA]   0515 CBC with Auto Differential(!):    WBC 5.2   RBC 4.32   Hemoglobin Quant 13.1   Hematocrit 36.4(!)   MCV 84.3   MCH 30.3   MCHC 36.0   RDW 13.9   Platelet Count 790   Neutrophils % 60.4   Lymphocyte % 27.7   Monocytes % 10.8   Eosinophils % 0.3   Basophils % 0.8   Neutrophils Absolute 3.2   Lymphocytes Absolute 1.5   Monocytes Absolute 0.6   Eosinophils Absolute 0.0   Basophils Absolute 0.0  Unremarkable. [NA]   0515 EKG 12 Lead  EKG showing Sinus rhythm with occasional PVCs, rate of 82 bpm.  Normal axis, normal intervals. Nonspecific ST T wave abnormalities. [NA]      ED Course User Index  [NA] Tania Siddiqui MD       79 y.o. female with a PMH clinically significant for HTN, Obesity, BUNNY, GERD, Bronchitis, Breast CA s/p resection and chemoradiation presenting to the ED via PV c/o congestion, rhinorrhea, postnasal drip, sore throat, frequent coughing, chest tightness, shortness of breath and wheezing that has been ongoing for the past week. Upon initial evaluation, Pt with frequent coughing, diffuse wheezes and difficulty speaking in full sentences due to frequent coughing, but otherwise Afebrile, HDS and in NAD.  PE as noted above. Labs, , EKG,, and Imaging as noted above. Given findings, clinical presentation most likely consistent w/ bronchitis with significant wheezing and reactive airway disease. Patient with nearly incessant coughing upon arrival.  Significantly improved following breathing treatments administered in the ED. No evidence of focal consolidation concerning for bacterial pneumonia on chest x-ray. Chest x-ray otherwise unremarkable. Labs largely unremarkable as well. Did repeat breathing treatments due to continued wheezing. Patient significantly improved in the ED. Not hypoxic on room air. Ambulating to the bathroom without difficulty. Discussed admission versus discharge. Patient electing for further evaluation and management as an outpatient at this time. Agreed to return to the ED if any new or worsening symptoms. Pt was administered   Medications   ipratropium-albuterol (DUONEB) nebulizer solution 3 ampule (3 ampules Inhalation Given 9/6/22 0415)   methylPREDNISolone sodium (SOLU-MEDROL) injection 125 mg (125 mg IntraVENous Given 9/6/22 0415)   doxycycline (VIBRAMYCIN) 100 mg in dextrose 5 % 100 mL IVPB (0 mg IntraVENous Stopped 9/6/22 0727)   magnesium sulfate 2000 mg in 50 mL IVPB premix (0 mg IntraVENous Stopped 9/6/22 0620)   0.9 % sodium chloride bolus (0 mLs IntraVENous Stopped 9/6/22 0556)   ipratropium-albuterol (DUONEB) nebulizer solution 3 ampule (3 ampules Inhalation Given 9/6/22 4483)   guaiFENesin-dextromethorphan (ROBITUSSIN DM) 100-10 MG/5ML syrup 5 mL (5 mLs Oral Given 9/6/22 2688)       Plan: Discharge home in good condition with meds as noted below and instructions to follow up with PCP . Pt stable and appropriate for further evaluation and management as an outpatient. and Patient understanding and amenable to the POC. CRITICAL CARE TIME   Total CriticalCare time was 0 minutes, excluding separately reportable procedures.   There was a high probability of clinically significant/life threatening deterioration in the patient's condition which required my urgent intervention. FINAL IMPRESSION      1. Bronchitis    2.  Dyspnea and respiratory abnormalities          DISPOSITION/PLAN   DISPOSITION Decision To Discharge 09/06/2022 06:56:23 AM      Discharge Medication List as of 9/6/2022  6:55 AM        START taking these medications    Details   ipratropium-albuterol (DUONEB) 0.5-2.5 (3) MG/3ML SOLN nebulizer solution Inhale 3 mLs into the lungs every 4 hours, Disp-360 mL, R-0Normal      guaiFENesin-dextromethorphan (ROBITUSSIN DM) 100-10 MG/5ML syrup Take 5 mLs by mouth 3 times daily as needed for Cough, Disp-120 mL, R-0Normal              MD Josefina Perrin MD  09/07/22 6956

## 2022-09-07 ENCOUNTER — HOSPITAL ENCOUNTER (INPATIENT)
Age: 67
LOS: 7 days | Discharge: HOME | DRG: 202 | End: 2022-09-14
Attending: EMERGENCY MEDICINE | Admitting: FAMILY MEDICINE
Payer: COMMERCIAL

## 2022-09-07 DIAGNOSIS — E11.65 TYPE 2 DIABETES MELLITUS WITH HYPERGLYCEMIA, WITH LONG-TERM CURRENT USE OF INSULIN (HCC): ICD-10-CM

## 2022-09-07 DIAGNOSIS — Z79.4 TYPE 2 DIABETES MELLITUS WITH HYPERGLYCEMIA, WITH LONG-TERM CURRENT USE OF INSULIN (HCC): ICD-10-CM

## 2022-09-07 DIAGNOSIS — J40 BRONCHITIS: ICD-10-CM

## 2022-09-07 DIAGNOSIS — Z78.9 FAILURE OF OUTPATIENT TREATMENT: ICD-10-CM

## 2022-09-07 DIAGNOSIS — R05.9 COUGH: ICD-10-CM

## 2022-09-07 DIAGNOSIS — J45.51 SEVERE PERSISTENT ASTHMA WITH EXACERBATION: Primary | ICD-10-CM

## 2022-09-07 PROBLEM — J45.901 ACUTE ASTHMA EXACERBATION: Status: ACTIVE | Noted: 2022-09-07

## 2022-09-07 PROBLEM — J45.901 ASTHMA EXACERBATION ATTACKS: Status: ACTIVE | Noted: 2022-09-07

## 2022-09-07 LAB
ALBUMIN SERPL-MCNC: 4 G/DL (ref 3.5–4.6)
ALP BLD-CCNC: 137 U/L (ref 40–130)
ALT SERPL-CCNC: 20 U/L (ref 0–33)
ANION GAP SERPL CALCULATED.3IONS-SCNC: 16 MEQ/L (ref 9–15)
AST SERPL-CCNC: 23 U/L (ref 0–35)
BASOPHILS ABSOLUTE: 0.1 K/UL (ref 0–0.2)
BASOPHILS RELATIVE PERCENT: 0.9 %
BILIRUB SERPL-MCNC: 0.5 MG/DL (ref 0.2–0.7)
BUN BLDV-MCNC: 21 MG/DL (ref 8–23)
CALCIUM SERPL-MCNC: 8.8 MG/DL (ref 8.5–9.9)
CHLORIDE BLD-SCNC: 97 MEQ/L (ref 95–107)
CHP ED QC CHECK: YES
CO2: 21 MEQ/L (ref 20–31)
CREAT SERPL-MCNC: 0.87 MG/DL (ref 0.5–0.9)
EOSINOPHILS ABSOLUTE: 0 K/UL (ref 0–0.7)
EOSINOPHILS RELATIVE PERCENT: 0.4 %
GFR AFRICAN AMERICAN: >60
GFR NON-AFRICAN AMERICAN: >60
GLOBULIN: 2.7 G/DL (ref 2.3–3.5)
GLUCOSE BLD-MCNC: 360 MG/DL (ref 70–99)
GLUCOSE BLD-MCNC: 455 MG/DL
GLUCOSE BLD-MCNC: 455 MG/DL (ref 70–99)
GLUCOSE BLD-MCNC: 460 MG/DL (ref 70–99)
GLUCOSE BLD-MCNC: 476 MG/DL (ref 70–99)
GLUCOSE BLD-MCNC: 539 MG/DL (ref 70–99)
HBA1C MFR BLD: 9.6 % (ref 4.8–5.9)
HCT VFR BLD CALC: 40.2 % (ref 37–47)
HEMOGLOBIN: 13.2 G/DL (ref 12–16)
INFLUENZA A BY PCR: NEGATIVE
INFLUENZA B BY PCR: NEGATIVE
LYMPHOCYTES ABSOLUTE: 1.4 K/UL (ref 1–4.8)
LYMPHOCYTES RELATIVE PERCENT: 20.8 %
MCH RBC QN AUTO: 28.6 PG (ref 27–31.3)
MCHC RBC AUTO-ENTMCNC: 32.8 % (ref 33–37)
MCV RBC AUTO: 87.2 FL (ref 82–100)
MONOCYTES ABSOLUTE: 0.5 K/UL (ref 0.2–0.8)
MONOCYTES RELATIVE PERCENT: 8 %
NEUTROPHILS ABSOLUTE: 4.7 K/UL (ref 1.4–6.5)
NEUTROPHILS RELATIVE PERCENT: 69.9 %
PDW BLD-RTO: 13.7 % (ref 11.5–14.5)
PERFORMED ON: ABNORMAL
PLATELET # BLD: 213 K/UL (ref 130–400)
POTASSIUM SERPL-SCNC: 3.6 MEQ/L (ref 3.4–4.9)
RBC # BLD: 4.61 M/UL (ref 4.2–5.4)
SARS-COV-2, NAAT: NOT DETECTED
SODIUM BLD-SCNC: 134 MEQ/L (ref 135–144)
TOTAL PROTEIN: 6.7 G/DL (ref 6.3–8)
WBC # BLD: 6.8 K/UL (ref 4.8–10.8)

## 2022-09-07 PROCEDURE — 6360000002 HC RX W HCPCS: Performed by: FAMILY MEDICINE

## 2022-09-07 PROCEDURE — 6360000002 HC RX W HCPCS: Performed by: EMERGENCY MEDICINE

## 2022-09-07 PROCEDURE — 99285 EMERGENCY DEPT VISIT HI MDM: CPT

## 2022-09-07 PROCEDURE — 94640 AIRWAY INHALATION TREATMENT: CPT

## 2022-09-07 PROCEDURE — 6370000000 HC RX 637 (ALT 250 FOR IP): Performed by: FAMILY MEDICINE

## 2022-09-07 PROCEDURE — 36415 COLL VENOUS BLD VENIPUNCTURE: CPT

## 2022-09-07 PROCEDURE — 6370000000 HC RX 637 (ALT 250 FOR IP): Performed by: EMERGENCY MEDICINE

## 2022-09-07 PROCEDURE — 96365 THER/PROPH/DIAG IV INF INIT: CPT

## 2022-09-07 PROCEDURE — 83036 HEMOGLOBIN GLYCOSYLATED A1C: CPT

## 2022-09-07 PROCEDURE — 85025 COMPLETE CBC W/AUTO DIFF WBC: CPT

## 2022-09-07 PROCEDURE — 87635 SARS-COV-2 COVID-19 AMP PRB: CPT

## 2022-09-07 PROCEDURE — 2580000003 HC RX 258: Performed by: FAMILY MEDICINE

## 2022-09-07 PROCEDURE — 94761 N-INVAS EAR/PLS OXIMETRY MLT: CPT

## 2022-09-07 PROCEDURE — 2580000003 HC RX 258: Performed by: EMERGENCY MEDICINE

## 2022-09-07 PROCEDURE — 87502 INFLUENZA DNA AMP PROBE: CPT

## 2022-09-07 PROCEDURE — 80053 COMPREHEN METABOLIC PANEL: CPT

## 2022-09-07 PROCEDURE — 1210000000 HC MED SURG R&B

## 2022-09-07 PROCEDURE — 6370000000 HC RX 637 (ALT 250 FOR IP): Performed by: INTERNAL MEDICINE

## 2022-09-07 PROCEDURE — 96375 TX/PRO/DX INJ NEW DRUG ADDON: CPT

## 2022-09-07 RX ORDER — METHYLPREDNISOLONE SODIUM SUCCINATE 125 MG/2ML
125 INJECTION, POWDER, LYOPHILIZED, FOR SOLUTION INTRAMUSCULAR; INTRAVENOUS ONCE
Status: COMPLETED | OUTPATIENT
Start: 2022-09-07 | End: 2022-09-07

## 2022-09-07 RX ORDER — INSULIN LISPRO 100 [IU]/ML
0-4 INJECTION, SOLUTION INTRAVENOUS; SUBCUTANEOUS NIGHTLY
Status: DISCONTINUED | OUTPATIENT
Start: 2022-09-07 | End: 2022-09-14 | Stop reason: HOSPADM

## 2022-09-07 RX ORDER — ACETAMINOPHEN 325 MG/1
650 TABLET ORAL EVERY 6 HOURS PRN
Status: DISCONTINUED | OUTPATIENT
Start: 2022-09-07 | End: 2022-09-14 | Stop reason: HOSPADM

## 2022-09-07 RX ORDER — INSULIN LISPRO 100 [IU]/ML
25 INJECTION, SOLUTION INTRAVENOUS; SUBCUTANEOUS ONCE
Status: COMPLETED | OUTPATIENT
Start: 2022-09-07 | End: 2022-09-07

## 2022-09-07 RX ORDER — BENZONATATE 100 MG/1
100 CAPSULE ORAL 3 TIMES DAILY PRN
Status: DISCONTINUED | OUTPATIENT
Start: 2022-09-07 | End: 2022-09-10

## 2022-09-07 RX ORDER — INSULIN LISPRO 100 [IU]/ML
35 INJECTION, SOLUTION INTRAVENOUS; SUBCUTANEOUS ONCE
Status: COMPLETED | OUTPATIENT
Start: 2022-09-08 | End: 2022-09-07

## 2022-09-07 RX ORDER — PREDNISONE 20 MG/1
40 TABLET ORAL DAILY
Status: DISCONTINUED | OUTPATIENT
Start: 2022-09-10 | End: 2022-09-08

## 2022-09-07 RX ORDER — DEXTROSE MONOHYDRATE 100 MG/ML
INJECTION, SOLUTION INTRAVENOUS CONTINUOUS PRN
Status: DISCONTINUED | OUTPATIENT
Start: 2022-09-07 | End: 2022-09-14 | Stop reason: HOSPADM

## 2022-09-07 RX ORDER — ACETAMINOPHEN 650 MG/1
650 SUPPOSITORY RECTAL EVERY 6 HOURS PRN
Status: DISCONTINUED | OUTPATIENT
Start: 2022-09-07 | End: 2022-09-14 | Stop reason: HOSPADM

## 2022-09-07 RX ORDER — SODIUM CHLORIDE 0.9 % (FLUSH) 0.9 %
5-40 SYRINGE (ML) INJECTION PRN
Status: DISCONTINUED | OUTPATIENT
Start: 2022-09-07 | End: 2022-09-14 | Stop reason: HOSPADM

## 2022-09-07 RX ORDER — ALBUTEROL SULFATE 2.5 MG/3ML
2.5 SOLUTION RESPIRATORY (INHALATION)
Status: DISCONTINUED | OUTPATIENT
Start: 2022-09-07 | End: 2022-09-08

## 2022-09-07 RX ORDER — POLYETHYLENE GLYCOL 3350 17 G/17G
17 POWDER, FOR SOLUTION ORAL DAILY PRN
Status: DISCONTINUED | OUTPATIENT
Start: 2022-09-07 | End: 2022-09-14 | Stop reason: HOSPADM

## 2022-09-07 RX ORDER — IPRATROPIUM BROMIDE AND ALBUTEROL SULFATE 2.5; .5 MG/3ML; MG/3ML
1 SOLUTION RESPIRATORY (INHALATION) PRN
Status: DISCONTINUED | OUTPATIENT
Start: 2022-09-07 | End: 2022-09-14 | Stop reason: HOSPADM

## 2022-09-07 RX ORDER — MAGNESIUM SULFATE IN WATER 40 MG/ML
4000 INJECTION, SOLUTION INTRAVENOUS ONCE
Status: COMPLETED | OUTPATIENT
Start: 2022-09-07 | End: 2022-09-07

## 2022-09-07 RX ORDER — ALBUTEROL SULFATE 2.5 MG/3ML
2.5 SOLUTION RESPIRATORY (INHALATION)
Status: DISCONTINUED | OUTPATIENT
Start: 2022-09-07 | End: 2022-09-14 | Stop reason: HOSPADM

## 2022-09-07 RX ORDER — SODIUM CHLORIDE 9 MG/ML
INJECTION, SOLUTION INTRAVENOUS PRN
Status: DISCONTINUED | OUTPATIENT
Start: 2022-09-07 | End: 2022-09-14 | Stop reason: HOSPADM

## 2022-09-07 RX ORDER — INSULIN LISPRO 100 [IU]/ML
10 INJECTION, SOLUTION INTRAVENOUS; SUBCUTANEOUS ONCE
Status: COMPLETED | OUTPATIENT
Start: 2022-09-07 | End: 2022-09-07

## 2022-09-07 RX ORDER — INSULIN GLARGINE 100 [IU]/ML
20 INJECTION, SOLUTION SUBCUTANEOUS NIGHTLY
Status: DISCONTINUED | OUTPATIENT
Start: 2022-09-07 | End: 2022-09-09

## 2022-09-07 RX ORDER — INSULIN LISPRO 100 [IU]/ML
0-16 INJECTION, SOLUTION INTRAVENOUS; SUBCUTANEOUS
Status: DISCONTINUED | OUTPATIENT
Start: 2022-09-07 | End: 2022-09-14 | Stop reason: HOSPADM

## 2022-09-07 RX ORDER — METHYLPREDNISOLONE SODIUM SUCCINATE 40 MG/ML
40 INJECTION, POWDER, LYOPHILIZED, FOR SOLUTION INTRAMUSCULAR; INTRAVENOUS EVERY 6 HOURS
Status: DISCONTINUED | OUTPATIENT
Start: 2022-09-07 | End: 2022-09-08

## 2022-09-07 RX ORDER — ONDANSETRON 4 MG/1
4 TABLET, ORALLY DISINTEGRATING ORAL EVERY 8 HOURS PRN
Status: DISCONTINUED | OUTPATIENT
Start: 2022-09-07 | End: 2022-09-14 | Stop reason: HOSPADM

## 2022-09-07 RX ORDER — ENOXAPARIN SODIUM 100 MG/ML
30 INJECTION SUBCUTANEOUS 2 TIMES DAILY
Status: DISCONTINUED | OUTPATIENT
Start: 2022-09-07 | End: 2022-09-14 | Stop reason: HOSPADM

## 2022-09-07 RX ORDER — SODIUM CHLORIDE 0.9 % (FLUSH) 0.9 %
5-40 SYRINGE (ML) INJECTION EVERY 12 HOURS SCHEDULED
Status: DISCONTINUED | OUTPATIENT
Start: 2022-09-07 | End: 2022-09-14 | Stop reason: HOSPADM

## 2022-09-07 RX ORDER — 0.9 % SODIUM CHLORIDE 0.9 %
1000 INTRAVENOUS SOLUTION INTRAVENOUS ONCE
Status: COMPLETED | OUTPATIENT
Start: 2022-09-07 | End: 2022-09-07

## 2022-09-07 RX ORDER — ONDANSETRON 2 MG/ML
4 INJECTION INTRAMUSCULAR; INTRAVENOUS EVERY 6 HOURS PRN
Status: DISCONTINUED | OUTPATIENT
Start: 2022-09-07 | End: 2022-09-14 | Stop reason: HOSPADM

## 2022-09-07 RX ADMIN — INSULIN LISPRO 4 UNITS: 100 INJECTION, SOLUTION INTRAVENOUS; SUBCUTANEOUS at 21:07

## 2022-09-07 RX ADMIN — INSULIN LISPRO 35 UNITS: 100 INJECTION, SOLUTION INTRAVENOUS; SUBCUTANEOUS at 23:57

## 2022-09-07 RX ADMIN — SODIUM CHLORIDE 1000 ML: 9 INJECTION, SOLUTION INTRAVENOUS at 18:33

## 2022-09-07 RX ADMIN — METHYLPREDNISOLONE SODIUM SUCCINATE 40 MG: 40 INJECTION, POWDER, FOR SOLUTION INTRAMUSCULAR; INTRAVENOUS at 22:32

## 2022-09-07 RX ADMIN — BENZONATATE 100 MG: 100 CAPSULE ORAL at 22:32

## 2022-09-07 RX ADMIN — INSULIN HUMAN 10 UNITS: 100 INJECTION, SOLUTION PARENTERAL at 18:30

## 2022-09-07 RX ADMIN — INSULIN LISPRO 25 UNITS: 100 INJECTION, SOLUTION INTRAVENOUS; SUBCUTANEOUS at 22:31

## 2022-09-07 RX ADMIN — Medication 10 ML: at 23:14

## 2022-09-07 RX ADMIN — METHYLPREDNISOLONE SODIUM SUCCINATE 125 MG: 125 INJECTION, POWDER, FOR SOLUTION INTRAMUSCULAR; INTRAVENOUS at 17:03

## 2022-09-07 RX ADMIN — IPRATROPIUM BROMIDE AND ALBUTEROL SULFATE 1 AMPULE: .5; 2.5 SOLUTION RESPIRATORY (INHALATION) at 17:40

## 2022-09-07 RX ADMIN — ACETAMINOPHEN 650 MG: 325 TABLET ORAL at 21:16

## 2022-09-07 RX ADMIN — IPRATROPIUM BROMIDE AND ALBUTEROL SULFATE 1 AMPULE: .5; 2.5 SOLUTION RESPIRATORY (INHALATION) at 17:45

## 2022-09-07 RX ADMIN — INSULIN LISPRO 10 UNITS: 100 INJECTION, SOLUTION INTRAVENOUS; SUBCUTANEOUS at 21:07

## 2022-09-07 RX ADMIN — INSULIN GLARGINE 20 UNITS: 100 INJECTION, SOLUTION SUBCUTANEOUS at 21:06

## 2022-09-07 RX ADMIN — ALBUTEROL SULFATE 2.5 MG: 2.5 SOLUTION RESPIRATORY (INHALATION) at 20:33

## 2022-09-07 RX ADMIN — MAGNESIUM SULFATE HEPTAHYDRATE 4000 MG: 40 INJECTION, SOLUTION INTRAVENOUS at 17:07

## 2022-09-07 RX ADMIN — IPRATROPIUM BROMIDE AND ALBUTEROL SULFATE 1 AMPULE: .5; 2.5 SOLUTION RESPIRATORY (INHALATION) at 17:50

## 2022-09-07 ASSESSMENT — PAIN SCALES - GENERAL
PAINLEVEL_OUTOF10: 5
PAINLEVEL_OUTOF10: 5

## 2022-09-07 ASSESSMENT — LIFESTYLE VARIABLES
HOW OFTEN DO YOU HAVE A DRINK CONTAINING ALCOHOL: NEVER
HOW MANY STANDARD DRINKS CONTAINING ALCOHOL DO YOU HAVE ON A TYPICAL DAY: PATIENT DOES NOT DRINK

## 2022-09-07 ASSESSMENT — ENCOUNTER SYMPTOMS
SORE THROAT: 0
SHORTNESS OF BREATH: 1
NAUSEA: 0
COUGH: 1
VOMITING: 0
WHEEZING: 1
EYE PAIN: 0
CHEST TIGHTNESS: 1
ABDOMINAL PAIN: 0

## 2022-09-07 ASSESSMENT — PAIN - FUNCTIONAL ASSESSMENT: PAIN_FUNCTIONAL_ASSESSMENT: NONE - DENIES PAIN

## 2022-09-07 ASSESSMENT — PAIN DESCRIPTION - LOCATION
LOCATION: HEAD
LOCATION: HEAD

## 2022-09-07 ASSESSMENT — PAIN DESCRIPTION - PAIN TYPE: TYPE: ACUTE PAIN

## 2022-09-07 NOTE — ED PROVIDER NOTES
[address] [facility]  EMERGENCY DEPARTMENT ENCOUNTER      Pt Name: Lesly Curry  MRN: [id number]  [place] 1955  Date of evaluation: 9/7/2022  Provider: Audi Grewal DO    CHIEF COMPLAINT       Chief Complaint   Patient presents with    Shortness of Breath     Since yesterday    Cough     And wheezing, seen here yesterday for the same         HISTORY OF PRESENT ILLNESS   (Location/Symptom, Timing/Onset, Context/Setting, Quality, Duration, Modifying Factors, Severity)  Note limiting factors. Lesly Curry is a 79 y.o. female who presents to the emergency department . Patient with history of asthma presents 2 days in a row with severe shortness of breath and wheezing. She was seen yesterday had an x-ray of her chest which was normal.  After DuoNeb's Solu-Medrol she was doing better sent home on steroids. Felt much better when she went home but is right back to being very short of breath and wheezy. No fevers or chills. Very congested in her nose and sinuses as well. HPI    Nursing Notes were reviewed. REVIEW OF SYSTEMS    (2-9 systems for level 4, 10 or more for level 5)     Review of Systems   Constitutional:  Negative for activity change, appetite change, fatigue and fever. HENT:  Positive for congestion. Negative for sore throat. Eyes:  Negative for pain and visual disturbance. Respiratory:  Positive for cough, chest tightness, shortness of breath and wheezing. Cardiovascular:  Negative for chest pain. Gastrointestinal:  Negative for abdominal pain, nausea and vomiting. Endocrine: Negative for polydipsia. Genitourinary:  Negative for flank pain and urgency. Musculoskeletal:  Negative for gait problem and neck stiffness. Skin:  Negative for rash. Neurological:  Negative for weakness, light-headedness and headaches. Psychiatric/Behavioral:  Negative for confusion and sleep disturbance.       Except as noted above the remainder of the review of systems was reviewed and negative.        PAST MEDICAL HISTORY     Past Medical History:   Diagnosis Date    Allergic rhinitis     Asthma     Breast cancer ([facility] [address]     invasive ductal carcinoma left breast    CAD (coronary artery disease)     Cancer ([facility] [address] 2014    Invasive ductal left breast    Colon polyps     Diabetes ([facility]     Fibromyalgia     GERD (gastroesophageal reflux disease)     HA (headache)     Herpes simplex     Nose    History of therapeutic radiation     HTN (hypertension)     Hx antineoplastic chemo     Obesity     Senile osteoporosis     Sleep apnea          SURGICAL HISTORY       Past Surgical History:   Procedure Laterality Date    BARIATRIC SURGERY  2004    BREAST BIOPSY Left 14    BREAST BIOPSY Left 12/10/14    BREAST LUMPECTOMY Left     with left SNB  invasive ductal carcinoma    BREAST LUMPECTOMY Left     BREAST LUMPECTOMY Left      SECTION      x4    CHOLECYSTECTOMY  2007    GASTRIC BYPASS SURGERY      HYSTERECTOMY (CERVIX STATUS UNKNOWN)      KNEE ARTHROPLASTY Right     knee reconstruction    KNEE SURGERY      Right knee    LYMPH NODE BIOPSY Left 14    ANDREW AND BSO (CERVIX REMOVED)      Dr. Elayne Alicea ENDOSCOPY  2/18/15    w/bx,polypectomy     UPPER GASTROINTESTINAL ENDOSCOPY N/A 2020    EGD DIAGNOSTIC ONLY performed by Pierce Stanford MD at [address] [place] [address]       Previous Medications    ALBUTEROL (PROVENTIL) (2.5 MG/3ML) 0.083% NEBULIZER SOLUTION    Take 3 mLs by nebulization every 6 hours as needed for Wheezing    ALBUTEROL (PROVENTIL) (5 MG/ML) 0.5% NEBULIZER SOLUTION    Take 1 mL by nebulization 4 times daily as needed for Wheezing    ALBUTEROL SULFATE  (90 BASE) MCG/ACT INHALER    Inhale 2 puffs into the lungs 4 times daily as needed for Wheezing    AMLODIPINE (NORVASC) 10 MG TABLET    Take 1 tablet by mouth once daily AZITHROMYCIN (ZITHROMAX) 250 MG TABLET    Take 1 tablet by mouth See Admin Instructions for 5 days 500mg on day 1 followed by 250mg on days 2 - 5    BENZONATATE (TESSALON) 200 MG CAPSULE    Take 1 capsule by mouth 3 times daily as needed for Cough    BLOOD GLUCOSE MONITOR KIT AND SUPPLIES    Dispense sufficient amount for indicated testing frequency plus additional to accommodate PRN testing needs. Dispense all needed supplies to include: monitor, strips, lancing device, lancets, control solutions, alcohol swabs. BLOOD GLUCOSE MONITOR STRIPS    Test 3 times a day & as needed for symptoms of irregular blood glucose. Dispense sufficient amount for indicated testing frequency plus additional to accommodate PRN testing needs.     BLOOD GLUCOSE MONITORING SUPPL (ONE TOUCH ULTRA 2) W/DEVICE KIT        BROMPHENIRAMINE-PHENYLEPHRINE 2-5 MG/10ML LIQD    Take 10 mLs by mouth every 6 hours as needed (cough)    CHOLESTYRAMINE (QUESTRAN) 4 G PACKET    Take 1 packet by mouth 2 times daily    CPAP MACHINE MISC        CYANOCOBALAMIN 1000 MCG/ML INJECTION    INJECT 1 ML SUBCUTANEOUSLY ONCE EVERY MONTH    DICYCLOMINE (BENTYL) 10 MG CAPSULE    Take 1 capsule by mouth 2 times daily (before meals)    DOXYCYCLINE HYCLATE (VIBRA-TABS) 100 MG TABLET    Take 1 tablet by mouth 2 times daily for 7 days    DULAGLUTIDE 0.75 MG/0.5ML SOPN    Inject 0.75 mg into the skin once a week    FLUTICASONE (FLONASE) 50 MCG/ACT NASAL SPRAY    1 spray by Each Nostril route daily    GLYBURIDE (DIABETA) 2.5 MG TABLET    Take 1 tablet by mouth daily (with breakfast)    GUAIFENESIN (MUCINEX) 600 MG EXTENDED RELEASE TABLET    Take 1 tablet by mouth 2 times daily for 15 days    GUAIFENESIN-DEXTROMETHORPHAN (ROBITUSSIN DM) 100-10 MG/5ML SYRUP    Take 5 mLs by mouth 3 times daily as needed for Cough    HYDROCORTISONE 1 % CREAM        IPRATROPIUM-ALBUTEROL (DUONEB) 0.5-2.5 (3) MG/3ML SOLN NEBULIZER SOLUTION    Inhale 3 mLs into the lungs every 4 hours KETOCONAZOLE (NIZORAL) 2 % CREAM        LANCETS MISC    DX: diabetes mellitus. Use 1-3 time(s) daily - Ok to substitute per insurance (1 each = 1 box)    LISINOPRIL-HYDROCHLOROTHIAZIDE (PRINZIDE;ZESTORETIC) 20-25 MG PER TABLET    Take 1 tablet by mouth daily    METFORMIN (GLUCOPHAGE) 500 MG TABLET    Take 2 tablets by mouth 2 times daily (with meals)    NYSTATIN (MYCOSTATIN) 074709 UNIT/GM CREAM    Apply topically 2 times daily. NYSTATIN (MYCOSTATIN) 768487 UNIT/GM POWDER    Apply 3 times daily. NYSTATIN (MYCOSTATIN) 277064 UNIT/GM POWDER    Apply 3 times daily.     PANTOPRAZOLE (PROTONIX) 40 MG TABLET    Take 1 tablet by mouth once daily    PREDNISONE (DELTASONE) 10 MG TABLET    Take 5 tabs daily x 3 days, 4 tabs daily x 3 days, 3 tabs daily x 3 days, 2 tabs daily x 3 days, 1 tab daily x 3 days    RESPIRATORY THERAPY SUPPLIES (FULL KIT NEBULIZER SET) MISC    1 Units by Does not apply route 4 times daily    [employer]ARE INSULIN SAFETY SYR 29G X 1/2\" 1 ML MISC    USE AS DIRECTED    VITAMIN D (ERGOCALCIFEROL) 1.25 MG (04265 UT) CAPS CAPSULE    Take 1 capsule by mouth once a week       ALLERGIES     Aspirin, Codeine, Imitrex [sumatriptan], Theophyllines, Diflucan [fluconazole], Nizoral [ketoconazole], and Zoloft    FAMILY HISTORY       Family History   Problem Relation Age of Onset    Cancer Father         melanoma    Prostate Cancer Father     Cancer Sister         Melanoma    Cancer Other         Throat    Breast Cancer Maternal Aunt     Colon Cancer Neg Hx           SOCIAL HISTORY       Social History     Socioeconomic History    Marital status:    Tobacco Use    Smoking status: Never    Smokeless tobacco: Never   Vaping Use    Vaping Use: Never used   Substance and Sexual Activity    Alcohol use: No    Drug use: No     Social Determinants of Health     Financial Resource Strain: Low Risk     Difficulty of Paying Living Expenses: Not hard at all   Food Insecurity: No Food Insecurity    Worried About Running Out of Food in the Last Year: Never true    Ran Out of Food in the Last Year: Never true       SCREENINGS        Seanor Coma Scale  Eye Opening: Spontaneous  Best Verbal Response: Oriented  Best Motor Response: Obeys commands  Seanor Coma Scale Score: 15               PHYSICAL EXAM    (up to 7 for level 4, 8 or more for level 5)     ED Triage Vitals [09/07/22 1618]   BP Temp Temp Source Heart Rate Resp SpO2 Height Weight   137/72 99.1 °F (37.3 °C) Oral (!) 115 24 97 % 5' 2\" (1.575 m) 260 lb (117.9 kg)       Physical Exam  Vitals and nursing note reviewed. Constitutional:       General: She is in acute distress. Appearance: She is well-developed. She is not diaphoretic. HENT:      Head: Normocephalic and atraumatic. Comments: Very congested     Right Ear: External ear normal.      Left Ear: External ear normal.      Nose: Nose normal.      Mouth/Throat:      Mouth: Mucous membranes are moist.      Pharynx: No oropharyngeal exudate. Eyes:      Extraocular Movements: Extraocular movements intact. Conjunctiva/sclera: Conjunctivae normal.      Pupils: Pupils are equal, round, and reactive to light. Neck:      Thyroid: No thyromegaly. Vascular: No JVD. Trachea: No tracheal deviation. Cardiovascular:      Rate and Rhythm: Regular rhythm. Tachycardia present. Heart sounds: Normal heart sounds. No murmur heard. Pulmonary:      Effort: Pulmonary effort is normal. No respiratory distress. Breath sounds: Examination of the right-upper field reveals wheezing. Examination of the left-upper field reveals wheezing. Examination of the right-middle field reveals wheezing. Examination of the left-middle field reveals wheezing. Examination of the right-lower field reveals wheezing. Examination of the left-lower field reveals wheezing. Wheezing present. Abdominal:      General: Bowel sounds are normal.      Palpations: Abdomen is soft. Tenderness: There is no abdominal tenderness. There is no guarding. Musculoskeletal:         General: Normal range of motion. Cervical back: Normal range of motion and neck supple. Right lower leg: No edema. Left lower leg: No edema. Skin:     General: Skin is warm and dry. Findings: No rash. Neurological:      General: No focal deficit present. Mental Status: She is alert and oriented to person, place, and time. Cranial Nerves: No cranial nerve deficit. Psychiatric:         Behavior: Behavior normal.       DIAGNOSTIC RESULTS     EKG: All EKG's are interpreted by the Emergency Department Physician who either signs or Co-signs this chart in the absence of a cardiologist.        RADIOLOGY:   Non-plain film images such as CT, Ultrasound and MRI are read by the radiologist. Plain radiographic images are visualized and preliminarily interpreted by the emergency physician with the below findings:        Interpretation per the Radiologist below, if available at the time of this note:    No orders to display         ED BEDSIDE ULTRASOUND:   Performed by ED Physician - none    LABS:  Labs Reviewed   CBC WITH AUTO DIFFERENTIAL - Abnormal; Notable for the following components:       Result Value    MCHC 32.8 (*)     All other components within normal limits   COMPREHENSIVE METABOLIC PANEL - Abnormal; Notable for the following components:    Sodium 134 (*)     Anion Gap 16 (*)     Glucose 460 (*)     Alkaline Phosphatase 137 (*)     All other components within normal limits    Narrative:     CALL  [place]  [facility] tel. [phone],  glu results called to and read back by john sullivan, 09/07/2022 17:44, by  ROXY   POCT GLUCOSE - Normal   RAPID INFLUENZA A/B ANTIGENS   COVID-19, RAPID   HEMOGLOBIN A1C       All other labs were within normal range or not returned as of this dictation.     EMERGENCY DEPARTMENT COURSE and DIFFERENTIAL DIAGNOSIS/MDM:   Vitals:    Vitals:    09/07/22 1618 09/07/22 1733 09/07/22 1742 09/07/22 1748   BP: 137/72 124/74   Pulse: (!) 115 88  87   Resp: 24 23 21   Temp: 99.1 °F (37.3 °C)      TempSrc: Oral      SpO2: 97% 95% 98% 100%   Weight: 260 lb (117.9 kg)      Height: 5' 2\" (1.575 m)          Patient returns for the second day in a row with asthma exacerbation. Despite DuoNeb's and steroids patient returned and has severe wheezing and shortness of breath. Patient again given several DuoNeb's Solu-Medrol magnesium and will require admission. Failed outpatient treatment. MDM        REASSESSMENT          CRITICAL CARE TIME   Total Critical Care time was 0 minutes, excluding separately reportable procedures. There was a high probability of clinically significant/life threatening deterioration in the patient's condition which required my urgent intervention. CONSULTS:  IP CONSULT TO PULMONOLOGY    PROCEDURES:  Unless otherwise noted below, none     Procedures        FINAL IMPRESSION      1. Severe persistent asthma with exacerbation    2. Failure of outpatient treatment          DISPOSITION/PLAN   DISPOSITION Admitted 09/07/2022 06:21:56 PM      PATIENT REFERRED TO:  No follow-up provider specified. DISCHARGE MEDICATIONS:  New Prescriptions    No medications on file     Controlled Substances Monitoring:     RX Monitoring 2/12/2016   Attestation The Prescription Monitoring Report for this patient was reviewed today. Periodic Controlled Substance Monitoring Possible medication side effects, risk of tolerance and/or dependence, and alternative treatments discussed; No signs of potential drug abuse or diversion identified.        (Please note that portions of this note were completed with a voice recognition program.  Efforts were made to edit the dictations but occasionally words are mis-transcribed.)    Audi Grewal DO (electronically signed)  Attending Emergency Physician            Audi Grewal DO  09/07/22 9972

## 2022-09-07 NOTE — ED TRIAGE NOTES
Patient presents with complaints of cough and shortness of breath. This is patient's second visit for same. Patient was told to return for worsening cough/SOB. Auditory wheezing heard in triage. Respirations equal and slightly labored.

## 2022-09-07 NOTE — CARE COORDINATION
[facility] EMERGENCY [facility] [place] Case Management Initial Discharge Assessment    Met with patient to discuss discharge plan. PCP: Tia Padilla MD                                  Date of Last Visit: 4/19/2022    [facility] Patient: No        VA Notified: NA    If no PCP, list provided? N/A    Discharge Planning    Living Arrangements: independently at home    Who do you live with? Patient's two sons live with her. Who helps you with your care: Patient is independent at baseline with ADLs and IADLs. If lives at home: Do you have any barriers navigating in your home? No    Patient can perform ADL? Yes    Current Services (outpatient and in home): None    Dialysis: No    Is transportation available to get to your appointments? Yes, patient drives. DME Equipment: Druscilla Covert - uses PRN. Respiratory equipment: Nebulizer     Pharmacy: [facility]. Consult with Medication Assistance Program?  No      Patient agreeable to [facility] [place]? Declined    Patient agreeable to SNF/Rehab? N/A    Other discharge needs identified? Other - No needs anticipated upon initial CMI interview. CM/SW to reassess if patient's needs change. Does Patient Have a High-Risk for Readmission Diagnosis (CHF, PN, MI, COPD)? No    Initial Discharge Plan? (Note: please see concurrent daily documentation for any updates after initial note). Home, denies needs.     Readmission Risk              Risk of Unplanned Readmission:  0         Electronically signed by Juan Arellano RN on 9/7/2022 at 6:45 PM

## 2022-09-07 NOTE — ED NOTES
This RN attempted to call report to [facility], phone was hung up on. Will attempt to call again.       Apolinar Craven RN  09/07/22 8937

## 2022-09-07 NOTE — ED NOTES
Pt ambulatory to restroom with steady gait, independently, no obvious s/s of distress noted.       Elyse Hoffmann RN  09/07/22 0440

## 2022-09-07 NOTE — H&P
2002    TUBAL LIGATION      UPPER GASTROINTESTINAL ENDOSCOPY  2/18/15    w/bx,polypectomy     UPPER GASTROINTESTINAL ENDOSCOPY N/A 9/28/2020    EGD DIAGNOSTIC ONLY performed by Chelsea Gunderson MD at [address]         Medications Prior to Admission:    Prior to Admission medications    Medication Sig Start Date End Date Taking? Authorizing Provider   ipratropium-albuterol (DUONEB) 0.5-2.5 (3) MG/3ML SOLN nebulizer solution Inhale 3 mLs into the lungs every 4 hours 9/6/22   Sami Tim MD   guaiFENesin-dextromethorphan (ROBITUSSIN DM) 100-10 MG/5ML syrup Take 5 mLs by mouth 3 times daily as needed for Cough 9/6/22 9/16/22  MD cornel RamirezaiFENesin (MUCINEX) 600 MG extended release tablet Take 1 tablet by mouth 2 times daily for 15 days 9/4/22 9/19/22  Debbie Hendricks PA-C   fluticasone [place]) 50 MCG/ACT nasal spray 1 spray by Each Nostril route daily 9/4/22   Debbie Hendricks PA-C   doxycycline hyclate (VIBRA-TABS) 100 MG tablet Take 1 tablet by mouth 2 times daily for 7 days 9/4/22 9/11/22  Debbie Hendricks PA-C   Brompheniramine-Phenylephrine 2-5 MG/10ML LIQD Take 10 mLs by mouth every 6 hours as needed (cough) 9/4/22   Cyndi Heredia PA-C   benzonatate (TESSALON) 200 MG capsule Take 1 capsule by mouth 3 times daily as needed for Cough 9/2/22 9/9/22  YAKELIN Caceres CNP   predniSONE (DELTASONE) 10 MG tablet Take 5 tabs daily x 3 days, 4 tabs daily x 3 days, 3 tabs daily x 3 days, 2 tabs daily x 3 days, 1 tab daily x 3 days 9/2/22   YAKELIN Hannon CNP   azithromycin (ZITHROMAX) 250 MG tablet Take 1 tablet by mouth See Admin Instructions for 5 days 500mg on day 1 followed by 250mg on days 2 - 5 9/2/22 9/7/22  Jaimee Davidson, APRN - CNP   nystatin (MYCOSTATIN) 094799 UNIT/GM powder Apply 3 times daily.  4/19/22   Roque Bamberger, MD   Dulaglutide 0.75 MG/0.5ML SOPN Inject 0.75 mg into the skin once a week 4/19/22 Manuel Pompa MD   albuterol (PROVENTIL) (5 MG/ML) 0.5% nebulizer solution Take 1 mL by nebulization 4 times daily as needed for Wheezing 2/8/22   CHEIKH [place]   amLODIPine (NORVASC) 10 MG tablet Take 1 tablet by mouth once daily 12/30/21   Manuel Pompa MD   pantoprazole (PROTONIX) 40 MG tablet Take 1 tablet by mouth once daily 12/30/21   Manuel Pompa MD   hydrocortisone 1 % cream  10/27/21   Historical Provider, MD   ketoconazole (NIZORAL) 2 % cream  10/27/21   Historical Provider, MD   Blood Glucose Monitoring Suppl (ONE TOUCH ULTRA 2) w/Device KIT  9/28/21   Historical Provider, MD   cyanocobalamin 1000 MCG/ML injection INJECT 1 ML SUBCUTANEOUSLY ONCE EVERY MONTH 9/15/21   Historical Provider, MD Aysha Hinojosa INSULIN SAFETY SYR 29G X 1/2\" 1 ML MISC USE AS DIRECTED 9/15/21   Historical Provider, MD   nystatin (MYCOSTATIN) 539780 UNIT/GM cream Apply topically 2 times daily. 9/28/21   Manuel Pompa MD   lisinopril-hydroCHLOROthiazide [facility]S [facility]) 20-25 MG per tablet Take 1 tablet by mouth daily 9/28/21   Manuel Pompa MD   blood glucose monitor kit and supplies Dispense sufficient amount for indicated testing frequency plus additional to accommodate PRN testing needs. Dispense all needed supplies to include: monitor, strips, lancing device, lancets, control solutions, alcohol swabs. 9/28/21   Manuel Pompa MD   Lancets MISC DX: diabetes mellitus. Use 1-3 time(s) daily - Ok to substitute per insurance (1 each = 1 box) 9/28/21   Manuel Pompa MD   blood glucose monitor strips Test 3 times a day & as needed for symptoms of irregular blood glucose. Dispense sufficient amount for indicated testing frequency plus additional to accommodate PRN testing needs. 9/28/21   Manuel Pompa MD   nystatin (MYCOSTATIN) 067112 UNIT/GM powder Apply 3 times daily.  6/30/21   Lili Barrientos MD   vitamin D (ERGOCALCIFEROL) 1.25 MG (27144 UT) CAPS capsule Take 1 capsule by mouth once a week 5/26/21   Sam Palomino MD   glyBURIDE (DIABETA) 2.5 MG tablet Take 1 tablet by mouth daily (with breakfast) 5/23/21   Sam Palomino MD   cholestyramine Mingo Bellis) 4 g packet Take 1 packet by mouth 2 times daily 5/5/21   Tolu Matthews MD   dicyclomine (BENTYL) 10 MG capsule Take 1 capsule by mouth 2 times daily (before meals) 5/5/21   Tolu Matthews MD   metFORMIN (GLUCOPHAGE) 500 MG tablet Take 2 tablets by mouth 2 times daily (with meals) 3/31/21   Sam Palomino MD   Respiratory Therapy Supplies (FULL KIT NEBULIZER SET) MISC 1 Units by Does not apply route 4 times daily 4/29/20   Sam Palomino MD   albuterol sulfate  (90 Base) MCG/ACT inhaler Inhale 2 puffs into the lungs 4 times daily as needed for Wheezing 6/4/19   Sam Palomino MD   albuterol (PROVENTIL) (2.5 MG/3ML) 0.083% nebulizer solution Take 3 mLs by nebulization every 6 hours as needed for Wheezing 6/4/19   Sam Palomino MD   CPAP Machine [address] [facility]     Historical Provider, MD       Allergies:  Aspirin, Codeine, Imitrex [sumatriptan], Theophyllines, Diflucan [fluconazole], Nizoral [ketoconazole], and Zoloft    Social History:   TOBACCO:   reports that she has never smoked. She has never used smokeless tobacco.  ETOH:   reports no history of alcohol use. Family History:       Problem Relation Age of Onset    Cancer Father         melanoma    Prostate Cancer Father     Cancer Sister         Melanoma    Cancer Other         Throat    Breast Cancer Maternal Aunt     Colon Cancer Neg Hx        REVIEW OF SYSTEMS:  Ten systems reviewed and negative except for as above. Physical Exam:    Vitals: /74   Pulse 87   Temp 99.1 °F (37.3 °C) (Oral)   Resp 21   Ht 5' 2\" (1.575 m)   Wt 260 lb (117.9 kg)   SpO2 100%   BMI 47.55 kg/m²   Constitutional: alert, appears stated age and cooperative  Skin: Skin color, texture, turgor normal. No rashes or lesions  Eyes:Eye: Normal external eye, conjunctiva, LETHA.   ENT: Head: Normocephalic, no lesions, without obvious abnormality. Neck: no adenopathy, no carotid bruit, no JVD, supple, symmetrical, trachea midline and thyroid not enlarged, symmetric, no tenderness/mass/nodules  Respiratory: very diminished, tight bs, minimal exp  wheeze  Cardiovascular: regular rate and rhythm, S1, S2 normal, no murmur, click, rub or gallop  Gastrointestinal: soft, non-tender; bowel sounds normal; no masses,  no organomegaly  Genitourinary: Deferred  Musculoskeletal:extremities normal, atraumatic, no cyanosis or edema  Neurologic: Mental status AAOx3 No facial asymmetry or droop. Normal muscle strength b/l. CN II-XII grossly intact. Psychiatric: Appropriate mood and affect. Good insight and judgement  Hematologic: No obvious bruising or bleeding    Recent Labs     09/06/22  0400 09/07/22  1645   WBC 5.2 6.8   HGB 13.1 13.2    213     Recent Labs     09/06/22  0400 09/07/22  1645    134*   K 3.5 3.6   CL 97 97   CO2 26 21   BUN 18 21   CREATININE 0.65 0.87   GLUCOSE 296* 460*   AST 13 23   ALT 18 20   BILITOT 0.6 0.5   ALKPHOS 140* 137*     Troponin T:   Recent Labs     09/06/22 0400   TROPONINI <0.010       Assessment and Plan   Acute asthma exacerbation failed outpatient treatment  IV steroid, nebs sched and prn, pulm consult.   DM2 with hyperglycemia due to steroid  Lantus and High SSI, ac/hs checks, a1c  HTN  Cont home meds when available  CAD  GERD  DVT proph    Patient Active Problem List   Diagnosis Code    Asthma J45.909    HA (headache) R51.9    Body mass index (BMI) 45.0-49.9, adult (HCC) Z68.42    CAD (coronary artery disease) I25.10    BUNNY (obstructive sleep apnea) G47.33    Hypovitaminosis D E55.9    Arthritis M19.90    Essential hypertension I10    GERD (gastroesophageal reflux disease) K21.9    Diabetes ([facility] [address] E11.9    Malignant neoplasm of lower-inner quadrant of left breast in female, estrogen receptor negative ([facility] [address] C50.312, Z17.1    Biliary sepsis K83.09    Abnormal LFTs R94.5    Dilated bile duct K83.8    Epigastric pain R10.13    Fever R50.9    Senile osteoporosis M81.0    Obstructive jaundice K83.1    Bronchitis J40    Asthma exacerbation attacks J45.901    Acute asthma exacerbation Gentry Strange MD, MD  Admitting Hospitalist

## 2022-09-08 PROBLEM — E11.65 UNCONTROLLED TYPE 2 DIABETES MELLITUS WITH HYPERGLYCEMIA (HCC): Status: ACTIVE | Noted: 2022-09-08

## 2022-09-08 LAB
ADENOVIRUS BY PCR: NOT DETECTED
BORDETELLA PARAPERTUSSIS BY PCR: NOT DETECTED
BORDETELLA PERTUSSIS BY PCR: NOT DETECTED
CHLAMYDOPHILIA PNEUMONIAE BY PCR: NOT DETECTED
CORONAVIRUS 229E BY PCR: NOT DETECTED
CORONAVIRUS HKU1 BY PCR: NOT DETECTED
CORONAVIRUS NL63 BY PCR: NOT DETECTED
CORONAVIRUS OC43 BY PCR: NOT DETECTED
GLUCOSE BLD-MCNC: 143 MG/DL (ref 70–99)
GLUCOSE BLD-MCNC: 229 MG/DL (ref 70–99)
GLUCOSE BLD-MCNC: 270 MG/DL (ref 70–99)
GLUCOSE BLD-MCNC: 288 MG/DL (ref 70–99)
GLUCOSE BLD-MCNC: 366 MG/DL (ref 70–99)
GLUCOSE BLD-MCNC: 412 MG/DL (ref 70–99)
GLUCOSE BLD-MCNC: 65 MG/DL (ref 70–99)
HUMAN METAPNEUMOVIRUS BY PCR: DETECTED
HUMAN RHINOVIRUS/ENTEROVIRUS BY PCR: NOT DETECTED
INFLUENZA A BY PCR: NOT DETECTED
INFLUENZA B BY PCR: NOT DETECTED
MYCOPLASMA PNEUMONIAE BY PCR: NOT DETECTED
PARAINFLUENZA VIRUS 1 BY PCR: NOT DETECTED
PARAINFLUENZA VIRUS 2 BY PCR: NOT DETECTED
PARAINFLUENZA VIRUS 3 BY PCR: NOT DETECTED
PARAINFLUENZA VIRUS 4 BY PCR: NOT DETECTED
PERFORMED ON: ABNORMAL
RESPIRATORY SYNCYTIAL VIRUS BY PCR: NOT DETECTED
SARS-COV-2, PCR: NOT DETECTED

## 2022-09-08 PROCEDURE — 6370000000 HC RX 637 (ALT 250 FOR IP): Performed by: FAMILY MEDICINE

## 2022-09-08 PROCEDURE — 6360000002 HC RX W HCPCS: Performed by: INTERNAL MEDICINE

## 2022-09-08 PROCEDURE — 2580000003 HC RX 258: Performed by: FAMILY MEDICINE

## 2022-09-08 PROCEDURE — 99223 1ST HOSP IP/OBS HIGH 75: CPT | Performed by: INTERNAL MEDICINE

## 2022-09-08 PROCEDURE — 6360000002 HC RX W HCPCS: Performed by: FAMILY MEDICINE

## 2022-09-08 PROCEDURE — 99222 1ST HOSP IP/OBS MODERATE 55: CPT | Performed by: INTERNAL MEDICINE

## 2022-09-08 PROCEDURE — 6370000000 HC RX 637 (ALT 250 FOR IP): Performed by: INTERNAL MEDICINE

## 2022-09-08 PROCEDURE — 94640 AIRWAY INHALATION TREATMENT: CPT

## 2022-09-08 PROCEDURE — 1210000000 HC MED SURG R&B

## 2022-09-08 PROCEDURE — 94761 N-INVAS EAR/PLS OXIMETRY MLT: CPT

## 2022-09-08 PROCEDURE — 0202U NFCT DS 22 TRGT SARS-COV-2: CPT

## 2022-09-08 PROCEDURE — 94660 CPAP INITIATION&MGMT: CPT

## 2022-09-08 PROCEDURE — 94664 DEMO&/EVAL PT USE INHALER: CPT

## 2022-09-08 RX ORDER — INSULIN LISPRO 100 [IU]/ML
30 INJECTION, SOLUTION INTRAVENOUS; SUBCUTANEOUS ONCE
Status: COMPLETED | OUTPATIENT
Start: 2022-09-08 | End: 2022-09-08

## 2022-09-08 RX ORDER — INSULIN LISPRO 100 [IU]/ML
14 INJECTION, SOLUTION INTRAVENOUS; SUBCUTANEOUS
Status: DISCONTINUED | OUTPATIENT
Start: 2022-09-09 | End: 2022-09-09

## 2022-09-08 RX ORDER — BUDESONIDE 0.5 MG/2ML
0.5 INHALANT ORAL 2 TIMES DAILY
Status: DISCONTINUED | OUTPATIENT
Start: 2022-09-08 | End: 2022-09-14 | Stop reason: HOSPADM

## 2022-09-08 RX ORDER — INSULIN LISPRO 100 [IU]/ML
20 INJECTION, SOLUTION INTRAVENOUS; SUBCUTANEOUS ONCE
Status: COMPLETED | OUTPATIENT
Start: 2022-09-08 | End: 2022-09-08

## 2022-09-08 RX ORDER — METHYLPREDNISOLONE SODIUM SUCCINATE 40 MG/ML
40 INJECTION, POWDER, LYOPHILIZED, FOR SOLUTION INTRAMUSCULAR; INTRAVENOUS EVERY 12 HOURS
Status: DISCONTINUED | OUTPATIENT
Start: 2022-09-09 | End: 2022-09-13

## 2022-09-08 RX ORDER — ALBUTEROL SULFATE 2.5 MG/3ML
2.5 SOLUTION RESPIRATORY (INHALATION) 3 TIMES DAILY
Status: DISCONTINUED | OUTPATIENT
Start: 2022-09-08 | End: 2022-09-14 | Stop reason: HOSPADM

## 2022-09-08 RX ADMIN — INSULIN GLARGINE 20 UNITS: 100 INJECTION, SOLUTION SUBCUTANEOUS at 21:58

## 2022-09-08 RX ADMIN — ALBUTEROL SULFATE 2.5 MG: 2.5 SOLUTION RESPIRATORY (INHALATION) at 02:55

## 2022-09-08 RX ADMIN — BUDESONIDE 500 MCG: 0.5 SUSPENSION RESPIRATORY (INHALATION) at 20:08

## 2022-09-08 RX ADMIN — ALBUTEROL SULFATE 2.5 MG: 2.5 SOLUTION RESPIRATORY (INHALATION) at 08:31

## 2022-09-08 RX ADMIN — INSULIN LISPRO 4 UNITS: 100 INJECTION, SOLUTION INTRAVENOUS; SUBCUTANEOUS at 12:27

## 2022-09-08 RX ADMIN — ALBUTEROL SULFATE 2.5 MG: 2.5 SOLUTION RESPIRATORY (INHALATION) at 20:07

## 2022-09-08 RX ADMIN — Medication 10 ML: at 12:13

## 2022-09-08 RX ADMIN — INSULIN LISPRO 8 UNITS: 100 INJECTION, SOLUTION INTRAVENOUS; SUBCUTANEOUS at 17:30

## 2022-09-08 RX ADMIN — METHYLPREDNISOLONE SODIUM SUCCINATE 40 MG: 40 INJECTION, POWDER, FOR SOLUTION INTRAMUSCULAR; INTRAVENOUS at 04:30

## 2022-09-08 RX ADMIN — INSULIN LISPRO 4 UNITS: 100 INJECTION, SOLUTION INTRAVENOUS; SUBCUTANEOUS at 21:58

## 2022-09-08 RX ADMIN — ENOXAPARIN SODIUM 30 MG: 100 INJECTION SUBCUTANEOUS at 12:13

## 2022-09-08 RX ADMIN — METHYLPREDNISOLONE SODIUM SUCCINATE 40 MG: 40 INJECTION, POWDER, FOR SOLUTION INTRAMUSCULAR; INTRAVENOUS at 12:27

## 2022-09-08 RX ADMIN — INSULIN LISPRO 20 UNITS: 100 INJECTION, SOLUTION INTRAVENOUS; SUBCUTANEOUS at 21:58

## 2022-09-08 RX ADMIN — Medication 10 ML: at 21:59

## 2022-09-08 RX ADMIN — BENZONATATE 100 MG: 100 CAPSULE ORAL at 13:22

## 2022-09-08 RX ADMIN — ENOXAPARIN SODIUM 30 MG: 100 INJECTION SUBCUTANEOUS at 21:57

## 2022-09-08 RX ADMIN — ALBUTEROL SULFATE 2.5 MG: 2.5 SOLUTION RESPIRATORY (INHALATION) at 12:23

## 2022-09-08 RX ADMIN — INSULIN LISPRO 30 UNITS: 100 INJECTION, SOLUTION INTRAVENOUS; SUBCUTANEOUS at 01:13

## 2022-09-08 ASSESSMENT — PAIN SCALES - GENERAL: PAINLEVEL_OUTOF10: 0

## 2022-09-08 NOTE — CONSULTS
Pulmonary and Critical Care Medicine  Consult Note  Encounter Date: 2022 1:59 PM    Ms. Sukhdeep Odell is a 79 y.o. female  : 1955  Requesting Provider: Mekhi Dunne MD    Reason for request: Asthma exacerbation            HISTORY OF PRESENT ILLNESS:    Patient is 79 y.o. presents with 1 week history of worsening shortness of breath, with cough, and wheezing, cough productive of yellow phlegm, and mucous plugs, she visited urgent care on Friday last week, she received prednisone, did not improve, came back to ED on Monday responded to rescue treatment and discharged home however symptoms recurred and she is now hospitalized. No chest pain, no worsening lower extremity edema, no fever no chills and no sick contact. She was tested for COVID-19 twice and it was negative. She is currently on room air saturation 97%, she has history of asthma and only on rescue inhalers, she is on no maintenance treatment. She reported history of snoring and sleep apnea, she has CPAP machine at home however it is old and does not work well. Family history positive for asthma, she has a dog at home no birds.       Past Medical History:        Diagnosis Date    Allergic rhinitis     Asthma     Breast cancer ([facility] [address]     invasive ductal carcinoma left breast    CAD (coronary artery disease)     Cancer ([facility] [address] 2014    Invasive ductal left breast    Colon polyps     Diabetes ([facility] [address]     Fibromyalgia     GERD (gastroesophageal reflux disease)     HA (headache)     Herpes simplex     Nose    History of therapeutic radiation     HTN (hypertension)     Hx antineoplastic chemo     Obesity     Senile osteoporosis     Sleep apnea        Past Surgical History:        Procedure Laterality Date    BARIATRIC SURGERY  2004    BREAST BIOPSY Left 14    BREAST BIOPSY Left 12/10/14    BREAST LUMPECTOMY Left     with left SNB  invasive ductal carcinoma    BREAST LUMPECTOMY Left     BREAST LUMPECTOMY Left      SECTION      x4    CHOLECYSTECTOMY  02/2007    GASTRIC BYPASS SURGERY  2003    HYSTERECTOMY (CERVIX STATUS UNKNOWN)      KNEE ARTHROPLASTY Right     knee reconstruction    KNEE SURGERY  1992    Right knee    LYMPH NODE BIOPSY Left 11/18/14    ANDREW AND BSO (CERVIX REMOVED)      Dr. Vishal Cazares ENDOSCOPY  2/18/15    w/bx,polypectomy     UPPER GASTROINTESTINAL ENDOSCOPY N/A 9/28/2020    EGD DIAGNOSTIC ONLY performed by Tolu Matthews MD at [address]         Social History:     reports that she has never smoked. She has never used smokeless tobacco. She reports that she does not drink alcohol and does not use drugs.     Family History:       Problem Relation Age of Onset    Cancer Father         melanoma    Prostate Cancer Father     Cancer Sister         Melanoma    Cancer Other         Throat    Breast Cancer Maternal Aunt     Colon Cancer Neg Hx        Allergies:  Aspirin, Codeine, Imitrex [sumatriptan], Theophyllines, Diflucan [fluconazole], Nizoral [ketoconazole], and Zoloft        MEDICATIONS during current hospitalization:    Continuous Infusions:   sodium chloride      dextrose         Scheduled Meds:   albuterol  2.5 mg Nebulization TID    sodium chloride flush  5-40 mL IntraVENous 2 times per day    enoxaparin  30 mg SubCUTAneous BID    methylPREDNISolone  40 mg IntraVENous Q6H    Followed by    Raymundo Paul ON 9/10/2022] predniSONE  40 mg Oral Daily    insulin glargine  20 Units SubCUTAneous Nightly    insulin lispro  0-16 Units SubCUTAneous TID WC    insulin lispro  0-4 Units SubCUTAneous Nightly       PRN Meds:ipratropium-albuterol, sodium chloride flush, sodium chloride, ondansetron **OR** ondansetron, polyethylene glycol, acetaminophen **OR** acetaminophen, albuterol, glucose, dextrose bolus **OR** dextrose bolus, glucagon (rDNA), dextrose, benzonatate        REVIEW OF SYSTEMS:  ROS: 10 organs review of system is done including general, psychological, ENT, hematological, endocrine, respiratory, cardiovascular, gastrointestinal, musculoskeletal, neurological,  allergy and Immunology is done and is otherwise negative. PHYSICAL EXAM:    Vitals:  BP (!) 156/70   Pulse (!) 101   Temp 98.2 °F (36.8 °C) (Oral)   Resp 17   Ht 5' 2\" (1.575 m)   Wt 260 lb (117.9 kg)   SpO2 97%   BMI 47.55 kg/m²     General: alert, cooperative, no distress  Head: normocephalic, atraumatic  Eyes:No gross abnormalities. ENT:  MMM no lesions  Neck:  supple and no masses  Chest : Diffuse wheezing, tight, no rales, nontender, tympanic  Heart[de-identified] Heart sounds are normal.  Regular rate and rhythm without murmur, gallop or rub. ABD:  symmetric, soft, non-tender  Musculoskeletal : no cyanosis, no clubbing, and no edema  Neuro:  Grossly normal  Skin: No rashes or nodules noted. Lymph node:  no cervical nodes  Urology: No Luis   Psychiatric: appropriate        Data Review  Recent Labs     09/06/22  0400 09/07/22  1645   WBC 5.2 6.8   HGB 13.1 13.2   HCT 36.4* 40.2    213      Recent Labs     09/06/22  0400 09/07/22  1645 09/07/22  1829    134*  --    K 3.5 3.6  --    CL 97 97  --    CO2 26 21  --    BUN 18 21  --    CREATININE 0.65 0.87  --    GLUCOSE 296* 460* 455       MV Settings: ABGs: No results for input(s): PHART, HSN2FFV, PO2ART, NQL1VXP, BEART, B5ZNZYCY, DTL5XCV in the last 72 hours.   O2 Device: None (Room air)  Lab Results   Component Value Date/Time    LACTA 3.4 08/07/2021 05:00 AM    LACTA 2.4 02/09/2021 04:54 PM    LACTA 1.2 11/24/2014 03:49 PM       Radiology  I personally reviewed imaging studies and chest x-ray shows no infiltrate        Assessment, plan:   Patient is at risk due to    Severe persistent asthma with acute exacerbation  Acute bronchitis with retained secretions  BUNNY  Obesity     Recommendation  Solu-Medrol 40 mg IV twice daily  Budesonide nebs twice daily  DuoNeb every 6 hours while awake  Respiratory viral panel  Repeat procalcitonin tomorrow  O2 to keep sat 90 to 92%  Will need outpatient PFT and repeat sleep study  BiPAP while asleep  Pulmonary hygiene      Thank you for consultation    Electronically signed by Checo Sandoval MD, [facility],  on 9/8/2022 at 1:59 PM

## 2022-09-08 NOTE — PROGRESS NOTES
Patients glucose 360, notified Dr. Brii Fried via [employer]. New orders placed and medications given per orders. Denies any symptoms at this time. Call light within reach. 2206 - Patients glucose 538, notified Dr. Brii Fried via [employer]. New orders placed and medications given per orders. Patient states she is thirsty, no other symptoms at this time. Call light within reach. 2327 - Patients glucose 476, notified Dr. Brii Fried via [employer]. New orders placed and medications given per orders. No new symptoms at this time. Call light within reach. 9/8/22  0045 - Patients glucose 366, notified Dr. Brii Fried via [employer]. Awaiting Response. 0106 - Response received from Dr. Brii Fried. New orders placed and medications given per orders. Patient resting at this time. Call light within reach. 0209 - Patients glucose 270. Orders to recheck glucose at 0300.

## 2022-09-08 NOTE — PROGRESS NOTES
Took over for previous nurse. Was passed on in report that patient's blood sugar needs checked around 0300 and if under 300 no need to contact Dr. Torri Johnson for further orders. Patient blood sugar was 143.

## 2022-09-08 NOTE — PROGRESS NOTES
[facility] [place] Respiratory Therapy Evaluation   Current Order:[id number]    Home Regimen: prn   Ordering Physician: london  Re-evaluation Date:  9/11   Diagnosis: sob  Patient Status: Stable / Unstable + Physician notified    The following MDI Criteria must be met in order to convert aerosol to MDI with spacer. If unable to meet, MDI will be converted to aerosol:  []  Patient able to demonstrate the ability to use MDI effectively  []  Patient alert and cooperative  []  Patient able to take deep breath with 5-10 second hold  []  Medication(s) available in this delivery method   []  Peak flow greater than or equal to 200 ml/min            Current Order Substituted To  (same drug, same frequency)   Aerosol to MDI [] Albuterol Sulfate 0.083% unit dose by aerosol Albuterol Sulfate MDI 2 puffs by inhalation with spacer    [] Levalbuterol 1.25 mg unit dose by aerosol Levalbuterol MDI 2 puffs by inhalation with spacer    [] Levalbuterol 0.63 mg unit dose by aerosol Levalbuterol MDI 2 puffs by inhalation with spacer    [] Ipratropium Bromide 0.02% unit dose by aerosol Ipratropium Bromide MDI 2 puffs by inhalation with spacer    [] Duoneb (Ipratropium + Albuterol) unit dose by aerosol Ipratropium MDI + Albuterol MDI 2 puffs by inhalation w/spacer   MDI to Aerosol [] Albuterol Sulfate MDI Albuterol Sulfate 0.083% unit dose by aerosol    [] Levalbuterol MDI 2 puffs by inhalation Levalbuterol 1.25 mg unit dose by aerosol    [] Ipratropium Bromide MDI by inhalation Ipratropium Bromide 0.02% unit dose by aerosol    [] Combivent (Ipratropium + Albuterol) MDI by inhalation Duoneb (Ipratropium + Albuterol) unit dose by aerosol       Treatment Assessment [Frequency/Schedule]:  Change frequency to: __albuterol tid and q2prn________________________________________________per Protocol, P&T, MEC      Points 0 1 2 3 4   Pulmonary Status  Non-Smoker  []   Smoking history   < 20 pack years  []   Smoking history  ?  20 pack years  [] Pulmonary Disorder  (acute or chronic)  [x]   Severe or Chronic w/ Exacerbation  []     Surgical Status No [x]   Surgeries     General []   Surgery Lower []   Abdominal Thoracic or []   Upper Abdominal Thoracic with  PulmonaryDisorder  []     Chest X-ray Clear/Not  Ordered     [x]  Chronic Changes  Results Pending  []  Infiltrates, atelectasis, pleural effusion, or edema  []  Infiltrates in more than one lobe []  Infiltrate + Atelectasis, &/or pleural effusion  []    Respiratory Pattern Regular,  RR = 12-20 [x]  Increased,  RR = 21-25 []  STOCKTON, irregular,  or RR = 26-30 []  Decreased FEV1  or RR = 31-35 []  Severe SOB, use  of accessory muscles, or RR ? 35  []    Mental Status Alert, oriented,  Cooperative [x]  Confused but Follows commands []  Lethargic or unable to follow commands []  Obtunded  []  Comatose  []    Breath Sounds Clear to  auscultation  []  Decreased unilaterally or  in bases only []  Decreased  bilaterally  []  Crackles or intermittent wheezes []  Wheezes [x]    Cough Strong, Spontan., & nonproductive [x]  Strong,  spontaneous, &  productive []  Weak,  Nonproductive []  Weak, productive or  with wheezes []  No spontaneous  cough or may require suctioning []    Level of Activity Ambulatory []  Ambulatory w/ Assist  [x]  Non-ambulatory []  Paraplegic []  Quadriplegic []    Total8    Score:__8_____     Triage Score:_____4___      Tri       Triage:     1. (>20) Freq: Q3    2. (16-20) Freq: Q4   3. (11-15) Freq: QID & Albuterol Q2 PRN    4. (6-10) Freq: TID & Albuterol Q2 PRN    5. (0-5) Freq Q4prn

## 2022-09-08 NOTE — PROGRESS NOTES
4637: Patient's POCT glucose 65 before breakfast. Breakfast tray delivered. Pt eating breakfast.    1211: Patient's POCT glucose 229-insulin coverage provided. Pt reports eating about half of her lunch tray. 1215: Assessment complete. VSS on room air. Patient alert, oriented, calm, and cooperative. Persistent cough noted. Breathing treatment given by RT. Lovenox administered per MAR. Pt up independently. Safety maintained, call light within reach. 1322: PRN Tessalon capsule given for cough. 1830: Respiratory panel sent down to lab at this time as lab had originally sent up the wrong swab. Pt on Droplet Plus precautions for rule out.      Electronically signed by Sarah Pak RN on 9/8/22 at 6:39 PM EDT

## 2022-09-09 LAB
ALBUMIN SERPL-MCNC: 3.7 G/DL (ref 3.5–4.6)
ALP BLD-CCNC: 109 U/L (ref 40–130)
ALT SERPL-CCNC: 21 U/L (ref 0–33)
ANION GAP SERPL CALCULATED.3IONS-SCNC: 11 MEQ/L (ref 9–15)
AST SERPL-CCNC: 16 U/L (ref 0–35)
BASOPHILS ABSOLUTE: 0 K/UL (ref 0–0.2)
BASOPHILS RELATIVE PERCENT: 0 %
BILIRUB SERPL-MCNC: 0.4 MG/DL (ref 0.2–0.7)
BUN BLDV-MCNC: 26 MG/DL (ref 8–23)
CALCIUM SERPL-MCNC: 8.4 MG/DL (ref 8.5–9.9)
CHLORIDE BLD-SCNC: 98 MEQ/L (ref 95–107)
CO2: 24 MEQ/L (ref 20–31)
CREAT SERPL-MCNC: 0.54 MG/DL (ref 0.5–0.9)
EOSINOPHILS ABSOLUTE: 0 K/UL (ref 0–0.7)
EOSINOPHILS RELATIVE PERCENT: 0 %
GFR AFRICAN AMERICAN: >60
GFR NON-AFRICAN AMERICAN: >60
GLOBULIN: 2.3 G/DL (ref 2.3–3.5)
GLUCOSE BLD-MCNC: 100 MG/DL (ref 70–99)
GLUCOSE BLD-MCNC: 237 MG/DL (ref 70–99)
GLUCOSE BLD-MCNC: 243 MG/DL (ref 70–99)
GLUCOSE BLD-MCNC: 347 MG/DL (ref 70–99)
GLUCOSE BLD-MCNC: 361 MG/DL (ref 70–99)
HCT VFR BLD CALC: 37.4 % (ref 37–47)
HEMOGLOBIN: 12.6 G/DL (ref 12–16)
LYMPHOCYTES ABSOLUTE: 0.6 K/UL (ref 1–4.8)
LYMPHOCYTES RELATIVE PERCENT: 7.5 %
MCH RBC QN AUTO: 29.3 PG (ref 27–31.3)
MCHC RBC AUTO-ENTMCNC: 33.6 % (ref 33–37)
MCV RBC AUTO: 87 FL (ref 82–100)
MONOCYTES ABSOLUTE: 0.3 K/UL (ref 0.2–0.8)
MONOCYTES RELATIVE PERCENT: 4.2 %
NEUTROPHILS ABSOLUTE: 6.7 K/UL (ref 1.4–6.5)
NEUTROPHILS RELATIVE PERCENT: 88.3 %
PDW BLD-RTO: 14.1 % (ref 11.5–14.5)
PERFORMED ON: ABNORMAL
PLATELET # BLD: 200 K/UL (ref 130–400)
POTASSIUM SERPL-SCNC: 4.3 MEQ/L (ref 3.4–4.9)
PROCALCITONIN: 0.1 NG/ML (ref 0–0.15)
RBC # BLD: 4.3 M/UL (ref 4.2–5.4)
SODIUM BLD-SCNC: 133 MEQ/L (ref 135–144)
TOTAL PROTEIN: 6 G/DL (ref 6.3–8)
WBC # BLD: 7.6 K/UL (ref 4.8–10.8)

## 2022-09-09 PROCEDURE — 94640 AIRWAY INHALATION TREATMENT: CPT

## 2022-09-09 PROCEDURE — 6360000002 HC RX W HCPCS: Performed by: FAMILY MEDICINE

## 2022-09-09 PROCEDURE — 6370000000 HC RX 637 (ALT 250 FOR IP): Performed by: INTERNAL MEDICINE

## 2022-09-09 PROCEDURE — 6370000000 HC RX 637 (ALT 250 FOR IP): Performed by: FAMILY MEDICINE

## 2022-09-09 PROCEDURE — 1210000000 HC MED SURG R&B

## 2022-09-09 PROCEDURE — 6360000002 HC RX W HCPCS: Performed by: INTERNAL MEDICINE

## 2022-09-09 PROCEDURE — 99232 SBSQ HOSP IP/OBS MODERATE 35: CPT | Performed by: INTERNAL MEDICINE

## 2022-09-09 PROCEDURE — 94761 N-INVAS EAR/PLS OXIMETRY MLT: CPT

## 2022-09-09 PROCEDURE — 6370000000 HC RX 637 (ALT 250 FOR IP): Performed by: NURSE PRACTITIONER

## 2022-09-09 PROCEDURE — 84145 PROCALCITONIN (PCT): CPT

## 2022-09-09 PROCEDURE — 80053 COMPREHEN METABOLIC PANEL: CPT

## 2022-09-09 PROCEDURE — 2580000003 HC RX 258: Performed by: FAMILY MEDICINE

## 2022-09-09 PROCEDURE — 85025 COMPLETE CBC W/AUTO DIFF WBC: CPT

## 2022-09-09 PROCEDURE — 36415 COLL VENOUS BLD VENIPUNCTURE: CPT

## 2022-09-09 RX ORDER — INSULIN GLARGINE 100 [IU]/ML
60 INJECTION, SOLUTION SUBCUTANEOUS NIGHTLY
Status: DISCONTINUED | OUTPATIENT
Start: 2022-09-09 | End: 2022-09-09

## 2022-09-09 RX ORDER — INSULIN GLARGINE 100 [IU]/ML
40 INJECTION, SOLUTION SUBCUTANEOUS ONCE
Status: COMPLETED | OUTPATIENT
Start: 2022-09-09 | End: 2022-09-09

## 2022-09-09 RX ORDER — INSULIN LISPRO 100 [IU]/ML
18 INJECTION, SOLUTION INTRAVENOUS; SUBCUTANEOUS
Status: DISCONTINUED | OUTPATIENT
Start: 2022-09-09 | End: 2022-09-09

## 2022-09-09 RX ORDER — INSULIN LISPRO 100 [IU]/ML
10 INJECTION, SOLUTION INTRAVENOUS; SUBCUTANEOUS
Status: DISCONTINUED | OUTPATIENT
Start: 2022-09-10 | End: 2022-09-14 | Stop reason: HOSPADM

## 2022-09-09 RX ORDER — INSULIN GLARGINE 100 [IU]/ML
40 INJECTION, SOLUTION SUBCUTANEOUS NIGHTLY
Status: DISCONTINUED | OUTPATIENT
Start: 2022-09-10 | End: 2022-09-14 | Stop reason: HOSPADM

## 2022-09-09 RX ADMIN — Medication 5 ML: at 21:56

## 2022-09-09 RX ADMIN — Medication 10 ML: at 09:44

## 2022-09-09 RX ADMIN — BENZONATATE 100 MG: 100 CAPSULE ORAL at 20:23

## 2022-09-09 RX ADMIN — ALBUTEROL SULFATE 2.5 MG: 2.5 SOLUTION RESPIRATORY (INHALATION) at 14:32

## 2022-09-09 RX ADMIN — BUDESONIDE 500 MCG: 0.5 SUSPENSION RESPIRATORY (INHALATION) at 19:20

## 2022-09-09 RX ADMIN — BENZONATATE 100 MG: 100 CAPSULE ORAL at 13:01

## 2022-09-09 RX ADMIN — ENOXAPARIN SODIUM 30 MG: 100 INJECTION SUBCUTANEOUS at 09:42

## 2022-09-09 RX ADMIN — INSULIN GLARGINE 40 UNITS: 100 INJECTION, SOLUTION SUBCUTANEOUS at 22:19

## 2022-09-09 RX ADMIN — INSULIN LISPRO 16 UNITS: 100 INJECTION, SOLUTION INTRAVENOUS; SUBCUTANEOUS at 12:58

## 2022-09-09 RX ADMIN — ENOXAPARIN SODIUM 30 MG: 100 INJECTION SUBCUTANEOUS at 21:56

## 2022-09-09 RX ADMIN — METHYLPREDNISOLONE SODIUM SUCCINATE 40 MG: 40 INJECTION, POWDER, FOR SOLUTION INTRAMUSCULAR; INTRAVENOUS at 00:37

## 2022-09-09 RX ADMIN — INSULIN LISPRO 4 UNITS: 100 INJECTION, SOLUTION INTRAVENOUS; SUBCUTANEOUS at 21:56

## 2022-09-09 RX ADMIN — BUDESONIDE 500 MCG: 0.5 SUSPENSION RESPIRATORY (INHALATION) at 07:34

## 2022-09-09 RX ADMIN — INSULIN LISPRO 14 UNITS: 100 INJECTION, SOLUTION INTRAVENOUS; SUBCUTANEOUS at 09:43

## 2022-09-09 RX ADMIN — INSULIN LISPRO 4 UNITS: 100 INJECTION, SOLUTION INTRAVENOUS; SUBCUTANEOUS at 09:43

## 2022-09-09 RX ADMIN — ALBUTEROL SULFATE 2.5 MG: 2.5 SOLUTION RESPIRATORY (INHALATION) at 07:34

## 2022-09-09 RX ADMIN — ALBUTEROL SULFATE 2.5 MG: 2.5 SOLUTION RESPIRATORY (INHALATION) at 19:19

## 2022-09-09 RX ADMIN — METHYLPREDNISOLONE SODIUM SUCCINATE 40 MG: 40 INJECTION, POWDER, FOR SOLUTION INTRAMUSCULAR; INTRAVENOUS at 12:58

## 2022-09-09 RX ADMIN — INSULIN LISPRO 14 UNITS: 100 INJECTION, SOLUTION INTRAVENOUS; SUBCUTANEOUS at 12:58

## 2022-09-09 ASSESSMENT — ENCOUNTER SYMPTOMS
WHEEZING: 1
SHORTNESS OF BREATH: 1

## 2022-09-09 NOTE — PROGRESS NOTES
sodium chloride flush, sodium chloride, ondansetron **OR** ondansetron, polyethylene glycol, acetaminophen **OR** acetaminophen, albuterol, glucose, dextrose bolus **OR** dextrose bolus, glucagon (rDNA), dextrose, benzonatate    Labs:   Recent Labs     09/06/22  0400 09/07/22  1645   WBC 5.2 6.8   HGB 13.1 13.2   HCT 36.4* 40.2    213     Recent Labs     09/06/22  0400 09/07/22  1645    134*   K 3.5 3.6   CL 97 97   CO2 26 21   BUN 18 21   CREATININE 0.65 0.87   CALCIUM 9.0 8.8     Recent Labs     09/06/22  0400 09/07/22  1645   AST 13 23   ALT 18 20   BILITOT 0.6 0.5   ALKPHOS 140* 137*     No results for input(s): INR in the last 72 hours. Recent Labs     09/06/22  0400   TROPONINI <0.010       Urinalysis:   Lab Results   Component Value Date/Time    NITRU Negative 08/07/2021 05:00 AM    WBCUA 0-2 09/24/2020 09:30 AM    BACTERIA MANY 09/24/2020 09:30 AM    RBCUA 0-2 09/24/2020 09:30 AM    BLOODU Negative 08/07/2021 05:00 AM    SPECGRAV 1.020 08/07/2021 05:00 AM    GLUCOSEU >=1000 08/07/2021 05:00 AM    GLUCOSEU NEG 04/04/2012 02:58 PM       Radiology:   Most recent    Chest CT      WITH CONTRAST:No results found for this or any previous visit. WITHOUT CONTRAST: No results found for this or any previous visit. CXR      2-view: No results found for this or any previous visit. Portable: Results for orders placed during the hospital encounter of 09/06/22    XR CHEST PORTABLE    Narrative  EXAMINATION:  CHEST. CLINICAL HISTORY:  SHORTNESS OF BREATH.    COMPARISONS:  8/7/2021. TECHNIQUE:  AP portable. FINDINGS:  Heart size and contour are within normal limits. Pulmonary vascularity appears unremarkable. No infiltrate or effusion is seen. No definite evidence of a mass or adenopathy. No significant bony abnormality. Impression  NORMAL PORTABLE CHEST. Echo No results found for this or any previous visit.             Assessment/Plan:    Active Hospital Problems    Diagnosis Date Noted    Uncontrolled type 2 diabetes mellitus with hyperglycemia ([facility] [address] [E11.65] 09/08/2022     Priority: Medium    Asthma exacerbation attacks [J45.901] 09/07/2022     Priority: Medium    Acute asthma exacerbation [J45.901] 09/07/2022     Priority: Medium     Acute asthma exacerbation failed outpatient treatment  IV steroid, nebs sched and prn, pulm consult. 9/8 - appreicate pulm recs, respiratory viral panel.   DM2 with hyperglycemia due to steroid  Lantus and High SSI, ac/hs checks, a1c  HTN  Cont home meds when available  CAD  GERD  DVT proph    Electronically signed by Megha Escobar MD on 9/9/2022 at 12:21 AM

## 2022-09-09 NOTE — CARE COORDINATION
SPOKE WITH PT VIA PHONE. PT STILL WHEEZING/COUGHING. PLAN REMAINS HOME W/SON WHEN MEDICALLY STABLE FOR DISCHARGE. PT DENIES NEEDS.  WILL CONTINUE TO FOLLOW

## 2022-09-09 NOTE — PROGRESS NOTES
INPATIENT PROGRESS NOTES    PATIENT NAME: Luis A Sifuentes  MRN: [id number]  SERVICE DATE:  September 9, 2022   SERVICE TIME:  9:50 AM      PRIMARY SERVICE: Pulmonary Disease    CHIEF COMPLAINTS: Asthma exacerbation    INTERVAL HPI: Patient seen and examined at bedside, Interval Notes, orders reviewed. Nursing notes noted    Patient report feeling better, shortness of breath improved, she did not tolerate CPAP yesterday mainly due to dry air from the BiPAP, otherwise she denies chest pain, no fever, she is currently on room air saturation 97%. Review of system:     GI Abdominal pain No  Skin Rash No    Social History     Tobacco Use    Smoking status: Never    Smokeless tobacco: Never   Substance Use Topics    Alcohol use: No         Problem Relation Age of Onset    Cancer Father         melanoma    Prostate Cancer Father     Cancer Sister         Melanoma    Cancer Other         Throat    Breast Cancer Maternal Aunt     Colon Cancer Neg Hx          OBJECTIVE    Body mass index is 47.55 kg/m². PHYSICAL EXAM:  Vitals:  BP (!) 156/70   Pulse (!) 101   Temp 98.2 °F (36.8 °C) (Oral)   Resp 16   Ht 5' 2\" (1.575 m)   Wt 260 lb (117.9 kg)   SpO2 97%   BMI 47.55 kg/m²     General: alert, cooperative, no distress  Head: normocephalic, atraumatic  Eyes:No gross abnormalities. ENT:  MMM no lesions  Neck:  supple and no masses  Chest : Improved air movement, bilateral wheezing, no rales, nontender, tympanic  Heart[de-identified] Heart sounds are normal.  Regular rate and rhythm without murmur, gallop or rub. ABD:  symmetric, soft, non-tender  Musculoskeletal : no cyanosis, no clubbing, and no edema  Neuro:  Grossly normal  Skin: No rashes or nodules noted.   Lymph node:  no cervical nodes  Urology: No Luis   Psychiatric: appropriate    DATA:   Recent Labs     09/07/22  1645 09/09/22  0600   WBC 6.8 7.6   HGB 13.2 12.6   HCT 40.2 37.4   MCV 87.2 87.0    200     Recent Labs     09/07/22  1645 09/07/22  1829 09/09/22  0600 *  --  133*   K 3.6  --  4.3   CL 97  --  98   CO2 21  --  24   BUN 21  --  26*   CREATININE 0.87  --  0.54   GLUCOSE 460* 455 243*   CALCIUM 8.8  --  8.4*   PROT 6.7  --  6.0*   LABALBU 4.0  --  3.7   BILITOT 0.5  --  0.4   ALKPHOS 137*  --  109   AST 23  --  16   ALT 20  --  21   LABGLOM >60.0  --  >60.0   GFRAA >60.0  --  >60.0   GLOB 2.7  --  2.3       MV Settings:     FiO2 : 21 %    No results for input(s): PHART, RZZ2BQN, PO2ART, YTC8NEK, BEART, U9UJBWBK in the last 72 hours. O2 Device: None (Room air)  O2 Flow Rate (L/min): 0 L/min    ADULT DIET; Regular; 4 carb choices (60 gm/meal)     MEDICATIONS during current hospitalization:    Continuous Infusions:   sodium chloride      dextrose         Scheduled Meds:   albuterol  2.5 mg Nebulization TID    methylPREDNISolone  40 mg IntraVENous Q12H    budesonide  0.5 mg Nebulization BID    insulin lispro  14 Units SubCUTAneous TID WC    insulin regular  15 Units IntraVENous Once    sodium chloride flush  5-40 mL IntraVENous 2 times per day    enoxaparin  30 mg SubCUTAneous BID    insulin glargine  20 Units SubCUTAneous Nightly    insulin lispro  0-16 Units SubCUTAneous TID WC    insulin lispro  0-4 Units SubCUTAneous Nightly       PRN Meds:ipratropium-albuterol, sodium chloride flush, sodium chloride, ondansetron **OR** ondansetron, polyethylene glycol, acetaminophen **OR** acetaminophen, albuterol, glucose, dextrose bolus **OR** dextrose bolus, glucagon (rDNA), dextrose, benzonatate    Radiology  XR CHEST PORTABLE    Result Date: 9/6/2022  EXAMINATION:  CHEST. CLINICAL HISTORY:  SHORTNESS OF BREATH. COMPARISONS:  8/7/2021. TECHNIQUE:  AP portable. FINDINGS:  Heart size and contour are within normal limits. Pulmonary vascularity appears unremarkable. No infiltrate or effusion is seen. No definite evidence of a mass or adenopathy. No significant bony abnormality. NORMAL PORTABLE CHEST.              IMPRESSION AND SUGGESTION:  Patient is at risk due to Severe persistent asthma with acute exacerbation  Metapneumovirus bronchitis  BUNNY  Obesity      Recommendation   Continue Solu-Medrol  Continue current nebs  If possible add humidity to BiPAP, will have to discontinue if unable to tolerate  Watch volume status, and maintain euvolemic  Will need outpatient PFT and sleep study  Continue pulmonary hygiene    Electronically signed by Jose Maria Montejo MD,  [facility]   on 9/9/2022 at 9:50 AM

## 2022-09-09 NOTE — CONSULTS
[address]                      [address], [address] [facility]                                  CONSULTATION    PATIENT NAME: Wade Dowell                    :        1955  MED REC NO:   [id number]                            ROOM:       [id number]  ACCOUNT NO:   [de-identified]                           ADMIT DATE: 2022  PROVIDER:     Dinorah Cazares MD    CONSULT DATE:  2022    ENDOCRINE CONSULTATION    REFERRING PROVIDER:  Braden Atwood DO    REASON FOR CONSULTATION:  Management of uncontrolled type 2 diabetes,  steroid, hyperglycemia worsened due to steroids. CHIEF COMPLAINT AND HISTORY OF PRESENT ILLNESS:  The patient is a  80-year-old female with known history of diabetes admitted to [facility] with asthma exacerbation, started on Solu-Medrol  of 40 mg IV twice daily. Blood sugars have been fluctuating. Lowest  glucose was in the 65 and has gone up to 400. Glucose yesterday have  been staying between 300-500 range. Hemoglobin A1c was 9.6. The  patient was given multiple dosages of Humalog insulin, started on Lantus  20 at night and high-dose Humalog coverage. Home medications for her  diabetes includes Trulicity 6.32 mg once a week, glyburide 2.5 mg daily,  metformin 500 mg two twice daily, was also on tapered prednisone dose. PAST MEDICAL HISTORY:  Significant for type 2 diabetes, asthma, history  of coronary artery disease, fibromyalgia, GERD, sleep apnea, morbid  obesity. PAST SURGICAL HISTORY:  Bariatric surgery in , breast surgery,  cholecystectomy, knee surgery, tonsillectomy, upper GI endoscopy. FAMILY HISTORY:  Cancer. PERSONAL AND SOCIAL HISTORY:  Denies any smoking, alcohol, or substance  abuse. MEDICATIONS:  Here include Lantus, Humalog coverage, Proventil,  Solu-Medrol 40 mg q. 12h. ALLERGIES:  ASPIRIN, CODEINE IMITREX, THEOPHYLLINE, DIFLUCAN, NIZORAL,  ZOLOFT.     REVIEW OF SYSTEMS:  Other than shortness of breath, wheezing, 14-point  review of systems was essentially unremarkable. PHYSICAL EXAMINATION:  GENERAL:  The patient is alert, awake, no obvious distress, lying in  bed. VITAL SIGNS:  Blood pressure was 152/76, pulse rate was 101, respiratory  rate 20, temperature 98. 2. HEENT:  Normocephalic, atraumatic. Pupils equally reactive to light. Oral mucosa was moist.  NECK:  Supple. Trachea in midline. CHEST:  Lungs were showing reduced air entry with bilateral wheezing. No crackles were heard. CARDIOVASCULAR:  Heart sounds were normal other than tachycardia. No  murmurs or thrills were present. ABDOMEN:  Soft, significantly obese. Bowel sounds are present. No  organomegaly or tenderness. EXTREMITIES:  Lower extremities reveal trace edema. MUSCULOSKELETAL:  No joint swelling. NEUROLOGIC:  Cranial nerves I through XII was intact. SKIN intact   PSYCHIATRIC:  Normal affect, cognition. LABORATORY DATA:  Chemistries; sodium 134, potassium 3.6, chloride 97,  CO2 was 21, BUN was 29, creatinine was 0.87. CBC was essentially  unremarkable. ASSESSMENT:  Uncontrolled type 2 diabetes worsened with steroids, morbid  obesity, history of coronary artery disease. PLAN:  Add Humalog 14 with each meals. We will adjust dose of Lantus in  the morning, regular insulin 15 units x1. Blood sugar goal 140-200. Avoid hypoglycemia. Long-term A1c goal of 7 or lower. Total time spent was 55 minutes. Thank you for the consult.         Tammie Poole MD    D: 09/08/2022 22:14:48       T: 09/08/2022 22:17:37     [email or web address]  Job#: [id number]     Doc#: [id number]    CC:

## 2022-09-10 LAB
ALBUMIN SERPL-MCNC: 3.7 G/DL (ref 3.5–4.6)
ALP BLD-CCNC: 118 U/L (ref 40–130)
ALT SERPL-CCNC: 22 U/L (ref 0–33)
ANION GAP SERPL CALCULATED.3IONS-SCNC: 11 MEQ/L (ref 9–15)
AST SERPL-CCNC: 13 U/L (ref 0–35)
BASOPHILS ABSOLUTE: 0 K/UL (ref 0–0.2)
BASOPHILS RELATIVE PERCENT: 0.1 %
BILIRUB SERPL-MCNC: 0.4 MG/DL (ref 0.2–0.7)
BUN BLDV-MCNC: 21 MG/DL (ref 8–23)
CALCIUM SERPL-MCNC: 8.4 MG/DL (ref 8.5–9.9)
CHLORIDE BLD-SCNC: 99 MEQ/L (ref 95–107)
CO2: 24 MEQ/L (ref 20–31)
CREAT SERPL-MCNC: 0.57 MG/DL (ref 0.5–0.9)
EOSINOPHILS ABSOLUTE: 0 K/UL (ref 0–0.7)
EOSINOPHILS RELATIVE PERCENT: 0 %
GFR AFRICAN AMERICAN: >60
GFR NON-AFRICAN AMERICAN: >60
GLOBULIN: 2.4 G/DL (ref 2.3–3.5)
GLUCOSE BLD-MCNC: 153 MG/DL (ref 70–99)
GLUCOSE BLD-MCNC: 175 MG/DL (ref 70–99)
GLUCOSE BLD-MCNC: 306 MG/DL (ref 70–99)
GLUCOSE BLD-MCNC: 310 MG/DL (ref 70–99)
GLUCOSE BLD-MCNC: 369 MG/DL (ref 70–99)
HCT VFR BLD CALC: 38.7 % (ref 37–47)
HEMOGLOBIN: 13.1 G/DL (ref 12–16)
LYMPHOCYTES ABSOLUTE: 0.6 K/UL (ref 1–4.8)
LYMPHOCYTES RELATIVE PERCENT: 8.7 %
MCH RBC QN AUTO: 29.7 PG (ref 27–31.3)
MCHC RBC AUTO-ENTMCNC: 33.8 % (ref 33–37)
MCV RBC AUTO: 87.9 FL (ref 82–100)
MONOCYTES ABSOLUTE: 0.2 K/UL (ref 0.2–0.8)
MONOCYTES RELATIVE PERCENT: 3.6 %
NEUTROPHILS ABSOLUTE: 5.7 K/UL (ref 1.4–6.5)
NEUTROPHILS RELATIVE PERCENT: 87.6 %
PDW BLD-RTO: 14 % (ref 11.5–14.5)
PERFORMED ON: ABNORMAL
PLATELET # BLD: 196 K/UL (ref 130–400)
POTASSIUM SERPL-SCNC: 4.1 MEQ/L (ref 3.4–4.9)
RBC # BLD: 4.4 M/UL (ref 4.2–5.4)
SODIUM BLD-SCNC: 134 MEQ/L (ref 135–144)
TOTAL PROTEIN: 6.1 G/DL (ref 6.3–8)
WBC # BLD: 6.5 K/UL (ref 4.8–10.8)

## 2022-09-10 PROCEDURE — 6360000002 HC RX W HCPCS: Performed by: INTERNAL MEDICINE

## 2022-09-10 PROCEDURE — 99232 SBSQ HOSP IP/OBS MODERATE 35: CPT | Performed by: INTERNAL MEDICINE

## 2022-09-10 PROCEDURE — 94761 N-INVAS EAR/PLS OXIMETRY MLT: CPT

## 2022-09-10 PROCEDURE — 6370000000 HC RX 637 (ALT 250 FOR IP): Performed by: INTERNAL MEDICINE

## 2022-09-10 PROCEDURE — 6370000000 HC RX 637 (ALT 250 FOR IP): Performed by: FAMILY MEDICINE

## 2022-09-10 PROCEDURE — 2580000003 HC RX 258: Performed by: FAMILY MEDICINE

## 2022-09-10 PROCEDURE — 6360000002 HC RX W HCPCS: Performed by: FAMILY MEDICINE

## 2022-09-10 PROCEDURE — 85025 COMPLETE CBC W/AUTO DIFF WBC: CPT

## 2022-09-10 PROCEDURE — 80053 COMPREHEN METABOLIC PANEL: CPT

## 2022-09-10 PROCEDURE — 94640 AIRWAY INHALATION TREATMENT: CPT

## 2022-09-10 PROCEDURE — 36415 COLL VENOUS BLD VENIPUNCTURE: CPT

## 2022-09-10 PROCEDURE — 1210000000 HC MED SURG R&B

## 2022-09-10 PROCEDURE — 6370000000 HC RX 637 (ALT 250 FOR IP): Performed by: EMERGENCY MEDICINE

## 2022-09-10 RX ORDER — PANTOPRAZOLE SODIUM 40 MG/1
40 TABLET, DELAYED RELEASE ORAL
Status: DISCONTINUED | OUTPATIENT
Start: 2022-09-11 | End: 2022-09-14 | Stop reason: HOSPADM

## 2022-09-10 RX ORDER — AMLODIPINE BESYLATE 10 MG/1
10 TABLET ORAL DAILY
Status: DISCONTINUED | OUTPATIENT
Start: 2022-09-10 | End: 2022-09-14 | Stop reason: HOSPADM

## 2022-09-10 RX ORDER — LISINOPRIL AND HYDROCHLOROTHIAZIDE 25; 20 MG/1; MG/1
1 TABLET ORAL DAILY
Status: DISCONTINUED | OUTPATIENT
Start: 2022-09-10 | End: 2022-09-14 | Stop reason: HOSPADM

## 2022-09-10 RX ORDER — FLUTICASONE PROPIONATE 50 MCG
1 SPRAY, SUSPENSION (ML) NASAL DAILY
Status: DISCONTINUED | OUTPATIENT
Start: 2022-09-10 | End: 2022-09-14 | Stop reason: HOSPADM

## 2022-09-10 RX ORDER — DICYCLOMINE HYDROCHLORIDE 10 MG/1
10 CAPSULE ORAL
Status: DISCONTINUED | OUTPATIENT
Start: 2022-09-11 | End: 2022-09-14 | Stop reason: HOSPADM

## 2022-09-10 RX ORDER — CHOLESTYRAMINE LIGHT 4 G/5.7G
4 POWDER, FOR SUSPENSION ORAL 2 TIMES DAILY
Status: DISCONTINUED | OUTPATIENT
Start: 2022-09-10 | End: 2022-09-14 | Stop reason: HOSPADM

## 2022-09-10 RX ORDER — NYSTATIN 100000 U/G
CREAM TOPICAL 2 TIMES DAILY
Status: DISCONTINUED | OUTPATIENT
Start: 2022-09-10 | End: 2022-09-14 | Stop reason: HOSPADM

## 2022-09-10 RX ORDER — BENZONATATE 100 MG/1
200 CAPSULE ORAL 3 TIMES DAILY PRN
Status: DISCONTINUED | OUTPATIENT
Start: 2022-09-10 | End: 2022-09-14 | Stop reason: HOSPADM

## 2022-09-10 RX ADMIN — INSULIN LISPRO 10 UNITS: 100 INJECTION, SOLUTION INTRAVENOUS; SUBCUTANEOUS at 17:30

## 2022-09-10 RX ADMIN — BUDESONIDE 500 MCG: 0.5 SUSPENSION RESPIRATORY (INHALATION) at 19:22

## 2022-09-10 RX ADMIN — Medication 10 ML: at 09:14

## 2022-09-10 RX ADMIN — AMLODIPINE BESYLATE 10 MG: 10 TABLET ORAL at 21:09

## 2022-09-10 RX ADMIN — Medication 10 ML: at 21:09

## 2022-09-10 RX ADMIN — ENOXAPARIN SODIUM 30 MG: 100 INJECTION SUBCUTANEOUS at 09:15

## 2022-09-10 RX ADMIN — INSULIN LISPRO 12 UNITS: 100 INJECTION, SOLUTION INTRAVENOUS; SUBCUTANEOUS at 09:17

## 2022-09-10 RX ADMIN — BUDESONIDE 500 MCG: 0.5 SUSPENSION RESPIRATORY (INHALATION) at 08:24

## 2022-09-10 RX ADMIN — METHYLPREDNISOLONE SODIUM SUCCINATE 40 MG: 40 INJECTION, POWDER, FOR SOLUTION INTRAMUSCULAR; INTRAVENOUS at 00:46

## 2022-09-10 RX ADMIN — INSULIN GLARGINE 40 UNITS: 100 INJECTION, SOLUTION SUBCUTANEOUS at 21:16

## 2022-09-10 RX ADMIN — ACETAMINOPHEN 650 MG: 325 TABLET ORAL at 09:14

## 2022-09-10 RX ADMIN — METHYLPREDNISOLONE SODIUM SUCCINATE 40 MG: 40 INJECTION, POWDER, FOR SOLUTION INTRAMUSCULAR; INTRAVENOUS at 12:47

## 2022-09-10 RX ADMIN — ALBUTEROL SULFATE 2.5 MG: 2.5 SOLUTION RESPIRATORY (INHALATION) at 13:51

## 2022-09-10 RX ADMIN — ALBUTEROL SULFATE 2.5 MG: 2.5 SOLUTION RESPIRATORY (INHALATION) at 08:24

## 2022-09-10 RX ADMIN — INSULIN LISPRO 10 UNITS: 100 INJECTION, SOLUTION INTRAVENOUS; SUBCUTANEOUS at 08:29

## 2022-09-10 RX ADMIN — ALBUTEROL SULFATE 2.5 MG: 2.5 SOLUTION RESPIRATORY (INHALATION) at 19:22

## 2022-09-10 RX ADMIN — INSULIN LISPRO 10 UNITS: 100 INJECTION, SOLUTION INTRAVENOUS; SUBCUTANEOUS at 12:35

## 2022-09-10 RX ADMIN — LISINOPRIL AND HYDROCHLOROTHIAZIDE 1 TABLET: 25; 20 TABLET ORAL at 21:09

## 2022-09-10 RX ADMIN — ENOXAPARIN SODIUM 30 MG: 100 INJECTION SUBCUTANEOUS at 21:09

## 2022-09-10 RX ADMIN — BENZONATATE 100 MG: 100 CAPSULE ORAL at 09:14

## 2022-09-10 RX ADMIN — INSULIN LISPRO 12 UNITS: 100 INJECTION, SOLUTION INTRAVENOUS; SUBCUTANEOUS at 12:23

## 2022-09-10 RX ADMIN — INSULIN LISPRO 10 UNITS: 100 INJECTION, SOLUTION INTRAVENOUS; SUBCUTANEOUS at 18:10

## 2022-09-10 RX ADMIN — CHOLESTYRAMINE 4 G: 4 POWDER, FOR SUSPENSION ORAL at 21:08

## 2022-09-10 ASSESSMENT — PAIN SCALES - GENERAL
PAINLEVEL_OUTOF10: 4
PAINLEVEL_OUTOF10: 3

## 2022-09-10 ASSESSMENT — PAIN DESCRIPTION - LOCATION: LOCATION: HEAD

## 2022-09-10 ASSESSMENT — PAIN - FUNCTIONAL ASSESSMENT: PAIN_FUNCTIONAL_ASSESSMENT: ACTIVITIES ARE NOT PREVENTED

## 2022-09-10 ASSESSMENT — PAIN DESCRIPTION - ORIENTATION: ORIENTATION: MID

## 2022-09-10 ASSESSMENT — PAIN DESCRIPTION - DESCRIPTORS: DESCRIPTORS: ACHING

## 2022-09-10 NOTE — PROGRESS NOTES
normal rate, regular rhythm, normal S1, S2, no murmurs, rubs, clicks or gallops  Abdomen - soft, nontender, nondistended, no masses or organomegaly  Rectal - deferred, not clinically indicated  Neurological - alert, oriented, normal speech, no focal findings or movement disorder noted, motor and sensory grossly normal bilaterally  Musculoskeletal - no joint tenderness, deformity or swelling  Extremities - peripheral pulses normal, no pedal edema, no clubbing or cyanosis  Skin - normal coloration and turgor, no rashes, no suspicious skin lesions noted              BMP:    Recent Labs     09/07/22  1645 09/07/22  1829 09/09/22  0600 09/10/22  0554   *  --  133* 134*   K 3.6  --  4.3 4.1   CL 97  --  98 99   CO2 21  --  24 24   BUN 21  --  26* 21   CREATININE 0.87  --  0.54 0.57   GLUCOSE 460*   < > 243* 369*    < > = values in this interval not displayed. .  MG:3,PHOS:3)@  Ionized Calcium: No results found for: IONCA  CBC:   Recent Labs     09/09/22  0600 09/10/22  0554   WBC 7.6 6.5   HGB 12.6 13.1    196      ABG: No results for input(s): PH, PCO2, PO2 in the last 72 hours. Assessment and Plan:       Impression:    - Acute respiratory failure, due to severe persistent asthma exacerbation  - Asthma exacerbation.  - Metapneumovirus lower respiratory tract infection  -Obstructive sleep apnea. Has not been on CPAP for some time. Diagnosed 12 years ago. Recommendations:    -Observe off of oxygen.  -Continue aggressive bronchodilators around-the-clock. -Continue IV steroids.  -Continue Mucinex.  -Imaging reviewed personally.  -Will need sleep study as an outpatient with titration.        Electronically signed by Sharron Mcmahon MD on 9/10/2022 at 11:42 AM

## 2022-09-10 NOTE — PROGRESS NOTES
Hospitalist Daily Progress Note  Name: Lesly Curry  Age: 79 y.o. Gender: female  CodeStatus: Full Code  Allergies: Aspirin  Codeine  Imitrex [Sumatriptan]  Theophyllines  Diflucan [Fluconazole]  Nizoral [Ketoconazole]  Zoloft    Chief Complaint:Shortness of Breath (Since yesterday) and Cough (And wheezing, seen here yesterday for the same)      Primary Care Provider: Aga Wallace MD    InpatientTreatment Team: Treatment Team: Attending Provider: Madyson Vega MD; Consulting Physician: Paige Squires MD; Consulting Physician: Geri Duong MD; Registered Nurse: Iqra White RN; : Gregorio Chavez. KIKI Rothman    Admission Date: 9/7/2022      Subjective: No chest pain, nausea. Resting in bed. SOB mildly improved from yesterday, not at baseline, sob with activity. Physical Exam  Vitals and nursing note reviewed. Constitutional:       Appearance: Normal appearance. Cardiovascular:      Rate and Rhythm: Normal rate and regular rhythm. Pulmonary:      Effort: Pulmonary effort is normal.      Breath sounds: Normal breath sounds. Abdominal:      General: Bowel sounds are normal.   Musculoskeletal:         General: Normal range of motion. Skin:     General: Skin is warm and dry. Neurological:      General: No focal deficit present. Mental Status: She is alert. Mental status is at baseline.        Medications:  Reviewed    Infusion Medications:    sodium chloride      dextrose       Scheduled Medications:    insulin glargine  40 Units SubCUTAneous Nightly    insulin lispro  10 Units SubCUTAneous TID WC    albuterol  2.5 mg Nebulization TID    methylPREDNISolone  40 mg IntraVENous Q12H    budesonide  0.5 mg Nebulization BID    insulin regular  15 Units IntraVENous Once    sodium chloride flush  5-40 mL IntraVENous 2 times per day    enoxaparin  30 mg SubCUTAneous BID    insulin lispro  0-16 Units SubCUTAneous TID WC    insulin lispro  0-4 Units SubCUTAneous Nightly     PRN Meds: ipratropium-albuterol, sodium chloride flush, sodium chloride, ondansetron **OR** ondansetron, polyethylene glycol, acetaminophen **OR** acetaminophen, albuterol, glucose, dextrose bolus **OR** dextrose bolus, glucagon (rDNA), dextrose, benzonatate    Labs:   Recent Labs     09/09/22  0600 09/10/22  0554   WBC 7.6 6.5   HGB 12.6 13.1   HCT 37.4 38.7    196       Recent Labs     09/09/22  0600 09/10/22  0554   * 134*   K 4.3 4.1   CL 98 99   CO2 24 24   BUN 26* 21   CREATININE 0.54 0.57   CALCIUM 8.4* 8.4*       Recent Labs     09/09/22  0600 09/10/22  0554   AST 16 13   ALT 21 22   BILITOT 0.4 0.4   ALKPHOS 109 118       No results for input(s): INR in the last 72 hours. No results for input(s): Tamikaantonina Deraston in the last 72 hours. Urinalysis:   Lab Results   Component Value Date/Time    NITRU Negative 08/07/2021 05:00 AM    WBCUA 0-2 09/24/2020 09:30 AM    BACTERIA MANY 09/24/2020 09:30 AM    RBCUA 0-2 09/24/2020 09:30 AM    BLOODU Negative 08/07/2021 05:00 AM    SPECGRAV 1.020 08/07/2021 05:00 AM    GLUCOSEU >=1000 08/07/2021 05:00 AM    GLUCOSEU NEG 04/04/2012 02:58 PM       Radiology:   Most recent    Chest CT      WITH CONTRAST:No results found for this or any previous visit. WITHOUT CONTRAST: No results found for this or any previous visit. CXR      2-view: No results found for this or any previous visit. Portable: Results for orders placed during the hospital encounter of 09/06/22    XR CHEST PORTABLE    Narrative  EXAMINATION:  CHEST. CLINICAL HISTORY:  SHORTNESS OF BREATH.    COMPARISONS:  8/7/2021. TECHNIQUE:  AP portable. FINDINGS:  Heart size and contour are within normal limits. Pulmonary vascularity appears unremarkable. No infiltrate or effusion is seen. No definite evidence of a mass or adenopathy. No significant bony abnormality. Impression  NORMAL PORTABLE CHEST.       Echo No results found for this or any previous visit. Assessment/Plan:    Active Hospital Problems    Diagnosis Date Noted    Uncontrolled type 2 diabetes mellitus with hyperglycemia ([facility] [address] [E11.65] 09/08/2022     Priority: Medium    Asthma exacerbation attacks [J45.901] 09/07/2022     Priority: Medium    Acute asthma exacerbation [J45.901] 09/07/2022     Priority: Medium     Acute asthma exacerbation failed outpatient treatment  IV steroid, nebs sched and prn, pulm consult. 9/8 - appreicate pulm recs, respiratory viral panel. 9/9 - noted metapneumovirus on rvp, cont precaution, cont steroid, nebs  9/10 - cont steroid, nebs, pulm following.     DM2 with hyperglycemia due to steroid  Lantus and High SSI, ac/hs checks, a1c  HTN  Cont home meds when available  CAD  GERD  DVT proph    Electronically signed by Mp Zelaya MD on 9/10/2022 at 6:10 PM

## 2022-09-10 NOTE — PROGRESS NOTES
Progress Note  Date:2022       TYAF:[id number]  Patient Rabia Skinner     YOB: 1955     Age:67 y.o. Chief complaint uncontrolled type 2 diabetes    Subjective    Subjective:  Symptoms:  Stable. She reports shortness of breath. Diet:  Adequate intake. Review of Systems   Constitutional:  Positive for fatigue. Respiratory:  Positive for shortness of breath and wheezing. Endocrine: Negative. All other systems reviewed and are negative. Objective         Vitals Last 24 Hours:  TEMPERATURE:  Temp  Av.9 °F (36.6 °C)  Min: 97.9 °F (36.6 °C)  Max: 97.9 °F (36.6 °C)  RESPIRATIONS RANGE: Resp  Av.5  Min: 16  Max: 18  PULSE OXIMETRY RANGE: SpO2  Av.5 %  Min: 94 %  Max: 100 %  PULSE RANGE: Pulse  Av  Min: 68  Max: 79  BLOOD PRESSURE RANGE: Systolic (71SUF), IDX:342 , Min:142 , PRX:025   ; Diastolic (90PEV), TRR:20, Min:63, Max:83    I/O (24Hr): Intake/Output Summary (Last 24 hours) at 2022  Last data filed at 2022 0959  Gross per 24 hour   Intake 850 ml   Output --   Net 850 ml     Objective:  General Appearance:  Ill-appearing. Vital signs: (most recent): Blood pressure (!) 142/83, pulse 68, temperature 97.9 °F (36.6 °C), temperature source Oral, resp. rate 18, height 5' 2\" (1.575 m), weight 260 lb (117.9 kg), SpO2 94 %, not currently breastfeeding. Vital signs are normal.    HEENT: Normal HEENT exam.    Lungs:  Normal effort and normal respiratory rate. Heart: Normal rate. Abdomen: Abdomen is soft. Extremities: Normal range of motion. Neurological: Patient is alert. Skin:  No rash.    Labs/Imaging/Diagnostics    Labs:  CBC:  Recent Labs     22  1645 22  0600   WBC 6.8 7.6   RBC 4.61 4.30   HGB 13.2 12.6   HCT 40.2 37.4   MCV 87.2 87.0   RDW 13.7 14.1    200     CHEMISTRIES:  Recent Labs     22  1645 22  1829 22  0600   *  --  133*   K 3.6  --  4.3   CL 97  --  98   CO2 21  --  24

## 2022-09-11 LAB
ALBUMIN SERPL-MCNC: 3.4 G/DL (ref 3.5–4.6)
ALP BLD-CCNC: 102 U/L (ref 40–130)
ALT SERPL-CCNC: 19 U/L (ref 0–33)
ANION GAP SERPL CALCULATED.3IONS-SCNC: 10 MEQ/L (ref 9–15)
AST SERPL-CCNC: 11 U/L (ref 0–35)
BASOPHILS ABSOLUTE: 0 K/UL (ref 0–0.2)
BASOPHILS RELATIVE PERCENT: 0.1 %
BILIRUB SERPL-MCNC: 0.4 MG/DL (ref 0.2–0.7)
BUN BLDV-MCNC: 21 MG/DL (ref 8–23)
CALCIUM SERPL-MCNC: 8.5 MG/DL (ref 8.5–9.9)
CHLORIDE BLD-SCNC: 103 MEQ/L (ref 95–107)
CO2: 25 MEQ/L (ref 20–31)
CREAT SERPL-MCNC: 0.53 MG/DL (ref 0.5–0.9)
EOSINOPHILS ABSOLUTE: 0 K/UL (ref 0–0.7)
EOSINOPHILS RELATIVE PERCENT: 0 %
GFR AFRICAN AMERICAN: >60
GFR NON-AFRICAN AMERICAN: >60
GLOBULIN: 2.4 G/DL (ref 2.3–3.5)
GLUCOSE BLD-MCNC: 181 MG/DL (ref 70–99)
GLUCOSE BLD-MCNC: 218 MG/DL (ref 70–99)
GLUCOSE BLD-MCNC: 225 MG/DL (ref 70–99)
GLUCOSE BLD-MCNC: 340 MG/DL (ref 70–99)
GLUCOSE BLD-MCNC: 391 MG/DL (ref 70–99)
HCT VFR BLD CALC: 39.2 % (ref 37–47)
HEMOGLOBIN: 13.2 G/DL (ref 12–16)
LYMPHOCYTES ABSOLUTE: 1.1 K/UL (ref 1–4.8)
LYMPHOCYTES RELATIVE PERCENT: 14.6 %
MCH RBC QN AUTO: 29.9 PG (ref 27–31.3)
MCHC RBC AUTO-ENTMCNC: 33.8 % (ref 33–37)
MCV RBC AUTO: 88.4 FL (ref 82–100)
MONOCYTES ABSOLUTE: 0.5 K/UL (ref 0.2–0.8)
MONOCYTES RELATIVE PERCENT: 6.5 %
NEUTROPHILS ABSOLUTE: 5.8 K/UL (ref 1.4–6.5)
NEUTROPHILS RELATIVE PERCENT: 78.8 %
PDW BLD-RTO: 14 % (ref 11.5–14.5)
PERFORMED ON: ABNORMAL
PLATELET # BLD: 189 K/UL (ref 130–400)
POTASSIUM SERPL-SCNC: 3.6 MEQ/L (ref 3.4–4.9)
RBC # BLD: 4.44 M/UL (ref 4.2–5.4)
SODIUM BLD-SCNC: 138 MEQ/L (ref 135–144)
TOTAL PROTEIN: 5.8 G/DL (ref 6.3–8)
WBC # BLD: 7.3 K/UL (ref 4.8–10.8)

## 2022-09-11 PROCEDURE — 94664 DEMO&/EVAL PT USE INHALER: CPT

## 2022-09-11 PROCEDURE — 6370000000 HC RX 637 (ALT 250 FOR IP): Performed by: FAMILY MEDICINE

## 2022-09-11 PROCEDURE — 6360000002 HC RX W HCPCS: Performed by: INTERNAL MEDICINE

## 2022-09-11 PROCEDURE — 85025 COMPLETE CBC W/AUTO DIFF WBC: CPT

## 2022-09-11 PROCEDURE — 94640 AIRWAY INHALATION TREATMENT: CPT

## 2022-09-11 PROCEDURE — 80053 COMPREHEN METABOLIC PANEL: CPT

## 2022-09-11 PROCEDURE — 36415 COLL VENOUS BLD VENIPUNCTURE: CPT

## 2022-09-11 PROCEDURE — 2580000003 HC RX 258: Performed by: FAMILY MEDICINE

## 2022-09-11 PROCEDURE — 6360000002 HC RX W HCPCS: Performed by: FAMILY MEDICINE

## 2022-09-11 PROCEDURE — 94761 N-INVAS EAR/PLS OXIMETRY MLT: CPT

## 2022-09-11 PROCEDURE — 99232 SBSQ HOSP IP/OBS MODERATE 35: CPT | Performed by: INTERNAL MEDICINE

## 2022-09-11 PROCEDURE — 1210000000 HC MED SURG R&B

## 2022-09-11 PROCEDURE — 6370000000 HC RX 637 (ALT 250 FOR IP): Performed by: INTERNAL MEDICINE

## 2022-09-11 RX ADMIN — ALBUTEROL SULFATE 2.5 MG: 2.5 SOLUTION RESPIRATORY (INHALATION) at 15:35

## 2022-09-11 RX ADMIN — BUDESONIDE 500 MCG: 0.5 SUSPENSION RESPIRATORY (INHALATION) at 19:29

## 2022-09-11 RX ADMIN — PANTOPRAZOLE SODIUM 40 MG: 40 TABLET, DELAYED RELEASE ORAL at 06:47

## 2022-09-11 RX ADMIN — ENOXAPARIN SODIUM 30 MG: 100 INJECTION SUBCUTANEOUS at 22:00

## 2022-09-11 RX ADMIN — INSULIN GLARGINE 40 UNITS: 100 INJECTION, SOLUTION SUBCUTANEOUS at 22:12

## 2022-09-11 RX ADMIN — METHYLPREDNISOLONE SODIUM SUCCINATE 40 MG: 40 INJECTION, POWDER, FOR SOLUTION INTRAMUSCULAR; INTRAVENOUS at 12:18

## 2022-09-11 RX ADMIN — Medication 10 ML: at 09:18

## 2022-09-11 RX ADMIN — INSULIN LISPRO 4 UNITS: 100 INJECTION, SOLUTION INTRAVENOUS; SUBCUTANEOUS at 17:15

## 2022-09-11 RX ADMIN — BENZONATATE 200 MG: 100 CAPSULE ORAL at 22:13

## 2022-09-11 RX ADMIN — Medication 10 ML: at 22:50

## 2022-09-11 RX ADMIN — CHOLESTYRAMINE 4 G: 4 POWDER, FOR SUSPENSION ORAL at 08:40

## 2022-09-11 RX ADMIN — INSULIN LISPRO 10 UNITS: 100 INJECTION, SOLUTION INTRAVENOUS; SUBCUTANEOUS at 12:28

## 2022-09-11 RX ADMIN — ALBUTEROL SULFATE 2.5 MG: 2.5 SOLUTION RESPIRATORY (INHALATION) at 19:29

## 2022-09-11 RX ADMIN — NYSTATIN: 100000 CREAM TOPICAL at 22:49

## 2022-09-11 RX ADMIN — INSULIN LISPRO 10 UNITS: 100 INJECTION, SOLUTION INTRAVENOUS; SUBCUTANEOUS at 08:42

## 2022-09-11 RX ADMIN — BUDESONIDE 500 MCG: 0.5 SUSPENSION RESPIRATORY (INHALATION) at 07:20

## 2022-09-11 RX ADMIN — BENZONATATE 200 MG: 100 CAPSULE ORAL at 03:58

## 2022-09-11 RX ADMIN — AMLODIPINE BESYLATE 10 MG: 10 TABLET ORAL at 08:40

## 2022-09-11 RX ADMIN — METHYLPREDNISOLONE SODIUM SUCCINATE 40 MG: 40 INJECTION, POWDER, FOR SOLUTION INTRAMUSCULAR; INTRAVENOUS at 03:58

## 2022-09-11 RX ADMIN — LISINOPRIL AND HYDROCHLOROTHIAZIDE 1 TABLET: 25; 20 TABLET ORAL at 08:40

## 2022-09-11 RX ADMIN — DICYCLOMINE HYDROCHLORIDE 10 MG: 10 CAPSULE ORAL at 06:47

## 2022-09-11 RX ADMIN — ALBUTEROL SULFATE 2.5 MG: 2.5 SOLUTION RESPIRATORY (INHALATION) at 07:20

## 2022-09-11 RX ADMIN — ENOXAPARIN SODIUM 30 MG: 100 INJECTION SUBCUTANEOUS at 08:40

## 2022-09-11 NOTE — PROGRESS NOTES
9/11/22    From:HOME WITH SON, HAS NEBS    Admit: SOB, COUGH,, WHEEZE-TO ED ON 9/6, RETURN 9/7    PMH:ASTHMA, BREAST CA, CAD, GERD, HTN,, DM, FIBROMYALGIA    Anticipated Discharge Disposition:HOME    Patient Mobility or PT/OT ordered:  Consults: Juan Luis Husbands    Barriers to Discharge: 9/11 OBSERVE OFF OXYGEN, CONT IV SOLUMEDROL    +HUMAN METAPNEUMOVIRUS    Assessments: CMI DONE

## 2022-09-11 NOTE — PROGRESS NOTES
[facility]   Current Order:  Albuterol TID & Q2 PRN      Home Regimen: PRN      Ordering Physician: Nanda Soriano  Re-evaluation Date:  9/14     Diagnosis: Asthma exac      Patient Status: Stable / Unstable + Physician notified    The following MDI Criteria must be met in order to convert aerosol to MDI with spacer. If unable to meet, MDI will be converted to aerosol:  []  Patient able to demonstrate the ability to use MDI effectively  []  Patient alert and cooperative  []  Patient able to take deep breath with 5-10 second hold  []  Medication(s) available in this delivery method   []  Peak flow greater than or equal to 200 ml/min            Current Order Substituted To  (same drug, same frequency)   Aerosol to MDI [] Albuterol Sulfate 0.083% unit dose by aerosol Albuterol Sulfate MDI 2 puffs by inhalation with spacer    [] Levalbuterol 1.25 mg unit dose by aerosol Levalbuterol MDI 2 puffs by inhalation with spacer    [] Levalbuterol 0.63 mg unit dose by aerosol Levalbuterol MDI 2 puffs by inhalation with spacer    [] Ipratropium Bromide 0.02% unit dose by aerosol Ipratropium Bromide MDI 2 puffs by inhalation with spacer    [] Duoneb (Ipratropium + Albuterol) unit dose by aerosol Ipratropium MDI + Albuterol MDI 2 puffs by inhalation w/spacer   MDI to Aerosol [] Albuterol Sulfate MDI Albuterol Sulfate 0.083% unit dose by aerosol    [] Levalbuterol MDI 2 puffs by inhalation Levalbuterol 1.25 mg unit dose by aerosol    [] Ipratropium Bromide MDI by inhalation Ipratropium Bromide 0.02% unit dose by aerosol    [] Combivent (Ipratropium + Albuterol) MDI by inhalation Duoneb (Ipratropium + Albuterol) unit dose by aerosol       Treatment Assessment [Frequency/Schedule]:  Change frequency to: _______No Changes___________________________________________per Protocol, P&T, MEC      Points 0 1 2 3 4   Pulmonary Status  Non-Smoker  []   Smoking history   < 20 pack years  []   Smoking history  ?  21 pack years  []   Pulmonary Disorder  (acute or chronic)  [x]   Severe or Chronic w/ Exacerbation  []     Surgical Status No [x]   Surgeries     General []   Surgery Lower []   Abdominal Thoracic or []   Upper Abdominal Thoracic with  PulmonaryDisorder  []     Chest X-ray Clear/Not  Ordered     [x]  Chronic Changes  Results Pending  []  Infiltrates, atelectasis, pleural effusion, or edema  []  Infiltrates in more than one lobe []  Infiltrate + Atelectasis, &/or pleural effusion  []    Respiratory Pattern Regular,  RR = 12-20 [x]  Increased,  RR = 21-25 []  [place], irregular,  or RR = 26-30 []  Decreased FEV1  or RR = 31-35 []  Severe SOB, use  of accessory muscles, or RR ? 35  []    Mental Status Alert, oriented,  Cooperative [x]  Confused but Follows commands []  Lethargic or unable to follow commands []  Obtunded  []  Comatose  []    Breath Sounds Clear to  auscultation  []  Decreased unilaterally or  in bases only []  Decreased  bilaterally  []  Crackles or intermittent wheezes []  Wheezes [x]    Cough Strong, Spontan., & nonproductive [x]  Strong,  spontaneous, &  productive []  Weak,  Nonproductive []  Weak, productive or  with wheezes []  No spontaneous  cough or may require suctioning []    Level of Activity Ambulatory [x]  Ambulatory w/ Assist  []  Non-ambulatory []  Paraplegic []  Quadriplegic []    Total    Score:____7___     Triage Score:____4____      Tri       Triage:     1. (>20) Freq: Q3    2. (16-20) Freq: Q4   3. (11-15) Freq: QID & Albuterol Q2 PRN    4. (6-10) Freq: TID & Albuterol Q2 PRN    5. (0-5) Freq Q4prn

## 2022-09-11 NOTE — PROGRESS NOTES
Pulmonary Progress Note    9/11/2022 11:07 AM    Subjective:   Admit Date: 9/7/2022  PCP: Vance Marshall MD    Chief Complaint   Patient presents with    Shortness of Breath     Since yesterday    Cough     And wheezing, seen here yesterday for the same     Interval History: Continues to feel about the same. Continues to have cough and wheezing. Cough is mainly dry. 14 points review of systems has been obtained and negative except to was mentioned in HPI. Medications:   Scheduled Meds:   amLODIPine  10 mg Oral Daily    cholestyramine light  4 g Oral BID    dicyclomine  10 mg Oral BID AC    fluticasone  1 spray Each Nostril Daily    lisinopril-hydroCHLOROthiazide  1 tablet Oral Daily    pantoprazole  40 mg Oral QAM AC    nystatin   Topical BID    insulin glargine  40 Units SubCUTAneous Nightly    insulin lispro  10 Units SubCUTAneous TID WC    albuterol  2.5 mg Nebulization TID    methylPREDNISolone  40 mg IntraVENous Q12H    budesonide  0.5 mg Nebulization BID    insulin regular  15 Units IntraVENous Once    sodium chloride flush  5-40 mL IntraVENous 2 times per day    enoxaparin  30 mg SubCUTAneous BID    insulin lispro  0-16 Units SubCUTAneous TID WC    insulin lispro  0-4 Units SubCUTAneous Nightly     Continuous Infusions:   sodium chloride      dextrose           Objective:   Vitals:   No data recorded. BP (!) 162/83   Pulse 71   Temp 97.9 °F (36.6 °C) (Oral)   Resp 20   Ht 5' 2\" (1.575 m)   Wt 260 lb (117.9 kg)   SpO2 100%   BMI 47.55 kg/m²   I/O:24HR INTAKE/OUTPUT:  No intake or output data in the 24 hours ending 09/11/22 1107  No intake/output data recorded.   CVP:           Physical Exam:  General appearance - alert, ill appearing, and in no distress  Mental status - alert, oriented to person, place, and time  Eyes - pupils equal and reactive, extraocular eye movements intact  Nose - normal and patent, no erythema, discharge or polyps  Neck - supple, no significant adenopathy  Chest -diminished bilaterally to auscultation, coarse breath sounds with bilateral wheezes,  symmetric air entry  Heart - normal rate, regular rhythm, normal S1, S2, no murmurs, rubs, clicks or gallops  Abdomen - soft, nontender, nondistended, no masses or organomegaly  Rectal - deferred, not clinically indicated  Neurological - alert, oriented, normal speech, no focal findings or movement disorder noted, motor and sensory grossly normal bilaterally  Musculoskeletal - no joint tenderness, deformity or swelling  Extremities - peripheral pulses normal, no pedal edema, no clubbing or cyanosis  Skin - normal coloration and turgor, no rashes, no suspicious skin lesions noted              BMP:    Recent Labs     09/09/22  0600 09/10/22  0554 09/11/22  0601   * 134* 138   K 4.3 4.1 3.6   CL 98 99 103   CO2 24 24 25   BUN 26* 21 21   CREATININE 0.54 0.57 0.53   GLUCOSE 243* 369* 181*      . MG:3,PHOS:3)@  Ionized Calcium: No results found for: IONCA  CBC:   Recent Labs     09/10/22  0554 09/11/22  0601   WBC 6.5 7.3   HGB 13.1 13.2    189        ABG: No results for input(s): PH, PCO2, PO2 in the last 72 hours. Assessment and Plan:       Impression:    - Acute respiratory failure, due to severe persistent asthma exacerbation  - Asthma exacerbation.  - Metapneumovirus lower respiratory tract infection  -Obstructive sleep apnea. Has not been on CPAP for some time. Diagnosed 12 years ago. Recommendations:    -Observe off of oxygen.  -Continue aggressive bronchodilators around-the-clock. -Continue IV steroids.  -Continue Mucinex.  -Imaging reviewed personally.  -Will need sleep study as an outpatient with titration. Reassess clinically tomorrow and hopefully can go home.        Electronically signed by Sabina Fernández MD on 9/11/2022 at 11:07 AM

## 2022-09-12 PROBLEM — J45.51 SEVERE PERSISTENT ASTHMA WITH EXACERBATION: Status: ACTIVE | Noted: 2022-09-07

## 2022-09-12 LAB
ALBUMIN SERPL-MCNC: 3.5 G/DL (ref 3.5–4.6)
ALP BLD-CCNC: 123 U/L (ref 40–130)
ALT SERPL-CCNC: 18 U/L (ref 0–33)
ANION GAP SERPL CALCULATED.3IONS-SCNC: 12 MEQ/L (ref 9–15)
AST SERPL-CCNC: 9 U/L (ref 0–35)
BASOPHILS ABSOLUTE: 0 K/UL (ref 0–0.2)
BASOPHILS RELATIVE PERCENT: 0.1 %
BILIRUB SERPL-MCNC: 0.4 MG/DL (ref 0.2–0.7)
BUN BLDV-MCNC: 22 MG/DL (ref 8–23)
CALCIUM SERPL-MCNC: 8.7 MG/DL (ref 8.5–9.9)
CHLORIDE BLD-SCNC: 98 MEQ/L (ref 95–107)
CO2: 27 MEQ/L (ref 20–31)
CREAT SERPL-MCNC: 0.59 MG/DL (ref 0.5–0.9)
EOSINOPHILS ABSOLUTE: 0 K/UL (ref 0–0.7)
EOSINOPHILS RELATIVE PERCENT: 0 %
GFR AFRICAN AMERICAN: >60
GFR NON-AFRICAN AMERICAN: >60
GLOBULIN: 2.4 G/DL (ref 2.3–3.5)
GLUCOSE BLD-MCNC: 199 MG/DL (ref 70–99)
GLUCOSE BLD-MCNC: 235 MG/DL (ref 70–99)
GLUCOSE BLD-MCNC: 248 MG/DL (ref 70–99)
GLUCOSE BLD-MCNC: 253 MG/DL (ref 70–99)
GLUCOSE BLD-MCNC: 255 MG/DL (ref 70–99)
GLUCOSE BLD-MCNC: 432 MG/DL (ref 70–99)
HCT VFR BLD CALC: 38.9 % (ref 37–47)
HEMOGLOBIN: 13.2 G/DL (ref 12–16)
LYMPHOCYTES ABSOLUTE: 0.8 K/UL (ref 1–4.8)
LYMPHOCYTES RELATIVE PERCENT: 10.1 %
MCH RBC QN AUTO: 29.4 PG (ref 27–31.3)
MCHC RBC AUTO-ENTMCNC: 34 % (ref 33–37)
MCV RBC AUTO: 86.7 FL (ref 82–100)
MONOCYTES ABSOLUTE: 0.4 K/UL (ref 0.2–0.8)
MONOCYTES RELATIVE PERCENT: 4.4 %
NEUTROPHILS ABSOLUTE: 7 K/UL (ref 1.4–6.5)
NEUTROPHILS RELATIVE PERCENT: 85.4 %
PDW BLD-RTO: 13.9 % (ref 11.5–14.5)
PERFORMED ON: ABNORMAL
PLATELET # BLD: 216 K/UL (ref 130–400)
POTASSIUM SERPL-SCNC: 3.9 MEQ/L (ref 3.4–4.9)
RBC # BLD: 4.49 M/UL (ref 4.2–5.4)
SODIUM BLD-SCNC: 137 MEQ/L (ref 135–144)
TOTAL PROTEIN: 5.9 G/DL (ref 6.3–8)
WBC # BLD: 8.2 K/UL (ref 4.8–10.8)

## 2022-09-12 PROCEDURE — 80053 COMPREHEN METABOLIC PANEL: CPT

## 2022-09-12 PROCEDURE — 1210000000 HC MED SURG R&B

## 2022-09-12 PROCEDURE — 99232 SBSQ HOSP IP/OBS MODERATE 35: CPT | Performed by: PHYSICIAN ASSISTANT

## 2022-09-12 PROCEDURE — 94761 N-INVAS EAR/PLS OXIMETRY MLT: CPT

## 2022-09-12 PROCEDURE — 94640 AIRWAY INHALATION TREATMENT: CPT

## 2022-09-12 PROCEDURE — 36415 COLL VENOUS BLD VENIPUNCTURE: CPT

## 2022-09-12 PROCEDURE — 6360000002 HC RX W HCPCS: Performed by: INTERNAL MEDICINE

## 2022-09-12 PROCEDURE — 2580000003 HC RX 258: Performed by: FAMILY MEDICINE

## 2022-09-12 PROCEDURE — 99233 SBSQ HOSP IP/OBS HIGH 50: CPT | Performed by: INTERNAL MEDICINE

## 2022-09-12 PROCEDURE — 6370000000 HC RX 637 (ALT 250 FOR IP): Performed by: INTERNAL MEDICINE

## 2022-09-12 PROCEDURE — 6360000002 HC RX W HCPCS: Performed by: FAMILY MEDICINE

## 2022-09-12 PROCEDURE — 85025 COMPLETE CBC W/AUTO DIFF WBC: CPT

## 2022-09-12 PROCEDURE — 6370000000 HC RX 637 (ALT 250 FOR IP): Performed by: FAMILY MEDICINE

## 2022-09-12 RX ADMIN — Medication 10 ML: at 21:55

## 2022-09-12 RX ADMIN — NYSTATIN: 100000 CREAM TOPICAL at 08:22

## 2022-09-12 RX ADMIN — AMLODIPINE BESYLATE 10 MG: 10 TABLET ORAL at 08:18

## 2022-09-12 RX ADMIN — BENZONATATE 200 MG: 100 CAPSULE ORAL at 21:53

## 2022-09-12 RX ADMIN — METHYLPREDNISOLONE SODIUM SUCCINATE 40 MG: 40 INJECTION, POWDER, FOR SOLUTION INTRAMUSCULAR; INTRAVENOUS at 12:28

## 2022-09-12 RX ADMIN — PANTOPRAZOLE SODIUM 40 MG: 40 TABLET, DELAYED RELEASE ORAL at 06:31

## 2022-09-12 RX ADMIN — METHYLPREDNISOLONE SODIUM SUCCINATE 40 MG: 40 INJECTION, POWDER, FOR SOLUTION INTRAMUSCULAR; INTRAVENOUS at 00:20

## 2022-09-12 RX ADMIN — LISINOPRIL AND HYDROCHLOROTHIAZIDE 1 TABLET: 25; 20 TABLET ORAL at 08:18

## 2022-09-12 RX ADMIN — ENOXAPARIN SODIUM 30 MG: 100 INJECTION SUBCUTANEOUS at 19:08

## 2022-09-12 RX ADMIN — INSULIN LISPRO 10 UNITS: 100 INJECTION, SOLUTION INTRAVENOUS; SUBCUTANEOUS at 19:08

## 2022-09-12 RX ADMIN — INSULIN LISPRO 4 UNITS: 100 INJECTION, SOLUTION INTRAVENOUS; SUBCUTANEOUS at 00:20

## 2022-09-12 RX ADMIN — BENZONATATE 200 MG: 100 CAPSULE ORAL at 08:20

## 2022-09-12 RX ADMIN — Medication 10 ML: at 08:24

## 2022-09-12 RX ADMIN — ALBUTEROL SULFATE 2.5 MG: 2.5 SOLUTION RESPIRATORY (INHALATION) at 21:36

## 2022-09-12 RX ADMIN — INSULIN LISPRO 10 UNITS: 100 INJECTION, SOLUTION INTRAVENOUS; SUBCUTANEOUS at 12:46

## 2022-09-12 RX ADMIN — NYSTATIN: 100000 CREAM TOPICAL at 08:23

## 2022-09-12 RX ADMIN — INSULIN LISPRO 4 UNITS: 100 INJECTION, SOLUTION INTRAVENOUS; SUBCUTANEOUS at 19:08

## 2022-09-12 RX ADMIN — INSULIN GLARGINE 40 UNITS: 100 INJECTION, SOLUTION SUBCUTANEOUS at 19:10

## 2022-09-12 RX ADMIN — FLUTICASONE PROPIONATE 1 SPRAY: 50 SPRAY, METERED NASAL at 08:22

## 2022-09-12 RX ADMIN — NYSTATIN: 100000 CREAM TOPICAL at 21:55

## 2022-09-12 RX ADMIN — INSULIN LISPRO 8 UNITS: 100 INJECTION, SOLUTION INTRAVENOUS; SUBCUTANEOUS at 12:45

## 2022-09-12 RX ADMIN — ALBUTEROL SULFATE 2.5 MG: 2.5 SOLUTION RESPIRATORY (INHALATION) at 07:37

## 2022-09-12 RX ADMIN — INSULIN LISPRO 10 UNITS: 100 INJECTION, SOLUTION INTRAVENOUS; SUBCUTANEOUS at 08:17

## 2022-09-12 RX ADMIN — BUDESONIDE 500 MCG: 0.5 SUSPENSION RESPIRATORY (INHALATION) at 21:36

## 2022-09-12 RX ADMIN — ENOXAPARIN SODIUM 30 MG: 100 INJECTION SUBCUTANEOUS at 08:17

## 2022-09-12 RX ADMIN — BUDESONIDE 500 MCG: 0.5 SUSPENSION RESPIRATORY (INHALATION) at 07:37

## 2022-09-12 RX ADMIN — ALBUTEROL SULFATE 2.5 MG: 2.5 SOLUTION RESPIRATORY (INHALATION) at 13:06

## 2022-09-12 ASSESSMENT — PAIN SCALES - GENERAL: PAINLEVEL_OUTOF10: 0

## 2022-09-12 NOTE — PLAN OF CARE
Patient is AOX4, RA , no complaints of pain , respiratory sounds are wheezes and non productive cough . Will continue to monitor .

## 2022-09-12 NOTE — PROGRESS NOTES
Hospitalist Progress Note      Date of Admission: 9/7/2022  Chief Complaint:    Chief Complaint   Patient presents with    Shortness of Breath     Since yesterday    Cough     And wheezing, seen here yesterday for the same     Subjective:  No new complaints, no nausea vomiting chest pain or headache    Medications:    Infusion Medications    sodium chloride      dextrose       Scheduled Medications    amLODIPine  10 mg Oral Daily    cholestyramine light  4 g Oral BID    dicyclomine  10 mg Oral BID AC    fluticasone  1 spray Each Nostril Daily    lisinopril-hydroCHLOROthiazide  1 tablet Oral Daily    pantoprazole  40 mg Oral QAM AC    nystatin   Topical BID    insulin glargine  40 Units SubCUTAneous Nightly    insulin lispro  10 Units SubCUTAneous TID WC    albuterol  2.5 mg Nebulization TID    methylPREDNISolone  40 mg IntraVENous Q12H    budesonide  0.5 mg Nebulization BID    insulin regular  15 Units IntraVENous Once    sodium chloride flush  5-40 mL IntraVENous 2 times per day    enoxaparin  30 mg SubCUTAneous BID    insulin lispro  0-16 Units SubCUTAneous TID WC    insulin lispro  0-4 Units SubCUTAneous Nightly     PRN Meds: benzonatate, ipratropium-albuterol, sodium chloride flush, sodium chloride, ondansetron **OR** ondansetron, polyethylene glycol, acetaminophen **OR** acetaminophen, albuterol, glucose, dextrose bolus **OR** dextrose bolus, glucagon (rDNA), dextrose  No intake or output data in the 24 hours ending 09/12/22 1309  Exam:  BP (!) 140/73   Pulse 76   Temp 97.9 °F (36.6 °C) (Oral)   Resp 18   Ht 5' 2\" (1.575 m)   Wt 260 lb (117.9 kg)   SpO2 96%   BMI 47.55 kg/m²   Head: Normocephalic, atraumatic  Sclera clear  Neck JVD flat  Lungs: Normal effort of breathing    Labs:   Recent Labs     09/10/22  0554 09/11/22  0601 09/12/22  0632   WBC 6.5 7.3 8.2   HGB 13.1 13.2 13.2   HCT 38.7 39.2 38.9    189 216     Recent Labs     09/10/22  0554 09/11/22  0601 09/12/22  0632   * 138 137   K 4.1 3.6 3.9   CL 99 103 98   CO2 24 25 27   BUN 21 21 22   CREATININE 0.57 0.53 0.59   CALCIUM 8.4* 8.5 8.7   AST 13 11 9   ALT 22 19 18   BILITOT 0.4 0.4 0.4   ALKPHOS 118 102 123     No results for input(s): INR in the last 72 hours. No results for input(s): Lakisha Mould in the last 72 hours. Radiology:  No orders to display     Assessment/Plan:    Asthma exacerbation secondary to metapneumovirus. Currently on steroids, slowly improving. Patient states she feels substantially better than at the time she was admitted. Still with substantial wheeze, left greater than right. Following along with pulmonology.     DM: monitor glucose accordion, titrate meds as needed  HTN: monitor BP, adjust meds as needed  CAD  GERD    35 minutes total care time, >1/2 in unit/floor time and care coordination       Garth Essex, MD ,MD

## 2022-09-12 NOTE — CARE COORDINATION
This LSW met with patient at bedside this afternoon. Patient and I discussed discharge plans. Patient plans to return home when medically stable. Patient denies needs at this time. LSW / CM to follow.   Electronically signed by MICHELA Heath, HERB on 9/12/22 at 4:04 PM EDT

## 2022-09-12 NOTE — PROGRESS NOTES
0815: Shift assessment complete, vss. Pt up in chair for breakfast & comfort. 100% breakfast eaten. Up independent. No complaints, states cough is improving -- PRN tessalon pearls given as per MAR. AM meds given as per MAR. Call light is within reach.      Electronically signed by Tiago Danielle RN on 9/12/22 at 8:39 AM EDT

## 2022-09-12 NOTE — PROGRESS NOTES
Endocrinology Progress Note    Assessment and Plan:   Assessment-  Type 2 diabetes, new to insulin  Acute asthma exacerbation    Plan-  Continue Lantus 40 units at night  Continue Humalog 10 units 3 times daily before meals  Medium dose Humalog sliding scale coverage  Monitor glycemic control closely  Diabetic education ordered, patient likely to go home on insulin    POC Glucose:   Recent Labs     09/11/22  1159 09/11/22  1620 09/11/22  2038 09/12/22  0810 09/12/22  1215   POCGLU 218* 391* 340* 199* 253*     HGBA1C:  Lab Results   Component Value Date    LABA1C 9.6 (H) 09/07/2022    LABA1C 8.9 04/19/2022    LABA1C 7.4 (H) 08/07/2021     CBC:   Recent Labs     09/11/22  0601 09/12/22  0632   WBC 7.3 8.2   HGB 13.2 13.2    216     CMP:    Lab Results   Component Value Date     09/12/2022    K 3.9 09/12/2022    CL 98 09/12/2022    CO2 27 09/12/2022    BUN 22 09/12/2022    CREATININE 0.59 09/12/2022    GLUCOSE 248 (H) 09/12/2022    CALCIUM 8.7 09/12/2022    PROT 5.9 (L) 09/12/2022    LABALBU 3.5 09/12/2022    BILITOT 0.4 09/12/2022    ALKPHOS 123 09/12/2022    AST 9 09/12/2022    ALT 18 09/12/2022    LABGLOM >60.0 09/12/2022    GFRAA >60.0 09/12/2022    GLOB 2.4 09/12/2022       Lab Results   Component Value Date    TSH 0.562 11/24/2014    TSH 0.897 09/16/2014    TSH 1.142 06/06/2013    TSHREFLEX 0.889 06/16/2018    TSHREFLEX 1.200 08/30/2016    T4FREE 0.84 (L) 02/20/2012     No results found for: TPOABS      CC:   Chief Complaint   Patient presents with    Shortness of Breath     Since yesterday    Cough     And wheezing, seen here yesterday for the same       Subjective: Interval History: Deandre Grewal is a 22-year-old type II diabetic female admitted for asthma exacerbation. She was experiencing 1 to 2 weeks of shortness of breath and wheezing. Started on IV Solu-Medrol with subsequent hyperglycemia. Insulin dosing adjustments are being made daily.   Diabetic education will be ordered, she will likely be discharged home on insulin. Hemoglobin A1c's have been worsening, recent is 9.6%    Review of systems: denies polyuria, polydipsia, ABD pain, flank pain, N/V/D, or diaphoresis  Medications:   Scheduled Meds:   amLODIPine  10 mg Oral Daily    cholestyramine light  4 g Oral BID    dicyclomine  10 mg Oral BID AC    fluticasone  1 spray Each Nostril Daily    lisinopril-hydroCHLOROthiazide  1 tablet Oral Daily    pantoprazole  40 mg Oral QAM AC    nystatin   Topical BID    insulin glargine  40 Units SubCUTAneous Nightly    insulin lispro  10 Units SubCUTAneous TID WC    albuterol  2.5 mg Nebulization TID    methylPREDNISolone  40 mg IntraVENous Q12H    budesonide  0.5 mg Nebulization BID    insulin regular  15 Units IntraVENous Once    sodium chloride flush  5-40 mL IntraVENous 2 times per day    enoxaparin  30 mg SubCUTAneous BID    insulin lispro  0-16 Units SubCUTAneous TID WC    insulin lispro  0-4 Units SubCUTAneous Nightly     Continuous Infusions:   sodium chloride      dextrose         Objective:   Vitals: BP (!) 140/73   Pulse 76   Temp 97.9 °F (36.6 °C) (Oral)   Resp 18   Ht 5' 2\" (1.575 m)   Wt 260 lb (117.9 kg)   SpO2 96%   BMI 47.55 kg/m²    Wt Readings from Last 3 Encounters:   09/07/22 260 lb (117.9 kg)   09/06/22 260 lb (117.9 kg)   09/04/22 260 lb (117.9 kg)        General appearance: alert, appears stated age, cooperative, and moderate distress  Skin: Skin color, texture, turgor normal. No rashes or lesions. Neck: thyroid normal size, non-tender,  without nodularity  Lungs: wheezes bilaterally  Heart: regular rate and rhythm, S1, S2 normal, no murmur, click, rub or gallop  Abdomen: soft, non-tender. Bowel sounds normal. No masses,  no organomegaly.   Extremities: extremities normal, atraumatic, no cyanosis or edema    Patient Active Problem List:     Asthma     HA (headache)     Body mass index (BMI) 45.0-49.9, adult (HCC)     CAD (coronary artery disease)     BUNNY (obstructive sleep apnea) Hypovitaminosis D     Arthritis     Essential hypertension     GERD (gastroesophageal reflux disease)     Diabetes ([facility] [address]     Malignant neoplasm of lower-inner quadrant of left breast in female, estrogen receptor negative ([facility] [address]     Biliary sepsis     Abnormal LFTs     Dilated bile duct     Epigastric pain     Fever     Senile osteoporosis     Obstructive jaundice     Bronchitis     Asthma exacerbation attacks     Acute asthma exacerbation     Uncontrolled type 2 diabetes mellitus with hyperglycemia [facility])        Electronically signed by CHEIKH Owens on 9/12/2022 at 4:33 PM

## 2022-09-12 NOTE — PROGRESS NOTES
Hospitalist Daily Progress Note  Name: Jia Hernandez  Age: 79 y.o. Gender: female  CodeStatus: Full Code  Allergies: Aspirin  Codeine  Imitrex [Sumatriptan]  Theophyllines  Diflucan [Fluconazole]  Nizoral [Ketoconazole]  Zoloft    Chief Complaint:Shortness of Breath (Since yesterday) and Cough (And wheezing, seen here yesterday for the same)      Primary Care Provider: Ruben Rodgers MD    InpatientTreatment Team: Treatment Team: Attending Provider: Jesús Willis MD; Consulting Physician: Hyla Heimlich, MD; Consulting Physician: Dinesh Davis MD; Patient Care Tech: Timurnadja Bran; Registered Nurse: Ruddy Cifuentes, RN; Registered Nurse: Rajeev Billingsley RN    Admission Date: 9/7/2022      Subjective: No chest pain, nausea. Resting in bed. SOB unchanged, still sob with any activity    Physical Exam  Vitals and nursing note reviewed. Constitutional:       Appearance: Normal appearance. Cardiovascular:      Rate and Rhythm: Normal rate and regular rhythm. Pulmonary:      Effort: Pulmonary effort is normal.      Breath sounds: Normal breath sounds. Abdominal:      General: Bowel sounds are normal.   Musculoskeletal:         General: Normal range of motion. Skin:     General: Skin is warm and dry. Neurological:      General: No focal deficit present. Mental Status: She is alert. Mental status is at baseline.        Medications:  Reviewed    Infusion Medications:    sodium chloride      dextrose       Scheduled Medications:    amLODIPine  10 mg Oral Daily    cholestyramine light  4 g Oral BID    dicyclomine  10 mg Oral BID AC    fluticasone  1 spray Each Nostril Daily    lisinopril-hydroCHLOROthiazide  1 tablet Oral Daily    pantoprazole  40 mg Oral QAM AC    nystatin   Topical BID    insulin glargine  40 Units SubCUTAneous Nightly    insulin lispro  10 Units SubCUTAneous TID WC    albuterol  2.5 mg Nebulization TID    methylPREDNISolone  40 mg IntraVENous Q12H    budesonide  0.5 mg Nebulization BID    insulin regular  15 Units IntraVENous Once    sodium chloride flush  5-40 mL IntraVENous 2 times per day    enoxaparin  30 mg SubCUTAneous BID    insulin lispro  0-16 Units SubCUTAneous TID WC    insulin lispro  0-4 Units SubCUTAneous Nightly     PRN Meds: benzonatate, ipratropium-albuterol, sodium chloride flush, sodium chloride, ondansetron **OR** ondansetron, polyethylene glycol, acetaminophen **OR** acetaminophen, albuterol, glucose, dextrose bolus **OR** dextrose bolus, glucagon (rDNA), dextrose    Labs:   Recent Labs     09/09/22  0600 09/10/22  0554 09/11/22  0601   WBC 7.6 6.5 7.3   HGB 12.6 13.1 13.2   HCT 37.4 38.7 39.2    196 189       Recent Labs     09/09/22  0600 09/10/22  0554 09/11/22  0601   * 134* 138   K 4.3 4.1 3.6   CL 98 99 103   CO2 24 24 25   BUN 26* 21 21   CREATININE 0.54 0.57 0.53   CALCIUM 8.4* 8.4* 8.5       Recent Labs     09/09/22  0600 09/10/22  0554 09/11/22  0601   AST 16 13 11   ALT 21 22 19   BILITOT 0.4 0.4 0.4   ALKPHOS 109 118 102       No results for input(s): INR in the last 72 hours. No results for input(s): Rolo Jakes in the last 72 hours. Urinalysis:   Lab Results   Component Value Date/Time    NITRU Negative 08/07/2021 05:00 AM    WBCUA 0-2 09/24/2020 09:30 AM    BACTERIA MANY 09/24/2020 09:30 AM    RBCUA 0-2 09/24/2020 09:30 AM    BLOODU Negative 08/07/2021 05:00 AM    SPECGRAV 1.020 08/07/2021 05:00 AM    GLUCOSEU >=1000 08/07/2021 05:00 AM    GLUCOSEU NEG 04/04/2012 02:58 PM       Radiology:   Most recent    Chest CT      WITH CONTRAST:No results found for this or any previous visit. WITHOUT CONTRAST: No results found for this or any previous visit. CXR      2-view: No results found for this or any previous visit. Portable: Results for orders placed during the hospital encounter of 09/06/22    XR CHEST PORTABLE    Narrative  EXAMINATION:  CHEST.     CLINICAL HISTORY:  SHORTNESS OF BREATH.    COMPARISONS: 8/7/2021. TECHNIQUE:  AP portable. FINDINGS:  Heart size and contour are within normal limits. Pulmonary vascularity appears unremarkable. No infiltrate or effusion is seen. No definite evidence of a mass or adenopathy. No significant bony abnormality. Impression  NORMAL PORTABLE CHEST. Echo No results found for this or any previous visit. Assessment/Plan:    Active Hospital Problems    Diagnosis Date Noted    Uncontrolled type 2 diabetes mellitus with hyperglycemia ([facility] [address] [E11.65] 09/08/2022     Priority: Medium    Asthma exacerbation attacks [J45.901] 09/07/2022     Priority: Medium    Acute asthma exacerbation [J45.901] 09/07/2022     Priority: Medium     Acute asthma exacerbation failed outpatient treatment  IV steroid, nebs sched and prn, pulm consult. 9/8 - appreicate pulm recs, respiratory viral panel.   9/9 - noted metapneumovirus on rvp, cont precaution, cont steroid, nebs  9/10 - cont steroid, nebs, pulm following.    9/11 - cont steroid nebs, remains tight  DM2 with hyperglycemia due to steroid  Lantus and High SSI, ac/hs checks, a1c  HTN  Cont home meds when available  CAD  GERD  DVT proph    Electronically signed by Flower Dillard MD on 9/11/2022 at 8:20 PM

## 2022-09-13 LAB
ALBUMIN SERPL-MCNC: 3.5 G/DL (ref 3.5–4.6)
ALP BLD-CCNC: 106 U/L (ref 40–130)
ALT SERPL-CCNC: 17 U/L (ref 0–33)
ANION GAP SERPL CALCULATED.3IONS-SCNC: 13 MEQ/L (ref 9–15)
AST SERPL-CCNC: 9 U/L (ref 0–35)
BASOPHILS ABSOLUTE: 0 K/UL (ref 0–0.2)
BASOPHILS RELATIVE PERCENT: 0.1 %
BILIRUB SERPL-MCNC: 0.4 MG/DL (ref 0.2–0.7)
BUN BLDV-MCNC: 30 MG/DL (ref 8–23)
CALCIUM SERPL-MCNC: 8.6 MG/DL (ref 8.5–9.9)
CHLORIDE BLD-SCNC: 96 MEQ/L (ref 95–107)
CO2: 27 MEQ/L (ref 20–31)
CREAT SERPL-MCNC: 0.7 MG/DL (ref 0.5–0.9)
EOSINOPHILS ABSOLUTE: 0 K/UL (ref 0–0.7)
EOSINOPHILS RELATIVE PERCENT: 0 %
GFR AFRICAN AMERICAN: >60
GFR NON-AFRICAN AMERICAN: >60
GLOBULIN: 2.5 G/DL (ref 2.3–3.5)
GLUCOSE BLD-MCNC: 108 MG/DL (ref 70–99)
GLUCOSE BLD-MCNC: 139 MG/DL (ref 70–99)
GLUCOSE BLD-MCNC: 163 MG/DL (ref 70–99)
GLUCOSE BLD-MCNC: 202 MG/DL (ref 70–99)
GLUCOSE BLD-MCNC: 217 MG/DL (ref 70–99)
GLUCOSE BLD-MCNC: 327 MG/DL (ref 70–99)
GLUCOSE BLD-MCNC: 411 MG/DL (ref 70–99)
HCT VFR BLD CALC: 41.1 % (ref 37–47)
HEMOGLOBIN: 13.6 G/DL (ref 12–16)
LYMPHOCYTES ABSOLUTE: 1 K/UL (ref 1–4.8)
LYMPHOCYTES RELATIVE PERCENT: 9.6 %
MCH RBC QN AUTO: 29.4 PG (ref 27–31.3)
MCHC RBC AUTO-ENTMCNC: 33.2 % (ref 33–37)
MCV RBC AUTO: 88.7 FL (ref 82–100)
MONOCYTES ABSOLUTE: 0.4 K/UL (ref 0.2–0.8)
MONOCYTES RELATIVE PERCENT: 4 %
NEUTROPHILS ABSOLUTE: 9.4 K/UL (ref 1.4–6.5)
NEUTROPHILS RELATIVE PERCENT: 86.3 %
PDW BLD-RTO: 14 % (ref 11.5–14.5)
PERFORMED ON: ABNORMAL
PLATELET # BLD: 237 K/UL (ref 130–400)
POTASSIUM SERPL-SCNC: 3.6 MEQ/L (ref 3.4–4.9)
RBC # BLD: 4.63 M/UL (ref 4.2–5.4)
SODIUM BLD-SCNC: 136 MEQ/L (ref 135–144)
TOTAL PROTEIN: 6 G/DL (ref 6.3–8)
WBC # BLD: 10.8 K/UL (ref 4.8–10.8)

## 2022-09-13 PROCEDURE — 94640 AIRWAY INHALATION TREATMENT: CPT

## 2022-09-13 PROCEDURE — 36415 COLL VENOUS BLD VENIPUNCTURE: CPT

## 2022-09-13 PROCEDURE — 6360000002 HC RX W HCPCS: Performed by: INTERNAL MEDICINE

## 2022-09-13 PROCEDURE — 94761 N-INVAS EAR/PLS OXIMETRY MLT: CPT

## 2022-09-13 PROCEDURE — 99232 SBSQ HOSP IP/OBS MODERATE 35: CPT | Performed by: INTERNAL MEDICINE

## 2022-09-13 PROCEDURE — 85025 COMPLETE CBC W/AUTO DIFF WBC: CPT

## 2022-09-13 PROCEDURE — 99232 SBSQ HOSP IP/OBS MODERATE 35: CPT | Performed by: PHYSICIAN ASSISTANT

## 2022-09-13 PROCEDURE — 94660 CPAP INITIATION&MGMT: CPT

## 2022-09-13 PROCEDURE — 2580000003 HC RX 258: Performed by: FAMILY MEDICINE

## 2022-09-13 PROCEDURE — 6370000000 HC RX 637 (ALT 250 FOR IP): Performed by: INTERNAL MEDICINE

## 2022-09-13 PROCEDURE — 80053 COMPREHEN METABOLIC PANEL: CPT

## 2022-09-13 PROCEDURE — 6370000000 HC RX 637 (ALT 250 FOR IP): Performed by: FAMILY MEDICINE

## 2022-09-13 PROCEDURE — 1210000000 HC MED SURG R&B

## 2022-09-13 PROCEDURE — G0108 DIAB MANAGE TRN  PER INDIV: HCPCS

## 2022-09-13 PROCEDURE — 6360000002 HC RX W HCPCS: Performed by: FAMILY MEDICINE

## 2022-09-13 RX ORDER — PREDNISONE 20 MG/1
40 TABLET ORAL DAILY
Status: DISCONTINUED | OUTPATIENT
Start: 2022-09-14 | End: 2022-09-14 | Stop reason: HOSPADM

## 2022-09-13 RX ADMIN — ENOXAPARIN SODIUM 30 MG: 100 INJECTION SUBCUTANEOUS at 08:30

## 2022-09-13 RX ADMIN — INSULIN HUMAN 10 UNITS: 100 INJECTION, SOLUTION PARENTERAL at 00:37

## 2022-09-13 RX ADMIN — INSULIN LISPRO 10 UNITS: 100 INJECTION, SOLUTION INTRAVENOUS; SUBCUTANEOUS at 16:41

## 2022-09-13 RX ADMIN — ALBUTEROL SULFATE 2.5 MG: 2.5 SOLUTION RESPIRATORY (INHALATION) at 07:51

## 2022-09-13 RX ADMIN — INSULIN GLARGINE 40 UNITS: 100 INJECTION, SOLUTION SUBCUTANEOUS at 21:48

## 2022-09-13 RX ADMIN — ALBUTEROL SULFATE 2.5 MG: 2.5 SOLUTION RESPIRATORY (INHALATION) at 13:24

## 2022-09-13 RX ADMIN — PANTOPRAZOLE SODIUM 40 MG: 40 TABLET, DELAYED RELEASE ORAL at 05:29

## 2022-09-13 RX ADMIN — AMLODIPINE BESYLATE 10 MG: 10 TABLET ORAL at 08:27

## 2022-09-13 RX ADMIN — INSULIN LISPRO 4 UNITS: 100 INJECTION, SOLUTION INTRAVENOUS; SUBCUTANEOUS at 12:09

## 2022-09-13 RX ADMIN — BUDESONIDE 500 MCG: 0.5 SUSPENSION RESPIRATORY (INHALATION) at 07:54

## 2022-09-13 RX ADMIN — ENOXAPARIN SODIUM 30 MG: 100 INJECTION SUBCUTANEOUS at 21:48

## 2022-09-13 RX ADMIN — Medication 10 ML: at 09:54

## 2022-09-13 RX ADMIN — BUDESONIDE 500 MCG: 0.5 SUSPENSION RESPIRATORY (INHALATION) at 19:45

## 2022-09-13 RX ADMIN — LISINOPRIL AND HYDROCHLOROTHIAZIDE 1 TABLET: 25; 20 TABLET ORAL at 08:27

## 2022-09-13 RX ADMIN — INSULIN LISPRO 10 UNITS: 100 INJECTION, SOLUTION INTRAVENOUS; SUBCUTANEOUS at 12:08

## 2022-09-13 RX ADMIN — Medication 10 ML: at 21:50

## 2022-09-13 RX ADMIN — METHYLPREDNISOLONE SODIUM SUCCINATE 40 MG: 40 INJECTION, POWDER, FOR SOLUTION INTRAMUSCULAR; INTRAVENOUS at 00:36

## 2022-09-13 RX ADMIN — METHYLPREDNISOLONE SODIUM SUCCINATE 40 MG: 40 INJECTION, POWDER, FOR SOLUTION INTRAMUSCULAR; INTRAVENOUS at 12:04

## 2022-09-13 RX ADMIN — INSULIN LISPRO 4 UNITS: 100 INJECTION, SOLUTION INTRAVENOUS; SUBCUTANEOUS at 16:41

## 2022-09-13 RX ADMIN — ALBUTEROL SULFATE 2.5 MG: 2.5 SOLUTION RESPIRATORY (INHALATION) at 19:40

## 2022-09-13 RX ADMIN — INSULIN LISPRO 10 UNITS: 100 INJECTION, SOLUTION INTRAVENOUS; SUBCUTANEOUS at 08:30

## 2022-09-13 RX ADMIN — FLUTICASONE PROPIONATE 1 SPRAY: 50 SPRAY, METERED NASAL at 09:54

## 2022-09-13 ASSESSMENT — PULMONARY FUNCTION TESTS: PEFR_L/MIN: 16

## 2022-09-13 ASSESSMENT — PAIN SCALES - GENERAL
PAINLEVEL_OUTOF10: 0

## 2022-09-13 NOTE — PROGRESS NOTES
Resumed care of pt from [facility] at 2300. Noreen Lea rechecked her blood sugar before leaving due to it being high after dinner. BS was 411. Noreen Lea made Dr. Sasha Kline aware who then ordered a once time dose of insulin. Insulin given per MAR. Will recheck blood sugar shortly. Pt denies other needs at this time. Call light within reach. Will continue to monitor. Electronically signed by Will Perez RN on 9/13/2022 at 1:27 AM    0145: rechecked blood sugar and it was 327. Dr. Sasha Kline is aware.

## 2022-09-13 NOTE — PROGRESS NOTES
Jd Alan, seen for evaluation and education on home on insulin, worsening A1C. Lab Results   Component Value Date    LABA1C 9.6 (H) 09/07/2022     No results found for: Lauren Dunne has had diabetes for 1 years. Discussed with Jd Alan, their current diabetes self-care routines prior to this admission. medication compliance: compliant all of the time, diabetic diet compliance: noncompliant much of the time, home glucose monitoring: is performed regularly    Survival Skill Topics discussed:  [x] Type 2 Diabetes diagnosis as chronic and progressive  [] Type 1 Diabetes diagnosis    [x] Eating healthy - [x] plate method [x] portion control [x] label reading [] carb counting  [x] sources of carbohydrates   Assessment of current status: [] Needs instruction [x] Needs review   [] Comprehends key points  [] Demonstrates understanding/competence  [] Not covered    [x] Being active - current recommendations and safety   Assessment of current status: [] Needs instruction [x] Needs review   [] Comprehends key points  [] Demonstrates understanding/competence  [] Not covered    [x] Medications - current medications: action, side effects, precautions   Patient's medications: _Metformin, home on insulin________________________________________  Assessment of current status: [] Needs instruction [] Needs review   [] Comprehends key points  [] Demonstrates understanding/competence  [] Not covered    [x] Monitoring - frequency & targets Checks twice a day  Assessment of current status: [] Needs instruction [] Needs review   [x] Comprehends key points  [] Demonstrates understanding/competence  [] Not covered    [x] Signs and symptoms of hypo/hyperglycemia,  and Rule of 15 - handouts provided.    Assessment of current status: [x] Needs instruction [] Needs review   [] Comprehends key points  [] Demonstrates understanding/competence  [] Not covered    [x] Sick day management - handout provided  [] Testing for ketones   Assessment of current status: [] Needs instruction [] Needs review   [] Comprehends key points  [] Demonstrates understanding/competence  [x] Not covered    [x] Overview of chronic complications and how to prevent them from occurring  Assessment of current status: [] Needs instruction [] Needs review   [] Comprehends key points  [] Demonstrates understanding/competence  [x] Not covered      [x] Use of insulin:          [] Previous use of insulin - Patient currently taking: ____                       Assessment of current status: [] Needs instruction [] Needs review   [] Comprehends key points  [] Demonstrates understanding/competence  [] Not covered    Reviewed the following   [] Type of insulin - onset, peak and duration of each type       [] Injection site - currently uses: [] abd  [] thighs []  upper arms  []  Inspected site for bruising, redness, infection, lipoatrophy  or lipohypertrophy.       [] no problems                 [] Problem: ________________  [] Not injecting near umbilicus or scars/tattoos. [] Rotates sites appropriately [] Not rotating sites and advised    [] Proper storage of insulin  [] Stores insulin appropriately  [] Does not store insulin appropriately and reinstructed    [] History of hypoglycemia within last month? [] Yes. Was it treated appropriately? [] yes   [] no and reinstructed on proper treatment  [] No           [x] New to insulin Patient is new to insulin and prescribed: __unknown__        []  Instructed today on type of insulin(s), onset, peak and duration. [x]  Demonstrated proper administration technique using  []Vial & syringe  [x]Pen. [x]  Return demonstration via [] Self-injection __U Site:____    [x] demonstrator pillow.  [x] Verbally        [x]  Reviewed recommended injection sites, proper storage of insulin, Proper needle               disposal, importance of blood glucose monitoring when taking insulin, and risk of hypoglycemia. [x]  Handouts given. [x] Use of meter:  [x]  Previous use of meter and at home patient checks:  [] Daily [] weekly [] monthly [] other:__________  [] Once a day [x] twice a day [] 3 times a day [] 4 times a day or more  [x] Before meal [] 2 hours after meal  [x] Reviewed technique and patient uses meter:  [x] Appropriately  [] Patient was not using appropriately and advised _______________    [] Patient is new to glucose meter and instructed on the following.:        [] Overview of meter - 800 number on all machines for help       [] Proper storage/ handling of meter and test strips       [] Washing hands, turning on meter - setting date and time       [] Running  checks on the test strips using control solution        [] Loading and using lancet device to obtain an adequate sample       [] Obtaining blood sample and reading results on meter       [] Proper disposal of used strips and lancets       [] Frequency of testing       [] Importance of record keeping        [] When to call their PCM with abnormal results       [] Desired blood glucose goals for optimal control - handout given       [] All of the above      [] Patient instructed on home meter. Name of meter:  [] Patient instructed with demonstrator model in office. The patient return demonstrated:  [] verbally   [] self-tested BG ___________    SUPPORT MATERIALS  The following support materials were provided for patient to take home:  [x] Diabetes Education Bette Burgos          [x] Self-care diary/log for blood glucose and food              [] Insulin how to kit          [x] Diabetes ID card  [x] [id number] [facility] Diabetes Education brochure/ contact card      RECOMMENDATIONS INPATIENT PLAN:  [] Dietician Consult this admission for education on CHO diet and diet plan for home  [] Would recommend follow -up education at outpatient diabetes education at [facility]. [] An order has been placed for signature. Diabetes Education team will contact patient for appointment after discharge. [x] Patient declines education at this time. Education Brochure given for future reference  [] No further education is recommended at this time. RN Bedside Support Plan:  [x] Bedside RN to support patient with administration of insulin at bedside nurse Collette Spangle  [] Bedside RN to support patient with SMBG - OK to use home meter along-side floor meter  [] Reinforce target BG ranges, Hyper and Hypo signs and symptoms and treatment  [] Bedside RN to coordinate BG Check with mealtime and insulin  [] Bedside RN to ensure snack at [facility] for patient on HS insulin  [] Bedside RN to reinforce survival skills education and diabetes self-care     [] Patient will need prescriptions for the following supplies at discharge:   [] Preferred / formulary blood glucose meter, with strips and lancets for testing   [] Insulin pen needle tips   [] Insulin syringes with needles    Patient verbalizes and demonstrates understanding of survival skills education.

## 2022-09-13 NOTE — PROGRESS NOTES
Shift assessment completed and medications given per MAR. VSS and alert and oriented x's 4. Patient has no complaint of pain or discomfort at this time. Patient able to make needs known and no additional needs known at this time. Will continue to monitor.

## 2022-09-13 NOTE — PROGRESS NOTES
Endocrinology Progress Note    Assessment and Plan:   Assessment-  Type 2 diabetes, new to insulin  Acute asthma exacerbation    Plan-  Continue Lantus 40 units at night  Continue Humalog 10 units 3 times daily before meals  Medium dose Humalog sliding scale coverage  Monitor glycemic control closely  Diabetic education ordered, patient likely to go home on insulin  No changes to current insulin regimen    POC Glucose:   Recent Labs     09/12/22  2108 09/13/22  0004 09/13/22  0147 09/13/22  0802 09/13/22  1119   POCGLU 255* 411* 327* 108* 202*     HGBA1C:  Lab Results   Component Value Date    LABA1C 9.6 (H) 09/07/2022    LABA1C 8.9 04/19/2022    LABA1C 7.4 (H) 08/07/2021     CBC:   Recent Labs     09/12/22  0632 09/13/22  0654   WBC 8.2 10.8   HGB 13.2 13.6    237     CMP:    Lab Results   Component Value Date     09/13/2022    K 3.6 09/13/2022    CL 96 09/13/2022    CO2 27 09/13/2022    BUN 30 (H) 09/13/2022    CREATININE 0.70 09/13/2022    GLUCOSE 139 (H) 09/13/2022    CALCIUM 8.6 09/13/2022    PROT 6.0 (L) 09/13/2022    LABALBU 3.5 09/13/2022    BILITOT 0.4 09/13/2022    ALKPHOS 106 09/13/2022    AST 9 09/13/2022    ALT 17 09/13/2022    LABGLOM >60.0 09/13/2022    GFRAA >60.0 09/13/2022    GLOB 2.5 09/13/2022       Lab Results   Component Value Date    TSH 0.562 11/24/2014    TSH 0.897 09/16/2014    TSH 1.142 06/06/2013    TSHREFLEX 0.889 06/16/2018    TSHREFLEX 1.200 08/30/2016    T4FREE 0.84 (L) 02/20/2012     No results found for: TPOABS      CC:   Chief Complaint   Patient presents with    Shortness of Breath     Since yesterday    Cough     And wheezing, seen here yesterday for the same       Subjective: Interval History: Semaj Arita is a 40-year-old type II diabetic female admitted for asthma exacerbation. She was experiencing 1 to 2 weeks of shortness of breath and wheezing. Started on IV Solu-Medrol with subsequent hyperglycemia. Insulin dosing adjustments are being made daily.   Diabetic education will be ordered, she will likely be discharged home on insulin. Hemoglobin A1c's have been worsening, recent is 9.6%  Dyspnea, cough, and audible wheezing persisted today. Very labile glycemic control due to IV steroid utilization. We will monitor closely and make insulin dosing adjustments accordingly. Review of systems: denies polyuria, polydipsia, ABD pain, flank pain, N/V/D, or diaphoresis  Medications:   Scheduled Meds:   amLODIPine  10 mg Oral Daily    cholestyramine light  4 g Oral BID    dicyclomine  10 mg Oral BID AC    fluticasone  1 spray Each Nostril Daily    lisinopril-hydroCHLOROthiazide  1 tablet Oral Daily    pantoprazole  40 mg Oral QAM AC    nystatin   Topical BID    insulin glargine  40 Units SubCUTAneous Nightly    insulin lispro  10 Units SubCUTAneous TID WC    albuterol  2.5 mg Nebulization TID    methylPREDNISolone  40 mg IntraVENous Q12H    budesonide  0.5 mg Nebulization BID    insulin regular  15 Units IntraVENous Once    sodium chloride flush  5-40 mL IntraVENous 2 times per day    enoxaparin  30 mg SubCUTAneous BID    insulin lispro  0-16 Units SubCUTAneous TID WC    insulin lispro  0-4 Units SubCUTAneous Nightly     Continuous Infusions:   sodium chloride      dextrose         Objective:   Vitals: BP (!) 148/86   Pulse 73   Temp 97.6 °F (36.4 °C) (Oral)   Resp 20   Ht 5' 2\" (1.575 m)   Wt 260 lb (117.9 kg)   SpO2 95%   BMI 47.55 kg/m²    Wt Readings from Last 3 Encounters:   09/07/22 260 lb (117.9 kg)   09/06/22 260 lb (117.9 kg)   09/04/22 260 lb (117.9 kg)        General appearance: alert, appears stated age, cooperative, and moderate distress  Skin: Skin color, texture, turgor normal. No rashes or lesions. Neck: thyroid normal size, non-tender,  without nodularity  Lungs: wheezes bilaterally  Heart: regular rate and rhythm, S1, S2 normal, no murmur, click, rub or gallop  Abdomen: soft, non-tender. Bowel sounds normal. No masses,  no organomegaly.   Extremities:

## 2022-09-13 NOTE — CARE COORDINATION
MET WITH PATIENT, FREEDOM OF CHOICE OFFERED.  STATES PLAN IS RETURN HOME,, CURRENTLY DENIES [address] [facility], [address] TO HOME 9/13

## 2022-09-13 NOTE — PROGRESS NOTES
INPATIENT PROGRESS NOTES    PATIENT NAME: Bridger Bruce  MRN: [id number]  SERVICE DATE:  September 12, 2022   SERVICE TIME:  11:08 PM      PRIMARY SERVICE: Pulmonary Disease    CHIEF COMPLAIN: Shortness of breath      INTERVAL HPI: Patient seen and examined at bedside, Interval Notes, orders reviewed. Nursing notes noted  She is still having wheezing and continues to have a cough mainly dry. No chest pain. No fever or chills. No nausea vomiting diarrhea. O2 saturation 98%    OBJECTIVE    Body mass index is 47.55 kg/m². PHYSICAL EXAM:  Vitals:  BP (!) 140/73   Pulse 76   Temp 97.9 °F (36.6 °C) (Oral)   Resp 18   Ht 5' 2\" (1.575 m)   Wt 260 lb (117.9 kg)   SpO2 98%   BMI 47.55 kg/m²   General: Alert, awake . comfortable in bed, No distress. Head: Atraumatic , Normocephalic   Eyes: PERRL. No sclera icterus. No conjunctival injection. No discharge   ENT: No nasal  discharge. Pharynx clear. Neck:  Trachea midline. No thyromegaly, no JVD, No cervical adenopathy. Chest : Bilaterally symmetrical ,Normal effort,  No accessory muscle use  Lung : . Fair BS bilateral, decreased BS at bases. No Rales. Bilateral wheezing more on left side. . No rhonchi. Heart[de-identified] Normal  rate. Regular rhythm. No mumur ,  Rub or gallop  ABD: Non-tender. Non-distended. No masses. No organmegaly. Normal bowel sounds. No hernia.   Ext : No Pitting both leg , No Cyanosis No clubbing  Neuro: no focal weakness          DATA:   Recent Labs     09/11/22  0601 09/12/22  0632   WBC 7.3 8.2   HGB 13.2 13.2   HCT 39.2 38.9   MCV 88.4 86.7    216     Recent Labs     09/11/22  0601 09/12/22  0632    137   K 3.6 3.9    98   CO2 25 27   BUN 21 22   CREATININE 0.53 0.59   GLUCOSE 181* 248*   CALCIUM 8.5 8.7   PROT 5.8* 5.9*   LABALBU 3.4* 3.5   BILITOT 0.4 0.4   ALKPHOS 102 123   AST 11 9   ALT 19 18   LABGLOM >60.0 >60.0   GFRAA >60.0 >60.0   GLOB 2.4 2.4       MV Settings:     FiO2 : 21 %    No results for input(s): PHART, YRM5NIJ, PO2ART, BLC9FIZ, BEART, V0BZXHPK in the last 72 hours. O2 Device: None (Room air)  O2 Flow Rate (L/min): 0 L/min    ADULT DIET; Regular; 4 carb choices (60 gm/meal)     MEDICATIONS during current hospitalization:    Continuous Infusions:   sodium chloride      dextrose         Scheduled Meds:   amLODIPine  10 mg Oral Daily    cholestyramine light  4 g Oral BID    dicyclomine  10 mg Oral BID AC    fluticasone  1 spray Each Nostril Daily    lisinopril-hydroCHLOROthiazide  1 tablet Oral Daily    pantoprazole  40 mg Oral QAM AC    nystatin   Topical BID    insulin glargine  40 Units SubCUTAneous Nightly    insulin lispro  10 Units SubCUTAneous TID WC    albuterol  2.5 mg Nebulization TID    methylPREDNISolone  40 mg IntraVENous Q12H    budesonide  0.5 mg Nebulization BID    insulin regular  15 Units IntraVENous Once    sodium chloride flush  5-40 mL IntraVENous 2 times per day    enoxaparin  30 mg SubCUTAneous BID    insulin lispro  0-16 Units SubCUTAneous TID WC    insulin lispro  0-4 Units SubCUTAneous Nightly       PRN Meds:benzonatate, ipratropium-albuterol, sodium chloride flush, sodium chloride, ondansetron **OR** ondansetron, polyethylene glycol, acetaminophen **OR** acetaminophen, albuterol, glucose, dextrose bolus **OR** dextrose bolus, glucagon (rDNA), dextrose    Radiology  XR CHEST PORTABLE    Result Date: 9/6/2022  EXAMINATION:  CHEST. CLINICAL HISTORY:  SHORTNESS OF BREATH. COMPARISONS:  8/7/2021. TECHNIQUE:  AP portable. FINDINGS:  Heart size and contour are within normal limits. Pulmonary vascularity appears unremarkable. No infiltrate or effusion is seen. No definite evidence of a mass or adenopathy. No significant bony abnormality. NORMAL PORTABLE CHEST.      No orders to display       IMPRESSION AND SUGGESTION:  Acute respiratory failure  Asthma exacerbation  Metapneumovirus lower respiratory tract infection  Obstructive sleep apnea, not on CPAP      Continue O2 to keep saturation 90% or above patient continues to have significant bronchospasm. Continue IV steroid to start tapering down tomorrow. Mucinex. Sleep study as outpatient. Hopefully discharge tomorrow on p.o. taper dose steroid    NOTE: This report was transcribed using voice recognition software. Every effort was made to ensure accuracy; however, inadvertent computerized transcription errors may be present.       Electronically signed by Goldie Padron MD, FCCP on 9/12/2022 at 11:08 PM

## 2022-09-13 NOTE — PROGRESS NOTES
Physician Progress Note      Desire Masters  CSN #:                  [id number]  :                       1955  ADMIT DATE:       2022 4:22 PM  [address] [place] DATE:  RESPONDING  PROVIDER #:        Rosa Rowell MD          QUERY TEXT:    Patient admitted with acute asthma exacerbation. Noted documentation of \"acute   respiratory failure due to severe persistent asthma exacerbation\" in   9/10- PNs by Dr Larissa Bishop. In order to support the diagnosis of acute   respiratory failure, please include additional clinical indicators in your   documentation. Or please document if the diagnosis of acute respiratory   failure has been ruled out after further study. The medical record reflects the following:  Risk Factors: asthma  CAD  HTN  DM2  BUNNY  GERD  MO  Clinical Indicators: ?on room air  O2 sat 94%-100%  Severe persistent asthma   with acute exacerbation  Acute bronchitis with retained secretions  Observe   off of oxygen. Treatment: CXR  IV Solu-Medrol  Proventil  Pulmicort  Lona Martin  RT    pulmonology    Acute Respiratory Failure Clinical Indicators per [employer] Training Guide and   Quick Reference Guide:  pO2 < 60 mmHg or SpO2 (pulse oximetry) < 91% breathing room air  pCO2 > 50 and pH < 7.35  P/F ratio (pO2 / FIO2) < 300  pO2 decrease or pCO2 increase by 10 mmHg from baseline (if known)  Supplemental oxygen of 40% or more  Presence of respiratory distress, tachypnea, dyspnea, shortness of breath,   wheezing  Unable to speak in complete sentences  Use of accessory muscles to breathe  Extreme anxiety and feeling of impending doom  Tripod position  Confusion/altered mental status/obtunded    Denys Rocha RN CCDS  [phone]  Options provided:  -- Acute Respiratory Failure as evidenced by, Please document evidence.   -- Acute Respiratory Failure ruled out after study  -- Other - I will add my own diagnosis  -- Disagree - Not applicable / Not valid  -- Disagree - Clinically unable to determine / Unknown  -- Refer to Clinical Documentation Reviewer    PROVIDER RESPONSE TEXT:    Provider is clinically unable to determine a response to this query.     Query created by: Carlos Zee on 9/12/2022 9:55 AM      Electronically signed by:  Joel Armas MD 9/13/2022 9:47 AM

## 2022-09-14 VITALS
BODY MASS INDEX: 47.84 KG/M2 | DIASTOLIC BLOOD PRESSURE: 61 MMHG | RESPIRATION RATE: 17 BRPM | HEIGHT: 62 IN | TEMPERATURE: 97.7 F | HEART RATE: 64 BPM | WEIGHT: 260 LBS | SYSTOLIC BLOOD PRESSURE: 132 MMHG | OXYGEN SATURATION: 94 %

## 2022-09-14 LAB
ALBUMIN SERPL-MCNC: 3.5 G/DL (ref 3.5–4.6)
ALP BLD-CCNC: 107 U/L (ref 40–130)
ALT SERPL-CCNC: 17 U/L (ref 0–33)
ANION GAP SERPL CALCULATED.3IONS-SCNC: 13 MEQ/L (ref 9–15)
ANISOCYTOSIS: ABNORMAL
AST SERPL-CCNC: 12 U/L (ref 0–35)
BANDED NEUTROPHILS RELATIVE PERCENT: 1 % (ref 5–11)
BASOPHILS ABSOLUTE: 0 K/UL (ref 0–0.2)
BASOPHILS RELATIVE PERCENT: 0.1 %
BILIRUB SERPL-MCNC: 0.6 MG/DL (ref 0.2–0.7)
BUN BLDV-MCNC: 27 MG/DL (ref 8–23)
CALCIUM SERPL-MCNC: 8.5 MG/DL (ref 8.5–9.9)
CHLORIDE BLD-SCNC: 97 MEQ/L (ref 95–107)
CO2: 26 MEQ/L (ref 20–31)
CREAT SERPL-MCNC: 0.71 MG/DL (ref 0.5–0.9)
EOSINOPHILS ABSOLUTE: 0 K/UL (ref 0–0.7)
EOSINOPHILS RELATIVE PERCENT: 0.1 %
GFR AFRICAN AMERICAN: >60
GFR NON-AFRICAN AMERICAN: >60
GLOBULIN: 2.4 G/DL (ref 2.3–3.5)
GLUCOSE BLD-MCNC: 127 MG/DL (ref 70–99)
GLUCOSE BLD-MCNC: 167 MG/DL (ref 70–99)
GLUCOSE BLD-MCNC: 207 MG/DL (ref 70–99)
HCT VFR BLD CALC: 43.2 % (ref 37–47)
HEMOGLOBIN: 14 G/DL (ref 12–16)
LYMPHOCYTES ABSOLUTE: 3.6 K/UL (ref 1–4.8)
LYMPHOCYTES RELATIVE PERCENT: 25 %
MCH RBC QN AUTO: 28.8 PG (ref 27–31.3)
MCHC RBC AUTO-ENTMCNC: 32.5 % (ref 33–37)
MCV RBC AUTO: 88.7 FL (ref 82–100)
MONOCYTES ABSOLUTE: 0.9 K/UL (ref 0.2–0.8)
MONOCYTES RELATIVE PERCENT: 5.7 %
NEUTROPHILS ABSOLUTE: 9.9 K/UL (ref 1.4–6.5)
NEUTROPHILS RELATIVE PERCENT: 69 %
PDW BLD-RTO: 14.3 % (ref 11.5–14.5)
PERFORMED ON: ABNORMAL
PERFORMED ON: ABNORMAL
PLATELET # BLD: 258 K/UL (ref 130–400)
PLATELET SLIDE REVIEW: NORMAL
POTASSIUM SERPL-SCNC: 3.5 MEQ/L (ref 3.4–4.9)
RBC # BLD: 4.87 M/UL (ref 4.2–5.4)
SODIUM BLD-SCNC: 136 MEQ/L (ref 135–144)
TOTAL PROTEIN: 5.9 G/DL (ref 6.3–8)
WBC # BLD: 14.2 K/UL (ref 4.8–10.8)

## 2022-09-14 PROCEDURE — 94761 N-INVAS EAR/PLS OXIMETRY MLT: CPT

## 2022-09-14 PROCEDURE — 36415 COLL VENOUS BLD VENIPUNCTURE: CPT

## 2022-09-14 PROCEDURE — 6370000000 HC RX 637 (ALT 250 FOR IP): Performed by: FAMILY MEDICINE

## 2022-09-14 PROCEDURE — 6360000002 HC RX W HCPCS: Performed by: INTERNAL MEDICINE

## 2022-09-14 PROCEDURE — 6370000000 HC RX 637 (ALT 250 FOR IP): Performed by: INTERNAL MEDICINE

## 2022-09-14 PROCEDURE — 80053 COMPREHEN METABOLIC PANEL: CPT

## 2022-09-14 PROCEDURE — 94640 AIRWAY INHALATION TREATMENT: CPT

## 2022-09-14 PROCEDURE — 2580000003 HC RX 258: Performed by: FAMILY MEDICINE

## 2022-09-14 PROCEDURE — 99232 SBSQ HOSP IP/OBS MODERATE 35: CPT | Performed by: PHYSICIAN ASSISTANT

## 2022-09-14 PROCEDURE — 6360000002 HC RX W HCPCS: Performed by: FAMILY MEDICINE

## 2022-09-14 PROCEDURE — 85025 COMPLETE CBC W/AUTO DIFF WBC: CPT

## 2022-09-14 PROCEDURE — 94664 DEMO&/EVAL PT USE INHALER: CPT

## 2022-09-14 RX ORDER — FLASH GLUCOSE SCANNING READER
1 EACH MISCELLANEOUS
Qty: 1 EACH | Refills: 0 | Status: SHIPPED | OUTPATIENT
Start: 2022-09-14

## 2022-09-14 RX ORDER — FLASH GLUCOSE SENSOR
1 KIT MISCELLANEOUS
Qty: 2 EACH | Refills: 3 | Status: SHIPPED | OUTPATIENT
Start: 2022-09-14

## 2022-09-14 RX ORDER — PREDNISONE 10 MG/1
TABLET ORAL
Qty: 30 TABLET | Refills: 0 | Status: SHIPPED | OUTPATIENT
Start: 2022-09-14 | End: 2022-10-20 | Stop reason: ALTCHOICE

## 2022-09-14 RX ORDER — INSULIN DEGLUDEC INJECTION 100 U/ML
30 INJECTION, SOLUTION SUBCUTANEOUS NIGHTLY
Qty: 5 ADJUSTABLE DOSE PRE-FILLED PEN SYRINGE | Refills: 3 | Status: SHIPPED | OUTPATIENT
Start: 2022-09-14

## 2022-09-14 RX ORDER — GLUCOSAMINE HCL/CHONDROITIN SU 500-400 MG
CAPSULE ORAL
Qty: 150 STRIP | Refills: 3 | Status: SHIPPED | OUTPATIENT
Start: 2022-09-14

## 2022-09-14 RX ORDER — INSULIN LISPRO 200 [IU]/ML
INJECTION, SOLUTION SUBCUTANEOUS
Qty: 2 ADJUSTABLE DOSE PRE-FILLED PEN SYRINGE | Refills: 3 | Status: SHIPPED | OUTPATIENT
Start: 2022-09-14

## 2022-09-14 RX ADMIN — INSULIN LISPRO 10 UNITS: 100 INJECTION, SOLUTION INTRAVENOUS; SUBCUTANEOUS at 08:29

## 2022-09-14 RX ADMIN — PANTOPRAZOLE SODIUM 40 MG: 40 TABLET, DELAYED RELEASE ORAL at 06:46

## 2022-09-14 RX ADMIN — ENOXAPARIN SODIUM 30 MG: 100 INJECTION SUBCUTANEOUS at 08:24

## 2022-09-14 RX ADMIN — INSULIN LISPRO 10 UNITS: 100 INJECTION, SOLUTION INTRAVENOUS; SUBCUTANEOUS at 11:56

## 2022-09-14 RX ADMIN — FLUTICASONE PROPIONATE 1 SPRAY: 50 SPRAY, METERED NASAL at 08:25

## 2022-09-14 RX ADMIN — INSULIN LISPRO 4 UNITS: 100 INJECTION, SOLUTION INTRAVENOUS; SUBCUTANEOUS at 08:29

## 2022-09-14 RX ADMIN — LISINOPRIL AND HYDROCHLOROTHIAZIDE 1 TABLET: 25; 20 TABLET ORAL at 08:24

## 2022-09-14 RX ADMIN — ALBUTEROL SULFATE 2.5 MG: 2.5 SOLUTION RESPIRATORY (INHALATION) at 07:58

## 2022-09-14 RX ADMIN — AMLODIPINE BESYLATE 10 MG: 10 TABLET ORAL at 08:24

## 2022-09-14 RX ADMIN — Medication 10 ML: at 08:26

## 2022-09-14 RX ADMIN — PREDNISONE 40 MG: 20 TABLET ORAL at 08:24

## 2022-09-14 RX ADMIN — DICYCLOMINE HYDROCHLORIDE 10 MG: 10 CAPSULE ORAL at 06:46

## 2022-09-14 RX ADMIN — BUDESONIDE 500 MCG: 0.5 SUSPENSION RESPIRATORY (INHALATION) at 07:58

## 2022-09-14 RX ADMIN — ALBUTEROL SULFATE 2.5 MG: 2.5 SOLUTION RESPIRATORY (INHALATION) at 13:23

## 2022-09-14 ASSESSMENT — PAIN SCALES - GENERAL: PAINLEVEL_OUTOF10: 0

## 2022-09-14 NOTE — DISCHARGE SUMMARY
Hospital Medicine Discharge Summary    Starr Ibanez  :    MRN:  [id number]    Admit date:  2022  Discharge date:  2022    Admitting Physician:  Denver Diver, MD  Primary Care Physician:  June Armendariz MD    Starr Ibanez is a 79 y.o. female that was admitted and treated for the following medical issues:     Principal Problem:    Asthma exacerbation attacks  Active Problems:    Severe persistent asthma with exacerbation    Uncontrolled type 2 diabetes mellitus with hyperglycemia ([facility] [address]  Resolved Problems:    * No resolved hospital problems. *      Discharge Diagnoses:    Principal Problem:    Asthma exacerbation attacks  Active Problems:    Severe persistent asthma with exacerbation    Uncontrolled type 2 diabetes mellitus with hyperglycemia ([address]  Resolved Problems:    * No resolved hospital problems. *    Chief Complaint   Patient presents with    Shortness of Breath     Since yesterday    Cough     And wheezing, seen here yesterday for the same       Hospital Course:   Starr Ibanez is a 79 y.o. female who was admitted to the hospital with asthma exacerbation secondary to admitted metapneumo virus. Supportive care provided. Steroids initiated. Symptoms gradually improved, patient subsequently discharged with outpatient follow-up. Pt was discharge in a stable condition. /61   Pulse 64   Temp 97.7 °F (36.5 °C) (Oral)   Resp 17   Ht 5' 2\" (1.575 m)   Wt 260 lb (117.9 kg)   SpO2 94%   BMI 47.55 kg/m²     Patient was seen by the following consultants  Consults:  IP CONSULT TO PULMONOLOGY  IP CONSULT TO ENDOCRINOLOGY  IP CONSULT TO DIABETES EDUCATOR    Significant Diagnostic Studies:    Refer to chart if  No results found.     Discharge Medications:         Medication List        CHANGE how you take these medications      predniSONE 10 MG tablet  Commonly known as: DELTASONE  40mg daily x3 days, then 30mg daily x 3 days, then 20mg daily x3 days, then 10mg x daily x 3days  What changed: additional instructions            CONTINUE taking these medications      * albuterol sulfate  (90 Base) MCG/ACT inhaler  Commonly known as: PROVENTIL;VENTOLIN;PROAIR  Inhale 2 puffs into the lungs 4 times daily as needed for Wheezing     * albuterol (2.5 MG/3ML) 0.083% nebulizer solution  Commonly known as: PROVENTIL  Take 3 mLs by nebulization every 6 hours as needed for Wheezing     * albuterol (5 MG/ML) 0.5% nebulizer solution  Commonly known as: PROVENTIL  Take 1 mL by nebulization 4 times daily as needed for Wheezing     amLODIPine 10 MG tablet  Commonly known as: NORVASC  Take 1 tablet by mouth once daily     * blood glucose monitor kit and supplies  Dispense sufficient amount for indicated testing frequency plus additional to accommodate PRN testing needs. Dispense all needed supplies to include: monitor, strips, lancing device, lancets, control solutions, alcohol swabs. * ONE TOUCH ULTRA 2 w/Device Kit     blood glucose test strips  Test 3 times a day & as needed for symptoms of irregular blood glucose. Dispense sufficient amount for indicated testing frequency plus additional to accommodate PRN testing needs.      Brompheniramine-Phenylephrine 2-5 MG/10ML Liqd  Take 10 mLs by mouth every 6 hours as needed (cough)     cholestyramine 4 g packet  Commonly known as: Questran  Take 1 packet by mouth 2 times daily     CPAP Machine Misc     cyanocobalamin 1000 MCG/ML injection     dicyclomine 10 MG capsule  Commonly known as: Bentyl  Take 1 capsule by mouth 2 times daily (before meals)     Dulaglutide 0.75 MG/0.5ML Sopn  Inject 0.75 mg into the skin once a week     fluticasone 50 MCG/ACT nasal spray  Commonly known as: FLONASE  1 spray by Each Nostril route daily     Full Kit Nebulizer Set Misc  1 Units by Does not apply route 4 times daily     glyBURIDE 2.5 MG tablet  Commonly known as: DIABETA  Take 1 tablet by mouth daily (with breakfast)     guaiFENesin 600 MG extended release tablet  Commonly known as: MUCINEX  Take 1 tablet by mouth 2 times daily for 15 days     guaiFENesin-dextromethorphan 100-10 MG/5ML syrup  Commonly known as: ROBITUSSIN DM  Take 5 mLs by mouth 3 times daily as needed for Cough     hydrocortisone 1 % cream     ipratropium-albuterol 0.5-2.5 (3) MG/3ML Soln nebulizer solution  Commonly known as: DUONEB  Inhale 3 mLs into the lungs every 4 hours     ketoconazole 2 % cream  Commonly known as: NIZORAL     Lancets Misc  DX: diabetes mellitus. Use 1-3 time(s) daily - Ok to substitute per insurance (1 each = 1 box)     lisinopril-hydroCHLOROthiazide 20-25 MG per tablet  Commonly known as: PRINZIDE;ZESTORETIC  Take 1 tablet by mouth daily     metFORMIN 500 MG tablet  Commonly known as: GLUCOPHAGE  Take 2 tablets by mouth 2 times daily (with meals)     * nystatin 353498 UNIT/GM powder  Commonly known as: MYCOSTATIN  Apply 3 times daily. * nystatin 340956 UNIT/GM cream  Commonly known as: MYCOSTATIN  Apply topically 2 times daily. * nystatin 335763 UNIT/GM powder  Commonly known as: MYCOSTATIN  Apply 3 times daily. pantoprazole 40 MG tablet  Commonly known as: PROTONIX  Take 1 tablet by mouth once daily     UltiCare Insulin Safety Syr 29G X 1/2\" 1 ML Misc  Generic drug: INSULIN SYRINGE 1CC/29G     vitamin D 1.25 MG (37592 UT) Caps capsule  Commonly known as: ERGOCALCIFEROL  Take 1 capsule by mouth once a week           * This list has 8 medication(s) that are the same as other medications prescribed for you. Read the directions carefully, and ask your doctor or other care provider to review them with you.                 STOP taking these medications      azithromycin 250 MG tablet  Commonly known as: ZITHROMAX     benzonatate 200 MG capsule  Commonly known as: TESSALON     doxycycline hyclate 100 MG tablet  Commonly known as: VIBRA-TABS               Where to Get Your Medications        These medications were sent to [address] - P [phone] Dick Capone [phone]  [address] [facility] [address]      Phone: [phone]   predniSONE 10 MG tablet           Disposition:   If discharged to Home, Any [place] [address] needs that were indicated and/or required as been addressed and set up by Social Work. Condition at discharge: Pt was medically stable at the time of discharge. Activity: activity as tolerated    Total time taken for discharging this patient: 40 minutes. Greater than 70% of time was spent focused exclusively on this patient. Time was taken to review chart, discuss plans with consultants, reconciling medications, discussing plan answering questions with patient.      Signed:  Dacia Farah MD

## 2022-09-14 NOTE — PROGRESS NOTES
wheezing. Started on IV Solu-Medrol with subsequent hyperglycemia. Insulin dosing adjustments are being made daily. Diabetic education will be ordered, she will likely be discharged home on insulin. Hemoglobin A1c's have been worsening, recent is 9.6%  Dyspnea, cough, and audible wheezing persisted today. Very labile glycemic control due to IV steroid utilization. We will monitor closely and make insulin dosing adjustments accordingly. Review of systems: denies polyuria, polydipsia, ABD pain, flank pain, N/V/D, or diaphoresis  Medications:   Scheduled Meds:   predniSONE  40 mg Oral Daily    amLODIPine  10 mg Oral Daily    cholestyramine light  4 g Oral BID    dicyclomine  10 mg Oral BID AC    fluticasone  1 spray Each Nostril Daily    lisinopril-hydroCHLOROthiazide  1 tablet Oral Daily    pantoprazole  40 mg Oral QAM AC    nystatin   Topical BID    insulin glargine  40 Units SubCUTAneous Nightly    insulin lispro  10 Units SubCUTAneous TID WC    albuterol  2.5 mg Nebulization TID    budesonide  0.5 mg Nebulization BID    insulin regular  15 Units IntraVENous Once    sodium chloride flush  5-40 mL IntraVENous 2 times per day    enoxaparin  30 mg SubCUTAneous BID    insulin lispro  0-16 Units SubCUTAneous TID WC    insulin lispro  0-4 Units SubCUTAneous Nightly     Continuous Infusions:   sodium chloride      dextrose         Objective:   Vitals: /61   Pulse 64   Temp 97.7 °F (36.5 °C) (Oral)   Resp 17   Ht 5' 2\" (1.575 m)   Wt 260 lb (117.9 kg)   SpO2 94%   BMI 47.55 kg/m²    Wt Readings from Last 3 Encounters:   09/07/22 260 lb (117.9 kg)   09/06/22 260 lb (117.9 kg)   09/04/22 260 lb (117.9 kg)        General appearance: alert, appears stated age, cooperative, and moderate distress  Skin: Skin color, texture, turgor normal. No rashes or lesions.   Neck: thyroid normal size, non-tender,  without nodularity  Lungs: wheezes bilaterally  Heart: regular rate and rhythm, S1, S2 normal, no murmur, click, rub or gallop  Abdomen: soft, non-tender. Bowel sounds normal. No masses,  no organomegaly.   Extremities: extremities normal, atraumatic, no cyanosis or edema    Patient Active Problem List:     Asthma     HA (headache)     Body mass index (BMI) 45.0-49.9, adult (HCC)     CAD (coronary artery disease)     BUNNY (obstructive sleep apnea)     Hypovitaminosis D     Arthritis     Essential hypertension     GERD (gastroesophageal reflux disease)     Diabetes ([facility] [address]     Malignant neoplasm of lower-inner quadrant of left breast in female, estrogen receptor negative ([facility] [address]     Biliary sepsis     Abnormal LFTs     Dilated bile duct     Epigastric pain     Fever     Senile osteoporosis     Obstructive jaundice     Bronchitis     Asthma exacerbation attacks     Acute asthma exacerbation     Uncontrolled type 2 diabetes mellitus with hyperglycemia [facility])        Electronically signed by CHEIKH Kline on 9/14/2022 at 3:04 PM

## 2022-09-14 NOTE — PROGRESS NOTES
Shift assessment completed and medications given per MAR. VSS and alert and oriented x's 4. Patient is up in chair watching television with no complaint of pain or discomfort at this time. She is able to make her needs known and there are no additional needs known at this time. Call light is within reach. Will continue to monitor.

## 2022-09-14 NOTE — PROGRESS NOTES
Hospitalist Progress Note      Date of Admission: 9/7/2022  Chief Complaint:    Chief Complaint   Patient presents with    Shortness of Breath     Since yesterday    Cough     And wheezing, seen here yesterday for the same     Subjective:  No new complaints, no nausea vomiting chest pain or headache    Medications:    Infusion Medications    sodium chloride      dextrose       Scheduled Medications    amLODIPine  10 mg Oral Daily    cholestyramine light  4 g Oral BID    dicyclomine  10 mg Oral BID AC    fluticasone  1 spray Each Nostril Daily    lisinopril-hydroCHLOROthiazide  1 tablet Oral Daily    pantoprazole  40 mg Oral QAM AC    nystatin   Topical BID    insulin glargine  40 Units SubCUTAneous Nightly    insulin lispro  10 Units SubCUTAneous TID WC    albuterol  2.5 mg Nebulization TID    methylPREDNISolone  40 mg IntraVENous Q12H    budesonide  0.5 mg Nebulization BID    insulin regular  15 Units IntraVENous Once    sodium chloride flush  5-40 mL IntraVENous 2 times per day    enoxaparin  30 mg SubCUTAneous BID    insulin lispro  0-16 Units SubCUTAneous TID WC    insulin lispro  0-4 Units SubCUTAneous Nightly     PRN Meds: benzonatate, ipratropium-albuterol, sodium chloride flush, sodium chloride, ondansetron **OR** ondansetron, polyethylene glycol, acetaminophen **OR** acetaminophen, albuterol, glucose, dextrose bolus **OR** dextrose bolus, glucagon (rDNA), dextrose    Intake/Output Summary (Last 24 hours) at 9/13/2022 2017  Last data filed at 9/13/2022 0532  Gross per 24 hour   Intake 360 ml   Output --   Net 360 ml     Exam:  /78   Pulse 72   Temp 97.6 °F (36.4 °C) (Oral)   Resp 20   Ht 5' 2\" (1.575 m)   Wt 260 lb (117.9 kg)   SpO2 97%   BMI 47.55 kg/m²   Head: Normocephalic, atraumatic  Sclera clear  Neck JVD flat  Lungs: Normal effort of breathing    Labs:   Recent Labs     09/11/22  0601 09/12/22  0632 09/13/22  0654   WBC 7.3 8.2 10.8   HGB 13.2 13.2 13.6   HCT 39.2 38.9 41.1    216 237       Recent Labs     09/11/22  0601 09/12/22  0632 09/13/22  0654    137 136   K 3.6 3.9 3.6    98 96   CO2 25 27 27   BUN 21 22 30*   CREATININE 0.53 0.59 0.70   CALCIUM 8.5 8.7 8.6   AST 11 9 9   ALT 19 18 17   BILITOT 0.4 0.4 0.4   ALKPHOS 102 123 106       No results for input(s): INR in the last 72 hours. No results for input(s): Eleonora Sanchez in the last 72 hours. Radiology:  No orders to display     Assessment/Plan:    Asthma exacerbation secondary to metapneumovirus. Currently on steroids, slowly improving. Patient states she feels substantially better than at the time she was admitted. Much better today than yesterday. Transitioning from IV to oral steroids. fOllowing along with pulmonology.     DM: monitor glucose accordion, titrate meds as needed  HTN: monitor BP, adjust meds as needed  CAD  GERD    Disposition: Anticipate discharge to home in 1 to 2 days    35 minutes total care time, >1/2 in unit/floor time and care coordination       Saurav Lala MD ,MD

## 2022-09-14 NOTE — PROGRESS NOTES
INPATIENT PROGRESS NOTES    PATIENT NAME: Sabrina Avila  MRN: [id number]  SERVICE DATE:  September 13, 2022   SERVICE TIME:  9:28 PM      PRIMARY SERVICE: Pulmonary Disease    CHIEF COMPLAIN: Shortness of breath      INTERVAL HPI: Patient seen and examined at bedside, Interval Notes, orders reviewed. Nursing notes noted  She is sitting in the chair. She is still having wheezing and continues to have a cough mainly dry. No chest pain. No fever or chills. No nausea vomiting diarrhea. O2 saturation 98%. She is on IV Solu-Medrol 40 mg daily. OBJECTIVE    Body mass index is 47.55 kg/m². PHYSICAL EXAM:  Vitals:  /78   Pulse 72   Temp 97.6 °F (36.4 °C) (Oral)   Resp 20   Ht 5' 2\" (1.575 m)   Wt 260 lb (117.9 kg)   SpO2 97%   BMI 47.55 kg/m²   General: Alert, awake . comfortable in bed, No distress. Head: Atraumatic , Normocephalic   Eyes: PERRL. No sclera icterus. No conjunctival injection. No discharge   ENT: No nasal  discharge. Pharynx clear. Neck:  Trachea midline. No thyromegaly, no JVD, No cervical adenopathy. Chest : Bilaterally symmetrical ,Normal effort,  No accessory muscle use  Lung : . Fair BS bilateral, decreased BS at bases. No Rales. Bilateral wheezing more on left side. . No rhonchi. Heart[de-identified] Normal  rate. Regular rhythm. No mumur ,  Rub or gallop  ABD: Non-tender. Non-distended. No masses. No organmegaly. Normal bowel sounds. No hernia.   Ext : No Pitting both leg , No Cyanosis No clubbing  Neuro: no focal weakness          DATA:   Recent Labs     09/12/22  0632 09/13/22  0654   WBC 8.2 10.8   HGB 13.2 13.6   HCT 38.9 41.1   MCV 86.7 88.7    237     Recent Labs     09/12/22  0632 09/13/22  0654    136   K 3.9 3.6   CL 98 96   CO2 27 27   BUN 22 30*   CREATININE 0.59 0.70   GLUCOSE 248* 139*   CALCIUM 8.7 8.6   PROT 5.9* 6.0*   LABALBU 3.5 3.5   BILITOT 0.4 0.4   ALKPHOS 123 106   AST 9 9   ALT 18 17   LABGLOM >60.0 >60.0   GFRAA >60.0 >60.0   GLOB 2.4 2.5       MV Settings:     FiO2 : 21 %    No results for input(s): PHART, ZNJ9BSY, PO2ART, ROX0ZHB, BEART, O6CZFWUD in the last 72 hours. O2 Device: None (Room air)  O2 Flow Rate (L/min): 0 L/min    ADULT DIET; Regular; 4 carb choices (60 gm/meal)     MEDICATIONS during current hospitalization:    Continuous Infusions:   sodium chloride      dextrose         Scheduled Meds:   amLODIPine  10 mg Oral Daily    cholestyramine light  4 g Oral BID    dicyclomine  10 mg Oral BID AC    fluticasone  1 spray Each Nostril Daily    lisinopril-hydroCHLOROthiazide  1 tablet Oral Daily    pantoprazole  40 mg Oral QAM AC    nystatin   Topical BID    insulin glargine  40 Units SubCUTAneous Nightly    insulin lispro  10 Units SubCUTAneous TID WC    albuterol  2.5 mg Nebulization TID    methylPREDNISolone  40 mg IntraVENous Q12H    budesonide  0.5 mg Nebulization BID    insulin regular  15 Units IntraVENous Once    sodium chloride flush  5-40 mL IntraVENous 2 times per day    enoxaparin  30 mg SubCUTAneous BID    insulin lispro  0-16 Units SubCUTAneous TID WC    insulin lispro  0-4 Units SubCUTAneous Nightly       PRN Meds:benzonatate, ipratropium-albuterol, sodium chloride flush, sodium chloride, ondansetron **OR** ondansetron, polyethylene glycol, acetaminophen **OR** acetaminophen, albuterol, glucose, dextrose bolus **OR** dextrose bolus, glucagon (rDNA), dextrose    Radiology  XR CHEST PORTABLE    Result Date: 9/6/2022  EXAMINATION:  CHEST. CLINICAL HISTORY:  SHORTNESS OF BREATH. COMPARISONS:  8/7/2021. TECHNIQUE:  AP portable. FINDINGS:  Heart size and contour are within normal limits. Pulmonary vascularity appears unremarkable. No infiltrate or effusion is seen. No definite evidence of a mass or adenopathy. No significant bony abnormality. NORMAL PORTABLE CHEST.      IMPRESSION AND SUGGESTION:  Acute respiratory failure  Asthma exacerbation  Metapneumovirus lower respiratory tract infection  Obstructive sleep apnea, not on CPAP      Continue O2 to keep saturation 90% or above patient continues to have significant bronchospasm. Switch IV Solu-Medrol to taper dose of prednisone. Mucinex. Sleep study as outpatient. Possible discharge tomorrow on slow taper prednisone      NOTE: This report was transcribed using voice recognition software. Every effort was made to ensure accuracy; however, inadvertent computerized transcription errors may be present.       Electronically signed by Erasmo Cody MD, FCCP on 9/13/2022 at 9:28 PM

## 2022-09-14 NOTE — DISCHARGE INSTRUCTIONS
Endocrinology-    Check your blood sugars 4 times a day, before meals and at night  Document these numbers in a blood glucose log and bring them with you to your follow-up appointment. If you are prescribed insulin, Do not take your mealtime insulin if your blood sugars less than 120   Call our office if you have blood sugars less than 80 or greater then 300 on two or more occasions  Call our office if you have any questions regarding your blood sugars or insulin dosing regiment  Signs of low blood sugar may include tremors, feeling shaky, sweating, dizziness, confusion and weakness. Check your blood sugar immediatly if you have any of these symptoms.

## 2022-09-14 NOTE — NOTE TO PATIENT VIA PORTAL
Return to Work    Pradip Washington  :  1955      Admit date:  2022  Discharge date:  2022    Not OK to return to work until follow up with PCP or specialist, at which time return to work date will be reassessed.      Sendy Eckert MD  22  [id number]

## 2022-09-23 ENCOUNTER — OFFICE VISIT (OUTPATIENT)
Dept: PULMONOLOGY | Age: 67
End: 2022-09-23
Payer: COMMERCIAL

## 2022-09-23 VITALS
SYSTOLIC BLOOD PRESSURE: 134 MMHG | DIASTOLIC BLOOD PRESSURE: 84 MMHG | HEART RATE: 68 BPM | WEIGHT: 252 LBS | OXYGEN SATURATION: 97 % | BODY MASS INDEX: 46.09 KG/M2

## 2022-09-23 DIAGNOSIS — J45.41 MODERATE PERSISTENT ASTHMA WITH ACUTE EXACERBATION: Primary | ICD-10-CM

## 2022-09-23 DIAGNOSIS — J20.8 ACUTE BRONCHITIS DUE TO HUMAN METAPNEUMOVIRUS: ICD-10-CM

## 2022-09-23 DIAGNOSIS — E66.01 MORBID OBESITY (HCC): ICD-10-CM

## 2022-09-23 DIAGNOSIS — G47.33 OSA (OBSTRUCTIVE SLEEP APNEA): ICD-10-CM

## 2022-09-23 DIAGNOSIS — B97.81 ACUTE BRONCHITIS DUE TO HUMAN METAPNEUMOVIRUS: ICD-10-CM

## 2022-09-23 PROCEDURE — 99215 OFFICE O/P EST HI 40 MIN: CPT | Performed by: INTERNAL MEDICINE

## 2022-09-23 PROCEDURE — 1123F ACP DISCUSS/DSCN MKR DOCD: CPT | Performed by: INTERNAL MEDICINE

## 2022-09-23 ASSESSMENT — ENCOUNTER SYMPTOMS
DIARRHEA: 0
RHINORRHEA: 0
WHEEZING: 0
SINUS PRESSURE: 0
CHEST TIGHTNESS: 0
VOMITING: 0
COUGH: 0
EYE DISCHARGE: 0
ABDOMINAL PAIN: 0
NAUSEA: 0
EYE ITCHING: 0
SHORTNESS OF BREATH: 1
TROUBLE SWALLOWING: 0
VOICE CHANGE: 0
SORE THROAT: 0

## 2022-09-23 NOTE — PROGRESS NOTES
Subjective:     Stew Barrett is a 79 y.o. female who complains today of:     Chief Complaint   Patient presents with    Follow-Up from Hospital     1wk f/u - respiratory virus    Asthma    Sleep Apnea       HPI  She was in hospital for asthma exacerbation , Metapneumovirus lower respiratory tract infection. She is on neb with albuterol HFA and neb with albuterol . C/o shortness of breath with exertion. Occasional Wheezing. Cough with  Sputum. No Hemoptysis. No Chest tightness. No Chest pain with radiation  or pleuritic pain. No  leg edema. No orthopnea. No Fever or chills. No Rhinorrhea and postnasal drip. She is complaining of snoring and daytime sleepiness and tiredness. No C/o witness apnea. She wakes up with gasping for air. C/o wakes up frequently during sleep . complaint of morning headache. She does not have restful sleep. She does not take daily naps. She does not fall asleep while watching TV. She does not have a complaint of sleepiness while drivingse. No sleep study in last 10 yrs ,   She had mild BUNNY 10 yrs ago , She was on CPAP few yrs but did not feels much difference. Willing to go for sleep study .    She had gastric  bypass lost  more than 50 lbs but gain all back         Allergies:  Aspirin, Codeine, Imitrex [sumatriptan], Theophyllines, Diflucan [fluconazole], Nizoral [ketoconazole], and Zoloft  Past Medical History:   Diagnosis Date    Allergic rhinitis     Asthma     Breast cancer ([facility] 2014    invasive ductal carcinoma left breast    CAD (coronary artery disease)     Cancer ([facility] 11/2014    Invasive ductal left breast    Colon polyps     Diabetes (HCC)     Fibromyalgia     GERD (gastroesophageal reflux disease)     HA (headache)     Herpes simplex     Nose    History of therapeutic radiation     HTN (hypertension)     Hx antineoplastic chemo 2014    Obesity     Senile osteoporosis     Sleep apnea      Past Surgical History:   Procedure Laterality Date BARIATRIC SURGERY  2004    BREAST BIOPSY Left 14    BREAST BIOPSY Left 12/10/14    BREAST LUMPECTOMY Left     with left SNB  invasive ductal carcinoma    BREAST LUMPECTOMY Left     BREAST LUMPECTOMY Left      SECTION      x4    CHOLECYSTECTOMY  2007    GASTRIC BYPASS SURGERY      HYSTERECTOMY (CERVIX STATUS UNKNOWN)      KNEE ARTHROPLASTY Right     knee reconstruction    KNEE SURGERY      Right knee    LYMPH NODE BIOPSY Left 14    ANDREW AND BSO (CERVIX REMOVED)      Dr. Malik Hernandez ENDOSCOPY  2/18/15    w/bx,polypectomy     UPPER GASTROINTESTINAL ENDOSCOPY N/A 2020    EGD DIAGNOSTIC ONLY performed by Seferino Ribera MD at [address]       Family History   Problem Relation Age of Onset    Cancer Father         melanoma    Prostate Cancer Father     Cancer Sister         Melanoma    Cancer Other         Throat    Breast Cancer Maternal Aunt     Colon Cancer Neg Hx      Social History     Socioeconomic History    Marital status:      Spouse name: Not on file    Number of children: Not on file    Years of education: Not on file    Highest education level: Not on file   Occupational History    Not on file   Tobacco Use    Smoking status: Never    Smokeless tobacco: Never   Vaping Use    Vaping Use: Never used   Substance and Sexual Activity    Alcohol use: No    Drug use: No    Sexual activity: Not on file   Other Topics Concern    Not on file   Social History Narrative    Not on file     Social Determinants of Health     Financial Resource Strain: Low Risk     Difficulty of Paying Living Expenses: Not hard at all   Food Insecurity: No Food Insecurity    Worried About Running Out of Food in the Last Year: Never true    Ran Out of Food in the Last Year: Never true   Transportation Needs: Not on file   Physical Activity: Not on file   Stress: Not on file   Social Connections: Not on file   Intimate Partner Violence: Not on file   Housing Stability: Not on file         Review of Systems   Constitutional:  Negative for chills, diaphoresis, fatigue and fever. HENT:  Negative for congestion, mouth sores, nosebleeds, postnasal drip, rhinorrhea, sinus pressure, sneezing, sore throat, trouble swallowing and voice change. Snoring    Eyes:  Negative for discharge, itching and visual disturbance. Respiratory:  Positive for shortness of breath. Negative for cough, chest tightness and wheezing. Cardiovascular:  Negative for chest pain, palpitations and leg swelling. Gastrointestinal:  Negative for abdominal pain, diarrhea, nausea and vomiting. Genitourinary:  Negative for difficulty urinating and hematuria. Musculoskeletal:  Negative for arthralgias, joint swelling and myalgias. Skin:  Negative for rash. Allergic/Immunologic: Negative for environmental allergies and food allergies. Neurological:  Negative for dizziness, tremors, weakness and headaches. Psychiatric/Behavioral:  Positive for sleep disturbance. Negative for behavioral problems. :     Vitals:    09/23/22 1420   BP: 134/84   Site: Right Lower Arm   Position: Sitting   Cuff Size: Medium Adult   Pulse: 68   SpO2: 97%   Weight: 252 lb (114.3 kg)     Wt Readings from Last 3 Encounters:   09/23/22 252 lb (114.3 kg)   09/07/22 260 lb (117.9 kg)   09/06/22 260 lb (117.9 kg)         Physical Exam  Constitutional:       General: She is not in acute distress. Appearance: She is well-developed. She is obese. She is not diaphoretic. HENT:      Head: Normocephalic and atraumatic. Nose: Nose normal.   Eyes:      Pupils: Pupils are equal, round, and reactive to light. Neck:      Thyroid: No thyromegaly. Vascular: No JVD. Trachea: No tracheal deviation. Cardiovascular:      Rate and Rhythm: Normal rate and regular rhythm. Heart sounds: No murmur heard. No friction rub.  No gallop. Pulmonary:      Effort: No respiratory distress. Breath sounds: No wheezing or rales. Chest:      Chest wall: No tenderness. Abdominal:      General: There is no distension. Tenderness: There is no abdominal tenderness. There is no rebound. Musculoskeletal:         General: Normal range of motion. Lymphadenopathy:      Cervical: No cervical adenopathy. Skin:     General: Skin is warm and dry. Neurological:      Mental Status: She is alert and oriented to person, place, and time. Coordination: Coordination normal.   Psychiatric:         Mood and Affect: Mood normal.         Behavior: Behavior normal.       Current Outpatient Medications   Medication Sig Dispense Refill    predniSONE (DELTASONE) 10 MG tablet 40mg daily x3 days, then 30mg daily x 3 days, then 20mg daily x3 days, then 10mg x daily x 3days 30 tablet 0    insulin lispro (HUMALOG KWIKPEN) 200 UNIT/ML SOPN pen inject 3 times daily before meals- 120-140 2 units, 141-160 3 units, 161-180 4 units, 181-200 5 units, 201-220 6 units, 221-240 7 units, 241-260 8 units, 261-280 9 units, 281-300 10 units, 301 or higher 11 units. 2 Adjustable Dose Pre-filled Pen Syringe 3    Insulin Degludec (TRESIBA FLEXTOUCH) 100 UNIT/ML SOPN Inject 30 Units into the skin nightly 5 Adjustable Dose Pre-filled Pen Syringe 3    Insulin Pen Needle 32G X 6 MM MISC 1 Device by Does not apply route 4 times daily (before meals and nightly) 150 each 3    Continuous Blood Gluc  (FREESTYLE GERTRUDE 2 READER) CHRISTINA 1 Device by Does not apply route 4 times daily (before meals and nightly) 1 each 0    Continuous Blood Gluc Sensor (FREESTYLE GERTRUDE 2 SENSOR) MISC 1 Device by Does not apply route every 14 days 2 each 3    blood glucose monitor strips Test 4 times a day and nightly. Dispense sufficient amount for indicated testing frequency plus additional to accommodate PRN testing needs.  150 strip 3    ipratropium-albuterol (DUONEB) 0.5-2.5 (3) MG/3ML SOLN nebulizer solution Inhale 3 mLs into the lungs every 4 hours 360 mL 0    fluticasone (FLONASE) 50 MCG/ACT nasal spray 1 spray by Each Nostril route daily 32 g 1    Brompheniramine-Phenylephrine 2-5 MG/10ML LIQD Take 10 mLs by mouth every 6 hours as needed (cough) 237 mL 0    Dulaglutide 0.75 MG/0.5ML SOPN Inject 0.75 mg into the skin once a week 4 pen 3    albuterol (PROVENTIL) (5 MG/ML) 0.5% nebulizer solution Take 1 mL by nebulization 4 times daily as needed for Wheezing 120 each 3    amLODIPine (NORVASC) 10 MG tablet Take 1 tablet by mouth once daily 30 tablet 5    pantoprazole (PROTONIX) 40 MG tablet Take 1 tablet by mouth once daily 30 tablet 5    hydrocortisone 1 % cream       ketoconazole (NIZORAL) 2 % cream       Blood Glucose Monitoring Suppl (ONE TOUCH ULTRA 2) w/Device KIT       cyanocobalamin 1000 MCG/ML injection INJECT 1 ML SUBCUTANEOUSLY ONCE EVERY MONTH      ULTICARE INSULIN SAFETY SYR 29G X 1/2\" 1 ML MISC USE AS DIRECTED      nystatin (MYCOSTATIN) 695651 UNIT/GM cream Apply topically 2 times daily. 30 g 3    lisinopril-hydroCHLOROthiazide (PRINZIDE;ZESTORETIC) 20-25 MG per tablet Take 1 tablet by mouth daily 30 tablet 0    blood glucose monitor kit and supplies Dispense sufficient amount for indicated testing frequency plus additional to accommodate PRN testing needs. Dispense all needed supplies to include: monitor, strips, lancing device, lancets, control solutions, alcohol swabs. 1 kit 0    Lancets MISC DX: diabetes mellitus. Use 1-3 time(s) daily - Ok to substitute per insurance (1 each = 1 box) 1 each 5    nystatin (MYCOSTATIN) 885841 UNIT/GM powder Apply 3 times daily.  45 g 5    vitamin D (ERGOCALCIFEROL) 1.25 MG (25480 UT) CAPS capsule Take 1 capsule by mouth once a week 12 capsule 1    cholestyramine (QUESTRAN) 4 g packet Take 1 packet by mouth 2 times daily 180 packet 5    dicyclomine (BENTYL) 10 MG capsule Take 1 capsule by mouth 2 times daily (before meals) 120 capsule 3    metFORMIN (GLUCOPHAGE) 500 MG tablet Take 2 tablets by mouth 2 times daily (with meals) 360 tablet 3    Respiratory Therapy Supplies (FULL KIT NEBULIZER SET) MISC 1 Units by Does not apply route 4 times daily 1 each 1    albuterol sulfate  (90 Base) MCG/ACT inhaler Inhale 2 puffs into the lungs 4 times daily as needed for Wheezing 3 Inhaler 3    albuterol (PROVENTIL) (2.5 MG/3ML) 0.083% nebulizer solution Take 3 mLs by nebulization every 6 hours as needed for Wheezing 300 vial 3    CPAP Machine MISC        No current facility-administered medications for this visit. No results found for this or any previous visit.  ]  Results for orders placed during the hospital encounter of 09/06/22    XR CHEST PORTABLE    Narrative  EXAMINATION:  CHEST. CLINICAL HISTORY:  SHORTNESS OF BREATH.    COMPARISONS:  8/7/2021. TECHNIQUE:  AP portable. FINDINGS:  Heart size and contour are within normal limits. Pulmonary vascularity appears unremarkable. No infiltrate or effusion is seen. No definite evidence of a mass or adenopathy. No significant bony abnormality. Impression  NORMAL PORTABLE CHEST. Results for orders placed during the hospital encounter of 08/07/21    XR CHEST PORTABLE    Narrative  Exam: XR CHEST PORTABLE    History: Fever. Abdominal pain. Technique: AP portable view of the chest obtained. Comparison: Portable chest radiograph from September 24, 2020    Findings: The cardiomediastinal silhouette is within normal limits. No pneumothorax, pleural effusion, or consolidation. Bones of the thorax appear intact. Impression  No radiographic evidence of acute intrathoracic process. Results for orders placed during the hospital encounter of 09/24/20    XR CHEST PORTABLE    Narrative  EXAMINATION: XR CHEST PORTABLE    CLINICAL HISTORY: LOWER CHEST PAIN, FEVER    COMPARISONS: NOVEMBER 24, 2014    FINDINGS: Osseous structures are intact.  Cardiopericardial silhouette is normal. Pulmonary vasculature is normal. Lungs are clear. Impression  NO ACUTE CARDIOPULMONARY DISEASE. Assessment/Plan:     1. Moderate persistent asthma with acute exacerbation  She was in hospital for asthma exacerbation , Metapneumovirus lower respiratory tract infection. She is doing much better now. She is currently on neb with albuterol HFA and neb with albuterol . C/o shortness of breath with exertion. Occasional Wheezing. Cough with Sputum. Continue bronchodilator therapy as  - Full PFT Study With Bronchodilator; Future    2. BUNNY (obstructive sleep apnea)  She is complaining of snoring and daytime sleepiness and tiredness. No C/o witness apnea. She wakes up with gasping for air. C/o wakes up frequently during sleep . complaint of morning headache. She does not have restful sleep. She does not take daily naps. She does not fall asleep while watching TV. She does not have a complaint of sleepiness while drivingse. No sleep study in last 10 yrs she is agreeable for sleep study for further evaluation    - Baseline Diagnostic Sleep Study; Future    3. Acute bronchitis due to human metapneumovirus  She was treated in hospital currently doing much better    4. Morbid obesity ([address]  She is advised try to lose weight. obesity related risk explained to the patient ,  Current weight:  252 lb (114.3 kg) Lbs. BMI:  Body mass index is 46.09 kg/m². Suggested weight control approaches, including dietary changes , exercise, behavioral modification. Return in about 4 weeks (around 10/21/2022) for bunny, asthma.       Coreen Guerrero MD

## 2022-09-27 ENCOUNTER — OFFICE VISIT (OUTPATIENT)
Dept: ENDOCRINOLOGY | Age: 67
End: 2022-09-27
Payer: COMMERCIAL

## 2022-09-27 ENCOUNTER — OFFICE VISIT (OUTPATIENT)
Dept: FAMILY MEDICINE CLINIC | Age: 67
End: 2022-09-27
Payer: COMMERCIAL

## 2022-09-27 VITALS
HEIGHT: 62 IN | WEIGHT: 251 LBS | HEART RATE: 68 BPM | OXYGEN SATURATION: 98 % | BODY MASS INDEX: 46.19 KG/M2 | SYSTOLIC BLOOD PRESSURE: 151 MMHG | DIASTOLIC BLOOD PRESSURE: 86 MMHG

## 2022-09-27 VITALS
HEART RATE: 77 BPM | HEIGHT: 62 IN | SYSTOLIC BLOOD PRESSURE: 136 MMHG | WEIGHT: 253 LBS | RESPIRATION RATE: 14 BRPM | DIASTOLIC BLOOD PRESSURE: 80 MMHG | BODY MASS INDEX: 46.56 KG/M2 | OXYGEN SATURATION: 97 %

## 2022-09-27 DIAGNOSIS — E11.9 TYPE 2 DIABETES MELLITUS WITHOUT COMPLICATION, WITHOUT LONG-TERM CURRENT USE OF INSULIN (HCC): Primary | ICD-10-CM

## 2022-09-27 DIAGNOSIS — G47.33 OSA (OBSTRUCTIVE SLEEP APNEA): ICD-10-CM

## 2022-09-27 DIAGNOSIS — J45.20 MILD INTERMITTENT ASTHMA, UNSPECIFIED WHETHER COMPLICATED: ICD-10-CM

## 2022-09-27 DIAGNOSIS — I10 ESSENTIAL HYPERTENSION: ICD-10-CM

## 2022-09-27 DIAGNOSIS — E11.65 UNCONTROLLED TYPE 2 DIABETES MELLITUS WITH HYPERGLYCEMIA (HCC): Primary | ICD-10-CM

## 2022-09-27 DIAGNOSIS — K21.9 GASTROESOPHAGEAL REFLUX DISEASE WITHOUT ESOPHAGITIS: ICD-10-CM

## 2022-09-27 LAB
CHP ED QC CHECK: NORMAL
GLUCOSE BLD-MCNC: 115 MG/DL
HBA1C MFR BLD: 9.7 %

## 2022-09-27 PROCEDURE — 83036 HEMOGLOBIN GLYCOSYLATED A1C: CPT | Performed by: INTERNAL MEDICINE

## 2022-09-27 PROCEDURE — 1111F DSCHRG MED/CURRENT MED MERGE: CPT | Performed by: INTERNAL MEDICINE

## 2022-09-27 PROCEDURE — 3017F COLORECTAL CA SCREEN DOC REV: CPT | Performed by: INTERNAL MEDICINE

## 2022-09-27 PROCEDURE — 99214 OFFICE O/P EST MOD 30 MIN: CPT | Performed by: PHYSICIAN ASSISTANT

## 2022-09-27 PROCEDURE — 3046F HEMOGLOBIN A1C LEVEL >9.0%: CPT | Performed by: INTERNAL MEDICINE

## 2022-09-27 PROCEDURE — 1123F ACP DISCUSS/DSCN MKR DOCD: CPT | Performed by: INTERNAL MEDICINE

## 2022-09-27 PROCEDURE — G8427 DOCREV CUR MEDS BY ELIG CLIN: HCPCS | Performed by: INTERNAL MEDICINE

## 2022-09-27 PROCEDURE — 1123F ACP DISCUSS/DSCN MKR DOCD: CPT | Performed by: PHYSICIAN ASSISTANT

## 2022-09-27 PROCEDURE — 1036F TOBACCO NON-USER: CPT | Performed by: INTERNAL MEDICINE

## 2022-09-27 PROCEDURE — G8417 CALC BMI ABV UP PARAM F/U: HCPCS | Performed by: INTERNAL MEDICINE

## 2022-09-27 PROCEDURE — 3046F HEMOGLOBIN A1C LEVEL >9.0%: CPT | Performed by: PHYSICIAN ASSISTANT

## 2022-09-27 PROCEDURE — 99214 OFFICE O/P EST MOD 30 MIN: CPT | Performed by: INTERNAL MEDICINE

## 2022-09-27 PROCEDURE — 2022F DILAT RTA XM EVC RTNOPTHY: CPT | Performed by: INTERNAL MEDICINE

## 2022-09-27 PROCEDURE — 1090F PRES/ABSN URINE INCON ASSESS: CPT | Performed by: INTERNAL MEDICINE

## 2022-09-27 PROCEDURE — G8399 PT W/DXA RESULTS DOCUMENT: HCPCS | Performed by: INTERNAL MEDICINE

## 2022-09-27 PROCEDURE — 82962 GLUCOSE BLOOD TEST: CPT | Performed by: PHYSICIAN ASSISTANT

## 2022-09-27 RX ORDER — DULAGLUTIDE 0.75 MG/.5ML
INJECTION, SOLUTION SUBCUTANEOUS
Qty: 4 ADJUSTABLE DOSE PRE-FILLED PEN SYRINGE | Refills: 1 | Status: SHIPPED | OUTPATIENT
Start: 2022-09-27 | End: 2022-10-20

## 2022-09-27 RX ORDER — GLUCAGON INJECTION, SOLUTION 1 MG/.2ML
1 INJECTION, SOLUTION SUBCUTANEOUS
Qty: 2 EACH | Refills: 3 | Status: SHIPPED | OUTPATIENT
Start: 2022-09-27 | End: 2022-10-20

## 2022-09-27 RX ORDER — DULAGLUTIDE 1.5 MG/.5ML
INJECTION, SOLUTION SUBCUTANEOUS
Qty: 4 ADJUSTABLE DOSE PRE-FILLED PEN SYRINGE | Refills: 3 | Status: SHIPPED | OUTPATIENT
Start: 2022-09-27 | End: 2022-10-20

## 2022-09-27 ASSESSMENT — ENCOUNTER SYMPTOMS
CHEST TIGHTNESS: 0
ABDOMINAL PAIN: 0
SINUS PRESSURE: 0
RHINORRHEA: 0
ABDOMINAL PAIN: 0
COLOR CHANGE: 0
WHEEZING: 0
WHEEZING: 0
COUGH: 0
COUGH: 0
BLOOD IN STOOL: 0
SHORTNESS OF BREATH: 0
VOMITING: 0
EYE ITCHING: 0
SINUS PRESSURE: 0
DIARRHEA: 0
DIARRHEA: 0
VOMITING: 0
SORE THROAT: 0
APNEA: 0
SORE THROAT: 0
TROUBLE SWALLOWING: 0
VOICE CHANGE: 0
NAUSEA: 0
NAUSEA: 0
EYE PAIN: 0
EYE DISCHARGE: 0
RHINORRHEA: 0
RECTAL PAIN: 0
SHORTNESS OF BREATH: 0
ABDOMINAL DISTENTION: 0
FACIAL SWELLING: 0
BACK PAIN: 0
CONSTIPATION: 0
EYE REDNESS: 0
PHOTOPHOBIA: 0
SINUS PAIN: 0

## 2022-09-27 NOTE — PROGRESS NOTES
9/27/2022    Assessment:       Diagnosis Orders   1.  Uncontrolled type 2 diabetes mellitus with hyperglycemia ([facility] [address]  POCT Glucose    Comprehensive Metabolic Panel    Hemoglobin A1C     DIABETES FOOT EXAM    Lipid Panel    [facility] [place]          PLAN:     Target glucose range 100-180   Decrease Tresiba 20 units injected nightly  Humalog inject 3 times daily before meals- 120-140 2 units, 141-160 3 units, 161-180 4 units, 181-200 5 units, 201-220 6 units, 221-240 7 units, 241-260 8 units, 261-280 9 units, 281-300 10 units, 301 or higher 11 units  Inject Trulicity 3.00 mg once weekly for 4 weeks, increase to 1.5 mg injected once weekly in 1 month if no nausea or vomiting   Metformin 1000 mg twice daily   Freestyle Rasheed 2 being used   Diabetic education ordered  Repeat labs and follow up in 3 months       Orders Placed This Encounter   Procedures    Comprehensive Metabolic Panel     Standing Status:   Future     Standing Expiration Date:   9/27/2023    Hemoglobin A1C     Standing Status:   Future     Standing Expiration Date:   9/27/2023    Lipid Panel     Standing Status:   Future     Standing Expiration Date:   9/27/2023     Order Specific Question:   Is Patient Fasting?/# of Hours     Answer:   10-12    [facility] [place]     Referral Priority:   Routine     Referral Type:   Eval and Treat     Referral Reason:   Specialty Services Required     Number of Visits Requested:   1    POCT Glucose     DIABETES FOOT EXAM     Orders Placed This Encounter   Medications    Dulaglutide (TRULICITY) 1.85 UC/5.8BN SOPN     Sig: Inject Trulicity 7.95 mg once weekly for 4 weeks, increase to 1.5 mg injected once weekly in 1 month if no nausea or vomiting     Dispense:  4 Adjustable Dose Pre-filled Pen Syringe     Refill:  1    Dulaglutide (TRULICITY) 1.5 DG/1.2YI SOPN     Sig: Inject Trulicity 8.61 mg once weekly for 4 weeks, increase to 1.5 mg injected once weekly in 1 month if no nausea or vomiting     Dispense:  4 Adjustable Dose Pre-filled Pen Syringe     Refill:  3    glucose 4 g chewable tablet     Sig: Take 4 tablets by mouth as needed for Low blood sugar     Dispense:  60 tablet     Refill:  3    Glucagon (GVOKE HYPOPEN 2-PACK) 1 MG/0.2ML SOAJ     Sig: Inject 1 mg into the skin every 15 minutes as needed (Glucose less than 70 or if symptomatic)     Dispense:  2 each     Refill:  3     Return in about 3 months (around 12/27/2022). Subjective:     Chief Complaint   Patient presents with    Diabetes    Follow-Up from [address] [place] [address] Patient     Vitals:    09/27/22 0806   BP: (!) 151/86   Site: Right Upper Arm   Pulse: 68   SpO2: 98%   Weight: 251 lb (113.9 kg)   Height: 5' 2\" (1.575 m)     Wt Readings from Last 3 Encounters:   09/27/22 251 lb (113.9 kg)   09/23/22 252 lb (114.3 kg)   09/07/22 260 lb (117.9 kg)     BP Readings from Last 3 Encounters:   09/27/22 (!) 151/86   09/23/22 134/84   09/14/22 132/61     Stephany Elliott is a 55-year-old type II diabetic female discharged from [facility] 9/2022 following admission for acute asthma exacerbation and steroid-induced hyperglycemia. Diabetic education was done and she was sent home on insulin with improving glycemic control. Diabetes  She presents for her follow-up diabetic visit. She has type 2 diabetes mellitus. The initial diagnosis of diabetes was made 20 years ago. Her disease course has been improving. Hypoglycemia symptoms include sweats and tremors. Pertinent negatives for hypoglycemia include no dizziness or headaches. Pertinent negatives for diabetes include no chest pain, no fatigue, no polydipsia and no polyuria. Symptoms are improving. There are no diabetic complications. Risk factors for coronary artery disease include diabetes mellitus and dyslipidemia. Current diabetic treatment includes insulin injections. She is compliant with treatment all of the time. She is currently taking insulin pre-breakfast, pre-lunch, pre-dinner and at bedtime. Rotation sites for injection include the abdominal wall. Her weight is stable. She is following a generally healthy diet. Meal planning includes avoidance of concentrated sweets. She has had a previous visit with a dietitian. She never participates in exercise. She monitors blood glucose at home 5+ x per day. Her overall blood glucose range is 140-180 mg/dl. An ACE inhibitor/angiotensin II receptor blocker is being taken.  She does not see a podiatrist.  Past Medical History:   Diagnosis Date    Allergic rhinitis     Asthma     Breast cancer ([facility]     invasive ductal carcinoma left breast    CAD (coronary artery disease)     Cancer ([facility] 2014    Invasive ductal left breast    Colon polyps     Diabetes ([facility]     Fibromyalgia     GERD (gastroesophageal reflux disease)     HA (headache)     Herpes simplex     Nose    History of therapeutic radiation     HTN (hypertension)     Hx antineoplastic chemo     Obesity     Senile osteoporosis     Sleep apnea      Past Surgical History:   Procedure Laterality Date    BARIATRIC SURGERY  2004    BREAST BIOPSY Left 14    BREAST BIOPSY Left 12/10/14    BREAST LUMPECTOMY Left     with left SNB  invasive ductal carcinoma    BREAST LUMPECTOMY Left     BREAST LUMPECTOMY Left      SECTION      x4    CHOLECYSTECTOMY  2007    GASTRIC BYPASS SURGERY  2003    HYSTERECTOMY (CERVIX STATUS UNKNOWN)      KNEE ARTHROPLASTY Right     knee reconstruction    KNEE SURGERY      Right knee    LYMPH NODE BIOPSY Left 14    ANDREW AND BSO (CERVIX REMOVED)      Dr. Marion Hendricks  2/18/15    w/bx,polypectomy     UPPER GASTROINTESTINAL ENDOSCOPY N/A 2020    EGD DIAGNOSTIC ONLY performed by Jammie To MD at [address] History     Socioeconomic History    Marital status:      Spouse name: Not on file    Number of children: Not on file    Years of education: Not on file    Highest education level: Not on file   Occupational History    Not on file   Tobacco Use    Smoking status: Never    Smokeless tobacco: Never   Vaping Use    Vaping Use: Never used   Substance and Sexual Activity    Alcohol use: No    Drug use: No    Sexual activity: Not on file   Other Topics Concern    Not on file   Social History Narrative    Not on file     Social Determinants of Health     Financial Resource Strain: Low Risk     Difficulty of Paying Living Expenses: Not hard at all   Food Insecurity: No Food Insecurity    Worried About Running Out of Food in the Last Year: Never true    Ran Out of Food in the Last Year: Never true   Transportation Needs: Not on file   Physical Activity: Not on file   Stress: Not on file   Social Connections: Not on file   Intimate Partner Violence: Not on file   Housing Stability: Not on file     Family History   Problem Relation Age of Onset    Cancer Father         melanoma    Prostate Cancer Father     Cancer Sister         Melanoma    Cancer Other         Throat    Breast Cancer Maternal Aunt     Colon Cancer Neg Hx      Allergies   Allergen Reactions    Aspirin      History of gastric ulcers    Codeine      Cramps    Imitrex [Sumatriptan]     Theophyllines     Diflucan [Fluconazole] Rash    Nizoral [Ketoconazole] Rash    Zoloft Rash       Current Outpatient Medications:     Dulaglutide (TRULICITY) 1.97 GL/4.1WP SOPN, Inject Trulicity 9.13 mg once weekly for 4 weeks, increase to 1.5 mg injected once weekly in 1 month if no nausea or vomiting, Disp: 4 Adjustable Dose Pre-filled Pen Syringe, Rfl: 1    Dulaglutide (TRULICITY) 1.5 MF/8.5OV SOPN, Inject Trulicity 8.93 mg once weekly for 4 weeks, increase to 1.5 mg injected once weekly in 1 month if no nausea or vomiting, Disp: 4 Adjustable Dose Pre-filled Pen Syringe, Rfl: 3    glucose 4 g chewable tablet, Take 4 tablets by mouth as needed for Low blood sugar, Disp: 60 tablet, Rfl: 3    Glucagon (GVOKE HYPOPEN 2-PACK) 1 MG/0.2ML SOAJ, Inject 1 mg into the skin every 15 minutes as needed (Glucose less than 70 or if symptomatic), Disp: 2 each, Rfl: 3    predniSONE (DELTASONE) 10 MG tablet, 40mg daily x3 days, then 30mg daily x 3 days, then 20mg daily x3 days, then 10mg x daily x 3days, Disp: 30 tablet, Rfl: 0    insulin lispro (HUMALOG KWIKPEN) 200 UNIT/ML SOPN pen, inject 3 times daily before meals- 120-140 2 units, 141-160 3 units, 161-180 4 units, 181-200 5 units, 201-220 6 units, 221-240 7 units, 241-260 8 units, 261-280 9 units, 281-300 10 units, 301 or higher 11 units. , Disp: 2 Adjustable Dose Pre-filled Pen Syringe, Rfl: 3    Insulin Degludec (TRESIBA FLEXTOUCH) 100 UNIT/ML SOPN, Inject 30 Units into the skin nightly, Disp: 5 Adjustable Dose Pre-filled Pen Syringe, Rfl: 3    Insulin Pen Needle 32G X 6 MM MISC, 1 Device by Does not apply route 4 times daily (before meals and nightly), Disp: 150 each, Rfl: 3    Continuous Blood Gluc  (FREESTYLE GERTRUDE 2 READER) CHRISTINA, 1 Device by Does not apply route 4 times daily (before meals and nightly), Disp: 1 each, Rfl: 0    Continuous Blood Gluc Sensor (FREESTYLE GERTRUDE 2 SENSOR) [facility] 1 Device by Does not apply route every 14 days, Disp: 2 each, Rfl: 3    blood glucose monitor strips, Test 4 times a day and nightly. Dispense sufficient amount for indicated testing frequency plus additional to accommodate PRN testing needs. , Disp: 150 strip, Rfl: 3    ipratropium-albuterol (DUONEB) 0.5-2.5 (3) MG/3ML SOLN nebulizer solution, Inhale 3 mLs into the lungs every 4 hours, Disp: 360 mL, Rfl: 0    fluticasone (FLONASE) 50 MCG/ACT nasal spray, 1 spray by Each Nostril route daily, Disp: 32 g, Rfl: 1    Brompheniramine-Phenylephrine 2-5 MG/10ML LIQD, Take 10 mLs by mouth every 6 hours as needed (cough), Disp: 237 mL, Rfl: 0    albuterol (PROVENTIL) (5 MG/ML) 0.5% nebulizer solution, Take 1 mL by nebulization 4 times daily as needed for Wheezing, Disp: 120 each, Rfl: 3    amLODIPine (NORVASC) 10 MG tablet, Take 1 tablet by mouth once daily, Disp: 30 tablet, Rfl: 5    pantoprazole (PROTONIX) 40 MG tablet, Take 1 tablet by mouth once daily, Disp: 30 tablet, Rfl: 5    hydrocortisone 1 % cream, , Disp: , Rfl:     ketoconazole (NIZORAL) 2 % cream, , Disp: , Rfl:     Blood Glucose Monitoring Suppl (ONE TOUCH ULTRA 2) w/Device KIT, , Disp: , Rfl:     cyanocobalamin 1000 MCG/ML injection, INJECT 1 ML SUBCUTANEOUSLY ONCE EVERY MONTH, Disp: , Rfl:     ULTICARE INSULIN SAFETY SYR 29G X 1/2\" 1 ML MISC, USE AS DIRECTED, Disp: , Rfl:     nystatin (MYCOSTATIN) 717895 UNIT/GM cream, Apply topically 2 times daily. , Disp: 30 g, Rfl: 3    lisinopril-hydroCHLOROthiazide (PRINZIDE;ZESTORETIC) 20-25 MG per tablet, Take 1 tablet by mouth daily, Disp: 30 tablet, Rfl: 0    blood glucose monitor kit and supplies, Dispense sufficient amount for indicated testing frequency plus additional to accommodate PRN testing needs. Dispense all needed supplies to include: monitor, strips, lancing device, lancets, control solutions, alcohol swabs., Disp: 1 kit, Rfl: 0    Lancets MISC, DX: diabetes mellitus. Use 1-3 time(s) daily - Ok to substitute per insurance (1 each = 1 box), Disp: 1 each, Rfl: 5    nystatin (MYCOSTATIN) 751050 UNIT/GM powder, Apply 3 times daily. , Disp: 45 g, Rfl: 5    vitamin D (ERGOCALCIFEROL) 1.25 MG (39986 UT) CAPS capsule, Take 1 capsule by mouth once a week, Disp: 12 capsule, Rfl: 1    cholestyramine (QUESTRAN) 4 g packet, Take 1 packet by mouth 2 times daily, Disp: 180 packet, Rfl: 5    dicyclomine (BENTYL) 10 MG capsule, Take 1 capsule by mouth 2 times daily (before meals), Disp: 120 capsule, Rfl: 3    metFORMIN (GLUCOPHAGE) 500 MG tablet, Take 2 tablets by mouth 2 times daily (with meals), Disp: 360 tablet, Rfl: 3    Respiratory Therapy Supplies (FULL KIT NEBULIZER SET) MISC, 1 Units by Does not apply route 4 times daily, Disp: 1 each, Rfl: 1    albuterol sulfate HFA 108 (90 Base) MCG/ACT inhaler, Inhale 2 puffs into the lungs 4 times daily as needed for Wheezing, Disp: 3 Inhaler, Rfl: 3    albuterol (PROVENTIL) (2.5 MG/3ML) 0.083% nebulizer solution, Take 3 mLs by nebulization every 6 hours as needed for Wheezing, Disp: 300 vial, Rfl: 3    CPAP Machine MISC, , Disp: , Rfl:   Lab Results   Component Value Date     09/14/2022    K 3.5 09/14/2022    CL 97 09/14/2022    CO2 26 09/14/2022    BUN 27 (H) 09/14/2022    CREATININE 0.71 09/14/2022    GLUCOSE 115 09/27/2022    CALCIUM 8.5 09/14/2022    PROT 5.9 (L) 09/14/2022    LABALBU 3.5 09/14/2022    BILITOT 0.6 09/14/2022    ALKPHOS 107 09/14/2022    AST 12 09/14/2022    ALT 17 09/14/2022    LABGLOM >60.0 09/14/2022    GFRAA >60.0 09/14/2022    GLOB 2.4 09/14/2022     Lab Results   Component Value Date    WBC 14.2 (H) 09/14/2022    HGB 14.0 09/14/2022    HCT 43.2 09/14/2022    MCV 88.7 09/14/2022     09/14/2022     Lab Results   Component Value Date    LABA1C 9.6 (H) 09/07/2022    LABA1C 8.9 04/19/2022    LABA1C 7.4 (H) 08/07/2021     Lab Results   Component Value Date    CHOLFAST 179 05/22/2021    CHOLFAST 167 06/06/2020    CHOLFAST 189 06/16/2018    TRIGLYCFAST 141 05/22/2021    TRIGLYCFAST 90 06/06/2020    TRIGLYCFAST 78 06/16/2018    HDL 72 (H) 05/22/2021    HDL 70 (H) 06/06/2020    HDL 76 (H) 06/16/2018    LDLCALC 79 05/22/2021    LDLCALC 79 06/06/2020    LDLCALC 97 06/16/2018    CHOL 199 11/24/2014    CHOL 189 06/06/2013    CHOL 206 (H) 04/04/2012    TRIG 122 11/24/2014    TRIG 71 06/06/2013    TRIG 84 04/04/2012     No results found for: TESTOSTERONE, SHBG, TESTFREENM  Lab Results   Component Value Date    TSH 0.562 11/24/2014    TSH 0.897 09/16/2014    TSH 1.142 06/06/2013    TSHREFLEX 0.889 06/16/2018    TSHREFLEX 1.200 08/30/2016    T4FREE 0.84 (L) 02/20/2012     No results found for: TPOABS    Review of Systems   Constitutional:  Negative for chills, fatigue and fever.    HENT:  Negative for congestion, ear pain, postnasal drip, rhinorrhea, sinus pressure and sore throat. Eyes:  Negative for visual disturbance. Respiratory:  Negative for cough, shortness of breath and wheezing. Cardiovascular:  Negative for chest pain, palpitations and leg swelling. Gastrointestinal:  Negative for abdominal pain, diarrhea, nausea and vomiting. Endocrine: Negative for cold intolerance, heat intolerance, polydipsia and polyuria. Genitourinary:  Negative for difficulty urinating. Musculoskeletal:  Negative for arthralgias. Skin:  Negative for rash. Allergic/Immunologic: Negative for environmental allergies. Neurological:  Positive for tremors. Negative for dizziness and headaches. Hematological:  Does not bruise/bleed easily. Psychiatric/Behavioral:  Negative for dysphoric mood.       Objective:   Physical Exam

## 2022-09-27 NOTE — PROGRESS NOTES
Subjective:      Patient ID: Gayathri Young is a 79 y.o. female Established patient, here for evaluation of the following chief complaint(s):  Chief Complaint   Patient presents with    Hypertension    Diabetes       Hypertension  Pertinent negatives include no chest pain, headaches, neck pain, palpitations or shortness of breath. Diabetes  Pertinent negatives for hypoglycemia include no confusion, dizziness, headaches, nervousness/anxiousness, seizures, speech difficulty or tremors. Pertinent negatives for diabetes include no chest pain, no fatigue, no polydipsia, no polyphagia, no polyuria and no weakness. 68-year-old female with history of essential hypertension, type 2 diabetes, GERD, obstructive sleep apnea, moderate persistent asthma presents for follow-up visit. Sent hospitalization: The patient was recently hospitalized for an acute asthma exacerbation. She required a steroid taper and is doing much better at this time. Essential hypertension-the patient's blood pressure is under fair control at this time. DMII-A1C 9.7 on September 27, 2022. Blood sugars have been slightly more elevated as she recently was placed on prednisone. At present he denies polyuria,  Polydipsia, constitutional, sinus, visual, cardiopulmonary, urologic, gastrointestinal, immunologic/hematologic, musculoskeletal, neurologic,dermatologic, or psychiatric complaints. Review of Systems   Constitutional:  Negative for chills, diaphoresis, fatigue and fever. HENT:  Negative for congestion, dental problem, drooling, ear discharge, ear pain, facial swelling, hearing loss, mouth sores, nosebleeds, postnasal drip, rhinorrhea, sinus pressure, sinus pain, sneezing, sore throat, tinnitus, trouble swallowing and voice change. Eyes:  Negative for photophobia, pain, discharge, redness, itching and visual disturbance.    Respiratory:  Negative for apnea, cough, chest tightness, shortness of breath and wheezing. Cardiovascular:  Negative for chest pain, palpitations and leg swelling. Gastrointestinal:  Negative for abdominal distention, abdominal pain, blood in stool, constipation, diarrhea, nausea, rectal pain and vomiting. Endocrine: Negative for cold intolerance, heat intolerance, polydipsia, polyphagia and polyuria. Genitourinary:  Negative for decreased urine volume, difficulty urinating, dysuria, flank pain, frequency, genital sores, hematuria and urgency. Musculoskeletal:  Negative for arthralgias, back pain, gait problem, joint swelling, myalgias, neck pain and neck stiffness. Skin:  Negative for color change, rash and wound. Allergic/Immunologic: Negative for environmental allergies and food allergies. Neurological:  Negative for dizziness, tremors, seizures, syncope, facial asymmetry, speech difficulty, weakness, light-headedness, numbness and headaches. Hematological:  Negative for adenopathy. Does not bruise/bleed easily. Psychiatric/Behavioral:  Negative for agitation, confusion, decreased concentration, hallucinations, self-injury, sleep disturbance and suicidal ideas. The patient is not nervous/anxious. Objective:   /80   Pulse 77   Resp 14   Ht 5' 2\" (1.575 m)   Wt 253 lb (114.8 kg)   SpO2 97%   BMI 46.27 kg/m²     Physical Exam  Constitutional:       General: She is not in acute distress. Appearance: She is well-developed. HENT:      Head: Normocephalic. Right Ear: External ear normal.      Left Ear: External ear normal.   Eyes:      Conjunctiva/sclera: Conjunctivae normal.   Neck:      Vascular: No JVD. Trachea: No tracheal deviation. Cardiovascular:      Rate and Rhythm: Normal rate and regular rhythm. Heart sounds: Normal heart sounds. Pulmonary:      Effort: Pulmonary effort is normal. No respiratory distress. Breath sounds: Normal breath sounds. No wheezing or rales. Chest:      Chest wall: No tenderness.    Abdominal: General: Bowel sounds are normal. There is no distension. Palpations: Abdomen is soft. There is no mass. Tenderness: There is no abdominal tenderness. There is no guarding or rebound. Musculoskeletal:         General: No tenderness or deformity. Cervical back: Neck supple. Skin:     General: Skin is warm and dry. Coloration: Skin is not pale. Findings: No erythema or rash. Neurological:      Mental Status: She is alert and oriented to person, place, and time. Cranial Nerves: No cranial nerve deficit. Motor: No abnormal muscle tone. Psychiatric:         Thought Content: Thought content normal.         Judgment: Judgment normal.       Assessment:       Diagnosis Orders   1. Type 2 diabetes mellitus without complication, without long-term current use of insulin ([facility])  POCT glycosylated hemoglobin (Hb A1C)      2. Essential hypertension        3. Mild intermittent asthma, unspecified whether complicated        4. Gastroesophageal reflux disease without esophagitis        5. BUNNY (obstructive sleep apnea)              Plan:            Essential hypertension continue Norvasc 10 mg orally daily, lisinopril-hydrochlorothiazide 20-25 mg daily    Type 2 diabetes mellitus without complication, without long-term current use of insulin ([facility])-blood sugar suboptimal.  Metformin 1000 mg twice daily. Tresiba 30 units at bedtime. Gastroesophageal reflux disease without esophagitis-continue Protonix 40 mg orally daily    BUNNY (obstructive sleep apnea)-the patient is attempting to obtain a more modern CPAP machine. Moderate persistent asthma, unspecified whether complicated-continue albuterol as needed. She is completing her prednisone taper today    Skin rash  -Stable at this time. Continue nystatin powder               Return in about 2 months (around 11/27/2022).       On this date 09/27/22 I have spent 30 minutes reviewing previous notes, test results and face to face with the patient discussing the diagnosis and importance of compliance with the treatment plan. Sofie Vieira MD    Please note, this report has been partially produced using speech recognition software  and may cause  and /or contain errors related to that system including grammar, punctuation and spelling as well as words and phrases that may seem inappropriate. If there are questions or concerns please feel free to contact me to clarify.

## 2022-09-28 ENCOUNTER — TELEPHONE (OUTPATIENT)
Dept: FAMILY MEDICINE CLINIC | Age: 67
End: 2022-09-28

## 2022-09-28 NOTE — TELEPHONE ENCOUNTER
Patient is calling in to advise the new medications from yesterdays appointment  are to expensive for her after the insurance its costing her 170.00 each please advise

## 2022-09-28 NOTE — TELEPHONE ENCOUNTER
Check with patient to [facility]acer is covered in the meantime patient can  samples of Ozempic 0.5 mg once a week

## 2022-10-03 RX ORDER — FLUTICASONE FUROATE, UMECLIDINIUM BROMIDE AND VILANTEROL TRIFENATATE 200; 62.5; 25 UG/1; UG/1; UG/1
1 POWDER RESPIRATORY (INHALATION) DAILY
Qty: 2 EACH | Refills: 0 | COMMUNITY
Start: 2022-10-03

## 2022-10-03 NOTE — TELEPHONE ENCOUNTER
SAMPLES OF TRELEGY 200 GIVEN TO PATIENT    QTY: 2  LOT# [id number]  EXP 3/2024      PLEASE SIGN OFF ON SAMPLES -- PLEASE DO NOT REFUSE SINCE THESE SAMPLES HAVE ALREADY BEEN GIVEN TO PATIENT

## 2022-10-10 ENCOUNTER — HOSPITAL ENCOUNTER (OUTPATIENT)
Dept: DIABETES SERVICES | Age: 67
Setting detail: THERAPIES SERIES
Discharge: HOME | End: 2022-10-10
Payer: COMMERCIAL

## 2022-10-10 PROCEDURE — G0108 DIAB MANAGE TRN  PER INDIV: HCPCS

## 2022-10-10 SDOH — ECONOMIC STABILITY: FOOD INSECURITY: ADDITIONAL INFORMATION: NO

## 2022-10-10 ASSESSMENT — SLEEP AND FATIGUE QUESTIONNAIRES
HOW MANY HOURS OF SLEEP ARE YOU GETTING, ON AVERAGE: LESS THAN 7
HAVE YOU BEEN TOLD, OR NOTICED ON YOUR OWN, THAT YOU STOP BREATHING OR STRUGGLE TO BREATHE IN YOUR SLEEP: YES
HAVE YOU EVER BEEN TESTED FOR SLEEP APNEA: YES
HOW DO YOU RATE THE QUALITY OF YOUR SLEEP: POOR

## 2022-10-10 ASSESSMENT — PROBLEM AREAS IN DIABETES QUESTIONNAIRE (PAID)
FEELING THAT DIABETES IS TAKING UP TOO MUCH OF YOUR MENTAL AND PHYSICAL ENERGY EVERY DAY: 0
COPING WITH COMPLICATIONS OF DIABETES: 0
FEELING SCARED WHEN YOU THINK ABOUT LIVING WITH DIABETES: 0
FEELING DEPRESSED WHEN YOU THINK ABOUT LIVING WITH DIABETES: 0
PAID-5 TOTAL SCORE: 0
WORRYING ABOUT THE FUTURE AND THE POSSIBILITY OF SERIOUS COMPLICATIONS: 0

## 2022-10-10 NOTE — LETTER
[facility]  Diabetes Education Department  [address] [facility] Office Building  [place] [facility] [place], [address] [facility]      10/10/2022       Re:     Koreen Sandhoff         :    Dear Gris Machado                Thank you for referring your patient, Koreen Sandhoff , for diabetes education sessions. Your patient attended a 1:1 session with the Diabetes Educator on 10/10/2022  . Family or significant other participation:   [x]No    []Yes    Name:________________ Relationship:_________________     Your patient received information on the following topics: Healthy Eating, Being Active, Medications, Acute and Chronic complications, Problem Solving, Monitoring, and Healthy coping. Your patient selected the following goal(s): 1. Learn carb foods  2. Schedule eye exam, covid booster and flu shot    See our progress note for individualized details of education provided. There will be a follow-up via  [x] telephone  [] in-person appointment in  2-3  weeks to assess progress with A1C, carbohydrate recall, attainment of their chosen goal, and self-identified support plan. Thank you for referring this patient to our program.  Please do not hesitate to call if you have any questions at [phone].         Sincerely,     Gregory Reilly RN         Diabetes Nurse Educators:   Registered Licensed Dietitians:    KIKI Chambers BSN, RN   Kaitlin Montilla, MS, RD, LD  [place], [address] [facility] Diabetes Education Department  An American Diabetes Association Recognized [facility] Program

## 2022-10-10 NOTE — PROGRESS NOTES
Diabetes Self- Management Education Program Assessment and Education Record-   Also see Diabetic Screening    Patient, Adin Garcia, has been diagnosed with  [] Type 1 [x] Type 2 diabetes. [x] Patient is new to diabetes, and here for initial diabetes self-management assessment and education. [] Patient is here for review of diabetes self-management assessment and education. Today's visit was in an [x] individual [] group setting. Patient came [x] alone [] with family member.      Goals setting:  Initial Goal Set with Patient  [x]Yes  [] NO      MEDICAL HISTORY:  Past Medical History:   Diagnosis Date    Allergic rhinitis     Asthma     Breast cancer ([facility] [address] 2014    invasive ductal carcinoma left breast    CAD (coronary artery disease)     Cancer ([facility] 11/2014    Invasive ductal left breast    Colon polyps     Diabetes (HCC)     Fibromyalgia     GERD (gastroesophageal reflux disease)     HA (headache)     Herpes simplex     Nose    History of therapeutic radiation     HTN (hypertension)     Hx antineoplastic chemo 2014    Obesity     Senile osteoporosis     Sleep apnea      Family History   Problem Relation Age of Onset    Cancer Father         melanoma    Prostate Cancer Father     Cancer Sister         Melanoma    Diabetes Maternal Aunt     Breast Cancer Maternal Aunt     Cancer Other         Throat    Colon Cancer Neg Hx      Aspirin, Codeine, Imitrex [sumatriptan], Theophyllines, Diflucan [fluconazole], Nizoral [ketoconazole], and Zoloft   Immunization History   Administered Date(s) Administered    COVID-19, MODERNA BLUE border, Primary or Immunocompromised, (age 12y+), IM, 100 mcg/0.5mL 04/20/2021, 05/18/2021, 11/18/2021    Influenza Virus Vaccine 10/18/2010, 10/13/2011, 10/24/2012, 10/23/2015, 10/20/2017, 10/17/2018    Influenza, FLUARIX, FLULAVAL, FLUZONE (age 10 mo+) AND AFLURIA, (age 1 y+), PF, 0.5mL 09/16/2020, 10/14/2021    Influenza, FLUCELVAX, (age 10 mo+), MDCK, PF, 0.5mL 11/01/2018 Influenza, High Dose (Fluzone 65 yrs and older) 10/23/2015    Influenza, Triv, 3 Years and older, IM (Afluria (5 yrs and older) 10/12/2016    Pneumococcal Conjugate 13-valent ([id number]) 12/12/2017    Pneumococcal Polysaccharide ([id number]) 06/12/2018    Td, unspecified formulation 08/21/2003       Current Medications  Current Outpatient Medications   Medication Sig Dispense Refill    insulin lispro (HUMALOG KWIKPEN) 200 UNIT/ML SOPN pen inject 3 times daily before meals- 120-140 2 units, 141-160 3 units, 161-180 4 units, 181-200 5 units, 201-220 6 units, 221-240 7 units, 241-260 8 units, 261-280 9 units, 281-300 10 units, 301 or higher 11 units.  2 Adjustable Dose Pre-filled Pen Syringe 3    Insulin Degludec (TRESIBA FLEXTOUCH) 100 UNIT/ML SOPN Inject 30 Units into the skin nightly (Patient taking differently: Inject 20 Units into the skin nightly) 5 Adjustable Dose Pre-filled Pen Syringe 3    Insulin Pen Needle 32G X 6 MM MISC 1 Device by Does not apply route 4 times daily (before meals and nightly) 150 each 3    Continuous Blood Gluc  (FREESTYLE GERTRUDE 2 READER) CHRISTINA 1 Device by Does not apply route 4 times daily (before meals and nightly) 1 each 0    Continuous Blood Gluc Sensor (FREESTYLE GERTRUDE 2 SENSOR) MISC 1 Device by Does not apply route every 14 days 2 each 3    Fluticasone-Umeclidin-Vilant (TRELEGY ELLIPTA) 200-62.5-25 MCG/INH AEPB Inhale 1 puff into the lungs daily 2 each 0    Dulaglutide (TRULICITY) 9.03 XQ/8.9CL SOPN Inject Trulicity 8.10 mg once weekly for 4 weeks, increase to 1.5 mg injected once weekly in 1 month if no nausea or vomiting (Patient not taking: Reported on 10/10/2022) 4 Adjustable Dose Pre-filled Pen Syringe 1    Dulaglutide (TRULICITY) 1.5 WH/4.7OE SOPN Inject Trulicity 0.74 mg once weekly for 4 weeks, increase to 1.5 mg injected once weekly in 1 month if no nausea or vomiting (Patient not taking: Reported on 10/10/2022) 4 Adjustable Dose Pre-filled Pen Syringe 3    glucose 4 g chewable tablet Take 4 tablets by mouth as needed for Low blood sugar (Patient not taking: Reported on 10/10/2022) 60 tablet 3    Glucagon (Tara Vaibhav 2-PACK) 1 MG/0.2ML SOAJ Inject 1 mg into the skin every 15 minutes as needed (Glucose less than 70 or if symptomatic) (Patient not taking: Reported on 10/10/2022) 2 each 3    predniSONE (DELTASONE) 10 MG tablet 40mg daily x3 days, then 30mg daily x 3 days, then 20mg daily x3 days, then 10mg x daily x 3days 30 tablet 0    blood glucose monitor strips Test 4 times a day and nightly. Dispense sufficient amount for indicated testing frequency plus additional to accommodate PRN testing needs. (Patient not taking: Reported on 10/10/2022) 150 strip 3    ipratropium-albuterol (DUONEB) 0.5-2.5 (3) MG/3ML SOLN nebulizer solution Inhale 3 mLs into the lungs every 4 hours 360 mL 0    fluticasone (FLONASE) 50 MCG/ACT nasal spray 1 spray by Each Nostril route daily 32 g 1    Brompheniramine-Phenylephrine 2-5 MG/10ML LIQD Take 10 mLs by mouth every 6 hours as needed (cough) 237 mL 0    albuterol (PROVENTIL) (5 MG/ML) 0.5% nebulizer solution Take 1 mL by nebulization 4 times daily as needed for Wheezing 120 each 3    amLODIPine (NORVASC) 10 MG tablet Take 1 tablet by mouth once daily 30 tablet 5    pantoprazole (PROTONIX) 40 MG tablet Take 1 tablet by mouth once daily 30 tablet 5    hydrocortisone 1 % cream       ketoconazole (NIZORAL) 2 % cream       Blood Glucose Monitoring Suppl (ONE TOUCH ULTRA 2) w/Device KIT  (Patient not taking: Reported on 10/10/2022)      cyanocobalamin 1000 MCG/ML injection INJECT 1 ML SUBCUTANEOUSLY ONCE EVERY MONTH      ULTICARE INSULIN SAFETY SYR 29G X 1/2\" 1 ML MISC USE AS DIRECTED      nystatin (MYCOSTATIN) 486086 UNIT/GM cream Apply topically 2 times daily.  30 g 3    lisinopril-hydroCHLOROthiazide (PRINZIDE;ZESTORETIC) 20-25 MG per tablet Take 1 tablet by mouth daily 30 tablet 0    blood glucose monitor kit and supplies Dispense sufficient amount for indicated testing frequency plus additional to accommodate PRN testing needs. Dispense all needed supplies to include: monitor, strips, lancing device, lancets, control solutions, alcohol swabs. (Patient not taking: Reported on 10/10/2022) 1 kit 0    Lancets MISC DX: diabetes mellitus. Use 1-3 time(s) daily - Ok to substitute per insurance (1 each = 1 box) (Patient not taking: Reported on 10/10/2022) 1 each 5    nystatin (MYCOSTATIN) 682303 UNIT/GM powder Apply 3 times daily. 45 g 5    vitamin D (ERGOCALCIFEROL) 1.25 MG (95916 UT) CAPS capsule Take 1 capsule by mouth once a week 12 capsule 1    cholestyramine (QUESTRAN) 4 g packet Take 1 packet by mouth 2 times daily 180 packet 5    dicyclomine (BENTYL) 10 MG capsule Take 1 capsule by mouth 2 times daily (before meals) 120 capsule 3    metFORMIN (GLUCOPHAGE) 500 MG tablet Take 2 tablets by mouth 2 times daily (with meals) (Patient not taking: Reported on 10/10/2022) 360 tablet 3    Respiratory Therapy Supplies (FULL KIT NEBULIZER SET) MISC 1 Units by Does not apply route 4 times daily 1 each 1    albuterol sulfate  (90 Base) MCG/ACT inhaler Inhale 2 puffs into the lungs 4 times daily as needed for Wheezing 3 Inhaler 3    albuterol (PROVENTIL) (2.5 MG/3ML) 0.083% nebulizer solution Take 3 mLs by nebulization every 6 hours as needed for Wheezing 300 vial 3    CPAP Machine MISC        No current facility-administered medications for this encounter.   :     Comments:  Allergies: Allergies   Allergen Reactions    Aspirin      History of gastric ulcers    Codeine      Cramps    Imitrex [Sumatriptan]     Theophyllines     Diflucan [Fluconazole] Rash    Nizoral [Ketoconazole] Rash    Zoloft Rash       Diabetes 5  / Health Status    1.  A1C blood level - at goal < 7%   Lab Results   Component Value Date    LABA1C 9.7 09/27/2022    LABA1C 9.6 (H) 09/07/2022    LABA1C 8.9 04/19/2022     Lab Results   Component Value Date    LABMICR <1.20 03/30/2021    LDLCALC 79 2021    CREATININE 0.71 2022       2. Blood pressure (140/90)  Or less  BP Readings from Last 3 Encounters:   22 136/80   22 (!) 151/86   22 134/84       3. Cholesterol ( LDL under  100)   Lab Results   Component Value Date    LDLCALC 79 2021       4. Smoking ? []Yes   [x]No    5. Taking an Asprin daily? []Yes   [x]No      Diabetes Self- Management Education Record    Participant Name: Stephane Nino  Referring Provider: Ladena Landau, MD   Assessment/Evaluation Ratings:  1=Needs Instruction   4=Demonstrates Understanding/Competency  2=Needs Review   NC=Not Covered    3=Comprehends Key Points  N/A=Not Applicable    Topics/Learning Objectives Initial Assessment Date:  Comments:  Signature:   Classes 1, 2, 3 or Individual instruction Instr. Date:  Comment:   Signature:   Reinforce Date:  Comments:  Signature: Post- Education Eval date:  Comments:  Signature:   Pathophysiology of diabetes  Define diabetes & Insulin resistance Identify own type of diabetes; role of the pancreas; signs/symptoms; diagnostic criteria; prevention & treatment options; contributing factors. Assessment Ratin  10/10/22  Diabetes Education assessment completed today. Patient has:  [] No knowledge of diabetes disease process   [x] Limited knowledge of diabetes disease process  [] Good knowledge of diabetes disease process. In this session, the role of the pancreas, insulin, and the liver in glucose utilization and insulin resistance was   [x]  Introduced  []  Reviewed. [x] ADA booklet Where Do I Begin used to reinforce teaching. Winnie Lorenzo RN   [] Discussed basic pathophysiology of diabetes with the group including the pancreas, insulin, insulin resistance and the liver's involvement. [] Reviewed the different types of DM, risk factors, diagnosis, symptoms and the treatment options.    Evaluation rating:  Recent Health problems:  []Yes []No   Being Active  Verbalize effect of exercise on blood glucose levels; benefits of regular exercise; safety considerations; contraindications; maintenance of activity. Assessment Ratin  10/10/22    Patient is   [] currently being active daily  [x] not currently being active daily. [x] Reviewed effects of exercise on glycemic control, risks and benefits, precautions. Patient       [x]has co- morbidities      [] has no co-morbidities that recommend being seen by [facility] prior to starting exercise program for medical clearance. [x] Briefly reviewed guidelines for frequency, duration, intensity for exercise. Patient currently engages in the following activities:    Jackie Mac RN     [] Reviewed effects of activity on glycemic control and weight loss, risks and benefits, and precautions. [] Current recommendations for being active by the American Diabetes Association reviewed. [] Discussed what patient is doing to stay active   Evaluation rating:  Have you made any changes in your activity level? []Yes []No       If yes, how? If yes, how many days/week? Monitoring blood glucose Identify recommended & personal blood glucose targets; importance of testing; testing supplies; HgbA1C target levels; Factors affecting blood glucose; Importance of logging blood glucose levels for pattern recognition; ketone testing; safe lancet disposal..   Assessment Ratin  10/10/22  [x] Proficient in using meter Freestyle  [] Recently started using meter  [] Not currently using meter  [] Patient does not have a meter prescribed & advised to contact PCP for order or to purchase meter. [x] Reviewed handout Desired blood glucose level citing ADA recommendations for blood glucose goals. Discussed frequency of testing, importance of record keeping, proper handling of meter and test strips, disposal of used strips and lancets. Reviewed importance of testing as a means to evaluate effectiveness of treatment plan.     [x] Does not have any Anemias or  hemoglobinopathies that may affect A1c  [] Has anemia or hemoglobinopathy that may affect A1c    Nic Mann RN   [] Discussed blood glucose monitoring to include: American Diabetes Association goals for blood glucose, effects of diet, exercise and medications on test results, frequency of testing, the possible causes and appropriate action to take if hypoglycemia (15/15 rule) or hyperglycemia occurs,   importance of record keeping to share with their PCP and when to call their PCP with abnormal results. [] Also reviewed were: proper storage and handling of both the meter and test strips; proper disposal of used strips and lancets, running  checks on the test strips using control solution and loading and using lancet device to obtain an adequate sample. Suggested times were given for routine testing. To increase testing for sick day monitoring, or when a change in diabetes medication, activity, or stress occurs. []  Aware of individualized A1C goal and current A1C. [] Checking for ketones was introduced along with prevention and symptoms of Diabetic Ketoacidosis (DKA)    Evaluation rating:    Checking blood sugar    times a day. Checking before meals? []Yes  []No     Fasting ranges in last week:      Checking 2 hours post prandial? []Yes  []No     Ranges in last week:     Checking at bedtime? []Yes  []No   Ranges in last week:     Knowledgeable of blood glucose goals? []Yes []No             Glucose meter - educate on meter use if new to monitoring.  Able to use meter appropriately   Assessment Rating:N/C  10/10/22  [] Patient is new to glucose meter and instructed on the following.:   []Overview of meter - 800 number on all machines for help  []Proper storage/ handling of meter and test strips  []Washing hands, turning on meter - setting date and time  []Running  checks on the test strips using control solution   []Loading and using lancet device to obtain an adequate sample  []Obtaining blood sample and reading results on meter  []Proper disposal of used strips and lancets  []Frequency of testing  Importance of record keeping   []When to call their PCM with abnormal results  []Desired blood glucose goals for optimal control - handout given  [] All of the above    [] Patient instructed on home meter. Name of meter:    [] Patient instructed with demonstrator model in office. The patient return demonstrated   [] Verbally   [] Using his own meter:        [] Self-tested Bg:_____    Aris Redman RN   See above  Evaluation rating:    Cleaning hands before testing? []Yes   []No    Using sides of fingers, not pads? []Yes   []No    Using  checks. []Yes   []No    Logging resuts? []Yes   []No   Risk Reduction - acute complications:  Identify symptoms of hyper & hypoglycemia, and prevention & treatment strategies. Assessment Ratin  10/10/22  [] Knowledgeable  [x] Limited Knowledge  [] No knowledge   of hypo or hyperglycemia. [x] Handouts reviewed covering symptoms and treatment for both. (includes Rule of 15)    Patient has []low [x]high risk for hypoglycemia. [x] Advised to carry source of fast acting glucose at all times. []  Advised to carry ID on person identifying as having diabetes  Aris Redman RN   [] Reviewed signs and symptoms of acute complications of diabetes, (hypoglycemia and hyperglycemia), possible causes and actions to take if occurs. [] Reviewed BG guidelines and troubleshooting unexpected numbers. Evaluation rating:    Knowledge of Hypo and Hyper glycemia treatment []Yes []No     Knowledge of Hypo and Hyper glycemia prevention []Yes []No    Lows since last visit? []Yes []No      Treat appropriately? []Yes  []No    Explainable? []Yes  []No      Ketone testing? []Yes []No   Risk Reduction - sick days   Describe sick day guidelines & indications for physician notification.  Identify short term consequences of poor control. Assessment Rating:   10/10/22  NC   [] Advised of effects of illness on blood glucose. Reviewed management of sick days with group. Advised about importance of continuing diabetes medications, tips for staying hydrated and when to call the doctor. [] Sick day handout given.     [] Sick day toolbox reviewed. Evaluation rating:    Knowledge of sick day plan? []Yes [] No   Healthy Coping:   Identify healthy and unhealthy coping, causes of stress and ways to reduce stress. How depression affects diabetes care and how to seek help if needed. Identify support needed & support network available   Assessment Ratin  10/10/22    Patient's PAID-5 Score:0    [x]Patient's PAID-5 (Problem Areas in Diabetes) score is less than 9. No intervention needed at this time. [] Patient's PAID-5 (Problem Areas in Diabetes) score is greater than 8 which indicates possible diabetes related emotional distress and warrants further assessment. [] Support given during appointment. Patient advised to follow up with PCP or psychologist.   [] Letter will be sent to PCP indicating further assessment needed. Sharon Nuñez RN   [] Reviewed healthy coping and unhealthy coping with the group. Discussed what ways of coping each person usually uses and brainstormed ways to change to a healthy coping mechanism with the group. [] Stressed the importance of stress reduction and the negative effects of stress on the body including elevated BG.     [] Community resources and support networks reviewed and handout provided. Evaluation rating:    Feelings/Attitudes/Concerns? Ongoing self-management support plan? [] Yes []No   Problem solving & goal setting:  Behavior Change and goal settingIdentify 7 self-care behaviors, problem solving techniques: Finding problems, identifying solutions and taking action.     Assessment Ratin  10/10/22  Discussed willingness to change behaviors and setting SMART goals. Patient [x] is willing  [] is not willing to change at this time. Sayra Mckenzie RN [] Identified problem solving techniques: finding problems, identifying solutions (goal setting) and taking action. [] Patient reviewed selected goal set at initial assessment. Evaluation rating:    Identifying Barriers: Depression, unhealthy behaviors, financial    Healthy Eating: Describe effect of type, amount & timing of food on blood glucose; Describe basic meal planning techniques & current nutrition guideline  Correctly read food labels & demonstrate CHO counting & portion control with personalized meal plan. Identifies dining out strategies, & dietary sick day guidelines   Assessment Ratin  10/10/22    Meal Plan patient is currently following:  [] plate method  [x] portion control  [] carb counting   [] label reading  [] no meal plan    [x] Booklet from ADA Counting Carbs given. Reviewed plate method, portion control & label reading for carb counting utilizing booklet. [x] Advised that dietitian will recommend individualized number of carbs to eat daily, but in meantime could follow basic recommendations as follows:  [] Men-   [] for weight loss - 3-4 choices/45-60 gms per meal with 1 snack of 15 gm per day. []To maintain weight: 4-5 choices/60-75  gms pre meal with 1 snack of 15 gm per day. [x] Women -   [x]weight loss 2-3 choices/30-45 gms per meal with 1 snack of 15 gm per day. []To maintain weight: 3-4 servings/45-60 gms pre meal with 1 snack of 15 gm per day. Patient would benefit from   [] individual appt with dietitian  [x] group MNT with dietitian    Sayra Mckenzie RN   Evaluation rating:    Are you following a meal plan? []Yes []No    [] Portion control   [] Plate method   [] Carb counting   [] Label reading    Weight loss since class? []Yes []No   Taking MedicationsIdentify effects of diabetes medicines on blood glucose levels;  List diabetes medication taken, action & side effects   Assessment Ratin  10/10/22  Handouts provided on both oral and injectable medications. Currently taking: Humalog and Tresiba. [] Currently takes the following complementary or alternative medicines:    [x] Reviewed medication compliance, action, side effects and timing of each. Patient is:  [x]compliant with current meds. []noncompliant with current meds. Ivis Gottlieb RN   [] Discussed all medication classes both oral and injectable to include method of action, side effects and precautions. [] Patient provided handout with their medications for diabetes listed showing method of action and when to take. Evaluation rating:    Any Changes in Medications? Compliant? []Yes []No     Any side effects? []Yes [] No   Insulin / Injectable - Appropriate injection sites; proper storage; supplies needed; proper technique; safe needle disposal guidelines. Assessment Ratin  10/10/22    [] Not on insulin    [] New to insulin   Patient is new to insulin and prescribed:     []  Instructed today on type of insulin(s), onset, peak and duration. []  Demonstrated proper administration technique using       []Vial & syringe       []Pen. [] Return demonstration via          [] Self-injection  __U Site:____             [] Demonstrator        []  Reviewed recommended injection sites, proper storage of insulin, Proper needle disposal, importance of blood glucose monitoring when taking insulin, and risk of hypoglycemia. []  Handouts given.     [x] Previously on insulin:  and assessed the following:    [x] Knowledge of type of insulin - onset, peak and duration of each type    [] Demonstrates Competency      [x] Education provided      [] Proper storage of insulin:     [] Demonstrates Competency      [x] Education provided          [] Site Management        [] Demonstrates Competency        [] Education provided      [] Injection site currently uses:     [] Evidence of: [] bruising       [] infection       [] lipoatrophy        [] lipohypertrophy.      [] Injecting near umbilicus or scars/tattoos    [] Rotates sites appropriately    [] Skin Preparation technique:          [] Injection Procedure:       [] Demonstrates Competency        [] Education provided   [] Sterile Technique   [] Rolling to uniformity (if necessary)   [] Pre-injection priming (pen only)   [] Air into vial (Syringe only)   [] Correct order for mixing in same syringe (if necessary)   [] Dosing accuracy   [] Needle insertion   [] Complete injection -    [] Needle withdrawal - with waiting time before removing needle          [] Disposal Procedure:       [] Demonstrates Competency        [] Education provided    [] Has sharps container /needle clip    [] Uses sharps container / needle clip          [] Injection Device Selection:       [] Demonstrates Competency       [] Education provided    [] Appropriate needle size    [] Sufficient volume    [] Appropriate dosing increments    [] Suited to physical limitations           [] Injection adherence:       [] Demonstrates Competency        [] Education provided     [] Misses doses:         [] Daily [] Weekly          [] Monthly [] Almost Never         [] Hypoglycemia symptoms & treatment:      [] Demonstrates Competency        [] Education provided    Ramez Estevez RN   [] Discussed insulin stigma and proper technique including site selection and self-injection. []  Reviewed both pen and vial/syringe use. Discussed needle disposal and proper insulin storage and importance of times to take insulin. [] Discussed importance of blood glucose monitoring to assess current treatment effectiveness. Evaluation rating: On Insulin? []Yes []No           Injection site used: __________     Rotating sites? []Yes [] No     Problems? []Yes []No      Storing insulin correctly? []Yes [] No     Injecting at proper times?  []Yes []No   IRisk Reduction - chronic complications:  Describe the relationship of blood glucose levels to long term complications of diabetes. Identify preventative measures & standards of care. Assessment Rating:   10/10/22  NC  See Diabetes Assessment for last completed chronic complication prevention appointments. [x] Patient is up-to-date on preventative exams and vaccines  [] Patient is not up-to date on preventative exams and vaccines and advised to discuss with PCM    [] Blood pressure is at goal  [] Blood pressure is not at goal    [] Cholesterol is at goal  [] Cholesterol is not at goal    [] Has microvascular complications  [] Has macrovascular complications  Ivis Gottlieb RN   [] Reviewed natural course of T2DM with group and how chronic complications are related to elevated blood glucose. [] Discussed macro and microvascular complications and the need for control of lipids, blood pressure, glucose levels, weight reduction and smoking cessation to help reduce risks. [] Instructed on goals for blood pressure, lipids, and glucose for optimal health. [] Individualized scorecard provided with current   health maintenance recommendations from the American Diabetes Association to include Eye, dental exams, foot exams, recommended labs and vaccines for people with diabetes. Evaluation rating:    Using Score card? [] Yes []No    Blood Pressure at goal?  [] Yes []No    Foot exams:  self-exams daily  []Yes []No  Provider yearly   []  Yes []No               Eye and dental exams up to date? []Yes []No               Labs completed & at goal  [] Yes []No        Plan if not at goal? []Yes []No  Immunizations   [] Flu   []pneumonia   []Hep B (19-59)   []Covid   Individualized goal selection. 10/10/22  My goal , to help me improve my health, I will: 1. Learn Carb foods      2. Schedule eye exam. Receive covid and flu vaccines    Ivis Gottlieb RN Percentage of goal completed from Initial Assessment:   %      2.    %    New revised goal: Percentage of goal completed:  1.      2.    New revised goal: Percentage of goal completed since end of class:  1.      2.    New revised goal:     Plan  Follow-up Appointments planned via phone call in 2-3 weeks         Instruction Method: [x]Lecture/Discussion  []Power Point Presentation  [x]Handouts  []Return Demonstration      Education Materials/Equipment Provided:      [x] Self-Management  - Initial Assessment - Where do I Begin booklet and Counting Carbs from the ADA. [] Self-Management  Class 1 - On the Road to better managing your diabetes - Packet of Handouts from Diabetes Department    [] Self-Management  Class 2 - Meal Plan,  Choose your Foods - Food Lists for [employer] and Nutrition in the [employer] - fast facts about fast food    [] Self-Management  Class 3 -  Diabetes ID card,  foot care tips sheet,  Continuing Your Journey with Diabetes, Individualized Diabetes report card, Sick Day Rules, Diabetes Cookbooks, Magazines and Pedometers when available    [] Glucose Meter     [] BD Getting Started Insulin Kit     [] Other      Encounter Type Date Start Time End Time Comments No Show Dates   Assessment 10/10/22  Aris Redman RN 5pm 615 10/10/22    [] Patient has no barriers to learning and recommend attending classes. Classes scheduled for:     [] Patient has the following barriers to learning and would benefit from additional education in an individual setting. Barriers:      [] Will follow up:    [x] Patient declines classes at this time. [x] Will follow up:gave class schedule    Aris Redman RN    Class 1 - Understanding diabetes      [] First class completed today. Class 2- Nutrition and diabetes        [] Second class completed today    Class 3 - Preventing Complications     [] Third class completed today. [] Patient has completed all 3 classes today. Goal sheets and DSMS Support Plan completed. Will follow up in 3-4 months via telephone.   Patient advised to contact Diabetes Education office if any questions. Number provided. [] Patient needs to attend Class #    in order to complete classes. Scheduled for: Individual MNT         3 Month Follow-up      []In Person  []Telephone    Meter Instrx        Insulin Instrx      []Pen  []Vial & Syringe      DSMS Support Plan:  Follow-up plan:    [] Healthy Coping  [] Emotional Support  [] National [employer] on Mental Illness (ERICK) - (Depression, bipolar and other support) 626.110.3178; www.erick.org    [] [address] [place] [address]  547.613.1585; www.Kopi. Zipari                          [] National Suicide Prevention Zilhugaq-041-260-8255                          [] Anxiety & Depression Association of [place]                               Find local support groups by zip code at [facility] number [phone]                          [] Counseling:  ______________________________  [] Diabetes Support Groups  [] New Weigh of Life at [facility]  2nd Tuesday of the month at 10:00 [place]-[phone]  [] On-line support groups (Meetmeals, AetherPal, ADA)  [] [facility]  [] I would be interested in a support group at [address] [facility] in [facility] if offered    [] Stress Relief     [] Yoga Class     [] Start a journal     [] Relaxation techniques     [] Snvubot8Rkvfx- Sekou         [] SparkQuote (Free, inspiring quote of the day)    []  Healthy Eating  [] Weight Management & Carb Counting  [] Weight Fravgfss-667-508-6000; www.weightwatchers. General [employer]  [] Over Eaters Jhlnsjgyn-489-635-2664 (support group)- www.oa. org  [] Follow up individual appointment with [address] [facility] dietitian - call [phone]  [] Food Tracking Apps - My Fitness Pal, Joann Ek  [] ADA website - [address], weight loss  [] [employer]. com      []   Monitoring  [] Glucose Renaldo (Free, tracks blood glucose, graphs)  [] MySugrApp (Basic and Pro patterns)  [] Diabetes:M - logbook    []  Being Active  [] 24 Hour Skwhgop-717-561-0240; www.Embrace  [] Oscar Goodrich. ReserveMyHome  [] Hodan Angelina  [] Fit Walks: FREE  [facility] in [place] on Mondays 5:30 pm  [facility] in [place] (for weather)   [place] on Thursdays at 5:30 [phone]  [] Stefan Kaiser walking videos (Some free on youtube)  [] Other Exercise Plan/Facility _____________________________________________  [] Caffie Viviane to 5K    []  Reducing Risk  [] Make a sick day toolkit  [] Schedule appointments for eye, dentist  [] Stop smoking              [] Monitor Blood pressure              [] Get vaccinated                [] Other:___________________      []    Taking Medications  [] Sekou: MyTherapyApp - helps track meds  [] Seek financial help with medications:  [] Non-insulin-agents-cost-saving-resource-7-29-19. pdf   [employer]. org/docs/default-source/practice/educator-tools/non-insulin-agents-cost-saving-resource-7-29-19. pdf?sfvrsn=2  [] Insulin cost saving resource  [employer]. org/docs/default-source/practice/educator-tools/insulin-cost-saving-resources-3-4-19. pdf?sfvrsn=4  []  GetInsulin. org  []  The Affordable insulin Project http://affordableinsulinproject.org/    []  Diabetes Websites - Use as a resource for additional information  [] www.diabetes. org  [] My.[employer] CrowdStrike  [] WWW.diabeteseducator. org     Association of Diabetes Care & Education Specialists  [] www.niddk.nih.gov/health-information/diabetes/overview  [address]  [] www.medicalert. org  - offers emergency medical information service, including an ID bracelet/pendant (have to pay)    []   Journals/Magazines  [] Diabetes Forecast 1-548.429.5590; www.diabetesforecast.org  [] Diabetes Self-Management 5-765.475.1451; www.diabetesselGoodChime!. CrowdStrike  [] Diabetes Health 624-285-9458; www.diabeteshealth. com    []   Other  [] Health Program offered at place of employment   [] Insurance Company's Chronic Disease Management Program  [] Follow up - Diabetes Education or Nutrition Therapy Annually (call in 1 year to set up apt)  [] Join the Living with Type 2 Diabetes Program (FREE)   www.diabetes. org/diabetes/type-2/living-with-type-2-diabetes-program  or call 1-800-DIABETES  [] Stop smoking - www.cancer. org/healthy/stay-away-from-tobacco       Post Education Referrals:        [] [place] Tobacco Quit information sheet and [address] [facility] , [address] [facility]      [] Dental care     [] Podiatrist     [] Ophthalmologist     [] Other    Dariana Holley RN

## 2022-10-12 ENCOUNTER — HOSPITAL ENCOUNTER (OUTPATIENT)
Dept: PULMONOLOGY | Age: 67
Discharge: HOME | End: 2022-10-12
Payer: COMMERCIAL

## 2022-10-12 DIAGNOSIS — J45.41 MODERATE PERSISTENT ASTHMA WITH ACUTE EXACERBATION: ICD-10-CM

## 2022-10-12 PROCEDURE — 94060 EVALUATION OF WHEEZING: CPT

## 2022-10-12 PROCEDURE — 94726 PLETHYSMOGRAPHY LUNG VOLUMES: CPT

## 2022-10-12 PROCEDURE — 94729 DIFFUSING CAPACITY: CPT

## 2022-10-12 PROCEDURE — 6360000002 HC RX W HCPCS: Performed by: INTERNAL MEDICINE

## 2022-10-12 RX ORDER — ALBUTEROL SULFATE 2.5 MG/3ML
2.5 SOLUTION RESPIRATORY (INHALATION) ONCE
Status: COMPLETED | OUTPATIENT
Start: 2022-10-12 | End: 2022-10-12

## 2022-10-12 RX ADMIN — ALBUTEROL SULFATE 2.5 MG: 2.5 SOLUTION RESPIRATORY (INHALATION) at 08:33

## 2022-10-13 PROCEDURE — 94729 DIFFUSING CAPACITY: CPT | Performed by: INTERNAL MEDICINE

## 2022-10-13 PROCEDURE — 94726 PLETHYSMOGRAPHY LUNG VOLUMES: CPT | Performed by: INTERNAL MEDICINE

## 2022-10-13 PROCEDURE — 94060 EVALUATION OF WHEEZING: CPT | Performed by: INTERNAL MEDICINE

## 2022-10-13 NOTE — PROCEDURES
[address]                      [address], [address] [facility]                    PULMONARY FUNCTION  Marolyn Caras   79 y.o.   female  Height 62 in  Weight 253 lb      Referring provider   Cristo Jara MD    Reading provider   Nitin Koehler MD    Test meets ATS criteria for acceptability and reproducibility Yes    Diagnosis: STOCKTON: Yes  Cough   Yes, wheezing No  Smoking   no    Spirometry   FVC            2.28 L   79%  Post bronchodilator 2.39 L  83% Change 4 %  FEV1          1.84 L  84%   Post bronchodilator  1.95 L  90%   Change 6%  FEV1/FVC  80  %             Post bronchodilator  81 %  GNA82-70% 1.81 L  94%  Post bronchodilator  2.33 L  120%   Change 28%    Lung volume   SVC           2.17 L  75%   RV              1.83 L 90%   TLC            4.00  L 84%   RV/TLC      45 %    DLCO           81 %     Test interpretation     Spirometry is normal with no significant response to BD   Lung volume are normal, ERV reduced due to obesity  DLCO is normal        Clinical correlation is recommended     Nitin Koehler MD [facility], 10/13/2022 12:49 PM

## 2022-10-20 ENCOUNTER — OFFICE VISIT (OUTPATIENT)
Dept: PULMONOLOGY | Age: 67
End: 2022-10-20
Payer: COMMERCIAL

## 2022-10-20 VITALS
OXYGEN SATURATION: 97 % | HEART RATE: 80 BPM | BODY MASS INDEX: 47.19 KG/M2 | DIASTOLIC BLOOD PRESSURE: 86 MMHG | WEIGHT: 258 LBS | SYSTOLIC BLOOD PRESSURE: 136 MMHG

## 2022-10-20 DIAGNOSIS — J45.41 MODERATE PERSISTENT ASTHMA WITH ACUTE EXACERBATION: Primary | ICD-10-CM

## 2022-10-20 DIAGNOSIS — E66.01 MORBID OBESITY (HCC): ICD-10-CM

## 2022-10-20 DIAGNOSIS — G47.33 OSA (OBSTRUCTIVE SLEEP APNEA): ICD-10-CM

## 2022-10-20 PROCEDURE — 3078F DIAST BP <80 MM HG: CPT | Performed by: INTERNAL MEDICINE

## 2022-10-20 PROCEDURE — 1123F ACP DISCUSS/DSCN MKR DOCD: CPT | Performed by: INTERNAL MEDICINE

## 2022-10-20 PROCEDURE — 99214 OFFICE O/P EST MOD 30 MIN: CPT | Performed by: INTERNAL MEDICINE

## 2022-10-20 PROCEDURE — 3074F SYST BP LT 130 MM HG: CPT | Performed by: INTERNAL MEDICINE

## 2022-10-20 ASSESSMENT — ENCOUNTER SYMPTOMS
ABDOMINAL PAIN: 0
VOMITING: 0
EYE DISCHARGE: 0
EYE ITCHING: 0
RHINORRHEA: 0
TROUBLE SWALLOWING: 0
COUGH: 0
VOICE CHANGE: 0
SINUS PRESSURE: 0
SHORTNESS OF BREATH: 1
CHEST TIGHTNESS: 0
WHEEZING: 0
NAUSEA: 0
DIARRHEA: 0
SORE THROAT: 0

## 2022-10-20 NOTE — PROGRESS NOTES
Subjective:     Aquiles Chandler is a 79 y.o. female who complains today of:     Chief Complaint   Patient presents with    Follow-up     4wk f/u     Sleep Apnea    Asthma       HPI   She had PFT  done   She was in hospital for asthma exacerbation ,  She is on albuterol HFA and neb with albuterol . C/o shortness of breath with exertion but better than before . No Wheezing. No Cough   No Hemoptysis. No Chest tightness. No Chest pain with radiation  or pleuritic pain. No  leg edema. No orthopnea. No Fever or chills. No Rhinorrhea and postnasal drip. She said she is going for  sleep study 11/14/22   She is complaining of snoring and daytime sleepiness and tiredness. No C/o witness apnea. She wakes up with gasping for air. C/o wakes up frequently during sleep . complaint of morning headache. She does not have restful sleep. She does not take daily naps. She does not fall asleep while watching TV. She does not have a complaint of sleepiness while drivingse. No sleep study in last 10 yrs ,   She had mild BUNNY 10 yrs ago , She was on CPAP few yrs but did not feels much difference. Willing to go for sleep study .    She had gastric  bypass lost  more than 50 lbs but gain all back     Allergies:  Aspirin, Codeine, Imitrex [sumatriptan], Theophyllines, Diflucan [fluconazole], Nizoral [ketoconazole], and Zoloft  Past Medical History:   Diagnosis Date    Allergic rhinitis     Asthma     Breast cancer ([facility] 2014    invasive ductal carcinoma left breast    CAD (coronary artery disease)     Cancer ([facility] 11/2014    Invasive ductal left breast    Colon polyps     Diabetes (HCC)     Fibromyalgia     GERD (gastroesophageal reflux disease)     HA (headache)     Herpes simplex     Nose    History of therapeutic radiation     HTN (hypertension)     Hx antineoplastic chemo 2014    Obesity     Senile osteoporosis     Sleep apnea      Past Surgical History:   Procedure Laterality Date    BARIATRIC SURGERY 2004    BREAST BIOPSY Left 14    BREAST BIOPSY Left 12/10/14    BREAST LUMPECTOMY Left     with left SNB  invasive ductal carcinoma    BREAST LUMPECTOMY Left     BREAST LUMPECTOMY Left      SECTION      x4    CHOLECYSTECTOMY  2007    GASTRIC BYPASS SURGERY      HYSTERECTOMY (CERVIX STATUS UNKNOWN)      KNEE ARTHROPLASTY Right     knee reconstruction    KNEE SURGERY      Right knee    LYMPH NODE BIOPSY Left 14    ANDREW AND BSO (CERVIX REMOVED)      Dr. Leah Schwartz ENDOSCOPY  2/18/15    w/bx,polypectomy     UPPER GASTROINTESTINAL ENDOSCOPY N/A 2020    EGD DIAGNOSTIC ONLY performed by Babar Roque MD at [address] [facility]       Family History   Problem Relation Age of Onset    Cancer Father         melanoma    Prostate Cancer Father     Cancer Sister         Melanoma    Diabetes Maternal Aunt     Breast Cancer Maternal Aunt     Cancer Other         Throat    Colon Cancer Neg Hx      Social History     Socioeconomic History    Marital status:      Spouse name: Not on file    Number of children: Not on file    Years of education: Not on file    Highest education level: Not on file   Occupational History    Not on file   Tobacco Use    Smoking status: Never    Smokeless tobacco: Never   Vaping Use    Vaping Use: Never used   Substance and Sexual Activity    Alcohol use: No    Drug use: No    Sexual activity: Not on file   Other Topics Concern    Not on file   Social History Narrative    Not on file     Social Determinants of Health     Financial Resource Strain: Low Risk     Difficulty of Paying Living Expenses: Not hard at all   Food Insecurity: No Food Insecurity    Worried About Running Out of Food in the Last Year: Never true    Ran Out of Food in the Last Year: Never true   Transportation Needs: Not on file   Physical Activity: Not on file   Stress: Not on file Social Connections: Not on file   Intimate Partner Violence: Not on file   Housing Stability: Not on file         Review of Systems   Constitutional:  Negative for chills, diaphoresis, fatigue and fever. HENT:  Negative for congestion, mouth sores, nosebleeds, postnasal drip, rhinorrhea, sinus pressure, sneezing, sore throat, trouble swallowing and voice change. Eyes:  Negative for discharge, itching and visual disturbance. Respiratory:  Positive for shortness of breath. Negative for cough, chest tightness and wheezing. Cardiovascular:  Negative for chest pain, palpitations and leg swelling. Gastrointestinal:  Negative for abdominal pain, diarrhea, nausea and vomiting. Genitourinary:  Negative for difficulty urinating and hematuria. Musculoskeletal:  Negative for arthralgias, joint swelling and myalgias. Skin:  Negative for rash. Allergic/Immunologic: Negative for environmental allergies and food allergies. Neurological:  Negative for dizziness, tremors, weakness and headaches. Psychiatric/Behavioral:  Positive for sleep disturbance. Negative for behavioral problems. :     Vitals:    10/20/22 1529 10/20/22 1536   BP: (!) 144/88 136/86   Site: Right Lower Arm Right Lower Arm   Position: Sitting Sitting   Cuff Size: Medium Adult Medium Adult   Pulse: 80    SpO2: 97%    Weight: 258 lb (117 kg)      Wt Readings from Last 3 Encounters:   10/20/22 258 lb (117 kg)   09/27/22 253 lb (114.8 kg)   09/27/22 251 lb (113.9 kg)         Physical Exam  Constitutional:       General: She is not in acute distress. Appearance: She is well-developed. She is obese. She is not diaphoretic. HENT:      Head: Normocephalic and atraumatic. Nose: Nose normal.   Eyes:      Pupils: Pupils are equal, round, and reactive to light. Neck:      Thyroid: No thyromegaly. Vascular: No JVD. Trachea: No tracheal deviation. Cardiovascular:      Rate and Rhythm: Normal rate and regular rhythm. Heart sounds: No murmur heard. No friction rub. No gallop. Pulmonary:      Effort: No respiratory distress. Breath sounds: No wheezing or rales. Chest:      Chest wall: No tenderness. Abdominal:      General: There is no distension. Tenderness: There is no abdominal tenderness. There is no rebound. Musculoskeletal:         General: Normal range of motion. Lymphadenopathy:      Cervical: No cervical adenopathy. Skin:     General: Skin is warm and dry. Neurological:      Mental Status: She is alert and oriented to person, place, and time. Coordination: Coordination normal.   Psychiatric:         Mood and Affect: Mood normal.         Behavior: Behavior normal.       Current Outpatient Medications   Medication Sig Dispense Refill    Fluticasone-Umeclidin-Vilant (TRELEGY ELLIPTA) 200-62.5-25 MCG/INH AEPB Inhale 1 puff into the lungs daily 2 each 0    insulin lispro (HUMALOG KWIKPEN) 200 UNIT/ML SOPN pen inject 3 times daily before meals- 120-140 2 units, 141-160 3 units, 161-180 4 units, 181-200 5 units, 201-220 6 units, 221-240 7 units, 241-260 8 units, 261-280 9 units, 281-300 10 units, 301 or higher 11 units. 2 Adjustable Dose Pre-filled Pen Syringe 3    Insulin Degludec (TRESIBA FLEXTOUCH) 100 UNIT/ML SOPN Inject 30 Units into the skin nightly (Patient taking differently: Inject 20 Units into the skin nightly) 5 Adjustable Dose Pre-filled Pen Syringe 3    Insulin Pen Needle 32G X 6 MM MISC 1 Device by Does not apply route 4 times daily (before meals and nightly) 150 each 3    Continuous Blood Gluc  (FREESTYLE GERTRUDE 2 READER) CHRISTINA 1 Device by Does not apply route 4 times daily (before meals and nightly) 1 each 0    Continuous Blood Gluc Sensor (FREESTYLE GERTRUDE 2 SENSOR) MISC 1 Device by Does not apply route every 14 days 2 each 3    blood glucose monitor strips Test 4 times a day and nightly.  Dispense sufficient amount for indicated testing frequency plus additional to accommodate PRN testing needs. 150 strip 3    ipratropium-albuterol (DUONEB) 0.5-2.5 (3) MG/3ML SOLN nebulizer solution Inhale 3 mLs into the lungs every 4 hours 360 mL 0    fluticasone (FLONASE) 50 MCG/ACT nasal spray 1 spray by Each Nostril route daily 32 g 1    Brompheniramine-Phenylephrine 2-5 MG/10ML LIQD Take 10 mLs by mouth every 6 hours as needed (cough) 237 mL 0    albuterol (PROVENTIL) (5 MG/ML) 0.5% nebulizer solution Take 1 mL by nebulization 4 times daily as needed for Wheezing 120 each 3    amLODIPine (NORVASC) 10 MG tablet Take 1 tablet by mouth once daily 30 tablet 5    pantoprazole (PROTONIX) 40 MG tablet Take 1 tablet by mouth once daily 30 tablet 5    hydrocortisone 1 % cream       ketoconazole (NIZORAL) 2 % cream       Blood Glucose Monitoring Suppl (ONE TOUCH ULTRA 2) w/Device KIT       cyanocobalamin 1000 MCG/ML injection INJECT 1 ML SUBCUTANEOUSLY ONCE EVERY MONTH      ULTICARE INSULIN SAFETY SYR 29G X 1/2\" 1 ML MISC USE AS DIRECTED      nystatin (MYCOSTATIN) 935085 UNIT/GM cream Apply topically 2 times daily. 30 g 3    lisinopril-hydroCHLOROthiazide (PRINZIDE;ZESTORETIC) 20-25 MG per tablet Take 1 tablet by mouth daily 30 tablet 0    blood glucose monitor kit and supplies Dispense sufficient amount for indicated testing frequency plus additional to accommodate PRN testing needs. Dispense all needed supplies to include: monitor, strips, lancing device, lancets, control solutions, alcohol swabs. 1 kit 0    Lancets MISC DX: diabetes mellitus. Use 1-3 time(s) daily - Ok to substitute per insurance (1 each = 1 box) 1 each 5    nystatin (MYCOSTATIN) 631384 UNIT/GM powder Apply 3 times daily.  45 g 5    cholestyramine (QUESTRAN) 4 g packet Take 1 packet by mouth 2 times daily 180 packet 5    dicyclomine (BENTYL) 10 MG capsule Take 1 capsule by mouth 2 times daily (before meals) 120 capsule 3    Respiratory Therapy Supplies (FULL KIT NEBULIZER SET) MISC 1 Units by Does not apply route 4 times daily 1 each 1    albuterol sulfate  (90 Base) MCG/ACT inhaler Inhale 2 puffs into the lungs 4 times daily as needed for Wheezing 3 Inhaler 3    albuterol (PROVENTIL) (2.5 MG/3ML) 0.083% nebulizer solution Take 3 mLs by nebulization every 6 hours as needed for Wheezing 300 vial 3    CPAP Machine MISC       glucose 4 g chewable tablet Take 4 tablets by mouth as needed for Low blood sugar (Patient not taking: No sig reported) 60 tablet 3     No current facility-administered medications for this visit. No results found for this or any previous visit.  ]  Results for orders placed during the hospital encounter of 09/06/22    XR CHEST PORTABLE    Narrative  EXAMINATION:  CHEST. CLINICAL HISTORY:  SHORTNESS OF BREATH.    COMPARISONS:  8/7/2021. TECHNIQUE:  AP portable. FINDINGS:  Heart size and contour are within normal limits. Pulmonary vascularity appears unremarkable. No infiltrate or effusion is seen. No definite evidence of a mass or adenopathy. No significant bony abnormality. Impression  NORMAL PORTABLE CHEST. Results for orders placed during the hospital encounter of 08/07/21    XR CHEST PORTABLE    Narrative  Exam: XR CHEST PORTABLE    History: Fever. Abdominal pain. Technique: AP portable view of the chest obtained. Comparison: Portable chest radiograph from September 24, 2020    Findings: The cardiomediastinal silhouette is within normal limits. No pneumothorax, pleural effusion, or consolidation. Bones of the thorax appear intact. Impression  No radiographic evidence of acute intrathoracic process. Results for orders placed during the hospital encounter of 09/24/20    XR CHEST PORTABLE    Narrative  EXAMINATION: XR CHEST PORTABLE    CLINICAL HISTORY: LOWER CHEST PAIN, FEVER    COMPARISONS: NOVEMBER 24, 2014    FINDINGS: Osseous structures are intact. Cardiopericardial silhouette is normal. Pulmonary vasculature is normal. Lungs are clear.     Impression  NO ACUTE CARDIOPULMONARY DISEASE.  ]  No results found for this or any previous visit.  ]  No results found for this or any previous visit.  ]  No results found for this or any previous visit.  ]    Assessment/Plan:     1. Moderate persistent asthma with acute exacerbation  She was in hospital for asthma exacerbation ,  She is on albuterol HFA and neb with albuterol C/o shortness of breath with exertion but better than before . No Wheezing. No Cough     She had PFT  done which I reviewed with her. Spirometry is normal with no significant response to BD Lung volume are normal, ERV reduced due to obesity. DLCO is normal        2. BUNNY (obstructive sleep apnea)  She said she is going for  sleep study 11/14/22 . She is complaining of snoring and daytime sleepiness and tiredness. No C/o witness apnea. She wakes up with gasping for air. C/o wakes up frequently during sleep . complaint of morning headache. No sleep study in last 10 yrs ,   She had mild BUNNY 10 yrs ago , She was on CPAP few yrs but did not feels much difference. Willing to go for sleep study and try CPAP therapy again. I requested sleep study . 3. Morbid obesity ([address]  She had gastric  bypass lost  more than 50 lbs but gain all back     Return in about 6 weeks (around 11/30/2022).       Romeo Gomez MD

## 2022-11-14 ENCOUNTER — HOSPITAL ENCOUNTER (OUTPATIENT)
Dept: SLEEP CENTER | Age: 67
Discharge: HOME | End: 2022-11-16
Payer: COMMERCIAL

## 2022-11-14 PROCEDURE — 95810 POLYSOM 6/> YRS 4/> PARAM: CPT

## 2022-12-01 ENCOUNTER — OFFICE VISIT (OUTPATIENT)
Dept: FAMILY MEDICINE CLINIC | Age: 67
End: 2022-12-01
Payer: COMMERCIAL

## 2022-12-01 VITALS
RESPIRATION RATE: 14 BRPM | OXYGEN SATURATION: 98 % | HEIGHT: 62 IN | HEART RATE: 64 BPM | SYSTOLIC BLOOD PRESSURE: 130 MMHG | BODY MASS INDEX: 48.03 KG/M2 | DIASTOLIC BLOOD PRESSURE: 80 MMHG | WEIGHT: 261 LBS

## 2022-12-01 DIAGNOSIS — E11.9 TYPE 2 DIABETES MELLITUS WITHOUT COMPLICATION, WITHOUT LONG-TERM CURRENT USE OF INSULIN (HCC): Primary | ICD-10-CM

## 2022-12-01 DIAGNOSIS — J45.41 MODERATE PERSISTENT ASTHMA WITH ACUTE EXACERBATION: ICD-10-CM

## 2022-12-01 DIAGNOSIS — Z87.11 PERSONAL HISTORY OF GASTRIC ULCER: ICD-10-CM

## 2022-12-01 DIAGNOSIS — K58.0 IRRITABLE BOWEL SYNDROME WITH DIARRHEA: ICD-10-CM

## 2022-12-01 DIAGNOSIS — B37.2 YEAST DERMATITIS: ICD-10-CM

## 2022-12-01 DIAGNOSIS — I10 ESSENTIAL HYPERTENSION: ICD-10-CM

## 2022-12-01 DIAGNOSIS — K21.9 GASTROESOPHAGEAL REFLUX DISEASE WITHOUT ESOPHAGITIS: ICD-10-CM

## 2022-12-01 LAB — HBA1C MFR BLD: 6.1 %

## 2022-12-01 PROCEDURE — 3078F DIAST BP <80 MM HG: CPT | Performed by: INTERNAL MEDICINE

## 2022-12-01 PROCEDURE — 1123F ACP DISCUSS/DSCN MKR DOCD: CPT | Performed by: INTERNAL MEDICINE

## 2022-12-01 PROCEDURE — 99214 OFFICE O/P EST MOD 30 MIN: CPT | Performed by: INTERNAL MEDICINE

## 2022-12-01 PROCEDURE — 3074F SYST BP LT 130 MM HG: CPT | Performed by: INTERNAL MEDICINE

## 2022-12-01 PROCEDURE — 83036 HEMOGLOBIN GLYCOSYLATED A1C: CPT | Performed by: INTERNAL MEDICINE

## 2022-12-01 PROCEDURE — 3044F HG A1C LEVEL LT 7.0%: CPT | Performed by: INTERNAL MEDICINE

## 2022-12-01 RX ORDER — NYSTATIN 100000 [USP'U]/G
POWDER TOPICAL
Qty: 45 G | Refills: 5 | Status: SHIPPED | OUTPATIENT
Start: 2022-12-01

## 2022-12-01 RX ORDER — CHOLESTYRAMINE 4 G/9G
1 POWDER, FOR SUSPENSION ORAL 2 TIMES DAILY
Qty: 180 PACKET | Refills: 5 | Status: SHIPPED | OUTPATIENT
Start: 2022-12-01

## 2022-12-01 RX ORDER — PANTOPRAZOLE SODIUM 40 MG/1
TABLET, DELAYED RELEASE ORAL
Qty: 30 TABLET | Refills: 5 | Status: SHIPPED | OUTPATIENT
Start: 2022-12-01

## 2022-12-01 RX ORDER — AMLODIPINE BESYLATE 10 MG/1
TABLET ORAL
Qty: 30 TABLET | Refills: 5 | Status: SHIPPED | OUTPATIENT
Start: 2022-12-01

## 2022-12-01 RX ORDER — ALBUTEROL SULFATE 90 UG/1
2 AEROSOL, METERED RESPIRATORY (INHALATION) 4 TIMES DAILY PRN
Qty: 3 EACH | Refills: 3 | Status: SHIPPED | OUTPATIENT
Start: 2022-12-01 | End: 2023-12-01

## 2022-12-01 ASSESSMENT — ENCOUNTER SYMPTOMS
PHOTOPHOBIA: 0
RECTAL PAIN: 0
TROUBLE SWALLOWING: 0
EYE PAIN: 0
CHEST TIGHTNESS: 0
EYE DISCHARGE: 0
FACIAL SWELLING: 0
WHEEZING: 0
SINUS PRESSURE: 0
CONSTIPATION: 0
BLOOD IN STOOL: 0
RHINORRHEA: 0
VOMITING: 0
APNEA: 0
EYE REDNESS: 0
EYE ITCHING: 0
VOICE CHANGE: 0
SORE THROAT: 0
NAUSEA: 0
DIARRHEA: 0
SINUS PAIN: 0
ABDOMINAL DISTENTION: 0
COUGH: 0
BACK PAIN: 0
ABDOMINAL PAIN: 0
COLOR CHANGE: 0
SHORTNESS OF BREATH: 0

## 2022-12-01 NOTE — PROGRESS NOTES
Subjective:      Patient ID: Dilia Mcdonald is a 79 y.o. female Established patient, here for evaluation of the following chief complaint(s):  Chief Complaint   Patient presents with    Diabetes    Hypertension       Hypertension  Pertinent negatives include no chest pain, headaches, neck pain, palpitations or shortness of breath. Diabetes  Pertinent negatives for hypoglycemia include no confusion, dizziness, headaches, nervousness/anxiousness, seizures, speech difficulty or tremors. Pertinent negatives for diabetes include no chest pain, no fatigue, no polydipsia, no polyphagia, no polyuria and no weakness. 59-year-old female with history of essential hypertension, type 2 diabetes, GERD, obstructive sleep apnea, moderate persistent asthma presents for follow-up visit. Essential hypertension-the patient's blood pressure is well controlled at this time. DMII-Humalog by sliding scale and  Tresiba: 30 u nits qhs. A1C =6.1 on 12/1/2022. Acid reflux-compliant with Protonix 40 mg orally daily. Yeast dermatitis: Compliant with nystatin 1000 international units daily. Irritable bowel syndrome: Compliant with Questran 4 g 2 times daily. At present he denies polyuria,  Polydipsia, constitutional, sinus, visual, cardiopulmonary, urologic, gastrointestinal, immunologic/hematologic, musculoskeletal, neurologic,dermatologic, or psychiatric complaints. Review of Systems   Constitutional:  Negative for chills, diaphoresis, fatigue and fever. HENT:  Negative for congestion, dental problem, drooling, ear discharge, ear pain, facial swelling, hearing loss, mouth sores, nosebleeds, postnasal drip, rhinorrhea, sinus pressure, sinus pain, sneezing, sore throat, tinnitus, trouble swallowing and voice change. Eyes:  Negative for photophobia, pain, discharge, redness, itching and visual disturbance. Respiratory:  Negative for apnea, cough, chest tightness, shortness of breath and wheezing. Cardiovascular:  Negative for chest pain, palpitations and leg swelling. Gastrointestinal:  Negative for abdominal distention, abdominal pain, blood in stool, constipation, diarrhea, nausea, rectal pain and vomiting. Endocrine: Negative for cold intolerance, heat intolerance, polydipsia, polyphagia and polyuria. Genitourinary:  Negative for decreased urine volume, difficulty urinating, dysuria, flank pain, frequency, genital sores, hematuria and urgency. Musculoskeletal:  Negative for arthralgias, back pain, gait problem, joint swelling, myalgias, neck pain and neck stiffness. Skin:  Negative for color change, rash and wound. Allergic/Immunologic: Negative for environmental allergies and food allergies. Neurological:  Negative for dizziness, tremors, seizures, syncope, facial asymmetry, speech difficulty, weakness, light-headedness, numbness and headaches. Hematological:  Negative for adenopathy. Does not bruise/bleed easily. Psychiatric/Behavioral:  Negative for agitation, confusion, decreased concentration, hallucinations, self-injury, sleep disturbance and suicidal ideas. The patient is not nervous/anxious. Objective:   /80   Pulse 64   Resp 14   Ht 5' 2\" (1.575 m)   Wt 261 lb (118.4 kg)   SpO2 98%   BMI 47.74 kg/m²     Physical Exam  Constitutional:       General: She is not in acute distress. Appearance: She is well-developed. HENT:      Head: Normocephalic. Right Ear: External ear normal.      Left Ear: External ear normal.   Eyes:      Conjunctiva/sclera: Conjunctivae normal.   Neck:      Vascular: No JVD. Trachea: No tracheal deviation. Cardiovascular:      Rate and Rhythm: Normal rate and regular rhythm. Heart sounds: Normal heart sounds. Pulmonary:      Effort: Pulmonary effort is normal. No respiratory distress. Breath sounds: Normal breath sounds. No wheezing or rales. Chest:      Chest wall: No tenderness.    Abdominal:      General: Bowel sounds are normal. There is no distension. Palpations: Abdomen is soft. There is no mass. Tenderness: There is no abdominal tenderness. There is no guarding or rebound. Musculoskeletal:         General: No tenderness or deformity. Cervical back: Neck supple. Skin:     General: Skin is warm and dry. Coloration: Skin is not pale. Findings: No erythema or rash. Neurological:      Mental Status: She is alert and oriented to person, place, and time. Cranial Nerves: No cranial nerve deficit. Motor: No abnormal muscle tone. Psychiatric:         Thought Content: Thought content normal.         Judgment: Judgment normal.       Assessment:       Diagnosis Orders   1. Type 2 diabetes mellitus without complication, without long-term current use of insulin ([facility])  POCT glycosylated hemoglobin (Hb A1C)      2. Personal history of gastric ulcer  pantoprazole (PROTONIX) 40 MG tablet      3. Gastroesophageal reflux disease without esophagitis  pantoprazole (PROTONIX) 40 MG tablet      4. Essential hypertension  amLODIPine (NORVASC) 10 MG tablet      5. Yeast dermatitis  nystatin (MYCOSTATIN) 237130 UNIT/GM powder      6. Irritable bowel syndrome with diarrhea  cholestyramine (QUESTRAN) 4 g packet      7. Moderate persistent asthma with acute exacerbation  albuterol sulfate HFA (PROVENTIL;VENTOLIN;PROAIR) 108 (90 Base) MCG/ACT inhaler            Plan:            Essential hypertension continue Norvasc 10 mg orally daily, lisinopril-hydrochlorothiazide 20-25 mg daily    Type 2 diabetes mellitus without complication, without long-term current use of insulin ([facility])-blood sugar suboptimal.  Humalog 20 units twice daily Tresiba 30 units at bedtime. Gastroesophageal reflux disease without esophagitis-continue Protonix 40 mg orally daily    BUNNY (obstructive sleep apnea)-the patient is attempting to obtain a more modern CPAP machine.     Moderate persistent asthma, unspecified whether complicated-stable. Monitoring. Yeast dermatitis-  -Stable at this time. Continue nystatin powder               No follow-ups on file. On this date 12/01/22 I have spent 30 minutes reviewing previous notes, test results and face to face with the patient discussing the diagnosis and importance of compliance with the treatment plan. Sunil Edmondson MD    Please note, this report has been partially produced using speech recognition software  and may cause  and /or contain errors related to that system including grammar, punctuation and spelling as well as words and phrases that may seem inappropriate. If there are questions or concerns please feel free to contact me to clarify.

## 2022-12-02 DIAGNOSIS — E11.65 TYPE 2 DIABETES MELLITUS WITH HYPERGLYCEMIA, WITH LONG-TERM CURRENT USE OF INSULIN (HCC): ICD-10-CM

## 2022-12-02 DIAGNOSIS — Z79.4 TYPE 2 DIABETES MELLITUS WITH HYPERGLYCEMIA, WITH LONG-TERM CURRENT USE OF INSULIN (HCC): ICD-10-CM

## 2022-12-02 RX ORDER — FLASH GLUCOSE SENSOR
1 KIT MISCELLANEOUS
Qty: 6 EACH | Refills: 3 | Status: SHIPPED | OUTPATIENT
Start: 2022-12-02

## 2022-12-02 RX ORDER — INSULIN LISPRO 200 [IU]/ML
INJECTION, SOLUTION SUBCUTANEOUS
Qty: 10 ADJUSTABLE DOSE PRE-FILLED PEN SYRINGE | Refills: 3 | Status: SHIPPED | OUTPATIENT
Start: 2022-12-02

## 2022-12-02 RX ORDER — INSULIN DEGLUDEC INJECTION 100 U/ML
30 INJECTION, SOLUTION SUBCUTANEOUS NIGHTLY
Qty: 10 ADJUSTABLE DOSE PRE-FILLED PEN SYRINGE | Refills: 3 | Status: SHIPPED | OUTPATIENT
Start: 2022-12-02

## 2022-12-05 ENCOUNTER — OFFICE VISIT (OUTPATIENT)
Dept: PULMONOLOGY | Age: 67
End: 2022-12-05
Payer: COMMERCIAL

## 2022-12-05 VITALS
TEMPERATURE: 97.6 F | WEIGHT: 257 LBS | OXYGEN SATURATION: 96 % | BODY MASS INDEX: 47.29 KG/M2 | HEIGHT: 62 IN | DIASTOLIC BLOOD PRESSURE: 80 MMHG | HEART RATE: 76 BPM | SYSTOLIC BLOOD PRESSURE: 131 MMHG

## 2022-12-05 DIAGNOSIS — G47.33 OSA (OBSTRUCTIVE SLEEP APNEA): ICD-10-CM

## 2022-12-05 DIAGNOSIS — J45.41 MODERATE PERSISTENT ASTHMA WITH ACUTE EXACERBATION: Primary | ICD-10-CM

## 2022-12-05 DIAGNOSIS — E66.01 MORBID OBESITY (HCC): ICD-10-CM

## 2022-12-05 PROBLEM — J20.8 ACUTE BRONCHITIS DUE TO HUMAN METAPNEUMOVIRUS: Status: ACTIVE | Noted: 2022-12-05

## 2022-12-05 PROBLEM — B97.81 ACUTE BRONCHITIS DUE TO HUMAN METAPNEUMOVIRUS: Status: ACTIVE | Noted: 2022-12-05

## 2022-12-05 PROCEDURE — 99214 OFFICE O/P EST MOD 30 MIN: CPT | Performed by: INTERNAL MEDICINE

## 2022-12-05 PROCEDURE — 1123F ACP DISCUSS/DSCN MKR DOCD: CPT | Performed by: INTERNAL MEDICINE

## 2022-12-05 PROCEDURE — 3078F DIAST BP <80 MM HG: CPT | Performed by: INTERNAL MEDICINE

## 2022-12-05 PROCEDURE — 3074F SYST BP LT 130 MM HG: CPT | Performed by: INTERNAL MEDICINE

## 2022-12-05 RX ORDER — ALBUTEROL SULFATE 90 UG/1
2 AEROSOL, METERED RESPIRATORY (INHALATION) 4 TIMES DAILY PRN
Qty: 3 EACH | Refills: 3 | Status: SHIPPED | OUTPATIENT
Start: 2022-12-05 | End: 2023-12-05

## 2022-12-05 RX ORDER — IPRATROPIUM BROMIDE AND ALBUTEROL SULFATE 2.5; .5 MG/3ML; MG/3ML
1 SOLUTION RESPIRATORY (INHALATION) EVERY 4 HOURS
Qty: 360 ML | Refills: 0 | Status: SHIPPED | OUTPATIENT
Start: 2022-12-05

## 2022-12-05 ASSESSMENT — ENCOUNTER SYMPTOMS
SORE THROAT: 0
ABDOMINAL PAIN: 0
TROUBLE SWALLOWING: 0
WHEEZING: 0
EYE ITCHING: 0
VOMITING: 0
VOICE CHANGE: 0
COUGH: 0
NAUSEA: 0
SHORTNESS OF BREATH: 1
RHINORRHEA: 0
SINUS PRESSURE: 0
EYE DISCHARGE: 0
CHEST TIGHTNESS: 0
DIARRHEA: 0

## 2022-12-05 NOTE — PROGRESS NOTES
Subjective:     Melvin Wu is a 79 y.o. female who complains today of:     Chief Complaint   Patient presents with    Asthma    Results     Sleep results       HPI  She had PSG  done show AHI 12.9  RDI 13.8 O2 desat for 15.6 min   She  daytime sleepiness and tiredness. She sometimes  wakes up with gasping for air. C/o wakes up frequently during sleep . Complaint of morning headache. She does not have restful sleep. She does not take daily naps. She does not fall asleep while watching TV. She does not have a complaint of sleepiness while driving. No sleep study in last 10 yrs ,   She had mild BUNNY 10 yrs ago . She had gastric  bypass lost  more than 50 lbs but gain all back , she will go for CPAP study . She is on trelegy  ellipta 1 puff daily  ,  She is on albuterol HFA prn and neb  with albuterol BID. C/o shortness of breath with exertion. No Wheezing. No Cough   No Hemoptysis. No Chest tightness. No Chest pain with radiation  or pleuritic pain. No  leg edema. No orthopnea. No Fever or chills. No Rhinorrhea and postnasal drip.             Allergies:  Aspirin, Codeine, Imitrex [sumatriptan], Theophyllines, Diflucan [fluconazole], Nizoral [ketoconazole], and Zoloft  Past Medical History:   Diagnosis Date    Allergic rhinitis     Asthma     Breast cancer ([facility] 2014    invasive ductal carcinoma left breast    CAD (coronary artery disease)     Cancer ([facility] 11/2014    Invasive ductal left breast    Colon polyps     Diabetes (HCC)     Fibromyalgia     GERD (gastroesophageal reflux disease)     HA (headache)     Herpes simplex     Nose    History of therapeutic radiation     HTN (hypertension)     Hx antineoplastic chemo 2014    Obesity     Senile osteoporosis     Sleep apnea      Past Surgical History:   Procedure Laterality Date    BARIATRIC SURGERY  03/2004    BREAST BIOPSY Left 11/07/14    BREAST BIOPSY Left 12/10/14    BREAST LUMPECTOMY Left 2014    with left SNB  invasive ductal carcinoma    BREAST LUMPECTOMY Left     BREAST LUMPECTOMY Left      SECTION      x4    CHOLECYSTECTOMY  2007    GASTRIC BYPASS SURGERY      HYSTERECTOMY (CERVIX STATUS UNKNOWN)      KNEE ARTHROPLASTY Right     knee reconstruction    KNEE SURGERY      Right knee    LYMPH NODE BIOPSY Left 14    ANDREW AND BSO (CERVIX REMOVED)      Dr. Smith Friendly ENDOSCOPY  2/18/15    w/bx,polypectomy     UPPER GASTROINTESTINAL ENDOSCOPY N/A 2020    EGD DIAGNOSTIC ONLY performed by Malcolm Munoz MD at [address]       Family History   Problem Relation Age of Onset    Cancer Father         melanoma    Prostate Cancer Father     Cancer Sister         Melanoma    Diabetes Maternal Aunt     Breast Cancer Maternal Aunt     Cancer Other         Throat    Colon Cancer Neg Hx      Social History     Socioeconomic History    Marital status:      Spouse name: Not on file    Number of children: Not on file    Years of education: Not on file    Highest education level: Not on file   Occupational History    Not on file   Tobacco Use    Smoking status: Never    Smokeless tobacco: Never   Vaping Use    Vaping Use: Never used   Substance and Sexual Activity    Alcohol use: No    Drug use: No    Sexual activity: Not on file   Other Topics Concern    Not on file   Social History Narrative    Not on file     Social Determinants of Health     Financial Resource Strain: Low Risk     Difficulty of Paying Living Expenses: Not hard at all   Food Insecurity: No Food Insecurity    Worried About Running Out of Food in the Last Year: Never true    Ran Out of Food in the Last Year: Never true   Transportation Needs: Not on file   Physical Activity: Not on file   Stress: Not on file   Social Connections: Not on file   Intimate Partner Violence: Not on file   Housing Stability: Not on file         Review of Systems Constitutional:  Negative for chills, diaphoresis, fatigue and fever. HENT:  Negative for congestion, mouth sores, nosebleeds, postnasal drip, rhinorrhea, sinus pressure, sneezing, sore throat, trouble swallowing and voice change. Eyes:  Negative for discharge, itching and visual disturbance. Respiratory:  Positive for shortness of breath. Negative for cough, chest tightness and wheezing. Cardiovascular:  Negative for chest pain, palpitations and leg swelling. Gastrointestinal:  Negative for abdominal pain, diarrhea, nausea and vomiting. Genitourinary:  Negative for difficulty urinating and hematuria. Musculoskeletal:  Negative for arthralgias, joint swelling and myalgias. Skin:  Negative for rash. Allergic/Immunologic: Negative for environmental allergies and food allergies. Neurological:  Negative for dizziness, tremors, weakness and headaches. Psychiatric/Behavioral:  Positive for sleep disturbance. Negative for behavioral problems. :     Vitals:    12/05/22 0838   BP: 131/80   Pulse: 76   Temp: 97.6 °F (36.4 °C)   TempSrc: Tympanic   SpO2: 96%   Weight: 257 lb (116.6 kg)   Height: 5' 2\" (1.575 m)     Wt Readings from Last 3 Encounters:   12/05/22 257 lb (116.6 kg)   12/01/22 261 lb (118.4 kg)   10/20/22 258 lb (117 kg)         Physical Exam  Constitutional:       General: She is not in acute distress. Appearance: She is well-developed. She is obese. She is not diaphoretic. HENT:      Head: Normocephalic and atraumatic. Nose: Nose normal.   Eyes:      Pupils: Pupils are equal, round, and reactive to light. Neck:      Thyroid: No thyromegaly. Vascular: No JVD. Trachea: No tracheal deviation. Cardiovascular:      Rate and Rhythm: Normal rate and regular rhythm. Heart sounds: No murmur heard. No friction rub. No gallop. Pulmonary:      Effort: No respiratory distress. Breath sounds: No wheezing or rales. Chest:      Chest wall: No tenderness. Abdominal:      General: There is no distension. Tenderness: There is no abdominal tenderness. There is no rebound. Musculoskeletal:         General: Normal range of motion. Lymphadenopathy:      Cervical: No cervical adenopathy. Skin:     General: Skin is warm and dry. Neurological:      Mental Status: She is alert and oriented to person, place, and time. Coordination: Coordination normal.   Psychiatric:         Mood and Affect: Mood normal.         Behavior: Behavior normal.       Current Outpatient Medications   Medication Sig Dispense Refill    ipratropium-albuterol (DUONEB) 0.5-2.5 (3) MG/3ML SOLN nebulizer solution Inhale 3 mLs into the lungs every 4 hours 360 mL 0    albuterol sulfate HFA (PROVENTIL;VENTOLIN;PROAIR) 108 (90 Base) MCG/ACT inhaler Inhale 2 puffs into the lungs 4 times daily as needed for Wheezing 3 each 3    insulin lispro (HUMALOG KWIKPEN) 200 UNIT/ML SOPN pen inject 3 times daily before meals- 120-140 2 units, 141-160 3 units, 161-180 4 units, 181-200 5 units, 201-220 6 units, 221-240 7 units, 241-260 8 units, 261-280 9 units, 281-300 10 units, 301 or higher 11 units. 10 Adjustable Dose Pre-filled Pen Syringe 3    Insulin Degludec (TRESIBA FLEXTOUCH) 100 UNIT/ML SOPN Inject 30 Units into the skin nightly 10 Adjustable Dose Pre-filled Pen Syringe 3    Continuous Blood Gluc Sensor (FREESTYLE GERTRUDE 2 SENSOR) MISC 1 Device by Does not apply route every 14 days 6 each 3    Insulin Pen Needle 32G X 6 MM MISC 1 Device by Does not apply route 4 times daily (before meals and nightly) 400 each 3    pantoprazole (PROTONIX) 40 MG tablet Take 1 tablet by mouth once daily 30 tablet 5    amLODIPine (NORVASC) 10 MG tablet Take 1 tablet by mouth once daily 30 tablet 5    nystatin (MYCOSTATIN) 455547 UNIT/GM powder Apply 3 times daily.  45 g 5    cholestyramine (QUESTRAN) 4 g packet Take 1 packet by mouth 2 times daily 180 packet 5    Fluticasone-Umeclidin-Vilant (Madalynn Cools) 200-62.5-25 MCG/INH AEPB Inhale 1 puff into the lungs daily 2 each 0    glucose 4 g chewable tablet Take 4 tablets by mouth as needed for Low blood sugar 60 tablet 3    Continuous Blood Gluc  (FREESTYLE GERTRUDE 2 READER) CHRISTINA 1 Device by Does not apply route 4 times daily (before meals and nightly) 1 each 0    blood glucose monitor strips Test 4 times a day and nightly. Dispense sufficient amount for indicated testing frequency plus additional to accommodate PRN testing needs. 150 strip 3    fluticasone (FLONASE) 50 MCG/ACT nasal spray 1 spray by Each Nostril route daily 32 g 1    Brompheniramine-Phenylephrine 2-5 MG/10ML LIQD Take 10 mLs by mouth every 6 hours as needed (cough) 237 mL 0    albuterol (PROVENTIL) (5 MG/ML) 0.5% nebulizer solution Take 1 mL by nebulization 4 times daily as needed for Wheezing 120 each 3    hydrocortisone 1 % cream       ketoconazole (NIZORAL) 2 % cream       Blood Glucose Monitoring Suppl (ONE TOUCH ULTRA 2) w/Device KIT       cyanocobalamin 1000 MCG/ML injection INJECT 1 ML SUBCUTANEOUSLY ONCE EVERY MONTH      ULTICARE INSULIN SAFETY SYR 29G X 1/2\" 1 ML MISC USE AS DIRECTED      nystatin (MYCOSTATIN) 705887 UNIT/GM cream Apply topically 2 times daily. 30 g 3    blood glucose monitor kit and supplies Dispense sufficient amount for indicated testing frequency plus additional to accommodate PRN testing needs. Dispense all needed supplies to include: monitor, strips, lancing device, lancets, control solutions, alcohol swabs. 1 kit 0    Lancets MISC DX: diabetes mellitus.  Use 1-3 time(s) daily - Ok to substitute per insurance (1 each = 1 box) 1 each 5    dicyclomine (BENTYL) 10 MG capsule Take 1 capsule by mouth 2 times daily (before meals) 120 capsule 3    Respiratory Therapy Supplies (FULL KIT NEBULIZER SET) MISC 1 Units by Does not apply route 4 times daily 1 each 1    albuterol (PROVENTIL) (2.5 MG/3ML) 0.083% nebulizer solution Take 3 mLs by nebulization every 6 hours as needed for Wheezing 300 vial 3    CPAP Machine MISC        No current facility-administered medications for this visit. No results found for this or any previous visit.  ]  Results for orders placed during the hospital encounter of 09/06/22    XR CHEST PORTABLE    Narrative  EXAMINATION:  CHEST. CLINICAL HISTORY:  SHORTNESS OF BREATH.    COMPARISONS:  8/7/2021. TECHNIQUE:  AP portable. FINDINGS:  Heart size and contour are within normal limits. Pulmonary vascularity appears unremarkable. No infiltrate or effusion is seen. No definite evidence of a mass or adenopathy. No significant bony abnormality. Impression  NORMAL PORTABLE CHEST. Results for orders placed during the hospital encounter of 08/07/21    XR CHEST PORTABLE    Narrative  Exam: XR CHEST PORTABLE    History: Fever. Abdominal pain. Technique: AP portable view of the chest obtained. Comparison: Portable chest radiograph from September 24, 2020    Findings: The cardiomediastinal silhouette is within normal limits. No pneumothorax, pleural effusion, or consolidation. Bones of the thorax appear intact. Impression  No radiographic evidence of acute intrathoracic process. Results for orders placed during the hospital encounter of 09/24/20    XR CHEST PORTABLE    Narrative  EXAMINATION: XR CHEST PORTABLE    CLINICAL HISTORY: LOWER CHEST PAIN, FEVER    COMPARISONS: NOVEMBER 24, 2014    FINDINGS: Osseous structures are intact. Cardiopericardial silhouette is normal. Pulmonary vasculature is normal. Lungs are clear. Impression  NO ACUTE CARDIOPULMONARY DISEASE. Assessment/Plan:     1. BUNNY (obstructive sleep apnea)  She had PSG  done show AHI 12.9  RDI 13.8 O2 desat for 15.6 min . She  daytime sleepiness and tiredness. She sometimes  wakes up with gasping for air. C/o wakes up frequently during sleep . Complaint of morning headache. She does not have restful sleep.  She will go CPAP titration study.    - Sleep Study with PAP Titration    2. Moderate persistent asthma   She is on trelegy  ellipta 1 puff daily  ,  She is on albuterol HFA prn and neb  with albuterol BID. C/o shortness of breath with exertion. No Wheezing. No Cough. continue same. 3. Morbid obesity ([address]  She is advised try to lose weight. obesity related risk explained to the patient ,  Current weight:  257 lb (116.6 kg) Lbs. BMI:  Body mass index is 47.01 kg/m². Suggested weight control approaches, including dietary changes , exercise, behavioral modification. Return in about 3 weeks (around 12/26/2022) for misael.       Ashok Denver, MD

## 2022-12-13 ENCOUNTER — HOSPITAL ENCOUNTER (OUTPATIENT)
Dept: SLEEP CENTER | Age: 67
Discharge: HOME | End: 2022-12-15
Payer: COMMERCIAL

## 2022-12-13 PROCEDURE — 95811 POLYSOM 6/>YRS CPAP 4/> PARM: CPT

## 2022-12-27 ENCOUNTER — OFFICE VISIT (OUTPATIENT)
Dept: PULMONOLOGY | Age: 67
End: 2022-12-27
Payer: COMMERCIAL

## 2022-12-27 VITALS
OXYGEN SATURATION: 97 % | HEART RATE: 63 BPM | WEIGHT: 260 LBS | DIASTOLIC BLOOD PRESSURE: 80 MMHG | SYSTOLIC BLOOD PRESSURE: 132 MMHG | BODY MASS INDEX: 47.55 KG/M2

## 2022-12-27 DIAGNOSIS — Z87.11 PERSONAL HISTORY OF GASTRIC ULCER: ICD-10-CM

## 2022-12-27 DIAGNOSIS — K21.9 GASTROESOPHAGEAL REFLUX DISEASE WITHOUT ESOPHAGITIS: ICD-10-CM

## 2022-12-27 DIAGNOSIS — G47.33 OSA (OBSTRUCTIVE SLEEP APNEA): Primary | ICD-10-CM

## 2022-12-27 DIAGNOSIS — J45.41 MODERATE PERSISTENT ASTHMA WITH ACUTE EXACERBATION: ICD-10-CM

## 2022-12-27 DIAGNOSIS — I10 ESSENTIAL HYPERTENSION: ICD-10-CM

## 2022-12-27 DIAGNOSIS — E66.01 MORBID OBESITY (HCC): ICD-10-CM

## 2022-12-27 PROCEDURE — 99214 OFFICE O/P EST MOD 30 MIN: CPT | Performed by: INTERNAL MEDICINE

## 2022-12-27 PROCEDURE — 1123F ACP DISCUSS/DSCN MKR DOCD: CPT | Performed by: INTERNAL MEDICINE

## 2022-12-27 PROCEDURE — 3078F DIAST BP <80 MM HG: CPT | Performed by: INTERNAL MEDICINE

## 2022-12-27 PROCEDURE — 3074F SYST BP LT 130 MM HG: CPT | Performed by: INTERNAL MEDICINE

## 2022-12-27 RX ORDER — AMLODIPINE BESYLATE 10 MG/1
TABLET ORAL
Qty: 90 TABLET | Refills: 1 | Status: SHIPPED | OUTPATIENT
Start: 2022-12-27

## 2022-12-27 RX ORDER — PANTOPRAZOLE SODIUM 40 MG/1
TABLET, DELAYED RELEASE ORAL
Qty: 90 TABLET | Refills: 1 | Status: SHIPPED | OUTPATIENT
Start: 2022-12-27

## 2022-12-27 ASSESSMENT — ENCOUNTER SYMPTOMS
EYE DISCHARGE: 0
RHINORRHEA: 0
WHEEZING: 0
SINUS PRESSURE: 0
EYE ITCHING: 0
VOICE CHANGE: 0
VOMITING: 0
ABDOMINAL PAIN: 0
DIARRHEA: 0
NAUSEA: 0
CHEST TIGHTNESS: 0
SHORTNESS OF BREATH: 1
SORE THROAT: 0
COUGH: 0
TROUBLE SWALLOWING: 0

## 2022-12-27 NOTE — TELEPHONE ENCOUNTER
Patient is requesting 90 day supply of these medications to go to [facility].         Rx requested:  Requested Prescriptions     Pending Prescriptions Disp Refills    amLODIPine (NORVASC) 10 MG tablet 30 tablet 5     Sig: Take 1 tablet by mouth once daily    pantoprazole (PROTONIX) 40 MG tablet 30 tablet 5     Sig: Take 1 tablet by mouth once daily         Last Office Visit:   12/1/2022      Next Visit Date:  Future Appointments   Date Time Provider [facility]   1/4/2023  8:00 AM Fermin Agustin [facility]   3/2/2023  4:00 PM Virgil Howard  [place]   3/28/2023  8:30 AM Romeo Gomez MD [address] [place] [address]

## 2022-12-27 NOTE — PROGRESS NOTES
Subjective:     Dianne Brown is a 79 y.o. female who complains today of:     Chief Complaint   Patient presents with    Follow-up     3wk f/u for SS results       HPI  She had PSG show AHI 12.9 and O2 desaturation. She had CPAP titration study. She had therapeutic CPA at 8 cm with AHI 1.7   She  daytime sleepiness and tiredness. She sometimes  wakes up with gasping for air. C/o wakes up frequently during sleep . Complaint of morning headache  She had mild BUNNY 10 yrs ago . She had gastric  bypass lost  more than 50 lbs but gain all back    She is on trelegy  ellipta 1 puff daily  ,   She is on albuterol HFA prn and neb  with albuterol BID. C/o shortness of breath with exertion. Occasional Wheezing. No Cough . No Chest tightness. No Chest pain with radiation  or pleuritic pain. No  leg edema. No orthopnea. No Fever or chills.    No Rhinorrhea and postnasal drip    Allergies:  Aspirin, Codeine, Imitrex [sumatriptan], Theophyllines, Diflucan [fluconazole], Nizoral [ketoconazole], and Zoloft  Past Medical History:   Diagnosis Date    Allergic rhinitis     Asthma     Breast cancer ([facility]     invasive ductal carcinoma left breast    CAD (coronary artery disease)     Cancer ([facility] 2014    Invasive ductal left breast    Colon polyps     Diabetes ([facility]     Fibromyalgia     GERD (gastroesophageal reflux disease)     HA (headache)     Herpes simplex     Nose    History of therapeutic radiation     HTN (hypertension)     Hx antineoplastic chemo     Obesity     Senile osteoporosis     Sleep apnea      Past Surgical History:   Procedure Laterality Date    BARIATRIC SURGERY  2004    BREAST BIOPSY Left 14    BREAST BIOPSY Left 12/10/14    BREAST LUMPECTOMY Left     with left SNB  invasive ductal carcinoma    BREAST LUMPECTOMY Left     BREAST LUMPECTOMY Left      SECTION      x4    CHOLECYSTECTOMY  2007    GASTRIC BYPASS SURGERY      HYSTERECTOMY (CERVIX STATUS UNKNOWN)      KNEE ARTHROPLASTY Right     knee reconstruction    KNEE SURGERY  1992    Right knee    LYMPH NODE BIOPSY Left 11/18/14    ANDREW AND BSO (CERVIX REMOVED)      Dr. Davila [place] ENDOSCOPY  2/18/15    w/bx,polypectomy     UPPER GASTROINTESTINAL ENDOSCOPY N/A 9/28/2020    EGD DIAGNOSTIC ONLY performed by Fritz Bloom MD at [address]       Family History   Problem Relation Age of Onset    Cancer Father         melanoma    Prostate Cancer Father     Cancer Sister         Melanoma    Diabetes Maternal Aunt     Breast Cancer Maternal Aunt     Cancer Other         Throat    Colon Cancer Neg Hx      Social History     Socioeconomic History    Marital status:      Spouse name: Not on file    Number of children: Not on file    Years of education: Not on file    Highest education level: Not on file   Occupational History    Not on file   Tobacco Use    Smoking status: Never    Smokeless tobacco: Never   Vaping Use    Vaping Use: Never used   Substance and Sexual Activity    Alcohol use: No    Drug use: No    Sexual activity: Not on file   Other Topics Concern    Not on file   Social History Narrative    Not on file     Social Determinants of Health     Financial Resource Strain: Low Risk     Difficulty of Paying Living Expenses: Not hard at all   Food Insecurity: No Food Insecurity    Worried About Running Out of Food in the Last Year: Never true    Ran Out of Food in the Last Year: Never true   Transportation Needs: Not on file   Physical Activity: Not on file   Stress: Not on file   Social Connections: Not on file   Intimate Partner Violence: Not on file   Housing Stability: Not on file         Review of Systems   Constitutional:  Negative for chills, diaphoresis, fatigue and fever.    HENT:  Negative for congestion, mouth sores, nosebleeds, postnasal drip, rhinorrhea, sinus pressure, sneezing, sore throat, trouble swallowing and voice change. Eyes:  Negative for discharge, itching and visual disturbance. Respiratory:  Positive for shortness of breath. Negative for cough, chest tightness and wheezing. Cardiovascular:  Negative for chest pain, palpitations and leg swelling. Gastrointestinal:  Negative for abdominal pain, diarrhea, nausea and vomiting. Genitourinary:  Negative for difficulty urinating and hematuria. Musculoskeletal:  Negative for arthralgias, joint swelling and myalgias. Skin:  Negative for rash. Allergic/Immunologic: Negative for environmental allergies and food allergies. Neurological:  Negative for dizziness, tremors, weakness and headaches. Psychiatric/Behavioral:  Positive for sleep disturbance. Negative for behavioral problems. :     Vitals:    12/27/22 0900 12/27/22 0905   BP: (!) 174/100 132/80   Site: Right Lower Arm Right Lower Arm   Position: Sitting Sitting   Cuff Size: Medium Adult Medium Adult   Pulse: 63    SpO2: 97%    Weight: 260 lb (117.9 kg)      Wt Readings from Last 3 Encounters:   12/27/22 260 lb (117.9 kg)   12/05/22 257 lb (116.6 kg)   12/01/22 261 lb (118.4 kg)         Physical Exam  Constitutional:       General: She is not in acute distress. Appearance: She is well-developed. She is obese. She is not diaphoretic. HENT:      Head: Normocephalic and atraumatic. Nose: Nose normal.   Eyes:      Pupils: Pupils are equal, round, and reactive to light. Neck:      Thyroid: No thyromegaly. Vascular: No JVD. Trachea: No tracheal deviation. Cardiovascular:      Rate and Rhythm: Normal rate and regular rhythm. Heart sounds: No murmur heard. No friction rub. No gallop. Pulmonary:      Effort: No respiratory distress. Breath sounds: No wheezing or rales. Comments: diminished Breath sound bilaterally. Chest:      Chest wall: No tenderness. Abdominal:      General: There is no distension. Tenderness:  There is no abdominal tenderness. There is no rebound. Musculoskeletal:         General: Normal range of motion. Lymphadenopathy:      Cervical: No cervical adenopathy. Skin:     General: Skin is warm and dry. Neurological:      Mental Status: She is alert and oriented to person, place, and time. Coordination: Coordination normal.   Psychiatric:         Mood and Affect: Mood normal.         Behavior: Behavior normal.       Current Outpatient Medications   Medication Sig Dispense Refill    CPAP Machine MISC by Does not apply route New CPAP with 8 cm 1 each 0    ipratropium-albuterol (DUONEB) 0.5-2.5 (3) MG/3ML SOLN nebulizer solution Inhale 3 mLs into the lungs every 4 hours 360 mL 0    albuterol sulfate HFA (PROVENTIL;VENTOLIN;PROAIR) 108 (90 Base) MCG/ACT inhaler Inhale 2 puffs into the lungs 4 times daily as needed for Wheezing 3 each 3    insulin lispro (HUMALOG KWIKPEN) 200 UNIT/ML SOPN pen inject 3 times daily before meals- 120-140 2 units, 141-160 3 units, 161-180 4 units, 181-200 5 units, 201-220 6 units, 221-240 7 units, 241-260 8 units, 261-280 9 units, 281-300 10 units, 301 or higher 11 units. 10 Adjustable Dose Pre-filled Pen Syringe 3    Insulin Degludec (TRESIBA FLEXTOUCH) 100 UNIT/ML SOPN Inject 30 Units into the skin nightly 10 Adjustable Dose Pre-filled Pen Syringe 3    Continuous Blood Gluc Sensor (FREESTYLE GERTRUDE 2 SENSOR) MISC 1 Device by Does not apply route every 14 days 6 each 3    Insulin Pen Needle 32G X 6 MM MISC 1 Device by Does not apply route 4 times daily (before meals and nightly) 400 each 3    pantoprazole (PROTONIX) 40 MG tablet Take 1 tablet by mouth once daily 30 tablet 5    amLODIPine (NORVASC) 10 MG tablet Take 1 tablet by mouth once daily 30 tablet 5    nystatin (MYCOSTATIN) 445993 UNIT/GM powder Apply 3 times daily.  45 g 5    cholestyramine (QUESTRAN) 4 g packet Take 1 packet by mouth 2 times daily 180 packet 5    Fluticasone-Umeclidin-Vilant (Madalynn Cools) 214-62.1-47 MCG/INH AEPB Inhale 1 puff into the lungs daily 2 each 0    glucose 4 g chewable tablet Take 4 tablets by mouth as needed for Low blood sugar 60 tablet 3    Continuous Blood Gluc  (FREESTYLE GERTRUDE 2 READER) CHRISTINA 1 Device by Does not apply route 4 times daily (before meals and nightly) 1 each 0    blood glucose monitor strips Test 4 times a day and nightly. Dispense sufficient amount for indicated testing frequency plus additional to accommodate PRN testing needs. 150 strip 3    fluticasone (FLONASE) 50 MCG/ACT nasal spray 1 spray by Each Nostril route daily 32 g 1    Brompheniramine-Phenylephrine 2-5 MG/10ML LIQD Take 10 mLs by mouth every 6 hours as needed (cough) 237 mL 0    albuterol (PROVENTIL) (5 MG/ML) 0.5% nebulizer solution Take 1 mL by nebulization 4 times daily as needed for Wheezing 120 each 3    hydrocortisone 1 % cream       ketoconazole (NIZORAL) 2 % cream       Blood Glucose Monitoring Suppl (ONE TOUCH ULTRA 2) w/Device KIT       cyanocobalamin 1000 MCG/ML injection INJECT 1 ML SUBCUTANEOUSLY ONCE EVERY MONTH      ULTICARE INSULIN SAFETY SYR 29G X 1/2\" 1 ML MISC USE AS DIRECTED      nystatin (MYCOSTATIN) 996177 UNIT/GM cream Apply topically 2 times daily. 30 g 3    blood glucose monitor kit and supplies Dispense sufficient amount for indicated testing frequency plus additional to accommodate PRN testing needs. Dispense all needed supplies to include: monitor, strips, lancing device, lancets, control solutions, alcohol swabs. 1 kit 0    Lancets MISC DX: diabetes mellitus.  Use 1-3 time(s) daily - Ok to substitute per insurance (1 each = 1 box) 1 each 5    dicyclomine (BENTYL) 10 MG capsule Take 1 capsule by mouth 2 times daily (before meals) 120 capsule 3    Respiratory Therapy Supplies (FULL KIT NEBULIZER SET) MISC 1 Units by Does not apply route 4 times daily 1 each 1    albuterol (PROVENTIL) (2.5 MG/3ML) 0.083% nebulizer solution Take 3 mLs by nebulization every 6 hours as needed for Wheezing 300 vial 3    CPAP Machine MISC        No current facility-administered medications for this visit. No results found for this or any previous visit.  ]  Results for orders placed during the hospital encounter of 09/06/22    XR CHEST PORTABLE    Narrative  EXAMINATION:  CHEST. CLINICAL HISTORY:  SHORTNESS OF BREATH.    COMPARISONS:  8/7/2021. TECHNIQUE:  AP portable. FINDINGS:  Heart size and contour are within normal limits. Pulmonary vascularity appears unremarkable. No infiltrate or effusion is seen. No definite evidence of a mass or adenopathy. No significant bony abnormality. Impression  NORMAL PORTABLE CHEST. Results for orders placed during the hospital encounter of 08/07/21    XR CHEST PORTABLE    Narrative  Exam: XR CHEST PORTABLE    History: Fever. Abdominal pain. Technique: AP portable view of the chest obtained. Comparison: Portable chest radiograph from September 24, 2020    Findings: The cardiomediastinal silhouette is within normal limits. No pneumothorax, pleural effusion, or consolidation. Bones of the thorax appear intact. Impression  No radiographic evidence of acute intrathoracic process. Results for orders placed during the hospital encounter of 09/24/20    XR CHEST PORTABLE    Narrative  EXAMINATION: XR CHEST PORTABLE    CLINICAL HISTORY: LOWER CHEST PAIN, FEVER    COMPARISONS: NOVEMBER 24, 2014    FINDINGS: Osseous structures are intact. Cardiopericardial silhouette is normal. Pulmonary vasculature is normal. Lungs are clear. Impression  NO ACUTE CARDIOPULMONARY DISEASE. Assessment/Plan:     1. UBNNY (obstructive sleep apnea)  She had PSG show AHI 12.9 and O2 desaturation. She had CPAP titration study. She had therapeutic CPA at 8 cm with AHI 1.7 . She  daytime sleepiness and tiredness. She sometimes  wakes up with gasping for air. C/o wakes up frequently during sleep .  Complaint of morning headache, she will be started on CPAP with 8 cm , order written     - CPAP Machine MISC; by Does not apply route New CPAP with 8 cm  Dispense: 1 each; Refill: 0    Counseling: CPAP/BiPAP uses, She advised to use CPAP at least 5-6 hours every night. Driving: She is advised for extreme caution when driving or operating machinery if there is a feeling of drowsiness, especially while driving it is preferable to stop driving and take a brief nap. Sleep hygiene:Avoid supine sleep, sleep on  sides. Avoid  sleep deprivation. Explained sleep hygiene. Advice to avoid Alcohol and sedative    2. Moderate persistent asthma with acute exacerbation  She is on trelegy  ellipta 1 puff daily  , She is on albuterol HFA prn and neb  with albuterol BID. C/o shortness of breath with exertion. Occasional Wheezing. No Cough . Continue Bronchodilator as before  as before. 3. Morbid obesity ([address]  She is advised try to lose weight. obesity related risk explained to the patient ,  Current weight:  260 lb (117.9 kg) Lbs. BMI:  Body mass index is 47.55 kg/m². Suggested weight control approaches, including dietary changes , exercise, behavioral modification. Return in about 3 months (around 3/27/2023) for CPAP f/u, asthma.       Rolly Stover MD

## 2023-01-04 ENCOUNTER — TELEPHONE (OUTPATIENT)
Dept: ENDOCRINOLOGY | Age: 68
End: 2023-01-04

## 2023-01-04 ENCOUNTER — OFFICE VISIT (OUTPATIENT)
Dept: ENDOCRINOLOGY | Age: 68
End: 2023-01-04
Payer: COMMERCIAL

## 2023-01-04 VITALS
HEART RATE: 67 BPM | HEIGHT: 62 IN | DIASTOLIC BLOOD PRESSURE: 80 MMHG | SYSTOLIC BLOOD PRESSURE: 134 MMHG | WEIGHT: 258 LBS | BODY MASS INDEX: 47.48 KG/M2 | OXYGEN SATURATION: 96 %

## 2023-01-04 DIAGNOSIS — Z79.4 TYPE 2 DIABETES MELLITUS WITH HYPERGLYCEMIA, WITH LONG-TERM CURRENT USE OF INSULIN (HCC): Primary | ICD-10-CM

## 2023-01-04 DIAGNOSIS — E11.65 TYPE 2 DIABETES MELLITUS WITH HYPERGLYCEMIA, WITH LONG-TERM CURRENT USE OF INSULIN (HCC): Primary | ICD-10-CM

## 2023-01-04 LAB
CHP ED QC CHECK: NORMAL
GLUCOSE BLD-MCNC: 196 MG/DL

## 2023-01-04 PROCEDURE — 99214 OFFICE O/P EST MOD 30 MIN: CPT | Performed by: PHYSICIAN ASSISTANT

## 2023-01-04 PROCEDURE — 1123F ACP DISCUSS/DSCN MKR DOCD: CPT | Performed by: PHYSICIAN ASSISTANT

## 2023-01-04 PROCEDURE — 82962 GLUCOSE BLOOD TEST: CPT | Performed by: PHYSICIAN ASSISTANT

## 2023-01-04 PROCEDURE — 3079F DIAST BP 80-89 MM HG: CPT | Performed by: PHYSICIAN ASSISTANT

## 2023-01-04 PROCEDURE — 3075F SYST BP GE 130 - 139MM HG: CPT | Performed by: PHYSICIAN ASSISTANT

## 2023-01-04 RX ORDER — TIRZEPATIDE 2.5 MG/.5ML
INJECTION, SOLUTION SUBCUTANEOUS
Qty: 4 ADJUSTABLE DOSE PRE-FILLED PEN SYRINGE | Refills: 3 | Status: SHIPPED | OUTPATIENT
Start: 2023-01-04

## 2023-01-04 RX ORDER — TIRZEPATIDE 5 MG/.5ML
INJECTION, SOLUTION SUBCUTANEOUS
Qty: 4 ADJUSTABLE DOSE PRE-FILLED PEN SYRINGE | Refills: 3 | Status: SHIPPED | OUTPATIENT
Start: 2023-01-04

## 2023-01-04 ASSESSMENT — ENCOUNTER SYMPTOMS
VOMITING: 0
RHINORRHEA: 0
DIARRHEA: 0
ABDOMINAL PAIN: 0
SORE THROAT: 0
WHEEZING: 0
SINUS PRESSURE: 0
NAUSEA: 0
SHORTNESS OF BREATH: 0
COUGH: 0

## 2023-01-04 NOTE — TELEPHONE ENCOUNTER
CHEIKH Eubanks done and approved through covermymeds. CaseId:[id number];Status:Approved; Review Type:Prior Auth; Coverage Start Date:12/05/2022; Coverage End Date:01/04/2024;

## 2023-01-04 NOTE — PROGRESS NOTES
1/4/2023    Assessment:       Diagnosis Orders   1.  Type 2 diabetes mellitus with hyperglycemia, with long-term current use of insulin (Formerly [facility])  POCT Glucose    Comprehensive Metabolic Panel    Hemoglobin A1C    Lipid Panel    [facility] Eugenio Davidson DPM, Podiatry, [place]          PLAN:     Target glucose range 100-180   Decrease Tresiba 10 units injected nightly  Humalog inject 3 times daily before meals- less than 140 no insulin, 140-160 3 units, 161-180 4 units, 181-200 5 units, 201-220 6 units, 221-240 7 units, 241-260 8 units, 261-280 9 units, 281-300 10 units, 301 or higher 11 units  Metformin 1000 mg twice daily   Start Mounjaro 2.5 mg injected weekly for 4 weeks, Increase to 5.0 mg on week 5 if no nausea or vomiting   Freestyle Rasheed 2 being used  Podiatry referral - Dr Josette Kelly   Repeat labs and follow up in 3 months       Orders Placed This Encounter   Procedures    Comprehensive Metabolic Panel     Standing Status:   Future     Standing Expiration Date:   1/4/2024    Hemoglobin A1C     Standing Status:   Future     Standing Expiration Date:   1/4/2024    Lipid Panel     Standing Status:   Future     Standing Expiration Date:   1/4/2024     Order Specific Question:   Is Patient Fasting?/# of Hours     Answer:   [id number]  Eugenio Davidson DPM, Podiatry, [place]     Referral Priority:   Routine     Referral Type:   Eval and Treat     Referral Reason:   Specialty Services Required     Referred to Provider:   Chaitanya Harvey DPM     Requested Specialty:   Podiatry     Number of Visits Requested:   1    POCT Glucose     Orders Placed This Encounter   Medications    Tirzepatide (MOUNJARO) 2.5 MG/0.5ML SOPN SC injection     Sig: Inject 2.5 mg weekly for 4 weeks, increase dose to 5 mg injected weekly on week 5 if no nausea or vomiting     Dispense:  4 Adjustable Dose Pre-filled Pen Syringe     Refill:  3    Tirzepatide (MOUNJARO) 5 MG/0.5ML SOPN SC injection     Sig: Inject 2.5 mg weekly for 4 weeks, increase dose to 5 mg injected weekly on week 5 if no nausea or vomiting     Dispense:  4 Adjustable Dose Pre-filled Pen Syringe     Refill:  3     Return in about 4 months (around 5/4/2023) for Diabetes. Subjective:     Chief Complaint   Patient presents with    Diabetes     Vitals:    01/04/23 0813   BP: 134/80   Site: Right Upper Arm   Position: Sitting   Cuff Size: Large Adult   Pulse: 67   SpO2: 96%   Weight: 258 lb (117 kg)   Height: 5' 2\" (1.575 m)     Wt Readings from Last 3 Encounters:   01/04/23 258 lb (117 kg)   12/27/22 260 lb (117.9 kg)   12/05/22 257 lb (116.6 kg)     BP Readings from Last 3 Encounters:   01/04/23 134/80   12/27/22 132/80   12/05/22 131/80     Micki Wallace is a 59-year-old type II diabetic female discharged from [facility] 9/2022 following admission for acute asthma exacerbation and steroid-induced hyperglycemia. Inpatient diabetic education was done and she was sent home on insulin with improving glycemic control. She is made significant dietary changes, attended outpatient education on 10/10/2022 A1c is markedly improved 6.1%. Going to decrease her insulin, and try to get her started on a GLP-1 RA. Diabetes  She presents for her follow-up diabetic visit. She has type 2 diabetes mellitus. The initial diagnosis of diabetes was made 20 years ago. Her disease course has been improving. Hypoglycemia symptoms include sweats and tremors. Pertinent negatives for hypoglycemia include no dizziness or headaches. Pertinent negatives for diabetes include no chest pain, no fatigue, no polydipsia and no polyuria. Symptoms are improving. There are no diabetic complications. Risk factors for coronary artery disease include diabetes mellitus and dyslipidemia. Current diabetic treatment includes insulin injections. She is compliant with treatment all of the time. She is currently taking insulin pre-breakfast, pre-lunch, pre-dinner and at bedtime. Rotation sites for injection include the abdominal wall. Her weight is stable. She is following a generally healthy diet. Meal planning includes avoidance of concentrated sweets. She has had a previous visit with a dietitian. She never participates in exercise. She monitors blood glucose at home 5+ x per day. Her overall blood glucose range is 140-180 mg/dl. An ACE inhibitor/angiotensin II receptor blocker is being taken.  She does not see a podiatrist.  Past Medical History:   Diagnosis Date    Allergic rhinitis     Asthma     Breast cancer ([facility] [address]     invasive ductal carcinoma left breast    CAD (coronary artery disease)     [facility]    Invasive ductal left breast    Colon polyps     Diabetes ([facility]     Fibromyalgia     GERD (gastroesophageal reflux disease)     HA (headache)     Herpes simplex     Nose    History of therapeutic radiation     HTN (hypertension)     Hx antineoplastic chemo     Obesity     Senile osteoporosis     Sleep apnea      Past Surgical History:   Procedure Laterality Date    BARIATRIC SURGERY  2004    BREAST BIOPSY Left 14    BREAST BIOPSY Left 12/10/14    BREAST LUMPECTOMY Left     with left SNB  invasive ductal carcinoma    BREAST LUMPECTOMY Left     BREAST LUMPECTOMY Left      SECTION      x4    CHOLECYSTECTOMY  2007    GASTRIC BYPASS SURGERY  2003    HYSTERECTOMY (CERVIX STATUS UNKNOWN)      KNEE ARTHROPLASTY Right     knee reconstruction    KNEE SURGERY      Right knee    LYMPH NODE BIOPSY Left 14    ANDREW AND BSO (CERVIX REMOVED)      Dr. Cohen Plater  2/18/15    w/bx,polypectomy     UPPER GASTROINTESTINAL ENDOSCOPY N/A 2020    EGD DIAGNOSTIC ONLY performed by Alea Parkinson MD at [address]       Social History     Socioeconomic History    Marital status:      Spouse name: Not on file    Number of children: Not on file    Years of education: Not on file    Highest education level: Not on file   Occupational History    Not on file   Tobacco Use    Smoking status: Never    Smokeless tobacco: Never   Vaping Use    Vaping Use: Never used   Substance and Sexual Activity    Alcohol use: No    Drug use: No    Sexual activity: Not on file   Other Topics Concern    Not on file   Social History Narrative    Not on file     Social Determinants of Health     Financial Resource Strain: Low Risk     Difficulty of Paying Living Expenses: Not hard at all   Food Insecurity: No Food Insecurity    Worried About Running Out of Food in the Last Year: Never true    Ran Out of Food in the Last Year: Never true   Transportation Needs: Not on file   Physical Activity: Not on file   Stress: Not on file   Social Connections: Not on file   Intimate Partner Violence: Not on file   Housing Stability: Not on file     Family History   Problem Relation Age of Onset    Cancer Father         melanoma    Prostate Cancer Father     Cancer Sister         Melanoma    Diabetes Maternal Aunt     Breast Cancer Maternal Aunt     Cancer Other         Throat    Colon Cancer Neg Hx      Allergies   Allergen Reactions    Aspirin      History of gastric ulcers    Codeine      Cramps    Imitrex [Sumatriptan]     Theophyllines     Diflucan [Fluconazole] Rash    Nizoral [Ketoconazole] Rash    Zoloft Rash       Current Outpatient Medications:     Tirzepatide (MOUNJARO) 2.5 MG/0.5ML SOPN SC injection, Inject 2.5 mg weekly for 4 weeks, increase dose to 5 mg injected weekly on week 5 if no nausea or vomiting, Disp: 4 Adjustable Dose Pre-filled Pen Syringe, Rfl: 3    Tirzepatide (MOUNJARO) 5 MG/0.5ML SOPN SC injection, Inject 2.5 mg weekly for 4 weeks, increase dose to 5 mg injected weekly on week 5 if no nausea or vomiting, Disp: 4 Adjustable Dose Pre-filled Pen Syringe, Rfl: 3    CPAP Machine MISC, by Does not apply route New CPAP with 8 cm, Disp: 1 each, Rfl: 0    amLODIPine (NORVASC) 10 MG tablet, Take 1 tablet by mouth once daily, Disp: 90 tablet, Rfl: 1    pantoprazole (PROTONIX) 40 MG tablet, Take 1 tablet by mouth once daily, Disp: 90 tablet, Rfl: 1    ipratropium-albuterol (DUONEB) 0.5-2.5 (3) MG/3ML SOLN nebulizer solution, Inhale 3 mLs into the lungs every 4 hours, Disp: 360 mL, Rfl: 0    albuterol sulfate HFA (PROVENTIL;VENTOLIN;PROAIR) 108 (90 Base) MCG/ACT inhaler, Inhale 2 puffs into the lungs 4 times daily as needed for Wheezing, Disp: 3 each, Rfl: 3    insulin lispro (HUMALOG KWIKPEN) 200 UNIT/ML SOPN pen, inject 3 times daily before meals- 120-140 2 units, 141-160 3 units, 161-180 4 units, 181-200 5 units, 201-220 6 units, 221-240 7 units, 241-260 8 units, 261-280 9 units, 281-300 10 units, 301 or higher 11 units. , Disp: 10 Adjustable Dose Pre-filled Pen Syringe, Rfl: 3    Insulin Degludec (TRESIBA FLEXTOUCH) 100 UNIT/ML SOPN, Inject 30 Units into the skin nightly, Disp: 10 Adjustable Dose Pre-filled Pen Syringe, Rfl: 3    Continuous Blood Gluc Sensor (FREESTYLE GERTRUDE 2 SENSOR) MISC, 1 Device by Does not apply route every 14 days, Disp: 6 each, Rfl: 3    Insulin Pen Needle 32G X 6 MM MISC, 1 Device by Does not apply route 4 times daily (before meals and nightly), Disp: 400 each, Rfl: 3    nystatin (MYCOSTATIN) 157403 UNIT/GM powder, Apply 3 times daily. , Disp: 45 g, Rfl: 5    cholestyramine (QUESTRAN) 4 g packet, Take 1 packet by mouth 2 times daily, Disp: 180 packet, Rfl: 5    Fluticasone-Umeclidin-Vilant (TRELEGY ELLIPTA) 200-62.5-25 MCG/INH AEPB, Inhale 1 puff into the lungs daily, Disp: 2 each, Rfl: 0    glucose 4 g chewable tablet, Take 4 tablets by mouth as needed for Low blood sugar, Disp: 60 tablet, Rfl: 3    Continuous Blood Gluc  (FREESTYLE GERTRUDE 2 READER) CHRISTINA, 1 Device by Does not apply route 4 times daily (before meals and nightly), Disp: 1 each, Rfl: 0    blood glucose monitor strips, Test 4 times a day and nightly.  Dispense sufficient amount for indicated testing frequency plus additional to accommodate PRN testing needs. , Disp: 150 strip, Rfl: 3    fluticasone (FLONASE) 50 MCG/ACT nasal spray, 1 spray by Each Nostril route daily, Disp: 32 g, Rfl: 1    Brompheniramine-Phenylephrine 2-5 MG/10ML LIQD, Take 10 mLs by mouth every 6 hours as needed (cough), Disp: 237 mL, Rfl: 0    albuterol (PROVENTIL) (5 MG/ML) 0.5% nebulizer solution, Take 1 mL by nebulization 4 times daily as needed for Wheezing, Disp: 120 each, Rfl: 3    hydrocortisone 1 % cream, , Disp: , Rfl:     ketoconazole (NIZORAL) 2 % cream, , Disp: , Rfl:     Blood Glucose Monitoring Suppl (ONE TOUCH ULTRA 2) w/Device KIT, , Disp: , Rfl:     cyanocobalamin 1000 MCG/ML injection, INJECT 1 ML SUBCUTANEOUSLY ONCE EVERY MONTH, Disp: , Rfl:     ULTICARE INSULIN SAFETY SYR 29G X 1/2\" 1 ML MISC, USE AS DIRECTED, Disp: , Rfl:     nystatin (MYCOSTATIN) 793023 UNIT/GM cream, Apply topically 2 times daily. , Disp: 30 g, Rfl: 3    blood glucose monitor kit and supplies, Dispense sufficient amount for indicated testing frequency plus additional to accommodate PRN testing needs. Dispense all needed supplies to include: monitor, strips, lancing device, lancets, control solutions, alcohol swabs., Disp: 1 kit, Rfl: 0    Lancets MISC, DX: diabetes mellitus.  Use 1-3 time(s) daily - Ok to substitute per insurance (1 each = 1 box), Disp: 1 each, Rfl: 5    dicyclomine (BENTYL) 10 MG capsule, Take 1 capsule by mouth 2 times daily (before meals), Disp: 120 capsule, Rfl: 3    Respiratory Therapy Supplies (FULL KIT NEBULIZER SET) MISC, 1 Units by Does not apply route 4 times daily, Disp: 1 each, Rfl: 1    albuterol (PROVENTIL) (2.5 MG/3ML) 0.083% nebulizer solution, Take 3 mLs by nebulization every 6 hours as needed for Wheezing, Disp: 300 vial, Rfl: 3    CPAP Machine MISC, , Disp: , Rfl:   Lab Results   Component Value Date     09/14/2022    K 3.5 09/14/2022    CL 97 09/14/2022    CO2 26 09/14/2022    BUN 27 (H) 09/14/2022    CREATININE 0.71 09/14/2022    GLUCOSE 196 01/04/2023    CALCIUM 8.5 09/14/2022    PROT 5.9 (L) 09/14/2022    LABALBU 3.5 09/14/2022    BILITOT 0.6 09/14/2022    ALKPHOS 107 09/14/2022    AST 12 09/14/2022    ALT 17 09/14/2022    LABGLOM >60.0 09/14/2022    GFRAA >60.0 09/14/2022    GLOB 2.4 09/14/2022     Lab Results   Component Value Date    WBC 14.2 (H) 09/14/2022    HGB 14.0 09/14/2022    HCT 43.2 09/14/2022    MCV 88.7 09/14/2022     09/14/2022     Lab Results   Component Value Date    LABA1C 6.1 12/01/2022    LABA1C 9.7 09/27/2022    LABA1C 9.6 (H) 09/07/2022     Lab Results   Component Value Date    CHOLFAST 179 05/22/2021    CHOLFAST 167 06/06/2020    CHOLFAST 189 06/16/2018    TRIGLYCFAST 141 05/22/2021    TRIGLYCFAST 90 06/06/2020    TRIGLYCFAST 78 06/16/2018    HDL 72 (H) 05/22/2021    HDL 70 (H) 06/06/2020    HDL 76 (H) 06/16/2018    LDLCALC 79 05/22/2021    LDLCALC 79 06/06/2020    LDLCALC 97 06/16/2018    CHOL 199 11/24/2014    CHOL 189 06/06/2013    CHOL 206 (H) 04/04/2012    TRIG 122 11/24/2014    TRIG 71 06/06/2013    TRIG 84 04/04/2012     No results found for: TESTOSTERONE, SHBG, TESTFREENM  Lab Results   Component Value Date    TSH 0.562 11/24/2014    TSH 0.897 09/16/2014    TSH 1.142 06/06/2013    TSHREFLEX 0.889 06/16/2018    TSHREFLEX 1.200 08/30/2016    T4FREE 0.84 (L) 02/20/2012     No results found for: TPOABS    Review of Systems   Constitutional:  Negative for chills, fatigue and fever.   HENT:  Negative for congestion, ear pain, postnasal drip, rhinorrhea, sinus pressure and sore throat.    Eyes:  Negative for visual disturbance.   Respiratory:  Negative for cough, shortness of breath and wheezing.    Cardiovascular:  Negative for chest pain, palpitations and leg swelling.   Gastrointestinal:  Negative for abdominal pain, diarrhea, nausea and vomiting.   Endocrine: Negative for cold intolerance, heat intolerance, polydipsia and polyuria.   Genitourinary:  Negative for difficulty urinating.   Musculoskeletal:  Negative  for arthralgias. Skin:  Negative for rash. Allergic/Immunologic: Negative for environmental allergies. Neurological:  Positive for tremors. Negative for dizziness and headaches. Hematological:  Does not bruise/bleed easily. Psychiatric/Behavioral:  Negative for dysphoric mood.       Objective:   Physical Exam

## 2023-02-08 ENCOUNTER — INITIAL CONSULT (OUTPATIENT)
Dept: PODIATRY | Age: 68
End: 2023-02-08
Payer: COMMERCIAL

## 2023-02-08 VITALS — BODY MASS INDEX: 48.21 KG/M2 | HEIGHT: 62 IN | TEMPERATURE: 97.2 F | WEIGHT: 262 LBS

## 2023-02-08 DIAGNOSIS — M19.071 PRIMARY OSTEOARTHRITIS OF BOTH FEET: ICD-10-CM

## 2023-02-08 DIAGNOSIS — M20.42 HAMMERTOE, BILATERAL: ICD-10-CM

## 2023-02-08 DIAGNOSIS — Z79.4 TYPE 2 DIABETES MELLITUS WITH DIABETIC NEUROPATHY, WITH LONG-TERM CURRENT USE OF INSULIN (HCC): ICD-10-CM

## 2023-02-08 DIAGNOSIS — M21.621 TAILOR'S BUNIONETTE, BILATERAL: ICD-10-CM

## 2023-02-08 DIAGNOSIS — S93.622S: ICD-10-CM

## 2023-02-08 DIAGNOSIS — M79.672 PAIN IN BOTH FEET: Primary | ICD-10-CM

## 2023-02-08 DIAGNOSIS — M21.622 TAILOR'S BUNIONETTE, BILATERAL: ICD-10-CM

## 2023-02-08 DIAGNOSIS — E11.40 TYPE 2 DIABETES MELLITUS WITH DIABETIC NEUROPATHY, WITH LONG-TERM CURRENT USE OF INSULIN (HCC): ICD-10-CM

## 2023-02-08 DIAGNOSIS — M20.41 HAMMERTOE, BILATERAL: ICD-10-CM

## 2023-02-08 DIAGNOSIS — M79.671 PAIN IN BOTH FEET: Primary | ICD-10-CM

## 2023-02-08 DIAGNOSIS — M19.072 PRIMARY OSTEOARTHRITIS OF BOTH FEET: ICD-10-CM

## 2023-02-08 PROCEDURE — 1123F ACP DISCUSS/DSCN MKR DOCD: CPT | Performed by: PODIATRIST

## 2023-02-08 PROCEDURE — 99204 OFFICE O/P NEW MOD 45 MIN: CPT | Performed by: PODIATRIST

## 2023-02-08 ASSESSMENT — ENCOUNTER SYMPTOMS
NAUSEA: 0
BACK PAIN: 1
VOMITING: 0
SHORTNESS OF BREATH: 1

## 2023-02-08 NOTE — PROGRESS NOTES
[facility]  [address] [place] [address]  Dept: [phone]  Loc: [phone]       Kwasi Forte  ([birth year])    2/8/23    Subjective     Kwasi Forte is 79 y.o. female who complains today of:    Chief Complaint   Patient presents with    Diabetes    Foot Pain     Both feet       Kwasi Forte is seen in consultation at the request of CHEIKH Isabel for evaluation of left foot pain. HPI: Patient presents with complaint of left foot pain. Patient describes the pain as achy and sharp depending on her activity. Patient rates her pain today in the chair at 2/10 but states that is more painful with certain activity. Patient points to the dorsum of the left midfoot to the anterior ankle as the area of pain. Patient states that in October she tripped on a tree root in her yard. Patient describes a forced plantarflexion injury, she denies inversion of the ankle. Patient treated the ankle with compression icing. Patient states that her most severe symptoms happen when she is standing and walking but she occasionally have the aching pain while at rest.  She lists elevating the feet as an alleviating factor. Patient does report a history of diabetes. Her most recent hemoglobin A1c was 6.1%. Patient complains of tingling to the lesser toes of both feet, she states this is occurring most of the time but it is not constant. .  Patient denies a history of diabetic foot ulceration. Patient states that she does not currently have diabetic shoes, she is concerned about getting them due to the cost.    Patient also reports a history of osteoarthritis. She reports pain and stiffness to the feet after periods of inactivity. Patient states that this is exacerbated by walking barefoot so she tends to avoid doing that.     Review of Systems   Constitutional:  Negative for chills and fever.   HENT:  Negative for hearing loss. Respiratory:  Positive for shortness of breath. Cardiovascular:  Negative for chest pain. Gastrointestinal:  Negative for nausea and vomiting. Genitourinary:  Negative for difficulty urinating. Musculoskeletal:  Positive for back pain and gait problem. Skin:  Negative for wound. Neurological:  Positive for numbness. Hematological:  Bruises/bleeds easily. Psychiatric/Behavioral:  Negative for sleep disturbance. The patient is a diabetic. Endocrinologist: CHEIKH Burciaga   Date last seen: 01/04/2023    Allergies:  Aspirin, Codeine, Imitrex [sumatriptan], Theophyllines, Diflucan [fluconazole], Nizoral [ketoconazole], and Zoloft    Current Outpatient Medications on File Prior to Visit   Medication Sig Dispense Refill    CPAP Machine MISC by Does not apply route New CPAP with 8 cm 1 each 0    amLODIPine (NORVASC) 10 MG tablet Take 1 tablet by mouth once daily 90 tablet 1    pantoprazole (PROTONIX) 40 MG tablet Take 1 tablet by mouth once daily 90 tablet 1    ipratropium-albuterol (DUONEB) 0.5-2.5 (3) MG/3ML SOLN nebulizer solution Inhale 3 mLs into the lungs every 4 hours 360 mL 0    albuterol sulfate HFA (PROVENTIL;VENTOLIN;PROAIR) 108 (90 Base) MCG/ACT inhaler Inhale 2 puffs into the lungs 4 times daily as needed for Wheezing 3 each 3    insulin lispro (HUMALOG KWIKPEN) 200 UNIT/ML SOPN pen inject 3 times daily before meals- 120-140 2 units, 141-160 3 units, 161-180 4 units, 181-200 5 units, 201-220 6 units, 221-240 7 units, 241-260 8 units, 261-280 9 units, 281-300 10 units, 301 or higher 11 units.  10 Adjustable Dose Pre-filled Pen Syringe 3    Insulin Degludec (TRESIBA FLEXTOUCH) 100 UNIT/ML SOPN Inject 30 Units into the skin nightly 10 Adjustable Dose Pre-filled Pen Syringe 3    Continuous Blood Gluc Sensor (FREESTYLE GERTRUDE 2 SENSOR) MISC 1 Device by Does not apply route every 14 days 6 each 3    Insulin Pen Needle 32G X 6 MM MISC 1 Device by Does not apply route 4 times daily (before meals and nightly) 400 each 3    nystatin (MYCOSTATIN) 371932 UNIT/GM powder Apply 3 times daily. 45 g 5    cholestyramine (QUESTRAN) 4 g packet Take 1 packet by mouth 2 times daily 180 packet 5    Fluticasone-Umeclidin-Vilant (TRELEGY ELLIPTA) 200-62.5-25 MCG/INH AEPB Inhale 1 puff into the lungs daily 2 each 0    glucose 4 g chewable tablet Take 4 tablets by mouth as needed for Low blood sugar 60 tablet 3    Continuous Blood Gluc  (FREESTYLE GERTRUDE 2 READER) CHRISTINA 1 Device by Does not apply route 4 times daily (before meals and nightly) 1 each 0    blood glucose monitor strips Test 4 times a day and nightly. Dispense sufficient amount for indicated testing frequency plus additional to accommodate PRN testing needs. 150 strip 3    fluticasone (FLONASE) 50 MCG/ACT nasal spray 1 spray by Each Nostril route daily 32 g 1    albuterol (PROVENTIL) (5 MG/ML) 0.5% nebulizer solution Take 1 mL by nebulization 4 times daily as needed for Wheezing 120 each 3    hydrocortisone 1 % cream       ketoconazole (NIZORAL) 2 % cream       Blood Glucose Monitoring Suppl (ONE TOUCH ULTRA 2) w/Device KIT       cyanocobalamin 1000 MCG/ML injection INJECT 1 ML SUBCUTANEOUSLY ONCE EVERY MONTH      ULTICARE INSULIN SAFETY SYR 29G X 1/2\" 1 ML MISC USE AS DIRECTED      nystatin (MYCOSTATIN) 404899 UNIT/GM cream Apply topically 2 times daily. 30 g 3    blood glucose monitor kit and supplies Dispense sufficient amount for indicated testing frequency plus additional to accommodate PRN testing needs. Dispense all needed supplies to include: monitor, strips, lancing device, lancets, control solutions, alcohol swabs. 1 kit 0    Lancets MISC DX: diabetes mellitus.  Use 1-3 time(s) daily - Ok to substitute per insurance (1 each = 1 box) 1 each 5    dicyclomine (BENTYL) 10 MG capsule Take 1 capsule by mouth 2 times daily (before meals) 120 capsule 3    Respiratory Therapy Supplies (FULL KIT NEBULIZER SET) MISC 1 Units by Does not apply route 4 times daily 1 each 1    albuterol (PROVENTIL) (2.5 MG/3ML) 0.083% nebulizer solution Take 3 mLs by nebulization every 6 hours as needed for Wheezing 300 vial 3    CPAP Machine MISC       [DISCONTINUED] glyBURIDE (DIABETA) 2.5 MG tablet Take 1 tablet by mouth daily (with breakfast) 30 tablet 5     No current facility-administered medications on file prior to visit.        Past Medical History:   Diagnosis Date    Allergic rhinitis     Asthma     Breast cancer ([facility] [address]     invasive ductal carcinoma left breast    CAD (coronary artery disease)     Cancer ([facility] 2014    Invasive ductal left breast    Colon polyps     Diabetes (HCC)     Fibromyalgia     GERD (gastroesophageal reflux disease)     HA (headache)     Herpes simplex     Nose    History of therapeutic radiation     HTN (hypertension)     Hx antineoplastic chemo     Obesity     Senile osteoporosis     Sleep apnea      Past Surgical History:   Procedure Laterality Date    BARIATRIC SURGERY  2004    BREAST BIOPSY Left 14    BREAST BIOPSY Left 12/10/14    BREAST LUMPECTOMY Left     with left SNB  invasive ductal carcinoma    BREAST LUMPECTOMY Left     BREAST LUMPECTOMY Left      SECTION      x4    CHOLECYSTECTOMY  2007    GASTRIC BYPASS SURGERY  2003    HYSTERECTOMY (CERVIX STATUS UNKNOWN)      KNEE ARTHROPLASTY Right     knee reconstruction    KNEE SURGERY      Right knee    LYMPH NODE BIOPSY Left 14    ANDREW AND BSO (CERVIX REMOVED)      Dr. Mikala Lindsey  2/18/15    w/bx,polypectomy     UPPER GASTROINTESTINAL ENDOSCOPY N/A 2020    EGD DIAGNOSTIC ONLY performed by Michael Man MD at [address]       Social History     Socioeconomic History    Marital status:      Spouse name: Not on file    Number of children: Not on file    Years of education: Not on file Highest education level: Not on file   Occupational History    Not on file   Tobacco Use    Smoking status: Never    Smokeless tobacco: Never   Vaping Use    Vaping Use: Never used   Substance and Sexual Activity    Alcohol use: No    Drug use: No    Sexual activity: Not on file   Other Topics Concern    Not on file   Social History Narrative    Not on file     Social Determinants of Health     Financial Resource Strain: Low Risk     Difficulty of Paying Living Expenses: Not hard at all   Food Insecurity: No Food Insecurity    Worried About Running Out of Food in the Last Year: Never true    Ran Out of Food in the Last Year: Never true   Transportation Needs: Not on file   Physical Activity: Not on file   Stress: Not on file   Social Connections: Not on file   Intimate Partner Violence: Not on file   Housing Stability: Not on file     Family History   Problem Relation Age of Onset    Cancer Father         melanoma    Prostate Cancer Father     Cancer Sister         Melanoma    Diabetes Maternal Aunt     Breast Cancer Maternal Aunt     Cancer Other         Throat    Colon Cancer Neg Hx            Objective:   Vitals:  Temp 97.2 °F (36.2 °C)   Ht 5' 2\" (1.575 m)   Wt 262 lb (118.8 kg)   BMI 47.92 kg/m² Pain Score:   2      Physical Exam  Vitals reviewed. Constitutional:       Appearance: She is obese. HENT:      Head: Normocephalic and atraumatic. Cardiovascular:      Pulses:           Dorsalis pedis pulses are 2+ on the right side and 2+ on the left side. Posterior tibial pulses are 2+ on the right side and 2+ on the left side. Comments: Pitting edema noted to the bilateral pretibial area. Skin temperature gradient is warm to cool from proximal tibial to distal toes bilaterally. Diminished hair growth noted bilaterally. No varicosities or telangiectasia noted bilaterally. No changes consistent with ischemia noted bilaterally.    Pulmonary:      Effort: Pulmonary effort is normal. Musculoskeletal:      Right lower le+ Pitting Edema present. Left lower le+ Pitting Edema present. Right foot: Decreased range of motion. Deformity present. No Charcot foot. Left foot: Decreased range of motion. Deformity present. No Charcot foot. Feet:      Right foot:      Protective Sensation: 10 sites tested. 10 sites sensed. Skin integrity: No ulcer. Toenail Condition: Right toenails are normal.      Left foot:      Protective Sensation: 10 sites tested. 10 sites sensed. Skin integrity: No ulcer. Toenail Condition: Left toenails are normal.      Comments: Rectus foot type noted bilaterally. MMT graded 5/5 for all extrinsic foot muscle groups bilaterally. There is a bony prominence noted to the dorsum of the foot/tarsometatarsal joints bilaterally. There is pain with palpation and with range of motion of the left foot tarsometatarsal joints. Crepitus noted to the bilateral subtalar joint, there is pain with range of motion to the left foot. Decreased ankle joint dorsiflexion noted bilaterally, soft endpoint noted. Mild tailor's bunionette deformity with adductovarus rotation deformity of the fifth toe noted bilaterally. Reducible flexion contracture noted at PIPJ 2 and 3 bilaterally. Lymphadenopathy:      Comments: Popliteal lymph nodes are soft and nontender. Skin:     Capillary Refill: Capillary refill takes 2 to 3 seconds. Comments: No open lesions noted bilaterally. Normal skin turgor noted bilaterally. Normal skin texture noted bilaterally. Nail plates are well groomed bilaterally. Pigmented lesion noted to the tip of the left second toe, measures 0.2 x 0.5 cm. The lesion is flat, monochromatic, and asymmetric. Pigmented lesion noted to the medial aspect of the right foot at the first MPJ, it measures 0.2 x 0.3 cm.   The lesion is flat, monochromatic, and symmetrical.      Neurological:      Mental Status: She is alert and oriented to person, place, and time. Deep Tendon Reflexes: Babinski sign absent on the right side. Babinski sign absent on the left side. Reflex Scores:       Patellar reflexes are 0 on the right side and 2+ on the left side. Achilles reflexes are 2+ on the right side and 2+ on the left side. Comments: No ankle clonus noted bilaterally. Diminished vibratory sensation noted bilaterally. Sharp versus dull discrimination is intact. Positive Tinel sign elicited with percussion of the right lateral dorsal cutaneous nerve. Psychiatric:         Mood and Affect: Mood normal.         Behavior: Behavior normal.       Assessment:      Diagnosis Orders   1. Pain in both feet        2. Type 2 diabetes mellitus with diabetic neuropathy, with long-term current use of insulin ([address]        3. Tailor's bunionette, bilateral        4. Hammertoe, bilateral        5. Sprain of tarsometatarsal joint of left foot, sequela        6. Primary osteoarthritis of both feet            Plan:     Sprain left foot: I have recommended relative rest until the pain decreases. I explained to the patient I believe the symptoms are being exacerbated due to her existing/underlying osteoarthritis. Patient instructed to apply ice to the area, especially after her work shift. I have also recommended she apply topical Voltaren to the painful area 2-4 times daily for at least a week to receive the full benefit of the topical medication. Patient instructed to call/return to clinic early should symptoms worsen or new problems arise. We will consider imaging if the pain persists. Diabetes mellitus: I discussed the \"do's and donts\" of diabetes mellitus and diabetic foot care. The patient should adhere to the random glucose monitoring schedule according to their internist/endocrinologist and report any significant fluctuations or problems to them immediately.   I emphasized the importance of daily foot checks and applying a moisturizing cream to the feet daily, but not in between the toes. If any ulcerations or signs and symptoms of infection arise, the patient is to call and return immediately for evaluation. Diabetes/ foot deformities: Due to patient's medical conditions and diabetic related risks, I believe diabetic shoes with custom inserts are imperative to the patients treatment plan to decrease the significant morbidities associated with ulceration and amputation. I have recommended custom molded diabetic inserts and extra depth diabetic shoes. Patient defers due to cost, I have recommended an off-the-shelf diabetic insole that is thin enough to fit into her current athletic shoes. Patient instructed to remove the insole from his shoes before applying the new diabetic insole. Bilateral osteoarthritis: We discussed the progressive nature of the condition. I explained the patient I believe the accommodative diabetic insoles would also help to alleviate some of her arthritic pain. Again patient encouraged to not walk barefoot. I have dispensed a home exercise plan. Patient encouraged to apply topical Voltaren to the joints on her feet that are arthritic and are symptomatic. All questions were answered to the patient's satisfaction. Thank you for allowing me to participate in the care of your patient. Follow up:  Return in about 6 weeks (around 3/22/2023). Stevie Garcia DPM      Level of medical decision making: moderate. Please note that this report has been partially produced using speech recognition software which may cause errors including grammar, punctuation, and spelling or words and phrases that may seem inappropriate. If there are questions or concerns please feel free to contact me for clarification.

## 2023-02-10 ENCOUNTER — OFFICE VISIT (OUTPATIENT)
Dept: FAMILY MEDICINE CLINIC | Age: 68
End: 2023-02-10
Payer: COMMERCIAL

## 2023-02-10 VITALS
OXYGEN SATURATION: 96 % | SYSTOLIC BLOOD PRESSURE: 134 MMHG | HEIGHT: 62 IN | DIASTOLIC BLOOD PRESSURE: 88 MMHG | HEART RATE: 88 BPM | WEIGHT: 262 LBS | BODY MASS INDEX: 48.21 KG/M2

## 2023-02-10 DIAGNOSIS — M25.562 ACUTE PAIN OF LEFT KNEE: Primary | ICD-10-CM

## 2023-02-10 PROCEDURE — 1123F ACP DISCUSS/DSCN MKR DOCD: CPT | Performed by: INTERNAL MEDICINE

## 2023-02-10 PROCEDURE — 3078F DIAST BP <80 MM HG: CPT | Performed by: INTERNAL MEDICINE

## 2023-02-10 PROCEDURE — 3074F SYST BP LT 130 MM HG: CPT | Performed by: INTERNAL MEDICINE

## 2023-02-10 PROCEDURE — 99213 OFFICE O/P EST LOW 20 MIN: CPT | Performed by: INTERNAL MEDICINE

## 2023-02-10 RX ORDER — IBUPROFEN 800 MG/1
800 TABLET ORAL
Qty: 270 TABLET | Refills: 1 | Status: SHIPPED | OUTPATIENT
Start: 2023-02-10

## 2023-02-10 SDOH — ECONOMIC STABILITY: INCOME INSECURITY: HOW HARD IS IT FOR YOU TO PAY FOR THE VERY BASICS LIKE FOOD, HOUSING, MEDICAL CARE, AND HEATING?: NOT HARD AT ALL

## 2023-02-10 SDOH — ECONOMIC STABILITY: HOUSING INSECURITY
IN THE LAST 12 MONTHS, WAS THERE A TIME WHEN YOU DID NOT HAVE A STEADY PLACE TO SLEEP OR SLEPT IN A SHELTER (INCLUDING NOW)?: NO

## 2023-02-10 SDOH — ECONOMIC STABILITY: FOOD INSECURITY: WITHIN THE PAST 12 MONTHS, THE FOOD YOU BOUGHT JUST DIDN'T LAST AND YOU DIDN'T HAVE MONEY TO GET MORE.: NEVER TRUE

## 2023-02-10 SDOH — ECONOMIC STABILITY: FOOD INSECURITY: WITHIN THE PAST 12 MONTHS, YOU WORRIED THAT YOUR FOOD WOULD RUN OUT BEFORE YOU GOT MONEY TO BUY MORE.: NEVER TRUE

## 2023-02-10 ASSESSMENT — ENCOUNTER SYMPTOMS
EYE REDNESS: 0
VOMITING: 0
RHINORRHEA: 0
APNEA: 0
VOICE CHANGE: 0
SHORTNESS OF BREATH: 0
COUGH: 0
SINUS PRESSURE: 0
EYE ITCHING: 0
COLOR CHANGE: 0
FACIAL SWELLING: 0
SORE THROAT: 0
DIARRHEA: 0
WHEEZING: 0
TROUBLE SWALLOWING: 0
PHOTOPHOBIA: 0
ABDOMINAL DISTENTION: 0
BACK PAIN: 0
CONSTIPATION: 0
ABDOMINAL PAIN: 0
EYE DISCHARGE: 0
RECTAL PAIN: 0
BLOOD IN STOOL: 0
EYE PAIN: 0
NAUSEA: 0
SINUS PAIN: 0
CHEST TIGHTNESS: 0

## 2023-02-10 ASSESSMENT — PATIENT HEALTH QUESTIONNAIRE - PHQ9
SUM OF ALL RESPONSES TO PHQ QUESTIONS 1-9: 0
1. LITTLE INTEREST OR PLEASURE IN DOING THINGS: 0
SUM OF ALL RESPONSES TO PHQ QUESTIONS 1-9: 0
SUM OF ALL RESPONSES TO PHQ9 QUESTIONS 1 & 2: 0
2. FEELING DOWN, DEPRESSED OR HOPELESS: 0

## 2023-02-10 NOTE — PROGRESS NOTES
Subjective:      Patient ID: Kathy Keys is a 79 y.o. female Established patient, here for evaluation of the following chief complaint(s):  Chief Complaint   Patient presents with    Leg Injury     Left. Pt fell this morning, pain is from her knee down to her leg       HPI  79year old female presenting with 9/10 non-radiating pain localized to the left knee following a fall this morning. No relief with OTC NSAIDs. At present he denies polyuria,  Polydipsia, constitutional, sinus, visual, cardiopulmonary, urologic, gastrointestinal, immunologic/hematologic, additional musculoskeletal, neurologic,dermatologic, or psychiatric complaints. Current Outpatient Medications on File Prior to Visit   Medication Sig Dispense Refill    CPAP Machine MISC by Does not apply route New CPAP with 8 cm 1 each 0    amLODIPine (NORVASC) 10 MG tablet Take 1 tablet by mouth once daily 90 tablet 1    pantoprazole (PROTONIX) 40 MG tablet Take 1 tablet by mouth once daily 90 tablet 1    ipratropium-albuterol (DUONEB) 0.5-2.5 (3) MG/3ML SOLN nebulizer solution Inhale 3 mLs into the lungs every 4 hours 360 mL 0    albuterol sulfate HFA (PROVENTIL;VENTOLIN;PROAIR) 108 (90 Base) MCG/ACT inhaler Inhale 2 puffs into the lungs 4 times daily as needed for Wheezing 3 each 3    insulin lispro (HUMALOG KWIKPEN) 200 UNIT/ML SOPN pen inject 3 times daily before meals- 120-140 2 units, 141-160 3 units, 161-180 4 units, 181-200 5 units, 201-220 6 units, 221-240 7 units, 241-260 8 units, 261-280 9 units, 281-300 10 units, 301 or higher 11 units.  10 Adjustable Dose Pre-filled Pen Syringe 3    Insulin Degludec (TRESIBA FLEXTOUCH) 100 UNIT/ML SOPN Inject 30 Units into the skin nightly 10 Adjustable Dose Pre-filled Pen Syringe 3    Continuous Blood Gluc Sensor (FREESTYLE GERTRUDE 2 SENSOR) MISC 1 Device by Does not apply route every 14 days 6 each 3    Insulin Pen Needle 32G X 6 MM MISC 1 Device by Does not apply route 4 times daily (before meals and nightly) 400 each 3    nystatin (MYCOSTATIN) 434598 UNIT/GM powder Apply 3 times daily. 45 g 5    cholestyramine (QUESTRAN) 4 g packet Take 1 packet by mouth 2 times daily 180 packet 5    Fluticasone-Umeclidin-Vilant (TRELEGY ELLIPTA) 200-62.5-25 MCG/INH AEPB Inhale 1 puff into the lungs daily 2 each 0    glucose 4 g chewable tablet Take 4 tablets by mouth as needed for Low blood sugar 60 tablet 3    Continuous Blood Gluc  (FREESTYLE GERTRUDE 2 READER) CHRISTINA 1 Device by Does not apply route 4 times daily (before meals and nightly) 1 each 0    blood glucose monitor strips Test 4 times a day and nightly. Dispense sufficient amount for indicated testing frequency plus additional to accommodate PRN testing needs. 150 strip 3    fluticasone (FLONASE) 50 MCG/ACT nasal spray 1 spray by Each Nostril route daily 32 g 1    albuterol (PROVENTIL) (5 MG/ML) 0.5% nebulizer solution Take 1 mL by nebulization 4 times daily as needed for Wheezing 120 each 3    hydrocortisone 1 % cream       ketoconazole (NIZORAL) 2 % cream       Blood Glucose Monitoring Suppl (ONE TOUCH ULTRA 2) w/Device KIT       cyanocobalamin 1000 MCG/ML injection INJECT 1 ML SUBCUTANEOUSLY ONCE EVERY MONTH      ULTICARE INSULIN SAFETY SYR 29G X 1/2\" 1 ML MISC USE AS DIRECTED      nystatin (MYCOSTATIN) 704095 UNIT/GM cream Apply topically 2 times daily. 30 g 3    blood glucose monitor kit and supplies Dispense sufficient amount for indicated testing frequency plus additional to accommodate PRN testing needs. Dispense all needed supplies to include: monitor, strips, lancing device, lancets, control solutions, alcohol swabs. 1 kit 0    Lancets MISC DX: diabetes mellitus.  Use 1-3 time(s) daily - Ok to substitute per insurance (1 each = 1 box) 1 each 5    dicyclomine (BENTYL) 10 MG capsule Take 1 capsule by mouth 2 times daily (before meals) 120 capsule 3    Respiratory Therapy Supplies (FULL KIT NEBULIZER SET) MISC 1 Units by Does not apply route 4 times daily 1 each 1    albuterol (PROVENTIL) (2.5 MG/3ML) 0.083% nebulizer solution Take 3 mLs by nebulization every 6 hours as needed for Wheezing 300 vial 3    CPAP Machine MISC       [DISCONTINUED] glyBURIDE (DIABETA) 2.5 MG tablet Take 1 tablet by mouth daily (with breakfast) 30 tablet 5     No current facility-administered medications on file prior to visit. Aspirin, Codeine, Imitrex [sumatriptan], Theophyllines, Diflucan [fluconazole], Nizoral [ketoconazole], and Zoloft    Review of Systems   Constitutional:  Negative for chills, diaphoresis, fatigue and fever. HENT:  Negative for congestion, dental problem, drooling, ear discharge, ear pain, facial swelling, hearing loss, mouth sores, nosebleeds, postnasal drip, rhinorrhea, sinus pressure, sinus pain, sneezing, sore throat, tinnitus, trouble swallowing and voice change. Eyes:  Negative for photophobia, pain, discharge, redness, itching and visual disturbance. Respiratory:  Negative for apnea, cough, chest tightness, shortness of breath and wheezing. Cardiovascular:  Negative for chest pain, palpitations and leg swelling. Gastrointestinal:  Negative for abdominal distention, abdominal pain, blood in stool, constipation, diarrhea, nausea, rectal pain and vomiting. Endocrine: Negative for cold intolerance, heat intolerance, polydipsia, polyphagia and polyuria. Genitourinary:  Negative for decreased urine volume, difficulty urinating, dysuria, flank pain, frequency, genital sores, hematuria and urgency. Musculoskeletal:  Negative for arthralgias, back pain, gait problem, joint swelling, myalgias, neck pain and neck stiffness. Left knee pain   Skin:  Negative for color change, rash and wound. Allergic/Immunologic: Negative for environmental allergies and food allergies. Neurological:  Negative for dizziness, tremors, seizures, syncope, facial asymmetry, speech difficulty, weakness, light-headedness, numbness and headaches.    Hematological: Negative for adenopathy. Does not bruise/bleed easily. Psychiatric/Behavioral:  Negative for agitation, confusion, decreased concentration, hallucinations, self-injury, sleep disturbance and suicidal ideas. The patient is not nervous/anxious. Objective:   /88 (Site: Right Upper Arm, Position: Sitting, Cuff Size: Medium Adult)   Pulse 88   Ht 5' 2\" (1.575 m)   Wt 262 lb (118.8 kg)   SpO2 96%   BMI 47.92 kg/m²     Physical Exam  Constitutional:       General: She is not in acute distress. Appearance: She is well-developed. HENT:      Head: Normocephalic. Right Ear: External ear normal.      Left Ear: External ear normal.   Eyes:      Conjunctiva/sclera: Conjunctivae normal.   Neck:      Vascular: No JVD. Trachea: No tracheal deviation. Cardiovascular:      Rate and Rhythm: Normal rate and regular rhythm. Heart sounds: Normal heart sounds. Pulmonary:      Effort: Pulmonary effort is normal. No respiratory distress. Breath sounds: Normal breath sounds. No wheezing or rales. Chest:      Chest wall: No tenderness. Abdominal:      General: Bowel sounds are normal. There is no distension. Palpations: Abdomen is soft. There is no mass. Tenderness: There is no abdominal tenderness. There is no guarding or rebound. Musculoskeletal:         General: No tenderness or deformity. Cervical back: Neck supple. Comments: Left knee pain on palpation   + Swelling and ecchymoses   Skin:     General: Skin is warm and dry. Coloration: Skin is not pale. Findings: No erythema or rash. Neurological:      Mental Status: She is alert and oriented to person, place, and time. Motor: No abnormal muscle tone. Psychiatric:         Thought Content: Thought content normal.         Judgment: Judgment normal.       Assessment:       Diagnosis Orders   1.  Acute pain of left knee  [address]           No results found for: LIPIDPAN, BMP, CMP, CBC, CBCAUTODIF  Plan:      June Mathew was seen today for leg injury. Diagnoses and all orders for this visit:    Acute pain of left knee  -     Cancel: XR KNEE RIGHT (3 VIEWS); Future  -     [address]: XR KNEE LEFT (3 VIEWS); Future    Other orders  -     ibuprofen (ADVIL;MOTRIN) 800 MG tablet; Take 1 tablet by mouth 3 times daily (with meals)       No follow-ups on file. On this date 02/11/23 I have spent 30 minutes reviewing previous notes, test results and face to face with the patient discussing the diagnosis and importance of compliance with the treatment plan. Luba Alonzo MD    Please note, this report has been partially produced using speech recognition software  and may cause  and /or contain errors related to that system including grammar, punctuation and spelling as well as words and phrases that may seem inappropriate. If there are questions or concerns please feel free to contact me to clarify.

## 2023-02-14 ENCOUNTER — OFFICE VISIT (OUTPATIENT)
Dept: ORTHOPEDIC SURGERY | Age: 68
End: 2023-02-14

## 2023-02-14 VITALS
DIASTOLIC BLOOD PRESSURE: 82 MMHG | HEART RATE: 65 BPM | HEIGHT: 62 IN | OXYGEN SATURATION: 97 % | TEMPERATURE: 97.3 F | WEIGHT: 260 LBS | SYSTOLIC BLOOD PRESSURE: 137 MMHG | BODY MASS INDEX: 47.84 KG/M2

## 2023-02-14 DIAGNOSIS — M23.92 INTERNAL DERANGEMENT OF LEFT KNEE: ICD-10-CM

## 2023-02-14 DIAGNOSIS — M25.562 ACUTE PAIN OF LEFT KNEE: Primary | ICD-10-CM

## 2023-02-14 DIAGNOSIS — S80.02XA CONTUSION OF LEFT KNEE, INITIAL ENCOUNTER: ICD-10-CM

## 2023-02-14 NOTE — PATIENT INSTRUCTIONS
.This after visit summary has been prepared for Luis A Sifuentes, evaluated in the offices of [facility] on 2/14/2023 by Dr. Ruiz Amaya MD, for the following diagnoses:  -Left knee injury, possible meniscus tear versus stress fracture    Recommendations based upon your evaluation:  -Continue current dosing of ibuprofen. -You may take acetaminophen (Tylenol) 650 mg tablet by mouth every 6 hours as needed to control pain.  -You have been provided with a hinged knee brace. Recommend wearing this for support at all times with standing or walking. May remove for sleep, while seated, for icing, and for hygiene purposes (for example, showering). -You may apply ice packs to left knee for 15-20 minutes every 6 hours, as needed to control inflammation/swelling.    -Avoid squatting, kneeling, crouching. Procedures performed in the office today:  -Application hinged knee brace to left knee    Plan for further testing and follow-up:  -You have been submitted for insurance approval for MRI of left knee, to evaluate for possible meniscus tear and loose body, as well as stress fracture. [facility] will contact you to coordinate arrangements for scheduling the study, as well as to schedule a follow-up visit with me to review the results. [phone]    If you have any questions regarding today's appointment, please feel free to contact our office at [phone]. Thank you for allowing Dr. Nir Diaz and [facility] to provide you with individualized, high-quality orthopedic care.

## 2023-02-14 NOTE — LETTER
[address]  Phone: [phone]  Fax: [phone]    Hira Altamirano MD        February 14, 2023     Patient: Toi Vincent   YOB: 1955   Date of Visit: 2/14/2023       To Whom It May Concern: It is my medical opinion that Emilee Palm should work from home until after her MRI and follow up  appointment with Dr Medina Allen. She is having a knee issue from a fall. If you have any questions or concerns, please don't hesitate to call.     Sincerely,        Hira Altamirano MD

## 2023-02-14 NOTE — PROGRESS NOTES
Subjective:      Patient ID: Richar Betancur is a 79 y.o. female who presents today for:  Chief Complaint   Patient presents with    Knee Injury     Left knee injury due to fall    Follow-up     HPI:    The patient is a pleasant 24-year-old female who presents for evaluation of left knee injury sustained 4 days ago as a result of a fall from standing height. This involved a twisting mechanism. She underwent subsequent radiographic evaluation through [facility] emergency department and has been referred to our office for definitive evaluation of her condition. She notes that she has experienced progressive pain in the left knee, predominantly baseline the posterior extent and associated with intermittent catching in the knee. Denies any eric locking in the knee but has difficulty achieving full extension due to pain and residual swelling. She is visible contusion over the anterior aspect of the distal knee and proximal leg with medial predominance. Currently rates pain 6/10, described as sharp, aggravated by all weightbearing activities. Not utilizing a brace. She has attempted ibuprofen which she is taking once daily at 800 mg, concerned about doing so more often due to history of prior gastric bypass. Not utilizing Tylenol. She has utilized Voltaren gel in the past and does not feel that to do so in the acute phase would enhance her left knee pain by decreasing inflammation. Icing the area on a regular basis. Modifying her activities.       Past Medical History:   Diagnosis Date    Allergic rhinitis     Asthma     [facility] 2014    invasive ductal carcinoma left breast    CAD (coronary artery disease)     Cancer ([facility] 11/2014    Invasive ductal left breast    Colon polyps     Diabetes (HCC)     Fibromyalgia     GERD (gastroesophageal reflux disease)     HA (headache)     Herpes simplex     Nose    History of therapeutic radiation     HTN (hypertension)     Hx antineoplastic chemo 2014    Obesity Senile osteoporosis     Sleep apnea      Past Surgical History:   Procedure Laterality Date    BARIATRIC SURGERY  2004    BREAST BIOPSY Left 14    BREAST BIOPSY Left 12/10/14    BREAST LUMPECTOMY Left     with left SNB  invasive ductal carcinoma    BREAST LUMPECTOMY Left     BREAST LUMPECTOMY Left      SECTION      x4    CHOLECYSTECTOMY  2007    GASTRIC BYPASS SURGERY      HYSTERECTOMY (CERVIX STATUS UNKNOWN)      KNEE ARTHROPLASTY Right     knee reconstruction    KNEE SURGERY      Right knee    LYMPH NODE BIOPSY Left 14    ANDREW AND BSO (CERVIX REMOVED)      Dr. Cornejo Anabelle ENDOSCOPY  2/18/15    w/bx,polypectomy     UPPER GASTROINTESTINAL ENDOSCOPY N/A 2020    EGD DIAGNOSTIC ONLY performed by Estephania Tavares MD at [address]       Social History     Socioeconomic History    Marital status:      Spouse name: Not on file    Number of children: Not on file    Years of education: Not on file    Highest education level: Not on file   Occupational History    Not on file   Tobacco Use    Smoking status: Never     Passive exposure: Never    Smokeless tobacco: Never   Vaping Use    Vaping Use: Never used   Substance and Sexual Activity    Alcohol use: No    Drug use: No    Sexual activity: Not on file   Other Topics Concern    Not on file   Social History Narrative    Not on file     Social Determinants of Health     Financial Resource Strain: Low Risk     Difficulty of Paying Living Expenses: Not hard at all   Food Insecurity: No Food Insecurity    Worried About [address] in the Last Year: Never true    [address] in the Last Year: Never true   Transportation Needs: Unknown    Lack of Transportation (Medical): Not on file    Lack of Transportation (Non-Medical):  No   Physical Activity: Not on file   Stress: Not on file   Social Connections: Not on file   Intimate Partner Violence: Not on file   Housing Stability: Unknown    Unable to Pay for Housing in the Last Year: Not on file    Number of Places Lived in the Last Year: Not on file    Unstable Housing in the Last Year: No     Family History   Problem Relation Age of Onset    Cancer Father         melanoma    Prostate Cancer Father     Cancer Sister         Melanoma    Diabetes Maternal Aunt     Breast Cancer Maternal Aunt     Cancer Other         Throat    Colon Cancer Neg Hx      Allergies   Allergen Reactions    Aspirin      History of gastric ulcers    Codeine      Cramps    Imitrex [Sumatriptan]     Theophyllines     Diflucan [Fluconazole] Rash    Nizoral [Ketoconazole] Rash    Zoloft Rash     Current Outpatient Medications on File Prior to Visit   Medication Sig Dispense Refill    ibuprofen (ADVIL;MOTRIN) 800 MG tablet Take 1 tablet by mouth 3 times daily (with meals) 270 tablet 1    CPAP Machine MISC by Does not apply route New CPAP with 8 cm 1 each 0    amLODIPine (NORVASC) 10 MG tablet Take 1 tablet by mouth once daily 90 tablet 1    pantoprazole (PROTONIX) 40 MG tablet Take 1 tablet by mouth once daily 90 tablet 1    ipratropium-albuterol (DUONEB) 0.5-2.5 (3) MG/3ML SOLN nebulizer solution Inhale 3 mLs into the lungs every 4 hours 360 mL 0    albuterol sulfate HFA (PROVENTIL;VENTOLIN;PROAIR) 108 (90 Base) MCG/ACT inhaler Inhale 2 puffs into the lungs 4 times daily as needed for Wheezing 3 each 3    insulin lispro (HUMALOG KWIKPEN) 200 UNIT/ML SOPN pen inject 3 times daily before meals- 120-140 2 units, 141-160 3 units, 161-180 4 units, 181-200 5 units, 201-220 6 units, 221-240 7 units, 241-260 8 units, 261-280 9 units, 281-300 10 units, 301 or higher 11 units.  10 Adjustable Dose Pre-filled Pen Syringe 3    Insulin Degludec (TRESIBA FLEXTOUCH) 100 UNIT/ML SOPN Inject 30 Units into the skin nightly 10 Adjustable Dose Pre-filled Pen Syringe 3    Continuous Blood Gluc Sensor (FREESTYLE GERTRUDE 2 SENSOR) MISC 1 Device by Does not apply route every 14 days 6 each 3    Insulin Pen Needle 32G X 6 MM MISC 1 Device by Does not apply route 4 times daily (before meals and nightly) 400 each 3    nystatin (MYCOSTATIN) 747272 UNIT/GM powder Apply 3 times daily. 45 g 5    cholestyramine (QUESTRAN) 4 g packet Take 1 packet by mouth 2 times daily 180 packet 5    Fluticasone-Umeclidin-Vilant (TRELEGY ELLIPTA) 200-62.5-25 MCG/INH AEPB Inhale 1 puff into the lungs daily 2 each 0    glucose 4 g chewable tablet Take 4 tablets by mouth as needed for Low blood sugar 60 tablet 3    Continuous Blood Gluc  (FREESTYLE GERTRUDE 2 READER) CHRISTINA 1 Device by Does not apply route 4 times daily (before meals and nightly) 1 each 0    blood glucose monitor strips Test 4 times a day and nightly. Dispense sufficient amount for indicated testing frequency plus additional to accommodate PRN testing needs. 150 strip 3    fluticasone (FLONASE) 50 MCG/ACT nasal spray 1 spray by Each Nostril route daily 32 g 1    albuterol (PROVENTIL) (5 MG/ML) 0.5% nebulizer solution Take 1 mL by nebulization 4 times daily as needed for Wheezing 120 each 3    hydrocortisone 1 % cream       ketoconazole (NIZORAL) 2 % cream       Blood Glucose Monitoring Suppl (ONE TOUCH ULTRA 2) w/Device KIT       cyanocobalamin 1000 MCG/ML injection INJECT 1 ML SUBCUTANEOUSLY ONCE EVERY MONTH      ULTICARE INSULIN SAFETY SYR 29G X 1/2\" 1 ML MISC USE AS DIRECTED      nystatin (MYCOSTATIN) 619940 UNIT/GM cream Apply topically 2 times daily. 30 g 3    blood glucose monitor kit and supplies Dispense sufficient amount for indicated testing frequency plus additional to accommodate PRN testing needs. Dispense all needed supplies to include: monitor, strips, lancing device, lancets, control solutions, alcohol swabs. 1 kit 0    Lancets MISC DX: diabetes mellitus.  Use 1-3 time(s) daily - Ok to substitute per insurance (1 each = 1 box) 1 each 5    dicyclomine (BENTYL) 10 MG capsule Take 1 capsule by mouth 2 times daily (before meals) 120 capsule 3    Respiratory Therapy Supplies (FULL KIT NEBULIZER SET) MISC 1 Units by Does not apply route 4 times daily 1 each 1    albuterol (PROVENTIL) (2.5 MG/3ML) 0.083% nebulizer solution Take 3 mLs by nebulization every 6 hours as needed for Wheezing 300 vial 3    CPAP Machine MISC       [DISCONTINUED] glyBURIDE (DIABETA) 2.5 MG tablet Take 1 tablet by mouth daily (with breakfast) 30 tablet 5     No current facility-administered medications on file prior to visit. Acute and Chronic Pain Monitoring:   RX Monitoring 2/12/2016   Attestation The Prescription Monitoring Report for this patient was reviewed today. Periodic Controlled Substance Monitoring Possible medication side effects, risk of tolerance and/or dependence, and alternative treatments discussed; No signs of potential drug abuse or diversion identified. Objective--Physical Examination:     /82 (Site: Right Wrist, Position: Sitting, Cuff Size: Medium Adult)   Pulse 65   Temp 97.3 °F (36.3 °C) (Temporal)   Ht 5' 2\" (1.575 m)   Wt 260 lb (117.9 kg)   SpO2 97%   BMI 47.55 kg/m²     Physical Examination:  Pleasant 59-year-old female in moderate distress, alert and cooperative. Antalgic gait referable to the left side. Examination of the left lower extremity reveals painless internal and external rotation of the hip. Nontender over the trochanteric bursa. No discomfort over the sacroiliac joint and negative CARYL test.  No pain with longitudinal compression or traction along the axis of the femur. Sensory intact light touch in the deep peroneal, superficial peroneal, saphenous, tibial, sural nerve distributions as well as common peroneal nerve distribution. Knee ranges from -10 degrees extension to 90 degrees of flexion with pain limited at extremes by volitional guarding as well as swelling and pain.   There is no evidence of objective defect over the course the extensor mechanism and I am unable to accurately assess the presence of extensor lag as I cannot get her to a fully extended position due to apprehension and pain. Teodora test does reproduce posterior discomfort with medial predominance but also some lateral component of pain. 1+ swelling of suprapatellar pouch and trace effusion of the knee on ballottement. Visible contusion over the tibial tubercle as well as over the pes anserine bursa. Sensory intact light touch along lateral aspect of the hip as well as in the deep peroneal, superficial peroneal, tibial nerve distributions. DP and popliteal pulses are 2+ with capillary refill less than 2 seconds. Negative straight leg raise. Normal posterior drawer test and no evidence of posterior sag. Lachman's test cannot be accurately assessed due to quadricep guarding. EHL, plantarflexion, dorsiflexion are 4+/5. Knee flexion knee extension are 4+/5, confounded by quadricep guarding. Radiographs and Laboratory Studies:     Diagnostic Imaging Studies:    I have independently evaluated and reviewed plain radiographs of the left knee obtained on 2/10/2023 and archived through [facility]. These demonstrate minimal narrowing of the medial compartment with relative preservation of the lateral and patellofemoral articulations. Normal Insall-Salvati ratio. No soft tissue calcification. No evidence of fracture or dislocation. Laboratory Studies:   Lab Results   Component Value Date    WBC 14.2 (H) 09/14/2022    HGB 14.0 09/14/2022    HCT 43.2 09/14/2022    MCV 88.7 09/14/2022     09/14/2022     No results found for: SEDRATE  No results found for: CRP    Assessment / Plan:      Diagnosis Orders   1. Acute pain of left knee  Ko elastic w/joints pre ots    MRI KNEE LEFT WO CONTRAST      2. Internal derangement of left knee        3.  Contusion of left knee, initial encounter           Orders Placed This Encounter   Procedures    MRI KNEE LEFT WO CONTRAST     MRI of left knee to assess for meniscal tear, loose osteochondral body, stress fracture. Standing Status:   Future     Standing Expiration Date:   2/14/2024     Order Specific Question:   Reason for exam:     Answer:   Left knee injury with associated stiffness and catching     Order Specific Question:   What is the sedation requirement? Answer:   None    Ko elastic w/joints pre ots     Patient was prescribed a Breg Economy Hinged Knee Brace. The left knee will require stabilization / immobilization from this semi-rigid / rigid orthosis to improve their function. The orthosis will assist in protecting the affected area, provide functional support and facilitate healing. The patient was educated and fit by a healthcare professional with expert knowledge and specialization in brace application while under the direct supervision of the treating physician. Verbal and written instructions for the use of and application of this item were provided. They were instructed to contact the office immediately should the brace result in increased pain, decreased sensation, increased swelling or worsening of the condition. No orders of the defined types were placed in this encounter.    -Acute rotational injury of left knee with elements of anterior knee and proximal leg contusion, posterior discomfort with associated mechanical symptoms (catching and positive Dave test) concerning for meniscal tear or loose osteochondral body. Given degree of discomfort, cannot exclude diagnosis of occult fracture. Findings were discussed with the patient as well as options moving forward. We did discuss potential for bracing for support and observation over the next 2 to 3 weeks with regular use of ibuprofen, intermittent icing, activity modification, and Tylenol to mitigate discomfort.   Given extent of discomfort, we did discuss the option of obtaining MRI of the left knee to evaluate for internal derangement such as meniscus tear or loose osteochondral body, as well as to rule out for any component of occult fracture. Patient would like to proceed with MRI of the left knee at this point. She was provided with a hinged anterior closure brace today and would have her utilize this at all times while standing or walking for support until we have clarified the underlying diagnosis. May continue with ibuprofen 800 mg by mouth once daily and would have her utilize Tylenol 650 mg by mouth every 6 hours to mitigate pain. Would have her ice for 15 to 20 minutes every 4 hours and avoid squatting, kneeling, deep knee bends. I will plan to see her back once MRI scan has been obtained so as to review the results and discuss modification of her treatment plan moving forward. Procedures Performed Today:     Application of economy hinged knee brace to left knee. Follow-up (with Radiographs) / Referral:     The patient has been submitted for insurance approval for MRI of the left knee to rule out internal derangement (loose osteochondral body, meniscal tear) as well as occult fracture. I will plan to see her back once MRI scan has been obtained, so as to review results and discuss modification of her treatment plan moving forward. Radiographs do not need to be updated at that follow-up appointment.     Renan Contreras MD  Orthopaedic Surgery

## 2023-02-14 NOTE — LETTER
[address]  Phone: [phone]  Fax: [phone]    Sharon Cavazos MD        February 14, 2023     Patient: Christina Mota   YOB: 1955   Date of Visit: 2/14/2023       To Whom It May Concern: It is my medical opinion that Prince Hays should work from home until after her MRI and follow up with Dr Johann Goodwin. She is having a knee issued which needs to be addressed. If you have any questions or concerns, please don't hesitate to call.     Sincerely,        Sharon Cavazos MD

## 2023-02-20 ENCOUNTER — HOSPITAL ENCOUNTER (OUTPATIENT)
Dept: MRI IMAGING | Age: 68
Discharge: HOME | End: 2023-02-22
Payer: COMMERCIAL

## 2023-02-20 DIAGNOSIS — M25.562 ACUTE PAIN OF LEFT KNEE: ICD-10-CM

## 2023-02-20 PROCEDURE — 73721 MRI JNT OF LWR EXTRE W/O DYE: CPT

## 2023-02-28 ENCOUNTER — OFFICE VISIT (OUTPATIENT)
Dept: ORTHOPEDIC SURGERY | Age: 68
End: 2023-02-28
Payer: COMMERCIAL

## 2023-02-28 VITALS
OXYGEN SATURATION: 99 % | HEART RATE: 69 BPM | BODY MASS INDEX: 47.84 KG/M2 | TEMPERATURE: 98.4 F | HEIGHT: 62 IN | WEIGHT: 260 LBS

## 2023-02-28 DIAGNOSIS — S82.016A: Primary | ICD-10-CM

## 2023-02-28 PROCEDURE — 1123F ACP DISCUSS/DSCN MKR DOCD: CPT | Performed by: ORTHOPAEDIC SURGERY

## 2023-02-28 PROCEDURE — 99213 OFFICE O/P EST LOW 20 MIN: CPT | Performed by: ORTHOPAEDIC SURGERY

## 2023-02-28 NOTE — LETTER
February 28, 2023       Lee Erica YOB: 1955   [address] Date of Visit:  2/28/2023       To Whom It May Concern: It is my medical opinion that Quincy Jewell should continue working from home until after her next appointment which is 3/29/2023    If you have any questions or concerns, please don't hesitate to call.     Sincerely,        Cara Alvares MD

## 2023-02-28 NOTE — PATIENT INSTRUCTIONS
.This after visit summary has been prepared for Tez Henao, evaluated in the offices of [facility] on 2/23/2023 by Dr. El Valera MD, for the following diagnoses:  -Nondisplaced fracture of left kneecap (patella)  -Incidentally noted cartilage (medial meniscus) tear of left knee    Recommendations based upon your evaluation:  -Continue with hinged knee brace while outside of the home. You may wrap knee with an Ace bandage prior to applying the knee brace to prevent keep the brace from sliding. Knee brace does not need to be worn while at home, except as needed for comfort.  -Limit squatting, kneeling, crouching activities.  -You may take acetaminophen (Tylenol) 650 mg tablet by mouth every 6 hours as needed to control pain.  -Continue with ibuprofen 800 mg by mouth every 8 hours as needed to control inflammation/swelling.  -You may apply ice packs to left knee for 15-20 minutes every 6 hours, as needed to control inflammation/swelling.  -You have been provided with documentation to allow you to continue to work from home until your next follow-up appointment. Procedures performed in the office today:  -None    Plan for follow-up:  -You will need to be seen in office by Rodger Weston PA-C in 3.5-4 weeks for follow-up of left patella fracture. We will update x-rays left knee at the beginning of that visit. You may make this appointment at the office  today or may call [phone] to schedule, confirm, or modify this appointment. If you have any questions regarding today's appointment, please feel free to contact our office at [phone]. Thank you for allowing Dr. Junior Ulloa and [facility] to provide you with individualized, high-quality orthopedic care.

## 2023-02-28 NOTE — PROGRESS NOTES
Subjective:      Patient ID: Elia Marin is a 79 y.o. female who presents today for:  Chief Complaint   Patient presents with    Follow-up     Patient presents to go over her MRI which was done on 2023. HPI:    Tara Jose returns for follow-up of left knee injury which was sustained approximately 18 days ago. She returns to review results of MRI. She has attempted to utilize hinged knee brace but notes that this tends to toggle and slide which makes it difficult to maintain. Utilizing ibuprofen 1-2 doses per day at 800 mg. Pain is dissipated to some extent but continues to have pain along the anterior and lateral aspect of the knee with no specific medial discomfort. She does have some radiation of symptoms along the proximal extent of the lateral calf and has some visible bruising along the posterior aspect of the calf. Maintaining full weightbearing. Currently rates pain 4/10, described as dull, aggravated by knee range of motion.     Past Medical History:   Diagnosis Date    Allergic rhinitis     Asthma     Breast cancer ([facility] [address]     invasive ductal carcinoma left breast    CAD (coronary artery disease)     Cancer ([facility] [address] 2014    Invasive ductal left breast    Colon polyps     Diabetes ([facility] [address]     Fibromyalgia     GERD (gastroesophageal reflux disease)     HA (headache)     Herpes simplex     Nose    History of therapeutic radiation     HTN (hypertension)     Hx antineoplastic chemo     Obesity     Senile osteoporosis     Sleep apnea      Past Surgical History:   Procedure Laterality Date    BARIATRIC SURGERY  2004    BREAST BIOPSY Left 14    BREAST BIOPSY Left 12/10/14    BREAST LUMPECTOMY Left     with left SNB  invasive ductal carcinoma    BREAST LUMPECTOMY Left     BREAST LUMPECTOMY Left      SECTION      x4    CHOLECYSTECTOMY  2007    GASTRIC BYPASS SURGERY  2003    HYSTERECTOMY (CERVIX STATUS UNKNOWN)      KNEE ARTHROPLASTY Right     knee reconstruction    KNEE SURGERY  1992    Right knee    LYMPH NODE BIOPSY Left 11/18/14    ANDREW AND BSO (CERVIX REMOVED)      Dr. Wagoner Master ENDOSCOPY  2/18/15    w/bx,polypectomy     UPPER GASTROINTESTINAL ENDOSCOPY N/A 9/28/2020    EGD DIAGNOSTIC ONLY performed by Jair Alfonso MD at [address]       Social History     Socioeconomic History    Marital status:      Spouse name: Not on file    Number of children: Not on file    Years of education: Not on file    Highest education level: Not on file   Occupational History    Not on file   Tobacco Use    Smoking status: Never     Passive exposure: Never    Smokeless tobacco: Never   Vaping Use    Vaping Use: Never used   Substance and Sexual Activity    Alcohol use: No    Drug use: No    Sexual activity: Not on file   Other Topics Concern    Not on file   Social History Narrative    Not on file     Social Determinants of Health     Financial Resource Strain: Low Risk     Difficulty of Paying Living Expenses: Not hard at all   Food Insecurity: No Food Insecurity    Worried About Running Out of Food in the Last Year: Never true    [address] in the Last Year: Never true   Transportation Needs: Unknown    Lack of Transportation (Medical): Not on file    Lack of Transportation (Non-Medical):  No   Physical Activity: Not on file   Stress: Not on file   Social Connections: Not on file   Intimate Partner Violence: Not on file   Housing Stability: Unknown    Unable to Pay for Housing in the Last Year: Not on file    Number of Places Lived in the Last Year: Not on file    Unstable Housing in the Last Year: No     Family History   Problem Relation Age of Onset    Cancer Father         melanoma    Prostate Cancer Father     Cancer Sister         Melanoma    Diabetes Maternal Aunt     Breast Cancer Maternal Aunt     Cancer Other         Throat    Colon Cancer Neg Hx      Allergies Allergen Reactions    Aspirin      History of gastric ulcers    Codeine      Cramps    Imitrex [Sumatriptan]     Theophyllines     Diflucan [Fluconazole] Rash    Nizoral [Ketoconazole] Rash    Zoloft Rash     Current Outpatient Medications on File Prior to Visit   Medication Sig Dispense Refill    ibuprofen (ADVIL;MOTRIN) 800 MG tablet Take 1 tablet by mouth 3 times daily (with meals) 270 tablet 1    CPAP Machine MISC by Does not apply route New CPAP with 8 cm 1 each 0    amLODIPine (NORVASC) 10 MG tablet Take 1 tablet by mouth once daily 90 tablet 1    pantoprazole (PROTONIX) 40 MG tablet Take 1 tablet by mouth once daily 90 tablet 1    ipratropium-albuterol (DUONEB) 0.5-2.5 (3) MG/3ML SOLN nebulizer solution Inhale 3 mLs into the lungs every 4 hours 360 mL 0    albuterol sulfate HFA (PROVENTIL;VENTOLIN;PROAIR) 108 (90 Base) MCG/ACT inhaler Inhale 2 puffs into the lungs 4 times daily as needed for Wheezing 3 each 3    insulin lispro (HUMALOG KWIKPEN) 200 UNIT/ML SOPN pen inject 3 times daily before meals- 120-140 2 units, 141-160 3 units, 161-180 4 units, 181-200 5 units, 201-220 6 units, 221-240 7 units, 241-260 8 units, 261-280 9 units, 281-300 10 units, 301 or higher 11 units. 10 Adjustable Dose Pre-filled Pen Syringe 3    Insulin Degludec (TRESIBA FLEXTOUCH) 100 UNIT/ML SOPN Inject 30 Units into the skin nightly 10 Adjustable Dose Pre-filled Pen Syringe 3    Continuous Blood Gluc Sensor (FREESTYLE GERTRUDE 2 SENSOR) MISC 1 Device by Does not apply route every 14 days 6 each 3    Insulin Pen Needle 32G X 6 MM MISC 1 Device by Does not apply route 4 times daily (before meals and nightly) 400 each 3    nystatin (MYCOSTATIN) 849094 UNIT/GM powder Apply 3 times daily.  45 g 5    cholestyramine (QUESTRAN) 4 g packet Take 1 packet by mouth 2 times daily 180 packet 5    Fluticasone-Umeclidin-Vilant (TRELEGY ELLIPTA) 200-62.5-25 MCG/INH AEPB Inhale 1 puff into the lungs daily 2 each 0    glucose 4 g chewable tablet Take 4 tablets by mouth as needed for Low blood sugar 60 tablet 3    Continuous Blood Gluc  (FREESTYLE GERTRUDE 2 READER) CHRISTINA 1 Device by Does not apply route 4 times daily (before meals and nightly) 1 each 0    blood glucose monitor strips Test 4 times a day and nightly. Dispense sufficient amount for indicated testing frequency plus additional to accommodate PRN testing needs. 150 strip 3    fluticasone (FLONASE) 50 MCG/ACT nasal spray 1 spray by Each Nostril route daily 32 g 1    albuterol (PROVENTIL) (5 MG/ML) 0.5% nebulizer solution Take 1 mL by nebulization 4 times daily as needed for Wheezing 120 each 3    hydrocortisone 1 % cream       ketoconazole (NIZORAL) 2 % cream       Blood Glucose Monitoring Suppl (ONE TOUCH ULTRA 2) w/Device KIT       cyanocobalamin 1000 MCG/ML injection INJECT 1 ML SUBCUTANEOUSLY ONCE EVERY MONTH      ULTICARE INSULIN SAFETY SYR 29G X 1/2\" 1 ML MISC USE AS DIRECTED      nystatin (MYCOSTATIN) 909222 UNIT/GM cream Apply topically 2 times daily. 30 g 3    blood glucose monitor kit and supplies Dispense sufficient amount for indicated testing frequency plus additional to accommodate PRN testing needs. Dispense all needed supplies to include: monitor, strips, lancing device, lancets, control solutions, alcohol swabs. 1 kit 0    Lancets MISC DX: diabetes mellitus.  Use 1-3 time(s) daily - Ok to substitute per insurance (1 each = 1 box) 1 each 5    dicyclomine (BENTYL) 10 MG capsule Take 1 capsule by mouth 2 times daily (before meals) 120 capsule 3    Respiratory Therapy Supplies (FULL KIT NEBULIZER SET) MISC 1 Units by Does not apply route 4 times daily 1 each 1    albuterol (PROVENTIL) (2.5 MG/3ML) 0.083% nebulizer solution Take 3 mLs by nebulization every 6 hours as needed for Wheezing 300 vial 3    CPAP Machine MISC       [DISCONTINUED] glyBURIDE (DIABETA) 2.5 MG tablet Take 1 tablet by mouth daily (with breakfast) 30 tablet 5     No current facility-administered medications on file prior to visit. Acute and Chronic Pain Monitoring:   RX Monitoring 2/12/2016   Attestation The Prescription Monitoring Report for this patient was reviewed today. Periodic Controlled Substance Monitoring Possible medication side effects, risk of tolerance and/or dependence, and alternative treatments discussed; No signs of potential drug abuse or diversion identified. Objective--Physical Examination:     Pulse 69   Temp 98.4 °F (36.9 °C) (Tympanic)   Ht 5' 2\" (1.575 m)   Wt 260 lb (117.9 kg)   SpO2 99%   BMI 47.55 kg/m²     Physical Examination:  Pleasant 59-year-old female in minimal distress, alert and cooperative. Examination of the left lower extremity reveals painless internal and external rotation of the hip. Nontender over the trochanteric bursa. No discomfort over the sacroiliac joint and negative CARYL test.  No pain with longitudinal compression or traction along the axis of the femur. Knee ranges from full extension extension to 110 degrees of flexion, with lesser extent of guarding as compared with prior examination. Negative straight leg raise no palpable defect over the course the extensor mechanism, no free fragment over lateral aspect of the patella. No patellar hypermobility. Teodora test is negative today. Trace no contusion over the tibial tubercle and pes anserine bursa, but there is some residual contusion along the posterior aspect of the calf. Swelling of suprapatellar pouch and trace effusion of the knee on ballottement. Sensory intact light touch along lateral aspect of the hip as well as in the common peroneal, deep peroneal, superficial peroneal, tibial nerve distributions. DP and popliteal pulses are 2+ with capillary refill less than 2 seconds. Normal posterior drawer test and no evidence of posterior sag. Lachman's test cannot be accurately assessed due to quadricep guarding.   EHL, plantarflexion, dorsiflexion are 5/5 knee flexion knee extension are 5/5. Radiographs and Laboratory Studies:     Diagnostic Imaging Studies:    I have independently reviewed and interpreted MRI of the left knee obtained on 2/20/2023 and archived through [facility]. This demonstrates peripherally based longitudinal fracture of the patella which does have some element of morphology similar to bipartite patella. That having been said, this corresponds with site of acute discomfort. Incidentally noted medial meniscal tear within the posterior horn although patient does not have evidence of mechanical symptoms or medial discomfort on examination today. Contusion of the patella also confirmed on T2 sections confirming acute process. Laboratory Studies:   Lab Results   Component Value Date    WBC 14.2 (H) 09/14/2022    HGB 14.0 09/14/2022    HCT 43.2 09/14/2022    MCV 88.7 09/14/2022     09/14/2022     No results found for: SEDRATE  No results found for: CRP    Assessment / Plan:      Diagnosis Orders   1. Closed nondisplaced osteochondral fracture of patella, unspecified laterality, initial encounter  XR KNEE LEFT (3 VIEWS)         Orders Placed This Encounter   Procedures    XR KNEE LEFT (3 VIEWS)     Standing AP of bilateral knees, lateral and merchant of the left knee. Standing Status:   Future     Standing Expiration Date:   2/28/2024     Order Specific Question:   Reason for exam:     Answer:   Left patella fracture     No orders of the defined types were placed in this encounter.      -Closed, nondisplaced, peripheral and oblique fracture of the left patella, incidentally noted tear of medial meniscus (posterior horn): Findings were discussed with the patient. Despite presence of medial meniscal tear on MRI, this does not correspond with her symptoms and may represent an incidental finding. Given the locale of discomfort, this does seem consistent with acute short oblique fracture involving the lateral facet of the patella.   This is predominantly longitudinal in nature and does not specifically involve the extensor mechanism, nor is her extensor mechanism compromise on examination. As such, would continue with functional bracing as needed. Would have her limit squatting, kneeling, crouching activities as well as running, repetitive jumping, and pivoting. I recommended that she utilize the hinged knee brace while outside of the home and continue with ibuprofen to mitigate inflammation, Tylenol to mitigate pain, intermittent icing to decrease inflammation as well. We will continue to follow her serially and may contemplate physical therapy after her next visit. She was provided with documentation to allow her to continue to work from home via remote access and we will update her status after her next visit. Procedures Performed Today:     None    Follow-up (with Radiographs) / Referral:     The patient will follow up with Galilea Andrea PA-C in 3.5-4 weeks for clinical and radiographic reassessment of short oblique/longitudinal fracture involving lateral facet of the left patella. We will update AP and, lateral, merchant radiographs of the left knee at the outset of that visit. If peripheral consolidation is suggested and no interval displacement of this currently nondisplaced fracture, may be reasonable to discontinue the brace altogether and transition the patient to physical therapy.     Amaya Hussein MD  Orthopaedic Surgery

## 2023-03-02 ENCOUNTER — OFFICE VISIT (OUTPATIENT)
Dept: FAMILY MEDICINE CLINIC | Age: 68
End: 2023-03-02

## 2023-03-02 VITALS
SYSTOLIC BLOOD PRESSURE: 128 MMHG | HEART RATE: 80 BPM | HEIGHT: 62 IN | DIASTOLIC BLOOD PRESSURE: 80 MMHG | OXYGEN SATURATION: 97 % | WEIGHT: 266 LBS | BODY MASS INDEX: 48.95 KG/M2 | RESPIRATION RATE: 14 BRPM

## 2023-03-02 DIAGNOSIS — I10 ESSENTIAL HYPERTENSION: ICD-10-CM

## 2023-03-02 DIAGNOSIS — K21.9 GASTROESOPHAGEAL REFLUX DISEASE WITHOUT ESOPHAGITIS: ICD-10-CM

## 2023-03-02 DIAGNOSIS — E66.01 CLASS 3 SEVERE OBESITY DUE TO EXCESS CALORIES WITH SERIOUS COMORBIDITY AND BODY MASS INDEX (BMI) OF 45.0 TO 49.9 IN ADULT (HCC): ICD-10-CM

## 2023-03-02 DIAGNOSIS — Z87.11 PERSONAL HISTORY OF GASTRIC ULCER: ICD-10-CM

## 2023-03-02 DIAGNOSIS — M79.605 PAIN OF LEFT LOWER EXTREMITY: Primary | ICD-10-CM

## 2023-03-02 DIAGNOSIS — E11.9 TYPE 2 DIABETES MELLITUS WITHOUT COMPLICATION, WITHOUT LONG-TERM CURRENT USE OF INSULIN (HCC): ICD-10-CM

## 2023-03-02 LAB
AMPHETAMINE SCREEN, URINE: NORMAL
BARBITURATE SCREEN URINE: NORMAL
BENZODIAZEPINE SCREEN, URINE: NORMAL
CANNABINOID SCREEN URINE: NORMAL
COCAINE METABOLITE SCREEN URINE: NORMAL
FENTANYL SCREEN, URINE: NORMAL
HBA1C MFR BLD: 7.2 %
Lab: NORMAL
METHADONE SCREEN, URINE: NORMAL
OPIATE SCREEN URINE: NORMAL
OXYCODONE URINE: NORMAL
PHENCYCLIDINE SCREEN URINE: NORMAL
PROPOXYPHENE SCREEN: NORMAL

## 2023-03-02 RX ORDER — PHENTERMINE HYDROCHLORIDE 37.5 MG/1
37.5 TABLET ORAL
Qty: 30 TABLET | Refills: 0 | Status: SHIPPED | OUTPATIENT
Start: 2023-03-02 | End: 2023-04-01

## 2023-03-02 ASSESSMENT — ENCOUNTER SYMPTOMS
CHEST TIGHTNESS: 0
SINUS PRESSURE: 0
FACIAL SWELLING: 0
EYE DISCHARGE: 0
VOMITING: 0
ABDOMINAL PAIN: 0
SORE THROAT: 0
APNEA: 0
SHORTNESS OF BREATH: 0
WHEEZING: 0
CONSTIPATION: 0
DIARRHEA: 0
BLOOD IN STOOL: 0
COUGH: 0
SINUS PAIN: 0
EYE ITCHING: 0
ABDOMINAL DISTENTION: 0
COLOR CHANGE: 0
TROUBLE SWALLOWING: 0
NAUSEA: 0
EYE REDNESS: 0
RHINORRHEA: 0
RECTAL PAIN: 0
VOICE CHANGE: 0
BACK PAIN: 0
EYE PAIN: 0
PHOTOPHOBIA: 0

## 2023-03-02 NOTE — PROGRESS NOTES
Subjective:      Patient ID: Kathy Keys is a 79 y.o. female Established patient, here for evaluation of the following chief complaint(s):  Chief Complaint   Patient presents with    Diabetes    Hypertension       HPI  79year old female presenting with 9/10 non-radiating pain localized to the left knee . The patient's pain is under fair control with ibuprofen. Type 2 diabetes: The patient's A1c is 7.1        Gastroesophageal reflux disease: Compliant with Protonix 40 mg orally daily      Obesity: The patient's body mass index is 40.65. She is interested in achieving weight loss goals. She would like to receive pharmacologic assistance in achieving her weight loss goals. Left lower extremity swelling and pain: The patient reports left lower extremity achy persistent nonradiating pain has been present for several days. At present he denies polyuria,  Polydipsia, constitutional, sinus, visual, cardiopulmonary, urologic, gastrointestinal, immunologic/hematologic, additional musculoskeletal, neurologic,dermatologic, or psychiatric complaints.     Current Outpatient Medications on File Prior to Visit   Medication Sig Dispense Refill    ibuprofen (ADVIL;MOTRIN) 800 MG tablet Take 1 tablet by mouth 3 times daily (with meals) 270 tablet 1    CPAP Machine MISC by Does not apply route New CPAP with 8 cm 1 each 0    amLODIPine (NORVASC) 10 MG tablet Take 1 tablet by mouth once daily 90 tablet 1    pantoprazole (PROTONIX) 40 MG tablet Take 1 tablet by mouth once daily 90 tablet 1    ipratropium-albuterol (DUONEB) 0.5-2.5 (3) MG/3ML SOLN nebulizer solution Inhale 3 mLs into the lungs every 4 hours 360 mL 0    albuterol sulfate HFA (PROVENTIL;VENTOLIN;PROAIR) 108 (90 Base) MCG/ACT inhaler Inhale 2 puffs into the lungs 4 times daily as needed for Wheezing 3 each 3    insulin lispro (HUMALOG KWIKPEN) 200 UNIT/ML SOPN pen inject 3 times daily before meals- 120-140 2 units, 141-160 3 units, 161-180 4 units, 181-200 5 units, 201-220 6 units, 221-240 7 units, 241-260 8 units, 261-280 9 units, 281-300 10 units, 301 or higher 11 units. 10 Adjustable Dose Pre-filled Pen Syringe 3    Insulin Degludec (TRESIBA FLEXTOUCH) 100 UNIT/ML SOPN Inject 30 Units into the skin nightly 10 Adjustable Dose Pre-filled Pen Syringe 3    Continuous Blood Gluc Sensor (FREESTYLE GERTRUDE 2 SENSOR) MISC 1 Device by Does not apply route every 14 days 6 each 3    Insulin Pen Needle 32G X 6 MM MISC 1 Device by Does not apply route 4 times daily (before meals and nightly) 400 each 3    nystatin (MYCOSTATIN) 012323 UNIT/GM powder Apply 3 times daily. 45 g 5    cholestyramine (QUESTRAN) 4 g packet Take 1 packet by mouth 2 times daily 180 packet 5    Fluticasone-Umeclidin-Vilant (TRELEGY ELLIPTA) 200-62.5-25 MCG/INH AEPB Inhale 1 puff into the lungs daily 2 each 0    glucose 4 g chewable tablet Take 4 tablets by mouth as needed for Low blood sugar 60 tablet 3    Continuous Blood Gluc  (FREESTYLE GERTRUDE 2 READER) CHRISTINA 1 Device by Does not apply route 4 times daily (before meals and nightly) 1 each 0    blood glucose monitor strips Test 4 times a day and nightly. Dispense sufficient amount for indicated testing frequency plus additional to accommodate PRN testing needs. 150 strip 3    fluticasone (FLONASE) 50 MCG/ACT nasal spray 1 spray by Each Nostril route daily 32 g 1    albuterol (PROVENTIL) (5 MG/ML) 0.5% nebulizer solution Take 1 mL by nebulization 4 times daily as needed for Wheezing 120 each 3    hydrocortisone 1 % cream       ketoconazole (NIZORAL) 2 % cream       Blood Glucose Monitoring Suppl (ONE TOUCH ULTRA 2) w/Device KIT       cyanocobalamin 1000 MCG/ML injection INJECT 1 ML SUBCUTANEOUSLY ONCE EVERY MONTH      ULTICARE INSULIN SAFETY SYR 29G X 1/2\" 1 ML MISC USE AS DIRECTED      nystatin (MYCOSTATIN) 458948 UNIT/GM cream Apply topically 2 times daily.  30 g 3    blood glucose monitor kit and supplies Dispense sufficient amount for indicated testing frequency plus additional to accommodate PRN testing needs. Dispense all needed supplies to include: monitor, strips, lancing device, lancets, control solutions, alcohol swabs. 1 kit 0    Lancets MISC DX: diabetes mellitus. Use 1-3 time(s) daily - Ok to substitute per insurance (1 each = 1 box) 1 each 5    [DISCONTINUED] glyBURIDE (DIABETA) 2.5 MG tablet Take 1 tablet by mouth daily (with breakfast) 30 tablet 5    dicyclomine (BENTYL) 10 MG capsule Take 1 capsule by mouth 2 times daily (before meals) 120 capsule 3    Respiratory Therapy Supplies (FULL KIT NEBULIZER SET) MISC 1 Units by Does not apply route 4 times daily 1 each 1    albuterol (PROVENTIL) (2.5 MG/3ML) 0.083% nebulizer solution Take 3 mLs by nebulization every 6 hours as needed for Wheezing 300 vial 3    CPAP Machine MISC        No current facility-administered medications on file prior to visit. Aspirin, Codeine, Imitrex [sumatriptan], Theophyllines, Diflucan [fluconazole], Nizoral [ketoconazole], and Zoloft    Review of Systems   Constitutional:  Negative for chills, diaphoresis, fatigue and fever. HENT:  Negative for congestion, dental problem, drooling, ear discharge, ear pain, facial swelling, hearing loss, mouth sores, nosebleeds, postnasal drip, rhinorrhea, sinus pressure, sinus pain, sneezing, sore throat, tinnitus, trouble swallowing and voice change. Eyes:  Negative for photophobia, pain, discharge, redness, itching and visual disturbance. Respiratory:  Negative for apnea, cough, chest tightness, shortness of breath and wheezing. Cardiovascular:  Negative for chest pain, palpitations and leg swelling. Gastrointestinal:  Negative for abdominal distention, abdominal pain, blood in stool, constipation, diarrhea, nausea, rectal pain and vomiting. Endocrine: Negative for cold intolerance, heat intolerance, polydipsia, polyphagia and polyuria.    Genitourinary:  Negative for decreased urine volume, difficulty urinating, dysuria, flank pain, frequency, genital sores, hematuria and urgency. Musculoskeletal:  Negative for arthralgias, back pain, gait problem, joint swelling, myalgias, neck pain and neck stiffness. Left knee pain   Skin:  Negative for color change, rash and wound. Allergic/Immunologic: Negative for environmental allergies and food allergies. Neurological:  Negative for dizziness, tremors, seizures, syncope, facial asymmetry, speech difficulty, weakness, light-headedness, numbness and headaches. Hematological:  Negative for adenopathy. Does not bruise/bleed easily. Psychiatric/Behavioral:  Negative for agitation, confusion, decreased concentration, hallucinations, self-injury, sleep disturbance and suicidal ideas. The patient is not nervous/anxious. Objective:   /80   Pulse 80   Resp 14   Ht 5' 2\" (1.575 m)   Wt 266 lb (120.7 kg)   SpO2 97%   BMI 48.65 kg/m²     Physical Exam  Constitutional:       General: She is not in acute distress. Appearance: She is well-developed. HENT:      Head: Normocephalic. Right Ear: External ear normal.      Left Ear: External ear normal.   Eyes:      Conjunctiva/sclera: Conjunctivae normal.   Neck:      Vascular: No JVD. Trachea: No tracheal deviation. Cardiovascular:      Rate and Rhythm: Normal rate and regular rhythm. Heart sounds: Normal heart sounds. Pulmonary:      Effort: Pulmonary effort is normal. No respiratory distress. Breath sounds: Normal breath sounds. No wheezing or rales. Chest:      Chest wall: No tenderness. Abdominal:      General: Bowel sounds are normal. There is no distension. Palpations: Abdomen is soft. There is no mass. Tenderness: There is no abdominal tenderness. There is no guarding or rebound. Musculoskeletal:         General: No tenderness or deformity. Cervical back: Neck supple. Skin:     General: Skin is warm and dry. Coloration: Skin is not pale. Findings: No erythema or rash. Neurological:      Mental Status: She is alert and oriented to person, place, and time. Motor: No abnormal muscle tone. Psychiatric:         Thought Content: Thought content normal.         Judgment: Judgment normal.       Assessment:       Diagnosis Orders   1. Pain of left lower extremity  US DUP LOWER EXTREMITY LEFT RIP      2. Essential hypertension        3. Type 2 diabetes mellitus without complication, without long-term current use of insulin ([facility])  POCT glycosylated hemoglobin (Hb A1C)      4. Personal history of gastric ulcer        5. Gastroesophageal reflux disease without esophagitis  US DUP LOWER EXTREMITY LEFT RIP      6. Class 3 severe obesity due to excess calories with serious comorbidity and body mass index (BMI) of 45.0 to 49.9 in adult ([facility])  phentermine (ADIPEX-P) 37.5 MG tablet    Urine Drug Screen           No results found for: LIPIDPAN, BMP, CMP, CBC, CBCAUTODIF  Plan:      Tara Jose was seen today for diabetes and hypertension. Diagnoses and all orders for this visit:    Pain of left lower extremity  -     US DUP LOWER EXTREMITY LEFT RIP; Future    Essential hypertension    Type 2 diabetes mellitus without complication, without long-term current use of insulin ([facility])  -     POCT glycosylated hemoglobin (Hb A1C)    Personal history of gastric ulcer    Gastroesophageal reflux disease without esophagitis  -     US DUP LOWER EXTREMITY LEFT RIP; Future    Class 3 severe obesity due to excess calories with serious comorbidity and body mass index (BMI) of 45.0 to 49.9 in adult ([facility])  -     phentermine (ADIPEX-P) 37.5 MG tablet; Take 1 tablet by mouth every morning (before breakfast) for 30 days. Max Daily Amount: 37.5 mg  -     Urine Drug Screen; Future       Return in about 3 months (around 6/2/2023).       On this date 03/02/23 I have spent 30 minutes reviewing previous notes, test results and face to face with the patient discussing the diagnosis and importance of compliance with the treatment plan. Dieudonne Stuart MD    Please note, this report has been partially produced using speech recognition software  and may cause  and /or contain errors related to that system including grammar, punctuation and spelling as well as words and phrases that may seem inappropriate. If there are questions or concerns please feel free to contact me to clarify.

## 2023-03-08 ENCOUNTER — HOSPITAL ENCOUNTER (OUTPATIENT)
Dept: ULTRASOUND IMAGING | Age: 68
Discharge: HOME | End: 2023-03-10
Payer: COMMERCIAL

## 2023-03-08 DIAGNOSIS — K21.9 GASTROESOPHAGEAL REFLUX DISEASE WITHOUT ESOPHAGITIS: ICD-10-CM

## 2023-03-08 DIAGNOSIS — M79.605 PAIN OF LEFT LOWER EXTREMITY: ICD-10-CM

## 2023-03-08 PROCEDURE — 93971 EXTREMITY STUDY: CPT

## 2023-03-22 ENCOUNTER — OFFICE VISIT (OUTPATIENT)
Dept: PODIATRY | Age: 68
End: 2023-03-22
Payer: COMMERCIAL

## 2023-03-22 VITALS — HEIGHT: 62 IN | BODY MASS INDEX: 46.56 KG/M2 | WEIGHT: 253 LBS | TEMPERATURE: 97.8 F

## 2023-03-22 DIAGNOSIS — Z79.4 TYPE 2 DIABETES MELLITUS WITH DIABETIC NEUROPATHY, WITH LONG-TERM CURRENT USE OF INSULIN (HCC): ICD-10-CM

## 2023-03-22 DIAGNOSIS — M21.621 TAILOR'S BUNIONETTE, BILATERAL: ICD-10-CM

## 2023-03-22 DIAGNOSIS — L81.9 PIGMENTED SKIN LESION: ICD-10-CM

## 2023-03-22 DIAGNOSIS — M19.072 PRIMARY OSTEOARTHRITIS OF BOTH FEET: ICD-10-CM

## 2023-03-22 DIAGNOSIS — E11.40 TYPE 2 DIABETES MELLITUS WITH DIABETIC NEUROPATHY, WITH LONG-TERM CURRENT USE OF INSULIN (HCC): ICD-10-CM

## 2023-03-22 DIAGNOSIS — M20.41 HAMMERTOE, BILATERAL: ICD-10-CM

## 2023-03-22 DIAGNOSIS — M79.672 PAIN IN BOTH FEET: Primary | ICD-10-CM

## 2023-03-22 DIAGNOSIS — M20.42 HAMMERTOE, BILATERAL: ICD-10-CM

## 2023-03-22 DIAGNOSIS — M19.071 PRIMARY OSTEOARTHRITIS OF BOTH FEET: ICD-10-CM

## 2023-03-22 DIAGNOSIS — M79.671 PAIN IN BOTH FEET: Primary | ICD-10-CM

## 2023-03-22 DIAGNOSIS — M21.622 TAILOR'S BUNIONETTE, BILATERAL: ICD-10-CM

## 2023-03-22 PROCEDURE — 99213 OFFICE O/P EST LOW 20 MIN: CPT | Performed by: PODIATRIST

## 2023-03-22 PROCEDURE — 1123F ACP DISCUSS/DSCN MKR DOCD: CPT | Performed by: PODIATRIST

## 2023-03-22 PROCEDURE — 3051F HG A1C>EQUAL 7.0%<8.0%: CPT | Performed by: PODIATRIST

## 2023-03-22 ASSESSMENT — ENCOUNTER SYMPTOMS
BACK PAIN: 1
NAUSEA: 0
VOMITING: 0
SHORTNESS OF BREATH: 1

## 2023-03-22 NOTE — PROGRESS NOTES
indicated testing frequency plus additional to accommodate PRN testing needs. 150 strip 3    fluticasone (FLONASE) 50 MCG/ACT nasal spray 1 spray by Each Nostril route daily 32 g 1    albuterol (PROVENTIL) (5 MG/ML) 0.5% nebulizer solution Take 1 mL by nebulization 4 times daily as needed for Wheezing 120 each 3    hydrocortisone 1 % cream       ketoconazole (NIZORAL) 2 % cream       Blood Glucose Monitoring Suppl (ONE TOUCH ULTRA 2) w/Device KIT       cyanocobalamin 1000 MCG/ML injection INJECT 1 ML SUBCUTANEOUSLY ONCE EVERY MONTH      ULTICARE INSULIN SAFETY SYR 29G X 1/2\" 1 ML MISC USE AS DIRECTED      nystatin (MYCOSTATIN) 447146 UNIT/GM cream Apply topically 2 times daily. 30 g 3    blood glucose monitor kit and supplies Dispense sufficient amount for indicated testing frequency plus additional to accommodate PRN testing needs. Dispense all needed supplies to include: monitor, strips, lancing device, lancets, control solutions, alcohol swabs. 1 kit 0    Lancets MISC DX: diabetes mellitus. Use 1-3 time(s) daily - Ok to substitute per insurance (1 each = 1 box) 1 each 5    dicyclomine (BENTYL) 10 MG capsule Take 1 capsule by mouth 2 times daily (before meals) 120 capsule 3    Respiratory Therapy Supplies (FULL KIT NEBULIZER SET) MISC 1 Units by Does not apply route 4 times daily 1 each 1    albuterol (PROVENTIL) (2.5 MG/3ML) 0.083% nebulizer solution Take 3 mLs by nebulization every 6 hours as needed for Wheezing 300 vial 3    CPAP Machine MISC       [DISCONTINUED] glyBURIDE (DIABETA) 2.5 MG tablet Take 1 tablet by mouth daily (with breakfast) 30 tablet 5     No current facility-administered medications on file prior to visit.        Past Medical History:   Diagnosis Date    Allergic rhinitis     Asthma     [facility] ([facility] 2014    invasive ductal carcinoma left breast    CAD (coronary artery disease)     Cancer ([facility] 11/2014    Invasive ductal left breast    Colon polyps     Diabetes ([facility]     Fibromyalgia

## 2023-03-28 ENCOUNTER — OFFICE VISIT (OUTPATIENT)
Dept: PULMONOLOGY | Age: 68
End: 2023-03-28
Payer: COMMERCIAL

## 2023-03-28 VITALS
DIASTOLIC BLOOD PRESSURE: 80 MMHG | WEIGHT: 254 LBS | OXYGEN SATURATION: 98 % | BODY MASS INDEX: 46.46 KG/M2 | HEART RATE: 66 BPM | TEMPERATURE: 96.8 F | SYSTOLIC BLOOD PRESSURE: 116 MMHG

## 2023-03-28 DIAGNOSIS — G47.33 OSA (OBSTRUCTIVE SLEEP APNEA): ICD-10-CM

## 2023-03-28 DIAGNOSIS — J45.41 MODERATE PERSISTENT ASTHMA WITH ACUTE EXACERBATION: Primary | ICD-10-CM

## 2023-03-28 DIAGNOSIS — E66.01 MORBID OBESITY (HCC): ICD-10-CM

## 2023-03-28 PROCEDURE — 3074F SYST BP LT 130 MM HG: CPT | Performed by: INTERNAL MEDICINE

## 2023-03-28 PROCEDURE — 3079F DIAST BP 80-89 MM HG: CPT | Performed by: INTERNAL MEDICINE

## 2023-03-28 PROCEDURE — 99214 OFFICE O/P EST MOD 30 MIN: CPT | Performed by: INTERNAL MEDICINE

## 2023-03-28 PROCEDURE — 1123F ACP DISCUSS/DSCN MKR DOCD: CPT | Performed by: INTERNAL MEDICINE

## 2023-03-28 RX ORDER — FLUTICASONE FUROATE, UMECLIDINIUM BROMIDE AND VILANTEROL TRIFENATATE 200; 62.5; 25 UG/1; UG/1; UG/1
1 POWDER RESPIRATORY (INHALATION) DAILY
Qty: 3 EACH | Refills: 1 | Status: SHIPPED | OUTPATIENT
Start: 2023-03-28

## 2023-03-28 RX ORDER — FLUTICASONE FUROATE, UMECLIDINIUM BROMIDE AND VILANTEROL TRIFENATATE 200; 62.5; 25 UG/1; UG/1; UG/1
1 POWDER RESPIRATORY (INHALATION) DAILY
Qty: 2 EACH | Refills: 0 | COMMUNITY
Start: 2023-03-28

## 2023-03-28 ASSESSMENT — ENCOUNTER SYMPTOMS
ABDOMINAL PAIN: 0
CHEST TIGHTNESS: 0
VOMITING: 0
SORE THROAT: 0
DIARRHEA: 0
TROUBLE SWALLOWING: 0
SHORTNESS OF BREATH: 0
COUGH: 0
VOICE CHANGE: 0
EYE ITCHING: 0
RHINORRHEA: 0
SINUS PRESSURE: 0
NAUSEA: 0
WHEEZING: 0
EYE DISCHARGE: 0

## 2023-03-28 NOTE — TELEPHONE ENCOUNTER
Please do not deny as samples were already given to patient     Rx requested:  Requested Prescriptions     Pending Prescriptions Disp Refills    fluticasone-umeclidin-vilant (TRELEGY ELLIPTA) 200-62.5-25 MCG/ACT AEPB inhaler 2 each 0     Sig: Inhale 1 puff into the lungs daily         Last Office Visit:   3/28/2023      Next Visit Date:  Future Appointments   Date Time Provider Jeff Pastrana   3/29/2023  4:00 PM Darryle Shropshire, PA-C [facility]   4/7/2023  8:00 AM Riri Rivers  Gary [place],[address]   5/8/2023 10:00 AM Riri Rivers MD [address] [place] [address] [place]   5/10/2023  8:00 AM Anabelle Brumfield, [address]   7/31/2023  3:15 PM Flako Soto, [address] [facility]   2/9/2024  3:30 PM ANGELA Fitzpatrick [facility] EMERGENCY [facility] AT [place]

## 2023-03-28 NOTE — PROGRESS NOTES
650530 UNIT/GM cream Apply topically 2 times daily. 30 g 3    blood glucose monitor kit and supplies Dispense sufficient amount for indicated testing frequency plus additional to accommodate PRN testing needs. Dispense all needed supplies to include: monitor, strips, lancing device, lancets, control solutions, alcohol swabs. 1 kit 0    Lancets MISC DX: diabetes mellitus. Use 1-3 time(s) daily - Ok to substitute per insurance (1 each = 1 box) 1 each 5    dicyclomine (BENTYL) 10 MG capsule Take 1 capsule by mouth 2 times daily (before meals) 120 capsule 3    Respiratory Therapy Supplies (FULL KIT NEBULIZER SET) MISC 1 Units by Does not apply route 4 times daily 1 each 1    albuterol (PROVENTIL) (2.5 MG/3ML) 0.083% nebulizer solution Take 3 mLs by nebulization every 6 hours as needed for Wheezing 300 vial 3    CPAP Machine MISC        No current facility-administered medications for this visit. No results found for this or any previous visit.  ]  Results for orders placed during the hospital encounter of 09/06/22    XR CHEST PORTABLE    Narrative  EXAMINATION:  CHEST. CLINICAL HISTORY:  SHORTNESS OF BREATH.    COMPARISONS:  8/7/2021. TECHNIQUE:  AP portable. FINDINGS:  Heart size and contour are within normal limits. Pulmonary vascularity appears unremarkable. No infiltrate or effusion is seen. No definite evidence of a mass or adenopathy. No significant bony abnormality. Impression  NORMAL PORTABLE CHEST. Results for orders placed during the hospital encounter of 08/07/21    XR CHEST PORTABLE    Narrative  Exam: XR CHEST PORTABLE    History: Fever. Abdominal pain. Technique: AP portable view of the chest obtained. Comparison: Portable chest radiograph from September 24, 2020    Findings: The cardiomediastinal silhouette is within normal limits. No pneumothorax, pleural effusion, or consolidation. Bones of the thorax appear intact.     Impression  No radiographic evidence of acute

## 2023-03-29 ENCOUNTER — OFFICE VISIT (OUTPATIENT)
Dept: ORTHOPEDIC SURGERY | Age: 68
End: 2023-03-29
Payer: COMMERCIAL

## 2023-03-29 ENCOUNTER — HOSPITAL ENCOUNTER (OUTPATIENT)
Dept: ORTHOPEDIC SURGERY | Age: 68
Discharge: HOME | End: 2023-03-31
Payer: COMMERCIAL

## 2023-03-29 VITALS
HEIGHT: 62 IN | TEMPERATURE: 96.8 F | HEART RATE: 66 BPM | SYSTOLIC BLOOD PRESSURE: 116 MMHG | BODY MASS INDEX: 46.74 KG/M2 | OXYGEN SATURATION: 98 % | DIASTOLIC BLOOD PRESSURE: 80 MMHG | WEIGHT: 254 LBS

## 2023-03-29 DIAGNOSIS — M23.92 INTERNAL DERANGEMENT OF LEFT KNEE: Primary | ICD-10-CM

## 2023-03-29 DIAGNOSIS — S82.016A: ICD-10-CM

## 2023-03-29 PROCEDURE — 1123F ACP DISCUSS/DSCN MKR DOCD: CPT | Performed by: PHYSICIAN ASSISTANT

## 2023-03-29 PROCEDURE — 3074F SYST BP LT 130 MM HG: CPT | Performed by: PHYSICIAN ASSISTANT

## 2023-03-29 PROCEDURE — 3079F DIAST BP 80-89 MM HG: CPT | Performed by: PHYSICIAN ASSISTANT

## 2023-03-29 PROCEDURE — 99213 OFFICE O/P EST LOW 20 MIN: CPT | Performed by: PHYSICIAN ASSISTANT

## 2023-03-29 PROCEDURE — 73562 X-RAY EXAM OF KNEE 3: CPT

## 2023-03-29 NOTE — LETTER
March 29, 2023       Ligia Moscoso YOB: 1955   [address] [facility] Date of Visit:  3/29/2023       To Whom It May Concern: It is my medical opinion that Sallysharath Collier {Work release (duty restriction):21109}. If you have any questions or concerns, please don't hesitate to call.     Sincerely,        Belkys Sevilla PA-C

## 2023-03-29 NOTE — PROGRESS NOTES
fluticasone-umeclidin-vilant (TRELEGY ELLIPTA) 200-62.5-25 MCG/ACT AEPB inhaler Inhale 1 puff into the lungs daily 3 each 1    fluticasone-umeclidin-vilant (TRELEGY ELLIPTA) 200-62.5-25 MCG/ACT AEPB inhaler Inhale 1 puff into the lungs daily 2 each 0    phentermine (ADIPEX-P) 37.5 MG tablet Take 1 tablet by mouth every morning (before breakfast) for 30 days. Max Daily Amount: 37.5 mg 30 tablet 0    ibuprofen (ADVIL;MOTRIN) 800 MG tablet Take 1 tablet by mouth 3 times daily (with meals) 270 tablet 1    CPAP Machine MISC by Does not apply route New CPAP with 8 cm 1 each 0    amLODIPine (NORVASC) 10 MG tablet Take 1 tablet by mouth once daily 90 tablet 1    pantoprazole (PROTONIX) 40 MG tablet Take 1 tablet by mouth once daily 90 tablet 1    ipratropium-albuterol (DUONEB) 0.5-2.5 (3) MG/3ML SOLN nebulizer solution Inhale 3 mLs into the lungs every 4 hours 360 mL 0    albuterol sulfate HFA (PROVENTIL;VENTOLIN;PROAIR) 108 (90 Base) MCG/ACT inhaler Inhale 2 puffs into the lungs 4 times daily as needed for Wheezing 3 each 3    insulin lispro (HUMALOG KWIKPEN) 200 UNIT/ML SOPN pen inject 3 times daily before meals- 120-140 2 units, 141-160 3 units, 161-180 4 units, 181-200 5 units, 201-220 6 units, 221-240 7 units, 241-260 8 units, 261-280 9 units, 281-300 10 units, 301 or higher 11 units. 10 Adjustable Dose Pre-filled Pen Syringe 3    Insulin Degludec (TRESIBA FLEXTOUCH) 100 UNIT/ML SOPN Inject 30 Units into the skin nightly 10 Adjustable Dose Pre-filled Pen Syringe 3    Continuous Blood Gluc Sensor (FREESTYLE GERTRUDE 2 SENSOR) MISC 1 Device by Does not apply route every 14 days 6 each 3    Insulin Pen Needle 32G X 6 MM MISC 1 Device by Does not apply route 4 times daily (before meals and nightly) 400 each 3    nystatin (MYCOSTATIN) 581413 UNIT/GM powder Apply 3 times daily.  45 g 5    cholestyramine (QUESTRAN) 4 g packet Take 1 packet by mouth 2 times daily 180 packet 5    Fluticasone-Umeclidin-Vilant (Tiffanie Franz)

## 2023-04-07 ENCOUNTER — OFFICE VISIT (OUTPATIENT)
Dept: FAMILY MEDICINE CLINIC | Age: 68
End: 2023-04-07
Payer: COMMERCIAL

## 2023-04-07 VITALS
BODY MASS INDEX: 47.11 KG/M2 | DIASTOLIC BLOOD PRESSURE: 80 MMHG | SYSTOLIC BLOOD PRESSURE: 132 MMHG | HEIGHT: 62 IN | HEART RATE: 63 BPM | WEIGHT: 256 LBS | OXYGEN SATURATION: 99 % | RESPIRATION RATE: 14 BRPM

## 2023-04-07 DIAGNOSIS — E66.01 CLASS 3 SEVERE OBESITY DUE TO EXCESS CALORIES WITH SERIOUS COMORBIDITY AND BODY MASS INDEX (BMI) OF 45.0 TO 49.9 IN ADULT (HCC): ICD-10-CM

## 2023-04-07 DIAGNOSIS — E11.65 TYPE 2 DIABETES MELLITUS WITH HYPERGLYCEMIA, WITH LONG-TERM CURRENT USE OF INSULIN (HCC): ICD-10-CM

## 2023-04-07 DIAGNOSIS — M79.605 PAIN OF LEFT LOWER EXTREMITY: ICD-10-CM

## 2023-04-07 DIAGNOSIS — Z79.4 TYPE 2 DIABETES MELLITUS WITH HYPERGLYCEMIA, WITH LONG-TERM CURRENT USE OF INSULIN (HCC): ICD-10-CM

## 2023-04-07 DIAGNOSIS — E11.9 TYPE 2 DIABETES MELLITUS WITHOUT COMPLICATION, WITHOUT LONG-TERM CURRENT USE OF INSULIN (HCC): Primary | ICD-10-CM

## 2023-04-07 DIAGNOSIS — K21.9 GASTROESOPHAGEAL REFLUX DISEASE WITHOUT ESOPHAGITIS: ICD-10-CM

## 2023-04-07 PROCEDURE — 99214 OFFICE O/P EST MOD 30 MIN: CPT | Performed by: INTERNAL MEDICINE

## 2023-04-07 PROCEDURE — 3051F HG A1C>EQUAL 7.0%<8.0%: CPT | Performed by: INTERNAL MEDICINE

## 2023-04-07 PROCEDURE — 3079F DIAST BP 80-89 MM HG: CPT | Performed by: INTERNAL MEDICINE

## 2023-04-07 PROCEDURE — 3075F SYST BP GE 130 - 139MM HG: CPT | Performed by: INTERNAL MEDICINE

## 2023-04-07 PROCEDURE — 1123F ACP DISCUSS/DSCN MKR DOCD: CPT | Performed by: INTERNAL MEDICINE

## 2023-04-07 RX ORDER — PHENTERMINE HYDROCHLORIDE 37.5 MG/1
37.5 TABLET ORAL
Qty: 30 TABLET | Refills: 0 | Status: SHIPPED | OUTPATIENT
Start: 2023-04-07 | End: 2023-05-07

## 2023-04-07 RX ORDER — INSULIN DEGLUDEC INJECTION 100 U/ML
30 INJECTION, SOLUTION SUBCUTANEOUS NIGHTLY
Qty: 10 ADJUSTABLE DOSE PRE-FILLED PEN SYRINGE | Refills: 3 | COMMUNITY
Start: 2023-04-07

## 2023-04-07 ASSESSMENT — ENCOUNTER SYMPTOMS
COLOR CHANGE: 0
RECTAL PAIN: 0
APNEA: 0
FACIAL SWELLING: 0
WHEEZING: 0
RHINORRHEA: 0
SORE THROAT: 0
EYE PAIN: 0
CHEST TIGHTNESS: 0
EYE REDNESS: 0
EYE DISCHARGE: 0
PHOTOPHOBIA: 0
EYE ITCHING: 0
COUGH: 0
VOICE CHANGE: 0
DIARRHEA: 0
VOMITING: 0
SHORTNESS OF BREATH: 0
ABDOMINAL DISTENTION: 0
CONSTIPATION: 0
ABDOMINAL PAIN: 0
BACK PAIN: 0
SINUS PRESSURE: 0
BLOOD IN STOOL: 0
TROUBLE SWALLOWING: 0
SINUS PAIN: 0
NAUSEA: 0

## 2023-04-07 NOTE — ADDENDUM NOTE
Addended by: Luda Palm on: 4/7/2023 08:38 AM     Modules accepted: Orders
History of CVA (cerebrovascular accident)

## 2023-05-08 ENCOUNTER — OFFICE VISIT (OUTPATIENT)
Dept: FAMILY MEDICINE CLINIC | Age: 68
End: 2023-05-08
Payer: COMMERCIAL

## 2023-05-08 VITALS
SYSTOLIC BLOOD PRESSURE: 138 MMHG | WEIGHT: 255 LBS | OXYGEN SATURATION: 96 % | DIASTOLIC BLOOD PRESSURE: 82 MMHG | HEART RATE: 73 BPM | BODY MASS INDEX: 46.93 KG/M2 | HEIGHT: 62 IN | RESPIRATION RATE: 14 BRPM

## 2023-05-08 DIAGNOSIS — I10 ESSENTIAL HYPERTENSION: ICD-10-CM

## 2023-05-08 DIAGNOSIS — G47.33 OSA (OBSTRUCTIVE SLEEP APNEA): ICD-10-CM

## 2023-05-08 DIAGNOSIS — E66.01 CLASS 3 SEVERE OBESITY DUE TO EXCESS CALORIES WITH SERIOUS COMORBIDITY AND BODY MASS INDEX (BMI) OF 45.0 TO 49.9 IN ADULT (HCC): ICD-10-CM

## 2023-05-08 DIAGNOSIS — E11.9 TYPE 2 DIABETES MELLITUS WITHOUT COMPLICATION, WITHOUT LONG-TERM CURRENT USE OF INSULIN (HCC): Primary | ICD-10-CM

## 2023-05-08 DIAGNOSIS — K21.9 GASTROESOPHAGEAL REFLUX DISEASE WITHOUT ESOPHAGITIS: ICD-10-CM

## 2023-05-08 PROCEDURE — 99214 OFFICE O/P EST MOD 30 MIN: CPT | Performed by: INTERNAL MEDICINE

## 2023-05-08 PROCEDURE — 3075F SYST BP GE 130 - 139MM HG: CPT | Performed by: INTERNAL MEDICINE

## 2023-05-08 PROCEDURE — 3051F HG A1C>EQUAL 7.0%<8.0%: CPT | Performed by: INTERNAL MEDICINE

## 2023-05-08 PROCEDURE — 3079F DIAST BP 80-89 MM HG: CPT | Performed by: INTERNAL MEDICINE

## 2023-05-08 PROCEDURE — 1123F ACP DISCUSS/DSCN MKR DOCD: CPT | Performed by: INTERNAL MEDICINE

## 2023-05-08 RX ORDER — PHENTERMINE HYDROCHLORIDE 37.5 MG/1
37.5 TABLET ORAL
Qty: 30 TABLET | Refills: 0 | Status: SHIPPED | OUTPATIENT
Start: 2023-05-08 | End: 2023-06-07

## 2023-05-08 ASSESSMENT — ENCOUNTER SYMPTOMS
PHOTOPHOBIA: 0
SHORTNESS OF BREATH: 0
EYE ITCHING: 0
CONSTIPATION: 0
APNEA: 0
TROUBLE SWALLOWING: 0
COUGH: 0
SINUS PRESSURE: 0
COLOR CHANGE: 0
SORE THROAT: 0
RECTAL PAIN: 0
VOMITING: 0
BLOOD IN STOOL: 0
CHEST TIGHTNESS: 0
EYE DISCHARGE: 0
FACIAL SWELLING: 0
BACK PAIN: 0
EYE REDNESS: 0
WHEEZING: 0
VOICE CHANGE: 0
ABDOMINAL DISTENTION: 0
RHINORRHEA: 0
SINUS PAIN: 0
ABDOMINAL PAIN: 0
NAUSEA: 0
EYE PAIN: 0
DIARRHEA: 0

## 2023-05-08 NOTE — PROGRESS NOTES
Subjective:      Patient ID: Patel Tariq is a 76 y.o. female Established patient, here for evaluation of the following chief complaint(s):  Chief Complaint   Patient presents with    Weight Loss       HPI  79year old female presenting with 9/10 non-radiating pain localized to the left knee . The patient's pain is under fair control with ibuprofen. Type 2 diabetes: The patient's A1c is 7.2 on 3/23/2023        Gastroesophageal reflux disease: Compliant with Protonix 40 mg orally daily      Obesity: The patient's body mass index is 40.64.  compliant with adipex. Left lower extremity swelling and pain: The patient reports left lower extremity achy persistent nonradiating pain has been present for several days. BUNNY : compliant with CPAP OF 8        At present he denies polyuria,  Polydipsia, constitutional, sinus, visual, cardiopulmonary, urologic, gastrointestinal, immunologic/hematologic, additional musculoskeletal, neurologic,dermatologic, or psychiatric complaints.         Current Outpatient Medications on File Prior to Visit   Medication Sig Dispense Refill    Insulin Degludec (TRESIBA FLEXTOUCH) 100 UNIT/ML SOPN Inject 30 Units into the skin nightly 10 Adjustable Dose Pre-filled Pen Syringe 3    ibuprofen (ADVIL;MOTRIN) 800 MG tablet Take 1 tablet by mouth 3 times daily (with meals) 270 tablet 1    CPAP Machine MISC by Does not apply route New CPAP with 8 cm 1 each 0    amLODIPine (NORVASC) 10 MG tablet Take 1 tablet by mouth once daily 90 tablet 1    pantoprazole (PROTONIX) 40 MG tablet Take 1 tablet by mouth once daily 90 tablet 1    ipratropium-albuterol (DUONEB) 0.5-2.5 (3) MG/3ML SOLN nebulizer solution Inhale 3 mLs into the lungs every 4 hours 360 mL 0    albuterol sulfate HFA (PROVENTIL;VENTOLIN;PROAIR) 108 (90 Base) MCG/ACT inhaler Inhale 2 puffs into the lungs 4 times daily as needed for Wheezing 3 each 3    insulin lispro (HUMALOG KWIKPEN) 200 UNIT/ML SOPN pen inject 3 times daily

## 2023-05-10 ENCOUNTER — OFFICE VISIT (OUTPATIENT)
Dept: ENDOCRINOLOGY | Age: 68
End: 2023-05-10
Payer: COMMERCIAL

## 2023-05-10 VITALS
OXYGEN SATURATION: 98 % | BODY MASS INDEX: 46.93 KG/M2 | SYSTOLIC BLOOD PRESSURE: 153 MMHG | HEART RATE: 73 BPM | DIASTOLIC BLOOD PRESSURE: 102 MMHG | WEIGHT: 255 LBS | HEIGHT: 62 IN

## 2023-05-10 DIAGNOSIS — Z79.4 TYPE 2 DIABETES MELLITUS WITH HYPERGLYCEMIA, WITH LONG-TERM CURRENT USE OF INSULIN (HCC): Primary | ICD-10-CM

## 2023-05-10 DIAGNOSIS — E11.65 TYPE 2 DIABETES MELLITUS WITH HYPERGLYCEMIA, WITH LONG-TERM CURRENT USE OF INSULIN (HCC): Primary | ICD-10-CM

## 2023-05-10 PROCEDURE — 99214 OFFICE O/P EST MOD 30 MIN: CPT | Performed by: PHYSICIAN ASSISTANT

## 2023-05-10 PROCEDURE — 1123F ACP DISCUSS/DSCN MKR DOCD: CPT | Performed by: PHYSICIAN ASSISTANT

## 2023-05-10 PROCEDURE — 3077F SYST BP >= 140 MM HG: CPT | Performed by: PHYSICIAN ASSISTANT

## 2023-05-10 PROCEDURE — 3051F HG A1C>EQUAL 7.0%<8.0%: CPT | Performed by: PHYSICIAN ASSISTANT

## 2023-05-10 PROCEDURE — 3080F DIAST BP >= 90 MM HG: CPT | Performed by: PHYSICIAN ASSISTANT

## 2023-05-10 RX ORDER — INSULIN LISPRO 200 [IU]/ML
INJECTION, SOLUTION SUBCUTANEOUS
Qty: 10 ADJUSTABLE DOSE PRE-FILLED PEN SYRINGE | Refills: 3 | Status: SHIPPED | OUTPATIENT
Start: 2023-05-10

## 2023-05-10 RX ORDER — INSULIN DEGLUDEC INJECTION 100 U/ML
30 INJECTION, SOLUTION SUBCUTANEOUS NIGHTLY
Qty: 10 ADJUSTABLE DOSE PRE-FILLED PEN SYRINGE | Refills: 3 | Status: SHIPPED | OUTPATIENT
Start: 2023-05-10

## 2023-05-10 RX ORDER — METFORMIN HYDROCHLORIDE 500 MG/1
500 TABLET, EXTENDED RELEASE ORAL
Qty: 180 TABLET | Refills: 5 | Status: SHIPPED | OUTPATIENT
Start: 2023-05-10

## 2023-05-10 ASSESSMENT — ENCOUNTER SYMPTOMS
NAUSEA: 0
DIARRHEA: 0
SORE THROAT: 0
ABDOMINAL PAIN: 0
VOMITING: 0
SINUS PRESSURE: 0
SHORTNESS OF BREATH: 0
COUGH: 0
RHINORRHEA: 0
WHEEZING: 0

## 2023-05-10 NOTE — PATIENT INSTRUCTIONS
Endocrinology-    Check your blood sugars 4 times a day, before meals and at night  Document these numbers in a blood glucose log and bring them with you to your follow-up appointment. If you are prescribed insulin, Do not take your mealtime insulin if your blood sugars less than 120   Call our office if you have blood sugars less than 80 or greater then 300 on two or more occasions  Call our office if you have any questions regarding your blood sugars or insulin dosing regiment  Signs of low blood sugar may include tremors, feeling shaky, sweating, dizziness, confusion and weakness. Check your blood sugar immediatly if you have any of these symptoms.      The plan as discussed at your appointment-   Target glucose range 100-180   Decrease Tresiba 16 units injected nightly  Humalog inject 3 times daily before meals- less than 140 no insulin, 140-160 3 units, 161-180 4 units, 181-200 5 units, 201-220 6 units, 221-240 7 units, 241-260 8 units, 261-280 9 units, 281-300 10 units, 301 or higher 11 units  Start Metformin 500 XR mg twice daily   Start Farxiga 10 mg daily   Freestyle Rasheed 2 being used  Repeat labs and follow up in 3 months

## 2023-05-10 NOTE — PROGRESS NOTES
5/10/2023    Assessment:       Diagnosis Orders   1. Type 2 diabetes mellitus with hyperglycemia, with long-term current use of insulin ([facility])  Comprehensive Metabolic Panel    Hemoglobin A1C    Continuous Blood Gluc Sensor (FREESTYLE GERTRUDE 2 SENSOR) [facility]    insulin lispro (HUMALOG KWIKPEN) 200 UNIT/ML SOPN pen    Insulin Pen Needle 32G X 6 MM MISC    Insulin Degludec (TRESIBA FLEXTOUCH) 100 UNIT/ML SOPN          PLAN:     Target glucose range 100-180   Decrease Tresiba 16 units injected nightly  Humalog inject 3 times daily before meals- less than 140 no insulin, 140-160 3 units, 161-180 4 units, 181-200 5 units, 201-220 6 units, 221-240 7 units, 241-260 8 units, 261-280 9 units, 281-300 10 units, 301 or higher 11 units  Start Metformin 500 XR mg twice daily   Start Farxiga 10 mg daily   Freestyle Gertrude 2 being used  Repeat labs and follow up in 3 months     Orders Placed This Encounter   Procedures    Comprehensive Metabolic Panel     Standing Status:   Future     Standing Expiration Date:   5/10/2024    Hemoglobin A1C     Standing Status:   Future     Standing Expiration Date:   5/10/2024     Orders Placed This Encounter   Medications    Continuous Blood Gluc Sensor [facility] G[facility]TRUDE 2 [facility]ENS[facility]     Si Device by Does not apply route every 14 days     Dispense:  6 each     Refill:  3    insulin lispro (HUMALOG KWIKPEN) 200 UNIT/ML SOPN pen     Sig: inject 3 times daily before meals- 120-140 2 units, 141-160 3 units, 161-180 4 units, 181-200 5 units, 201-220 6 units, 221-240 7 units, 241-260 8 units, 261-280 9 units, 281-300 10 units, 301 or higher 11 units.      Dispense:  10 Adjustable Dose Pre-filled Pen Syringe     Refill:  3    Insulin Pen Needle 32G X 6 MM MISC     Si Device by Does not apply route 4 times daily (before meals and nightly)     Dispense:  400 each     Refill:  3    glucose 4 g chewable tablet     Sig: Take 4 tablets by mouth as needed for Low blood sugar     Dispense:  60 tablet

## 2023-05-12 ENCOUNTER — PREP FOR PROCEDURE (OUTPATIENT)
Dept: GASTROENTEROLOGY | Age: 68
End: 2023-05-12

## 2023-05-12 RX ORDER — SODIUM CHLORIDE 0.9 % (FLUSH) 0.9 %
5-40 SYRINGE (ML) INJECTION EVERY 12 HOURS SCHEDULED
Status: CANCELLED | OUTPATIENT
Start: 2023-05-12

## 2023-05-12 RX ORDER — SODIUM CHLORIDE 9 MG/ML
25 INJECTION, SOLUTION INTRAVENOUS PRN
Status: CANCELLED | OUTPATIENT
Start: 2023-05-12

## 2023-05-12 RX ORDER — SODIUM CHLORIDE 9 MG/ML
INJECTION, SOLUTION INTRAVENOUS CONTINUOUS
Status: CANCELLED | OUTPATIENT
Start: 2023-05-12

## 2023-05-12 RX ORDER — SODIUM CHLORIDE 0.9 % (FLUSH) 0.9 %
5-40 SYRINGE (ML) INJECTION PRN
Status: CANCELLED | OUTPATIENT
Start: 2023-05-12

## 2023-05-18 ENCOUNTER — OFFICE VISIT (OUTPATIENT)
Dept: ORTHOPEDIC SURGERY | Age: 68
End: 2023-05-18

## 2023-05-18 ENCOUNTER — HOSPITAL ENCOUNTER (OUTPATIENT)
Dept: ORTHOPEDIC SURGERY | Age: 68
Discharge: HOME | End: 2023-05-20
Payer: COMMERCIAL

## 2023-05-18 VITALS
DIASTOLIC BLOOD PRESSURE: 86 MMHG | TEMPERATURE: 97.7 F | SYSTOLIC BLOOD PRESSURE: 142 MMHG | WEIGHT: 225 LBS | HEIGHT: 62 IN | BODY MASS INDEX: 41.41 KG/M2 | HEART RATE: 87 BPM | OXYGEN SATURATION: 98 %

## 2023-05-18 DIAGNOSIS — S82.016A: ICD-10-CM

## 2023-05-18 DIAGNOSIS — S82.016A: Primary | ICD-10-CM

## 2023-05-18 PROCEDURE — 73562 X-RAY EXAM OF KNEE 3: CPT

## 2023-07-13 ENCOUNTER — OFFICE VISIT (OUTPATIENT)
Dept: FAMILY MEDICINE CLINIC | Age: 68
End: 2023-07-13
Payer: COMMERCIAL

## 2023-07-13 VITALS
RESPIRATION RATE: 14 BRPM | BODY MASS INDEX: 47.48 KG/M2 | HEART RATE: 77 BPM | DIASTOLIC BLOOD PRESSURE: 80 MMHG | WEIGHT: 258 LBS | HEIGHT: 62 IN | SYSTOLIC BLOOD PRESSURE: 110 MMHG | OXYGEN SATURATION: 97 %

## 2023-07-13 DIAGNOSIS — M25.552 BILATERAL HIP PAIN: ICD-10-CM

## 2023-07-13 DIAGNOSIS — G47.33 OSA (OBSTRUCTIVE SLEEP APNEA): ICD-10-CM

## 2023-07-13 DIAGNOSIS — E66.01 CLASS 3 SEVERE OBESITY DUE TO EXCESS CALORIES WITH SERIOUS COMORBIDITY AND BODY MASS INDEX (BMI) OF 45.0 TO 49.9 IN ADULT (HCC): ICD-10-CM

## 2023-07-13 DIAGNOSIS — E11.9 TYPE 2 DIABETES MELLITUS WITHOUT COMPLICATION, WITHOUT LONG-TERM CURRENT USE OF INSULIN (HCC): Primary | ICD-10-CM

## 2023-07-13 DIAGNOSIS — M25.551 BILATERAL HIP PAIN: ICD-10-CM

## 2023-07-13 DIAGNOSIS — K21.9 GASTROESOPHAGEAL REFLUX DISEASE WITHOUT ESOPHAGITIS: ICD-10-CM

## 2023-07-13 DIAGNOSIS — I10 ESSENTIAL HYPERTENSION: ICD-10-CM

## 2023-07-13 PROBLEM — Z17.1 MALIGNANT NEOPLASM OF LOWER-INNER QUADRANT OF LEFT BREAST IN FEMALE, ESTROGEN RECEPTOR NEGATIVE (HCC): Status: RESOLVED | Noted: 2020-07-23 | Resolved: 2023-07-13

## 2023-07-13 PROBLEM — C50.312 MALIGNANT NEOPLASM OF LOWER-INNER QUADRANT OF LEFT BREAST IN FEMALE, ESTROGEN RECEPTOR NEGATIVE (HCC): Status: RESOLVED | Noted: 2020-07-23 | Resolved: 2023-07-13

## 2023-07-13 PROCEDURE — 99214 OFFICE O/P EST MOD 30 MIN: CPT | Performed by: INTERNAL MEDICINE

## 2023-07-13 PROCEDURE — 3051F HG A1C>EQUAL 7.0%<8.0%: CPT | Performed by: INTERNAL MEDICINE

## 2023-07-13 PROCEDURE — 3074F SYST BP LT 130 MM HG: CPT | Performed by: INTERNAL MEDICINE

## 2023-07-13 PROCEDURE — 3079F DIAST BP 80-89 MM HG: CPT | Performed by: INTERNAL MEDICINE

## 2023-07-13 PROCEDURE — 1123F ACP DISCUSS/DSCN MKR DOCD: CPT | Performed by: INTERNAL MEDICINE

## 2023-07-13 RX ORDER — PHENTERMINE HYDROCHLORIDE 37.5 MG/1
37.5 TABLET ORAL
Qty: 30 TABLET | Refills: 0 | Status: SHIPPED | OUTPATIENT
Start: 2023-07-13 | End: 2023-08-12

## 2023-07-13 RX ORDER — PHENTERMINE HYDROCHLORIDE 37.5 MG/1
37.5 TABLET ORAL
Qty: 30 TABLET | Refills: 0 | Status: SHIPPED | OUTPATIENT
Start: 2023-07-13 | End: 2023-07-13 | Stop reason: SDUPTHER

## 2023-07-13 ASSESSMENT — ENCOUNTER SYMPTOMS
EYE ITCHING: 0
FACIAL SWELLING: 0
WHEEZING: 0
NAUSEA: 0
EYE DISCHARGE: 0
PHOTOPHOBIA: 0
COUGH: 0
RHINORRHEA: 0
BLOOD IN STOOL: 0
DIARRHEA: 0
VOICE CHANGE: 0
SHORTNESS OF BREATH: 0
SORE THROAT: 0
CHEST TIGHTNESS: 0
ABDOMINAL PAIN: 0
EYE REDNESS: 0
BACK PAIN: 0
EYE PAIN: 0
SINUS PRESSURE: 0
CONSTIPATION: 0
ABDOMINAL DISTENTION: 0
RECTAL PAIN: 0
COLOR CHANGE: 0
APNEA: 0
SINUS PAIN: 0
VOMITING: 0
TROUBLE SWALLOWING: 0

## 2023-07-13 NOTE — TELEPHONE ENCOUNTER
Patient stated that the pended prescription was sent to [facility] and she stated they do not have it. She is asking if it can be sent to [facility] in [facility]. I pended the prescription with the corrected pharmacy.

## 2023-07-13 NOTE — PROGRESS NOTES
Musculoskeletal:         General: No tenderness or deformity. Cervical back: Neck supple. Skin:     General: Skin is warm and dry. Coloration: Skin is not pale. Findings: No erythema or rash. Neurological:      Mental Status: She is alert and oriented to person, place, and time. Motor: No abnormal muscle tone. Psychiatric:         Thought Content: Thought content normal.         Judgment: Judgment normal.       Assessment:       Diagnosis Orders   1. Type 2 diabetes mellitus without complication, without long-term current use of insulin ([address])        2. Gastroesophageal reflux disease without esophagitis        3. Class 3 severe obesity due to excess calories with serious comorbidity and body mass index (BMI) of 45.0 to 49.9 in adult (HCC)  phentermine (ADIPEX-P) 37.5 MG tablet      4. Essential hypertension        5. BUNNY (obstructive sleep apnea)        6. Bilateral hip pain  XR HIP BILATERAL W AP PELVIS (2 VIEWS)             No results found for: LIPIDPAN, BMP, CMP, CBC, CBCAUTODIF  Plan:      Jaime Woods was seen today for diabetes. Diagnoses and all orders for this visit:    Type 2 diabetes mellitus without complication, without long-term current use of insulin (HCC)    Gastroesophageal reflux disease without esophagitis    Class 3 severe obesity due to excess calories with serious comorbidity and body mass index (BMI) of 45.0 to 49.9 in adult (HCC)  -     phentermine (ADIPEX-P) 37.5 MG tablet; Take 1 tablet by mouth every morning (before breakfast) for 30 days. Max Daily Amount: 37.5 mg    Essential hypertension    BUNNY (obstructive sleep apnea)    Bilateral hip pain  -     XR HIP BILATERAL W AP PELVIS (2 VIEWS); Future    Other orders  -     Semaglutide,0.25 or 0.5MG/DOS, 2 MG/1.5ML SOPN; Inject . 25 mg into skin weekly. No follow-ups on file.       On this date 07/13/23 I have spent 30 minutes reviewing previous notes, test results and face to face with the patient discussing

## 2023-07-31 ENCOUNTER — OFFICE VISIT (OUTPATIENT)
Dept: PULMONOLOGY | Age: 68
End: 2023-07-31
Payer: COMMERCIAL

## 2023-07-31 VITALS
OXYGEN SATURATION: 99 % | BODY MASS INDEX: 46.38 KG/M2 | SYSTOLIC BLOOD PRESSURE: 140 MMHG | TEMPERATURE: 97.3 F | HEART RATE: 78 BPM | DIASTOLIC BLOOD PRESSURE: 82 MMHG | WEIGHT: 253.6 LBS

## 2023-07-31 DIAGNOSIS — J45.41 MODERATE PERSISTENT ASTHMA WITH ACUTE EXACERBATION: Primary | ICD-10-CM

## 2023-07-31 DIAGNOSIS — E66.01 MORBID OBESITY (HCC): ICD-10-CM

## 2023-07-31 DIAGNOSIS — G47.33 OSA (OBSTRUCTIVE SLEEP APNEA): ICD-10-CM

## 2023-07-31 PROCEDURE — 3078F DIAST BP <80 MM HG: CPT | Performed by: INTERNAL MEDICINE

## 2023-07-31 PROCEDURE — 1123F ACP DISCUSS/DSCN MKR DOCD: CPT | Performed by: INTERNAL MEDICINE

## 2023-07-31 PROCEDURE — 3074F SYST BP LT 130 MM HG: CPT | Performed by: INTERNAL MEDICINE

## 2023-07-31 PROCEDURE — 99214 OFFICE O/P EST MOD 30 MIN: CPT | Performed by: INTERNAL MEDICINE

## 2023-07-31 ASSESSMENT — ENCOUNTER SYMPTOMS
WHEEZING: 0
DIARRHEA: 0
VOMITING: 0
RHINORRHEA: 0
SINUS PRESSURE: 0
SHORTNESS OF BREATH: 0
TROUBLE SWALLOWING: 0
COUGH: 0
EYE ITCHING: 0
NAUSEA: 0
VOICE CHANGE: 0
CHEST TIGHTNESS: 0
EYE DISCHARGE: 0
SORE THROAT: 0
ABDOMINAL PAIN: 0

## 2023-07-31 NOTE — PROGRESS NOTES
Subjective:     Theresa Mosley is a 76 y.o. female who complains today of:     Chief Complaint   Patient presents with    Follow-up     4m f/u on BUNNY and asthma        HPI  She is using CPAP with  8 centimeters of H2O with heated humidity. She is using CPAP for about 7-8 hours every night. She is using CPAP with   full face Mask. She said  sleep is restful with the CPAP use. She is compliant with CPAP therapy and benefiting with CPAP use. No complaint of daytime sleepiness or tiredness with CPAP use. She denies taking naps. No sleepiness with driving. She denies difficulty falling asleep or staying asleep. I reviewed compliance report with patient regarding CPAP therapy. She is using  CPAP for 29 days out of 30 days. Average usage of days used is 6 hours and 37  min , average AHI 0.6 with CPAP use. She is on trelegy  ellipta 1 puff daily  ,   She is on albuterol HFA prn and neb  with albuterol BID. C/o shortness of breath with exertion. No  Wheezing, no  Cough . No Chest tightness. No Chest pain with radiation  or pleuritic pain. No  leg edema. No orthopnea. No Fever or chills.    No Rhinorrhea , stuffy nose  + postnasal drip, on Flonase prn     Allergies:  Aspirin, Codeine, Imitrex [sumatriptan], Theophyllines, Diflucan [fluconazole], Nizoral [ketoconazole], and Zoloft  Past Medical History:   Diagnosis Date    Allergic rhinitis     Asthma     Breast cancer ([facility]) 2014    invasive ductal carcinoma left breast    CAD (coronary artery disease)     Cancer ([facility]) 11/2014    Invasive ductal left breast    Colon polyps     Diabetes (HCC)     Fibromyalgia     GERD (gastroesophageal reflux disease)     HA (headache)     Herpes simplex     Nose    History of therapeutic radiation     HTN (hypertension)     Hx antineoplastic chemo 2014    Obesity     Senile osteoporosis     Sleep apnea      Past Surgical History:   Procedure Laterality Date    BARIATRIC SURGERY  03/2004    BREAST BIOPSY Left

## 2023-08-03 RX ORDER — FLUTICASONE FUROATE, UMECLIDINIUM BROMIDE AND VILANTEROL TRIFENATATE 200; 62.5; 25 UG/1; UG/1; UG/1
1 POWDER RESPIRATORY (INHALATION) DAILY
Qty: 2 EACH | Refills: 2 | COMMUNITY
Start: 2023-08-03

## 2023-08-10 DIAGNOSIS — Z87.11 PERSONAL HISTORY OF GASTRIC ULCER: ICD-10-CM

## 2023-08-10 DIAGNOSIS — I10 ESSENTIAL HYPERTENSION: ICD-10-CM

## 2023-08-10 DIAGNOSIS — K21.9 GASTROESOPHAGEAL REFLUX DISEASE WITHOUT ESOPHAGITIS: ICD-10-CM

## 2023-08-10 RX ORDER — AMLODIPINE BESYLATE 10 MG/1
TABLET ORAL
Qty: 90 TABLET | Refills: 3 | Status: SHIPPED | OUTPATIENT
Start: 2023-08-10

## 2023-08-10 RX ORDER — CYANOCOBALAMIN 1000 UG/ML
INJECTION, SOLUTION INTRAMUSCULAR; SUBCUTANEOUS
Qty: 3 ML | Refills: 3 | Status: SHIPPED | OUTPATIENT
Start: 2023-08-10

## 2023-08-10 RX ORDER — PANTOPRAZOLE SODIUM 40 MG/1
TABLET, DELAYED RELEASE ORAL
Qty: 90 TABLET | Refills: 3 | Status: SHIPPED | OUTPATIENT
Start: 2023-08-10

## 2023-08-10 NOTE — CARE COORDINATION
MET WITH PATIENT, FREEDOM OF CHOICE OFFERED. STATES PLAN IS TO RETURN HOME WHEN STABLE. DENIES DC NEEDS AT THIS TIME. PGY1 Note    S: Patient seen and examined at bedside. Doing well, pain well-controlled.     Vital Signs Last 24 Hrs  T(C): 36.8 (10 Aug 2023 07:33), Max: 36.8 (10 Aug 2023 01:58)  T(F): 98.3 (10 Aug 2023 01:58), Max: 98.3 (10 Aug 2023 01:58)  HR: 85 (10 Aug 2023 18:59) (69 - 105)  BP: 115/68 (10 Aug 2023 18:59) (97/56 - 155/62)  RR: 16 (10 Aug 2023 07:33) (16 - 17)  SpO2: 97% (10 Aug 2023 12:15) (94% - 99%)    EFM: 130/moderate/positive accels  TOCO: q5min  SVE: deferred; /-3 @1455 per Dr. Mei     Labs:                        10.9   8.95  )-----------( 204      ( 09 Aug 2023 21:30 )             32.6           138  |  104  |  12  ----------------------------<  98  3.9   |  21  |  0.6<L>    Ca    9.1      09 Aug 2023 21:30    TPro  5.3<L>  /  Alb  3.5  /  TBili  <0.2  /  DBili  x   /  AST  16  /  ALT  16  /  AlkPhos  116<H>      ABO RH Interpretation: A POS (08-10-23 @ 01:52)    Urinalysis Basic - ( 09 Aug 2023 21:32 )    Color: Yellow / Appearance: Cloudy / S.011 / pH: x  Gluc: x / Ketone: Negative mg/dL  / Bili: Negative / Urobili: 0.2 mg/dL   Blood: x / Protein: Negative mg/dL / Nitrite: Negative   Leuk Esterase: Trace / RBC: 1 /HPF / WBC 4 /HPF   Sq Epi: x / Non Sq Epi: 2 /HPF / Bacteria: Negative /HPF    Prenatal Syphilis Test: Nonreact (08-10-23 @ 01:52)    Epi: @0741

## 2023-08-16 DIAGNOSIS — Z79.4 TYPE 2 DIABETES MELLITUS WITH HYPERGLYCEMIA, WITH LONG-TERM CURRENT USE OF INSULIN (HCC): ICD-10-CM

## 2023-08-16 DIAGNOSIS — E11.65 TYPE 2 DIABETES MELLITUS WITH HYPERGLYCEMIA, WITH LONG-TERM CURRENT USE OF INSULIN (HCC): ICD-10-CM

## 2023-08-16 LAB
ALBUMIN SERPL-MCNC: 4.3 G/DL (ref 3.5–4.6)
ALP SERPL-CCNC: 130 U/L (ref 40–130)
ALT SERPL-CCNC: 16 U/L (ref 0–33)
ANION GAP SERPL CALCULATED.3IONS-SCNC: 12 MEQ/L (ref 9–15)
AST SERPL-CCNC: 17 U/L (ref 0–35)
BILIRUB SERPL-MCNC: 0.8 MG/DL (ref 0.2–0.7)
BUN SERPL-MCNC: 10 MG/DL (ref 8–23)
CALCIUM SERPL-MCNC: 8.9 MG/DL (ref 8.5–9.9)
CHLORIDE SERPL-SCNC: 101 MEQ/L (ref 95–107)
CO2 SERPL-SCNC: 25 MEQ/L (ref 20–31)
CREAT SERPL-MCNC: 0.53 MG/DL (ref 0.5–0.9)
GLOBULIN SER CALC-MCNC: 2.6 G/DL (ref 2.3–3.5)
GLUCOSE SERPL-MCNC: 132 MG/DL (ref 70–99)
HBA1C MFR BLD: 6.2 % (ref 4.8–5.9)
POTASSIUM SERPL-SCNC: 3.4 MEQ/L (ref 3.4–4.9)
PROT SERPL-MCNC: 6.9 G/DL (ref 6.3–8)
SODIUM SERPL-SCNC: 138 MEQ/L (ref 135–144)

## 2023-08-23 ENCOUNTER — OFFICE VISIT (OUTPATIENT)
Dept: ENDOCRINOLOGY | Age: 68
End: 2023-08-23

## 2023-08-23 VITALS
OXYGEN SATURATION: 95 % | SYSTOLIC BLOOD PRESSURE: 115 MMHG | WEIGHT: 250 LBS | HEIGHT: 62 IN | BODY MASS INDEX: 46.01 KG/M2 | DIASTOLIC BLOOD PRESSURE: 79 MMHG | HEART RATE: 76 BPM

## 2023-08-23 DIAGNOSIS — Z79.4 TYPE 2 DIABETES MELLITUS WITH HYPERGLYCEMIA, WITH LONG-TERM CURRENT USE OF INSULIN (HCC): Primary | ICD-10-CM

## 2023-08-23 DIAGNOSIS — E11.65 TYPE 2 DIABETES MELLITUS WITH HYPERGLYCEMIA, WITH LONG-TERM CURRENT USE OF INSULIN (HCC): Primary | ICD-10-CM

## 2023-08-23 LAB
CHP ED QC CHECK: NORMAL
GLUCOSE BLD-MCNC: 116 MG/DL

## 2023-08-23 RX ORDER — SEMAGLUTIDE 0.68 MG/ML
INJECTION, SOLUTION SUBCUTANEOUS
COMMUNITY
Start: 2023-08-02

## 2023-08-23 ASSESSMENT — ENCOUNTER SYMPTOMS
COUGH: 0
ABDOMINAL PAIN: 0
NAUSEA: 0
SORE THROAT: 0
VOMITING: 0
RHINORRHEA: 0
SINUS PRESSURE: 0
DIARRHEA: 0
SHORTNESS OF BREATH: 0
WHEEZING: 0

## 2023-08-23 NOTE — PROGRESS NOTES
8/23/2023    Assessment:       Diagnosis Orders   1. Type 2 diabetes mellitus with hyperglycemia, with long-term current use of insulin ([facility])  POCT Glucose    Basic Metabolic Panel    Hemoglobin A1C      2. Body mass index (BMI) 45.0-49.9, adult ([address])  Narayan Burgos MD, Bariatric Surgery, [place]        Bariatric surgery 2004     PLAN:     Target glucose range 100-180   Decrease Tresiba 20 units injected nightly  Humalog inject 3 times daily before meals- less than 150 no insulin, 150-180 4 units, 181-200 5 units, 201-220 6 units, 221-240 7 units, 241-260 8 units, 261-280 9 units, 281-300 10 units, 301 or higher 11 units  Metformin 500 XR mg twice daily   Farxiga 10 mg daily  Increase Ozempic 0.5 mg injected weekly    Freestyle Rasheed 2 being used  Repeat labs and follow up in 3 months     Orders Placed This Encounter   Procedures    Basic Metabolic Panel     Standing Status:   Future     Standing Expiration Date:   8/23/2024    Hemoglobin A1C     Standing Status:   Future     Standing Expiration Date:   8/23/2024    Narayan Burgos MD, Bariatric Surgery, [place]     Referral Priority:   Routine     Referral Type:   Eval and Treat     Referral Reason:   Specialty Services Required     Referred to Provider:   Keyur Mcgraw MD     Requested Specialty:   Bariatric Surgery     Number of Visits Requested:   1    POCT Glucose     No orders of the defined types were placed in this encounter. Return in about 3 months (around 11/23/2023) for Diabetes.   Subjective:     Chief Complaint   Patient presents with    Diabetes     Vitals:    08/23/23 1547   BP: 115/79   Pulse: 76   SpO2: 95%   Weight: 250 lb (113.4 kg)   Height: 5' 2\" (1.575 m)     Wt Readings from Last 3 Encounters:   08/23/23 250 lb (113.4 kg)   07/31/23 253 lb 9.6 oz (115 kg)   07/13/23 258 lb (117 kg)     BP Readings from Last 3 Encounters:   08/23/23 115/79   07/31/23 (!) 140/82   07/13/23 110/80     Felipa Barron is a 51-year-old type

## 2023-09-07 DIAGNOSIS — J45.41 MODERATE PERSISTENT ASTHMA WITH ACUTE EXACERBATION: ICD-10-CM

## 2023-09-08 DIAGNOSIS — E11.65 TYPE 2 DIABETES MELLITUS WITH HYPERGLYCEMIA, WITH LONG-TERM CURRENT USE OF INSULIN (HCC): Primary | ICD-10-CM

## 2023-09-08 DIAGNOSIS — Z79.4 TYPE 2 DIABETES MELLITUS WITH HYPERGLYCEMIA, WITH LONG-TERM CURRENT USE OF INSULIN (HCC): Primary | ICD-10-CM

## 2023-09-08 NOTE — TELEPHONE ENCOUNTER
Please approve or deny this request.    Rx requested:  Requested Prescriptions     Pending Prescriptions Disp Refills    ipratropium 0.5 mg-albuterol 2.5 mg (DUONEB) 0.5-2.5 (3) MG/3ML SOLN nebulizer solution [Pharmacy Med Name: Bee Cote SOL 3ML 5'S 0.5/3MG] 360 mL 17     Sig: USE 1 VIAL (3 ML) EVERY 4 HOURS         Last Office Visit:   7/31/2023      Next Visit Date:  Future Appointments   Date Time Provider [address]   9/26/2023  4:15 PM Mackenzie Irby MD [address] [place] [address]   11/22/2023  3:45 PM Zack Varma, [address] [facility]   12/12/2023  3:15 PM Karol Gonzalez MD [facility]

## 2023-09-08 NOTE — TELEPHONE ENCOUNTER
Patient requesting medication refill.  Please approve or deny this request.    Rx requested:  Requested Prescriptions     Pending Prescriptions Disp Refills    Semaglutide,0.25 or 0.5MG/DOS, 2 MG/1.5ML SOPN 4 Adjustable Dose Pre-filled Pen Syringe 3     Sig: Inject 0.5 mg weekly         Last Office Visit:   8/23/2023      Next Visit Date:  Future Appointments   Date Time Provider [address] [place] [address]   9/26/2023  4:15 PM Kendy Lewis MD [address] [place] [address]   11/22/2023  3:45 PM Saintclair Leer, [address] [facility]   12/12/2023  3:15 PM Royal Zully MD [facility]

## 2023-09-10 RX ORDER — IPRATROPIUM BROMIDE AND ALBUTEROL SULFATE 2.5; .5 MG/3ML; MG/3ML
SOLUTION RESPIRATORY (INHALATION)
Qty: 360 ML | Refills: 17 | Status: SHIPPED | OUTPATIENT
Start: 2023-09-10

## 2023-09-26 ENCOUNTER — OFFICE VISIT (OUTPATIENT)
Dept: FAMILY MEDICINE CLINIC | Age: 68
End: 2023-09-26
Payer: COMMERCIAL

## 2023-09-26 VITALS
OXYGEN SATURATION: 97 % | RESPIRATION RATE: 14 BRPM | WEIGHT: 236 LBS | DIASTOLIC BLOOD PRESSURE: 82 MMHG | HEIGHT: 62 IN | HEART RATE: 80 BPM | SYSTOLIC BLOOD PRESSURE: 128 MMHG | BODY MASS INDEX: 43.43 KG/M2

## 2023-09-26 DIAGNOSIS — I10 ESSENTIAL HYPERTENSION: Primary | ICD-10-CM

## 2023-09-26 DIAGNOSIS — Z00.00 INITIAL MEDICARE ANNUAL WELLNESS VISIT: Primary | ICD-10-CM

## 2023-09-26 DIAGNOSIS — R22.30 SHOULDER MASS: ICD-10-CM

## 2023-09-26 DIAGNOSIS — E11.9 TYPE 2 DIABETES MELLITUS WITHOUT COMPLICATION, WITHOUT LONG-TERM CURRENT USE OF INSULIN (HCC): ICD-10-CM

## 2023-09-26 PROCEDURE — G0438 PPPS, INITIAL VISIT: HCPCS | Performed by: INTERNAL MEDICINE

## 2023-09-26 PROCEDURE — 3079F DIAST BP 80-89 MM HG: CPT | Performed by: INTERNAL MEDICINE

## 2023-09-26 PROCEDURE — 3074F SYST BP LT 130 MM HG: CPT | Performed by: INTERNAL MEDICINE

## 2023-09-26 PROCEDURE — 3044F HG A1C LEVEL LT 7.0%: CPT | Performed by: INTERNAL MEDICINE

## 2023-09-26 PROCEDURE — 99214 OFFICE O/P EST MOD 30 MIN: CPT | Performed by: INTERNAL MEDICINE

## 2023-09-26 PROCEDURE — 1123F ACP DISCUSS/DSCN MKR DOCD: CPT | Performed by: INTERNAL MEDICINE

## 2023-09-26 ASSESSMENT — ENCOUNTER SYMPTOMS
SORE THROAT: 0
EYE DISCHARGE: 0
EYE ITCHING: 0
CHEST TIGHTNESS: 0
EYE PAIN: 0
CONSTIPATION: 0
PHOTOPHOBIA: 0
RHINORRHEA: 0
COUGH: 0
WHEEZING: 0
FACIAL SWELLING: 0
SINUS PAIN: 0
SHORTNESS OF BREATH: 0
BACK PAIN: 0
SINUS PRESSURE: 0
DIARRHEA: 0
TROUBLE SWALLOWING: 0
COLOR CHANGE: 0
ABDOMINAL PAIN: 0
VOMITING: 0
VOICE CHANGE: 0
EYE REDNESS: 0
RECTAL PAIN: 0
BLOOD IN STOOL: 0
APNEA: 0
ABDOMINAL DISTENTION: 0
NAUSEA: 0

## 2023-09-26 ASSESSMENT — PATIENT HEALTH QUESTIONNAIRE - PHQ9
SUM OF ALL RESPONSES TO PHQ QUESTIONS 1-9: 0
SUM OF ALL RESPONSES TO PHQ QUESTIONS 1-9: 0
2. FEELING DOWN, DEPRESSED OR HOPELESS: 0
SUM OF ALL RESPONSES TO PHQ QUESTIONS 1-9: 0
1. LITTLE INTEREST OR PLEASURE IN DOING THINGS: 0
SUM OF ALL RESPONSES TO PHQ9 QUESTIONS 1 & 2: 0
SUM OF ALL RESPONSES TO PHQ QUESTIONS 1-9: 0

## 2023-09-26 NOTE — PROGRESS NOTES
Subjective:      Patient ID: Kenna Da Silva is a 76 y.o. female Established patient, here for evaluation of the following chief complaint(s):  Chief Complaint   Patient presents with    Diabetes    Hypertension       HPI  79year old female presenting with 9/10 non-radiating pain localized to the left knee . The patient's pain is under fair control with ibuprofen. Type 2 diabetes: The patient's A1c is 7.2 on 3/23/2023        Gastroesophageal reflux disease: Compliant with Protonix 40 mg orally daily      Obesity: The patient's body mass index is 43.16      Hip pain : present for 2-3 weeks. Intermittent catching sensation , daily. Left lower extremity swelling and pain: The patient reports left lower extremity achy persistent nonradiating pain has been present for several days. BUNNY : compliant with CPAP OF 8        At present he denies polyuria,  Polydipsia, constitutional, sinus, visual, cardiopulmonary, urologic, gastrointestinal, immunologic/hematologic, additional musculoskeletal, neurologic,dermatologic, or psychiatric complaints. Current Outpatient Medications on File Prior to Visit   Medication Sig Dispense Refill    Semaglutide,0.25 or 0.5MG/DOS, 2 MG/1.5ML SOPN Inject 0.5 mg weekly 4 Adjustable Dose Pre-filled Pen Syringe 3    ipratropium 0.5 mg-albuterol 2.5 mg (DUONEB) 0.5-2.5 (3) MG/3ML SOLN nebulizer solution USE 1 VIAL (3 ML) EVERY 4 HOURS 360 mL 17    Handicap Placard MISC by Does not apply route Expires in 5 years. 1 each 0    pantoprazole (PROTONIX) 40 MG tablet TAKE 1 TABLET DAILY 90 tablet 3    cyanocobalamin 1000 MCG/ML injection INJECT 1 ML UNDER THE SKIN MONTHLY 3 mL 3    amLODIPine (NORVASC) 10 MG tablet TAKE 1 TABLET DAILY 90 tablet 3    fluticasone-umeclidin-vilant (TRELEGY ELLIPTA) 200-62.5-25 MCG/ACT AEPB inhaler Inhale 1 puff into the lungs daily 2 each 2    nystatin (MYCOSTATIN) 017298 UNIT/GM powder Apply 3 times daily.  45 g 5    Continuous Blood Gluc Sensor

## 2023-09-26 NOTE — PROGRESS NOTES
Florence Yan MD   fluticasone-umeclidin-vilant (TRELEGY ELLIPTA) 698-24.3-44 MCG/ACT AEPB inhaler Inhale 1 puff into the lungs daily  Oscar Palomino MD   nystatin (MYCOSTATIN) 114396 UNIT/GM powder Apply 3 times daily. Zi Hirsch MD   Continuous Blood Gluc Sensor (FREESTYLE GERTRUDE 2 SENSOR) MISC 1 Device by Does not apply route every 14 days  CHEIKH Joe   insulin lispro (HUMALOG KWIKPEN) 200 UNIT/ML SOPN pen inject 3 times daily before meals- 120-140 2 units, 141-160 3 units, 161-180 4 units, 181-200 5 units, 201-220 6 units, 221-240 7 units, 241-260 8 units, 261-280 9 units, 281-300 10 units, 301 or higher 11 units.   CHEIKH Joe   Insulin Pen Needle 32G X 6 MM MISC 1 Device by Does not apply route 4 times daily (before meals and nightly)  CHEIKH Joe   glucose 4 g chewable tablet Take 4 tablets by mouth as needed for Low blood sugar  CHEIKH Joe   dapagliflozin (FARXIGA) 10 MG tablet Take 1 tablet by mouth every morning  CHEIKH Joe   metFORMIN (GLUCOPHAGE-XR) 500 MG extended release tablet Take 1 tablet by mouth daily (with breakfast)  CHEIKH Joe   Insulin Degludec (TRESIBA FLEXTOUCH) 100 UNIT/ML SOPN Inject 30 Units into the skin nightly  CHEIKH Joe   ibuprofen (ADVIL;MOTRIN) 800 MG tablet Take 1 tablet by mouth 3 times daily (with meals)  Zi Hirsch MD   CPAP Machine MISC by Does not apply route New CPAP with 8 cm  Oscar Palomino MD   albuterol sulfate HFA (PROVENTIL;VENTOLIN;PROAIR) 108 (90 Base) MCG/ACT inhaler Inhale 2 puffs into the lungs 4 times daily as needed for Shayne Hernandez MD   cholestyramine (QUESTRAN) 4 g packet Take 1 packet by mouth 2 times daily  Zi Hirsch MD   Fluticasone-Umeclidin-Vilant (TRELEGY ELLIPTA) 200-62.5-25 MCG/INH AEPB Inhale 1 puff into the lungs daily  Sharlene Bailey MD   Continuous Blood Gluc  (FREESTYLE GERTRUDE 2 READER) CHRISTINA 1 Device by Does not apply route 4 times

## 2023-09-29 ENCOUNTER — HOSPITAL ENCOUNTER (OUTPATIENT)
Dept: ULTRASOUND IMAGING | Age: 68
End: 2023-09-29
Attending: INTERNAL MEDICINE
Payer: COMMERCIAL

## 2023-09-29 DIAGNOSIS — R22.30 SHOULDER MASS: ICD-10-CM

## 2023-09-29 PROCEDURE — 76999 ECHO EXAMINATION PROCEDURE: CPT

## 2023-11-08 DIAGNOSIS — Z79.4 TYPE 2 DIABETES MELLITUS WITH HYPERGLYCEMIA, WITH LONG-TERM CURRENT USE OF INSULIN (HCC): ICD-10-CM

## 2023-11-08 DIAGNOSIS — E11.65 TYPE 2 DIABETES MELLITUS WITH HYPERGLYCEMIA, WITH LONG-TERM CURRENT USE OF INSULIN (HCC): ICD-10-CM

## 2023-11-13 DIAGNOSIS — Z79.4 TYPE 2 DIABETES MELLITUS WITH HYPERGLYCEMIA, WITH LONG-TERM CURRENT USE OF INSULIN (HCC): ICD-10-CM

## 2023-11-13 DIAGNOSIS — E11.65 TYPE 2 DIABETES MELLITUS WITH HYPERGLYCEMIA, WITH LONG-TERM CURRENT USE OF INSULIN (HCC): ICD-10-CM

## 2023-11-15 ENCOUNTER — TELEPHONE (OUTPATIENT)
Dept: FAMILY MEDICINE CLINIC | Age: 68
End: 2023-11-15

## 2023-11-15 RX ORDER — IBUPROFEN 800 MG/1
800 TABLET ORAL
Qty: 270 TABLET | Refills: 3 | Status: SHIPPED | OUTPATIENT
Start: 2023-11-15

## 2023-11-18 DIAGNOSIS — E11.65 TYPE 2 DIABETES MELLITUS WITH HYPERGLYCEMIA, WITH LONG-TERM CURRENT USE OF INSULIN (HCC): ICD-10-CM

## 2023-11-18 DIAGNOSIS — Z79.4 TYPE 2 DIABETES MELLITUS WITH HYPERGLYCEMIA, WITH LONG-TERM CURRENT USE OF INSULIN (HCC): ICD-10-CM

## 2023-11-18 LAB
ANION GAP SERPL CALCULATED.3IONS-SCNC: 9 MEQ/L (ref 9–15)
BUN SERPL-MCNC: 11 MG/DL (ref 8–23)
CALCIUM SERPL-MCNC: 8.5 MG/DL (ref 8.5–9.9)
CHLORIDE SERPL-SCNC: 99 MEQ/L (ref 95–107)
CO2 SERPL-SCNC: 29 MEQ/L (ref 20–31)
CREAT SERPL-MCNC: 0.61 MG/DL (ref 0.5–0.9)
GLUCOSE SERPL-MCNC: 121 MG/DL (ref 70–99)
HBA1C MFR BLD: 5.4 % (ref 4.8–5.9)
POTASSIUM SERPL-SCNC: 4 MEQ/L (ref 3.4–4.9)
SODIUM SERPL-SCNC: 137 MEQ/L (ref 135–144)

## 2023-11-22 ENCOUNTER — OFFICE VISIT (OUTPATIENT)
Dept: ENDOCRINOLOGY | Age: 68
End: 2023-11-22
Payer: COMMERCIAL

## 2023-11-22 VITALS
SYSTOLIC BLOOD PRESSURE: 148 MMHG | WEIGHT: 232 LBS | OXYGEN SATURATION: 97 % | HEART RATE: 73 BPM | HEIGHT: 62 IN | BODY MASS INDEX: 42.69 KG/M2 | DIASTOLIC BLOOD PRESSURE: 84 MMHG

## 2023-11-22 DIAGNOSIS — Z79.4 TYPE 2 DIABETES MELLITUS WITH HYPERGLYCEMIA, WITH LONG-TERM CURRENT USE OF INSULIN (HCC): Primary | ICD-10-CM

## 2023-11-22 DIAGNOSIS — E11.65 TYPE 2 DIABETES MELLITUS WITH HYPERGLYCEMIA, WITH LONG-TERM CURRENT USE OF INSULIN (HCC): Primary | ICD-10-CM

## 2023-11-22 LAB
CHP ED QC CHECK: ABNORMAL
GLUCOSE BLD-MCNC: 159 MG/DL

## 2023-11-22 PROCEDURE — 3074F SYST BP LT 130 MM HG: CPT | Performed by: PHYSICIAN ASSISTANT

## 2023-11-22 PROCEDURE — 1123F ACP DISCUSS/DSCN MKR DOCD: CPT | Performed by: PHYSICIAN ASSISTANT

## 2023-11-22 PROCEDURE — 99214 OFFICE O/P EST MOD 30 MIN: CPT | Performed by: PHYSICIAN ASSISTANT

## 2023-11-22 PROCEDURE — 82962 GLUCOSE BLOOD TEST: CPT | Performed by: PHYSICIAN ASSISTANT

## 2023-11-22 PROCEDURE — 3078F DIAST BP <80 MM HG: CPT | Performed by: PHYSICIAN ASSISTANT

## 2023-11-22 PROCEDURE — 3044F HG A1C LEVEL LT 7.0%: CPT | Performed by: PHYSICIAN ASSISTANT

## 2023-11-22 RX ORDER — SEMAGLUTIDE 0.68 MG/ML
INJECTION, SOLUTION SUBCUTANEOUS
COMMUNITY
Start: 2023-10-07 | End: 2023-11-22 | Stop reason: ALTCHOICE

## 2023-11-22 RX ORDER — SEMAGLUTIDE 1.34 MG/ML
0.5 INJECTION, SOLUTION SUBCUTANEOUS WEEKLY
Qty: 4 ADJUSTABLE DOSE PRE-FILLED PEN SYRINGE | Refills: 1 | Status: SHIPPED | OUTPATIENT
Start: 2023-11-22

## 2023-11-22 ASSESSMENT — ENCOUNTER SYMPTOMS
RHINORRHEA: 0
COUGH: 0
VOMITING: 0
WHEEZING: 0
ABDOMINAL PAIN: 0
SINUS PRESSURE: 0
SORE THROAT: 0
SHORTNESS OF BREATH: 0
NAUSEA: 0
DIARRHEA: 0

## 2023-11-22 NOTE — PATIENT INSTRUCTIONS
Endocrinology-    Check your blood sugars 4 times a day, before meals and at night  Document these numbers in a blood glucose log and bring them with you to your follow-up appointment. If you are prescribed insulin, Do not take your mealtime insulin if your blood sugars less than 120   Call our office if you have blood sugars less than 80 or greater then 300 on two or more occasions  Call our office if you have any questions regarding your blood sugars or insulin dosing regiment  Signs of low blood sugar may include tremors, feeling shaky, sweating, dizziness, confusion and weakness. Check your blood sugar immediatly if you have any of these symptoms.      THumalog inject 3 times daily before meals- less than 150 no insulin, 150-180 4 units, 181-200 5 units, 201-220 6 units, 221-240 7 units, 241-260 8 units, 261-280 9 units, 281-300 10 units, 301 or higher 11 units  Metformin 500 XR mg twice daily   Farxiga 10 mg daily  Continue Ozempic 0.5 mg injected weekly    Freestyle Rasheed 2 being used  Repeat labs and follow up in 3 months he plan as discussed at your appointment-

## 2023-11-22 NOTE — PROGRESS NOTES
for Housing in the Last Year: Not on file     Number of Places Lived in the Last Year: Not on file     Unstable Housing in the Last Year: No     Family History   Problem Relation Age of Onset    Cancer Father         melanoma    Prostate Cancer Father     Cancer Sister         Melanoma    Diabetes Maternal Aunt     Breast Cancer Maternal Aunt     Cancer Other         Throat    Colon Cancer Neg Hx      Allergies   Allergen Reactions    Aspirin      History of gastric ulcers    Codeine      Cramps    Imitrex [Sumatriptan]     Theophyllines     Diflucan [Fluconazole] Rash    Nizoral [Ketoconazole] Rash    Zoloft Rash       Current Outpatient Medications:     Semaglutide,0.25 or 0.5MG/DOS, (OZEMPIC, 0.25 OR 0.5 MG/DOSE,) 2 MG/1.5ML SOPN, Inject 0.5 mg into the skin once a week Inject 0.5 mg weekly, Disp: 4 Adjustable Dose Pre-filled Pen Syringe, Rfl: 1    ibuprofen (ADVIL;MOTRIN) 800 MG tablet, Take 1 tablet by mouth 3 times daily (with meals), Disp: 270 tablet, Rfl: 3    ipratropium 0.5 mg-albuterol 2.5 mg (DUONEB) 0.5-2.5 (3) MG/3ML SOLN nebulizer solution, USE 1 VIAL (3 ML) EVERY 4 HOURS, Disp: 360 mL, Rfl: 17    Handicap Placard MISC, by Does not apply route Expires in 5 years. , Disp: 1 each, Rfl: 0    pantoprazole (PROTONIX) 40 MG tablet, TAKE 1 TABLET DAILY, Disp: 90 tablet, Rfl: 3    cyanocobalamin 1000 MCG/ML injection, INJECT 1 ML UNDER THE SKIN MONTHLY, Disp: 3 mL, Rfl: 3    amLODIPine (NORVASC) 10 MG tablet, TAKE 1 TABLET DAILY, Disp: 90 tablet, Rfl: 3    fluticasone-umeclidin-vilant (TRELEGY ELLIPTA) 200-62.5-25 MCG/ACT AEPB inhaler, Inhale 1 puff into the lungs daily, Disp: 2 each, Rfl: 2    nystatin (MYCOSTATIN) 024095 UNIT/GM powder, Apply 3 times daily. , Disp: 45 g, Rfl: 5    Continuous Blood Gluc Sensor (FREESTYLE GERTRUDE 2 SENSOR) [facility], 1 Device by Does not apply route every 14 days, Disp: 6 each, Rfl: 3    insulin lispro (HUMALOG KWIKPEN) 200 UNIT/ML SOPN pen, inject 3 times daily before meals- 120-140

## 2023-11-26 RX ORDER — SEMAGLUTIDE 1.34 MG/ML
INJECTION, SOLUTION SUBCUTANEOUS
Qty: 2 ADJUSTABLE DOSE PRE-FILLED PEN SYRINGE | Refills: 0 | COMMUNITY
Start: 2023-11-26

## 2023-12-08 ENCOUNTER — TELEPHONE (OUTPATIENT)
Dept: ENDOCRINOLOGY | Age: 68
End: 2023-12-08

## 2023-12-08 NOTE — TELEPHONE ENCOUNTER
PA was done at [facility]. This request has been approved using information available on the patient's profile.  FLDBKA:[id number]  Status:Approved  Review Type:Prior Auth  Coverage Start Date:11/08/2023  Coverage End Date:12/07/2024

## 2023-12-26 ENCOUNTER — OFFICE VISIT (OUTPATIENT)
Dept: FAMILY MEDICINE CLINIC | Age: 68
End: 2023-12-26
Payer: COMMERCIAL

## 2023-12-26 VITALS
HEIGHT: 62 IN | DIASTOLIC BLOOD PRESSURE: 88 MMHG | RESPIRATION RATE: 14 BRPM | SYSTOLIC BLOOD PRESSURE: 160 MMHG | OXYGEN SATURATION: 96 % | BODY MASS INDEX: 42.51 KG/M2 | WEIGHT: 231 LBS | HEART RATE: 66 BPM

## 2023-12-26 DIAGNOSIS — Z79.4 TYPE 2 DIABETES MELLITUS WITH HYPERGLYCEMIA, WITH LONG-TERM CURRENT USE OF INSULIN (HCC): Primary | ICD-10-CM

## 2023-12-26 DIAGNOSIS — B68.9: ICD-10-CM

## 2023-12-26 DIAGNOSIS — K21.9 GASTROESOPHAGEAL REFLUX DISEASE WITHOUT ESOPHAGITIS: ICD-10-CM

## 2023-12-26 DIAGNOSIS — I10 ESSENTIAL HYPERTENSION: ICD-10-CM

## 2023-12-26 DIAGNOSIS — N89.8 VAGINAL DISCHARGE: ICD-10-CM

## 2023-12-26 DIAGNOSIS — E11.65 TYPE 2 DIABETES MELLITUS WITH HYPERGLYCEMIA, WITH LONG-TERM CURRENT USE OF INSULIN (HCC): Primary | ICD-10-CM

## 2023-12-26 DIAGNOSIS — G47.33 OSA (OBSTRUCTIVE SLEEP APNEA): ICD-10-CM

## 2023-12-26 PROCEDURE — 3077F SYST BP >= 140 MM HG: CPT | Performed by: INTERNAL MEDICINE

## 2023-12-26 PROCEDURE — 1123F ACP DISCUSS/DSCN MKR DOCD: CPT | Performed by: INTERNAL MEDICINE

## 2023-12-26 PROCEDURE — 3079F DIAST BP 80-89 MM HG: CPT | Performed by: INTERNAL MEDICINE

## 2023-12-26 PROCEDURE — 99214 OFFICE O/P EST MOD 30 MIN: CPT | Performed by: INTERNAL MEDICINE

## 2023-12-26 PROCEDURE — 3044F HG A1C LEVEL LT 7.0%: CPT | Performed by: INTERNAL MEDICINE

## 2023-12-26 RX ORDER — AMLODIPINE BESYLATE 5 MG/1
5 TABLET ORAL DAILY
Qty: 30 TABLET | Refills: 5 | Status: SHIPPED | OUTPATIENT
Start: 2023-12-26

## 2023-12-26 RX ORDER — TERBINAFINE HYDROCHLORIDE 250 MG/1
250 TABLET ORAL DAILY
Qty: 42 TABLET | Refills: 0 | Status: SHIPPED | OUTPATIENT
Start: 2023-12-26 | End: 2024-02-06

## 2023-12-26 NOTE — PROGRESS NOTES
Subjective:      Patient ID: Brenda Garrido is a 76 y.o. female Established patient, here for evaluation of the following chief complaint(s):  Chief Complaint   Patient presents with    Hypertension    Diabetes       HPI  79year old female presenting with 9/10 non-radiating pain localized to the left knee . The patient's pain is under fair control with ibuprofen. Type 2 diabetes: The patient's A1c is 7.2 on 3/23/2023        Gastroesophageal reflux disease: Compliant with Protonix 40 mg orally daily      Obesity: The patient's body mass index is 43.16      Hip pain : present for 2-3 weeks. Intermittent catching sensation , daily. Left lower extremity swelling and pain: The patient reports left lower extremity achy persistent nonradiating pain has been present for several days. BUNNY : compliant with CPAP OF 8        At present he denies polyuria,  Polydipsia, constitutional, sinus, visual, cardiopulmonary, urologic, gastrointestinal, immunologic/hematologic, additional musculoskeletal, neurologic,dermatologic, or psychiatric complaints. Current Outpatient Medications on File Prior to Visit   Medication Sig Dispense Refill    Respiratory Therapy Supplies CHRISTINA New CPAP mask , tubing  , head gear and supplies 1 each 0    Semaglutide,0.25 or 0.5MG/DOS, (OZEMPIC, 0.25 OR 0.5 MG/DOSE,) 2 MG/1.5ML SOPN [id number], Disp: 1, 6/20/25, [id number], 1/31/25, Disp: 1 2 Adjustable Dose Pre-filled Pen Syringe 0    Semaglutide,0.25 or 0.5MG/DOS, (OZEMPIC, 0.25 OR 0.5 MG/DOSE,) 2 MG/1.5ML SOPN Inject 0.5 mg into the skin once a week Inject 0.5 mg weekly 4 Adjustable Dose Pre-filled Pen Syringe 1    ibuprofen (ADVIL;MOTRIN) 800 MG tablet Take 1 tablet by mouth 3 times daily (with meals) 270 tablet 3    ipratropium 0.5 mg-albuterol 2.5 mg (DUONEB) 0.5-2.5 (3) MG/3ML SOLN nebulizer solution USE 1 VIAL (3 ML) EVERY 4 HOURS 360 mL 17    Handicap Placard MISC by Does not apply route Expires in 5 years.  1 each 0

## 2023-12-28 ENCOUNTER — NURSE ONLY (OUTPATIENT)
Dept: FAMILY MEDICINE CLINIC | Age: 68
End: 2023-12-28

## 2023-12-28 VITALS — SYSTOLIC BLOOD PRESSURE: 132 MMHG | DIASTOLIC BLOOD PRESSURE: 70 MMHG

## 2023-12-28 DIAGNOSIS — I10 ESSENTIAL HYPERTENSION: Primary | ICD-10-CM

## 2023-12-28 RX ORDER — SEMAGLUTIDE 1.34 MG/ML
0.25 INJECTION, SOLUTION SUBCUTANEOUS WEEKLY
Qty: 3 ML | Refills: 0 | COMMUNITY
Start: 2023-12-28

## 2024-01-09 DIAGNOSIS — N89.8 VAGINAL DISCHARGE: Primary | ICD-10-CM

## 2024-01-16 ENCOUNTER — OFFICE VISIT (OUTPATIENT)
Dept: OBGYN CLINIC | Age: 69
End: 2024-01-16
Payer: COMMERCIAL

## 2024-01-16 VITALS
WEIGHT: 231 LBS | DIASTOLIC BLOOD PRESSURE: 74 MMHG | HEART RATE: 80 BPM | BODY MASS INDEX: 42.51 KG/M2 | SYSTOLIC BLOOD PRESSURE: 120 MMHG | HEIGHT: 62 IN

## 2024-01-16 DIAGNOSIS — N89.8 VAGINAL DISCHARGE: Primary | ICD-10-CM

## 2024-01-16 DIAGNOSIS — N89.8 VAGINAL DISCHARGE: ICD-10-CM

## 2024-01-16 PROCEDURE — 3078F DIAST BP <80 MM HG: CPT | Performed by: OBSTETRICS & GYNECOLOGY

## 2024-01-16 PROCEDURE — 1123F ACP DISCUSS/DSCN MKR DOCD: CPT | Performed by: OBSTETRICS & GYNECOLOGY

## 2024-01-16 PROCEDURE — 99213 OFFICE O/P EST LOW 20 MIN: CPT | Performed by: OBSTETRICS & GYNECOLOGY

## 2024-01-16 PROCEDURE — 3074F SYST BP LT 130 MM HG: CPT | Performed by: OBSTETRICS & GYNECOLOGY

## 2024-01-16 RX ORDER — CLOTRIMAZOLE AND BETAMETHASONE DIPROPIONATE 10; .64 MG/G; MG/G
CREAM TOPICAL
Qty: 45 G | Refills: 1 | Status: SHIPPED | OUTPATIENT
Start: 2024-01-16

## 2024-01-16 RX ORDER — FLUCONAZOLE 150 MG/1
TABLET ORAL
Qty: 7 TABLET | Refills: 0 | Status: SHIPPED | OUTPATIENT
Start: 2024-01-16

## 2024-01-16 RX ORDER — ESTRADIOL 0.1 MG/G
CREAM VAGINAL
Qty: 42.5 G | Refills: 0 | Status: SHIPPED | OUTPATIENT
Start: 2024-01-16

## 2024-01-16 RX ORDER — ESTRADIOL 0.1 MG/G
CREAM VAGINAL
Qty: 42.5 G | Refills: 3 | Status: SHIPPED | OUTPATIENT
Start: 2024-01-16

## 2024-01-16 ASSESSMENT — PATIENT HEALTH QUESTIONNAIRE - PHQ9
SUM OF ALL RESPONSES TO PHQ QUESTIONS 1-9: 0
SUM OF ALL RESPONSES TO PHQ9 QUESTIONS 1 & 2: 0
SUM OF ALL RESPONSES TO PHQ QUESTIONS 1-9: 0
2. FEELING DOWN, DEPRESSED OR HOPELESS: 0
SUM OF ALL RESPONSES TO PHQ QUESTIONS 1-9: 0
SUM OF ALL RESPONSES TO PHQ QUESTIONS 1-9: 0
1. LITTLE INTEREST OR PLEASURE IN DOING THINGS: 0

## 2024-01-16 ASSESSMENT — ENCOUNTER SYMPTOMS
TROUBLE SWALLOWING: 0
BLOOD IN STOOL: 0
NAUSEA: 0
WHEEZING: 0
ABDOMINAL DISTENTION: 0
SORE THROAT: 0
VOMITING: 0
CONSTIPATION: 0
CHEST TIGHTNESS: 0
VOICE CHANGE: 0
SHORTNESS OF BREATH: 0
COLOR CHANGE: 0
COUGH: 0
ABDOMINAL PAIN: 0
BACK PAIN: 0

## 2024-01-16 NOTE — PROGRESS NOTES
HPI:  Maryellen Avila (: 1955) is a 68 y.o. female, Established patient, here for evaluation of the following chief complaint(s):  Follow-up (Vaginal discharge and itching on going for over a year.)    Patient is vaginal discharge.  She recently began having sexual intercourse again and is having a great deal of discomfort.  She also notices itching and irritation in the intertriginous areas.    SUBJECTIVE/OBJECTIVE:    Past Surgical History:   Procedure Laterality Date    BARIATRIC SURGERY  2004    BREAST BIOPSY Left 14    BREAST BIOPSY Left 12/10/14    BREAST LUMPECTOMY Left     with left SNB  invasive ductal carcinoma    BREAST LUMPECTOMY Left     BREAST LUMPECTOMY Left      SECTION      x4    CHOLECYSTECTOMY  2007    GASTRIC BYPASS SURGERY      HYSTERECTOMY (CERVIX STATUS UNKNOWN)      KNEE ARTHROPLASTY Right     knee reconstruction    KNEE SURGERY      Right knee    LYMPH NODE BIOPSY Left 14    ANDREW AND BSO (CERVIX REMOVED)      Dr. Nguyễn    TONSILLECTOMY  2002    TUBAL LIGATION      UPPER GASTROINTESTINAL ENDOSCOPY  2/18/15    w/bx,polypectomy     UPPER GASTROINTESTINAL ENDOSCOPY N/A 2020    EGD DIAGNOSTIC ONLY performed by Sarkis Herrera MD at [facility]    UVULOPALATOPHARYGOPLASTY          Review of Systems   Constitutional:  Negative for activity change, appetite change, fatigue and unexpected weight change.   HENT:  Negative for dental problem, ear pain, hearing loss, nosebleeds, sore throat, trouble swallowing and voice change.    Eyes:  Negative for visual disturbance.   Respiratory:  Negative for cough, chest tightness, shortness of breath and wheezing.    Cardiovascular:  Negative for chest pain and palpitations.   Gastrointestinal:  Negative for abdominal distention, abdominal pain, blood in stool, constipation, nausea and vomiting.   Endocrine: Negative for cold intolerance, heat intolerance, polydipsia, polyphagia and polyuria.

## 2024-01-17 LAB
CLUE CELLS VAG QL WET PREP: NORMAL
T VAGINALIS VAG QL WET PREP: NORMAL
TRICHOMONAS VAGINALIS SCREEN: NEGATIVE
YEAST VAG QL WET PREP: NORMAL

## 2024-01-22 DIAGNOSIS — M25.562 ACUTE PAIN OF LEFT KNEE: Primary | ICD-10-CM

## 2024-01-22 RX ORDER — DOCUSATE SODIUM 100 MG/1
100 CAPSULE, LIQUID FILLED ORAL 2 TIMES DAILY
Qty: 60 CAPSULE | Refills: 0 | Status: SHIPPED | OUTPATIENT
Start: 2024-01-22 | End: 2024-02-21

## 2024-01-22 RX ORDER — HYDROCODONE BITARTRATE AND ACETAMINOPHEN 5; 325 MG/1; MG/1
1 TABLET ORAL EVERY 6 HOURS PRN
Qty: 28 TABLET | Refills: 0 | Status: SHIPPED | OUTPATIENT
Start: 2024-01-22 | End: 2024-01-29

## 2024-02-09 RX ORDER — ESTRADIOL 0.1 MG/G
CREAM VAGINAL
Qty: 42.5 G | Refills: 0 | Status: SHIPPED | OUTPATIENT
Start: 2024-02-09

## 2024-02-09 NOTE — TELEPHONE ENCOUNTER
Future Appointments    Encounter Information   Provider Department Appt Notes   2/20/2024 Edwardo Maki PA [facility] Endo 3 month follow up   2/22/2024 Ruben Espinoza,  [facility] Obstetrics and Gynecology 1 month fu   3/12/2024 Eduar Dacosta MD [facility] Pulmonology 3m fu   3/19/2024 Stanley Ernst MD [facility] Primary and Specialty Care 3 month follow up     Past Visits    Date Provider Specialty Visit Type Primary Dx   01/16/2024 Ruben Espinoza DO Obstetrics and Gynecology Office Visit Vaginal discharge   12/28/2023  Family Medicine Nurse Only Essential hypertension   12/26/2023 Stanley Ernst MD Family Medicine Office Visit Type 2 diabetes mellitus with hyperglycemia, with long-term current use of insulin (HCC)

## 2024-02-12 DIAGNOSIS — J45.41 MODERATE PERSISTENT ASTHMA WITH ACUTE EXACERBATION: ICD-10-CM

## 2024-02-12 RX ORDER — AMLODIPINE BESYLATE 5 MG/1
5 TABLET ORAL DAILY
Qty: 90 TABLET | Refills: 1 | Status: SHIPPED | OUTPATIENT
Start: 2024-02-12

## 2024-02-12 RX ORDER — ALBUTEROL SULFATE 90 UG/1
AEROSOL, METERED RESPIRATORY (INHALATION)
Qty: 25.5 G | Refills: 3 | Status: SHIPPED | OUTPATIENT
Start: 2024-02-12

## 2024-02-12 RX ORDER — FLUCONAZOLE 150 MG/1
TABLET ORAL
Qty: 2 TABLET | Refills: 3 | Status: SHIPPED | OUTPATIENT
Start: 2024-02-12

## 2024-02-12 NOTE — TELEPHONE ENCOUNTER
Rx requested:  Requested Prescriptions     Pending Prescriptions Disp Refills    albuterol sulfate HFA (PROVENTIL;VENTOLIN;PROAIR) 108 (90 Base) MCG/ACT inhaler [Pharmacy Med Name: ALBUTEROL HFA INHALER 8.5GM 90MCG] 25.5 g 3     Sig: USE 2 INHALATIONS FOUR TIMES A DAY AS NEEDED FOR WHEEZING       Last Office Visit:   12/12/2023      Next Visit Date:  Future Appointments   Date Time Provider Department Center   2/20/2024  3:45 PM Maki, Edwardo S, PA [facility]   2/22/2024  3:45 PM Ruben Espinoza DO [facility]   3/12/2024  3:15 PM Eduar Dacosta MD [place] [facility]   3/19/2024  5:00 PM Stanley Ernst MD [facility]

## 2024-02-19 DIAGNOSIS — Z79.4 TYPE 2 DIABETES MELLITUS WITH HYPERGLYCEMIA, WITH LONG-TERM CURRENT USE OF INSULIN (HCC): ICD-10-CM

## 2024-02-19 DIAGNOSIS — E11.65 TYPE 2 DIABETES MELLITUS WITH HYPERGLYCEMIA, WITH LONG-TERM CURRENT USE OF INSULIN (HCC): ICD-10-CM

## 2024-02-19 LAB
ALBUMIN SERPL-MCNC: 4.2 G/DL (ref 3.5–4.6)
ALP SERPL-CCNC: 124 U/L (ref 40–130)
ALT SERPL-CCNC: 13 U/L (ref 0–33)
ANION GAP SERPL CALCULATED.3IONS-SCNC: 10 MEQ/L (ref 9–15)
AST SERPL-CCNC: 13 U/L (ref 0–35)
BILIRUB SERPL-MCNC: 0.5 MG/DL (ref 0.2–0.7)
BUN SERPL-MCNC: 11 MG/DL (ref 8–23)
CALCIUM SERPL-MCNC: 8.9 MG/DL (ref 8.5–9.9)
CHLORIDE SERPL-SCNC: 103 MEQ/L (ref 95–107)
CO2 SERPL-SCNC: 27 MEQ/L (ref 20–31)
CREAT SERPL-MCNC: 0.57 MG/DL (ref 0.5–0.9)
GLOBULIN SER CALC-MCNC: 2.4 G/DL (ref 2.3–3.5)
GLUCOSE SERPL-MCNC: 107 MG/DL (ref 70–99)
HBA1C MFR BLD: 5.8 % (ref 4.8–5.9)
POTASSIUM SERPL-SCNC: 4.4 MEQ/L (ref 3.4–4.9)
PROT SERPL-MCNC: 6.6 G/DL (ref 6.3–8)
SODIUM SERPL-SCNC: 140 MEQ/L (ref 135–144)

## 2024-02-20 ENCOUNTER — OFFICE VISIT (OUTPATIENT)
Dept: ENDOCRINOLOGY | Age: 69
End: 2024-02-20
Payer: COMMERCIAL

## 2024-02-20 VITALS
HEART RATE: 63 BPM | OXYGEN SATURATION: 98 % | SYSTOLIC BLOOD PRESSURE: 160 MMHG | HEIGHT: 62 IN | WEIGHT: 228 LBS | BODY MASS INDEX: 41.96 KG/M2 | DIASTOLIC BLOOD PRESSURE: 93 MMHG

## 2024-02-20 DIAGNOSIS — E11.65 TYPE 2 DIABETES MELLITUS WITH HYPERGLYCEMIA, WITH LONG-TERM CURRENT USE OF INSULIN (HCC): Primary | ICD-10-CM

## 2024-02-20 DIAGNOSIS — Z79.4 TYPE 2 DIABETES MELLITUS WITH HYPERGLYCEMIA, WITH LONG-TERM CURRENT USE OF INSULIN (HCC): Primary | ICD-10-CM

## 2024-02-20 PROCEDURE — 1123F ACP DISCUSS/DSCN MKR DOCD: CPT | Performed by: PHYSICIAN ASSISTANT

## 2024-02-20 PROCEDURE — 3077F SYST BP >= 140 MM HG: CPT | Performed by: PHYSICIAN ASSISTANT

## 2024-02-20 PROCEDURE — 3044F HG A1C LEVEL LT 7.0%: CPT | Performed by: PHYSICIAN ASSISTANT

## 2024-02-20 PROCEDURE — 3079F DIAST BP 80-89 MM HG: CPT | Performed by: PHYSICIAN ASSISTANT

## 2024-02-20 PROCEDURE — 99214 OFFICE O/P EST MOD 30 MIN: CPT | Performed by: PHYSICIAN ASSISTANT

## 2024-02-20 ASSESSMENT — ENCOUNTER SYMPTOMS
SINUS PRESSURE: 0
ABDOMINAL PAIN: 0
SORE THROAT: 0
VOMITING: 0
SHORTNESS OF BREATH: 0
NAUSEA: 0
DIARRHEA: 0
COUGH: 0
WHEEZING: 0
RHINORRHEA: 0

## 2024-02-20 NOTE — PATIENT INSTRUCTIONS
Endocrinology-    Check your blood sugars 4 times a day, before meals and at night  Document these numbers in a blood glucose log and bring them with you to your follow-up appointment.  If you are prescribed insulin, Do not take your mealtime insulin if your blood sugars less than 150   Call our office if you have blood sugars less than 80 or greater then 300 on two or more occasions  Call our office if you have any questions regarding your blood sugars or insulin dosing regiment  Signs of low blood sugar may include tremors, feeling shaky, sweating, dizziness, confusion and weakness. Check your blood sugar immediatly if you have any of these symptoms.     The plan as discussed at your appointment-   Humalog inject 3 times daily before meals- less than 150 no insulin, 150-180 4 units, 181-200 5 units, 201-220 6 units, 221-240 7 units, 241-260 8 units, 261-280 9 units, 281-300 10 units, 301 or higher 11 units  Metformin 500 XR mg twice daily   Farxiga 10 mg daily  Continue Ozempic 0.5 mg injected weekly    Freestyle Rasheed 2 being used  Repeat labs and follow up in 6 months

## 2024-02-20 NOTE — PROGRESS NOTES
2/20/2024    Assessment:       Diagnosis Orders   1. Type 2 diabetes mellitus with hyperglycemia, with long-term current use of insulin ([facility])  Comprehensive Metabolic Panel    Hemoglobin A1C        Bariatric surgery 2004     PLAN:     Humalog inject 3 times daily before meals- less than 150 no insulin, 150-180 4 units, 181-200 5 units, 201-220 6 units, 221-240 7 units, 241-260 8 units, 261-280 9 units, 281-300 10 units, 301 or higher 11 units  Metformin 500 XR mg twice daily   Farxiga 10 mg daily  Continue Ozempic 0.5 mg injected weekly    Freestyle Rasheed 2 being used  Repeat labs and follow up in 6 months     Orders Placed This Encounter   Procedures    Comprehensive Metabolic Panel     Standing Status:   Future     Standing Expiration Date:   2/20/2025    Hemoglobin A1C     Standing Status:   Future     Standing Expiration Date:   2/20/2025     No orders of the defined types were placed in this encounter.    Return in about 6 months (around 8/20/2024) for Diabetes.  Subjective:     Chief Complaint   Patient presents with    Follow-up    Diabetes     Vitals:    02/20/24 1537 02/20/24 1544   BP: (!) 158/86 (!) 160/93   Site: Right Upper Arm    Pulse: 63    SpO2: 98%    Weight: 103.4 kg (228 lb)    Height: 1.575 m (5' 2.01\")      Wt Readings from Last 3 Encounters:   02/20/24 103.4 kg (228 lb)   01/16/24 104.8 kg (231 lb)   12/26/23 104.8 kg (231 lb)     BP Readings from Last 3 Encounters:   02/20/24 (!) 160/93   01/16/24 120/74   12/28/23 132/70     Maryellen is a 68-year-old type II diabetic female discharged from [facility] 9/2022 following admission for acute asthma exacerbation and steroid-induced hyperglycemia. Ozempic started and continue Merformin and start Farxiga.       Diabetes  She presents for her follow-up diabetic visit. She has type 2 diabetes mellitus. The initial diagnosis of diabetes was made 21 years ago. Her disease course has been improving. Hypoglycemia symptoms include sweats and tremors. Pertinent

## 2024-02-20 NOTE — TELEPHONE ENCOUNTER
Patient requesting medication refill. Please approve or deny this request.    Rx requested:  Requested Prescriptions     Pending Prescriptions Disp Refills    Semaglutide,0.25 or 0.5MG/DOS, (OZEMPIC, 0.25 OR 0.5 MG/DOSE,) 2 MG/1.5ML SOPN 1 Adjustable Dose Pre-filled Pen Syringe 0     Sig: Lot #: [id number], Disp: 1, Exp: 1/31/25         Last Office Visit:   2/20/2024      Next Visit Date:  Future Appointments   Date Time Provider Department Center   2/22/2024  3:45 PM Ruben Espinoza DO [facility]   3/12/2024  3:15 PM Eduar Dacosta MD [place] [facility]   3/19/2024  5:00 PM Stanley Ernst MD [facility] [facility]   8/20/2024  3:45 PM Maki, Edwardo S, PA [place] [facility]

## 2024-02-22 ENCOUNTER — OFFICE VISIT (OUTPATIENT)
Dept: OBGYN CLINIC | Age: 69
End: 2024-02-22
Payer: COMMERCIAL

## 2024-02-22 VITALS
SYSTOLIC BLOOD PRESSURE: 140 MMHG | DIASTOLIC BLOOD PRESSURE: 80 MMHG | HEIGHT: 62 IN | BODY MASS INDEX: 41.77 KG/M2 | WEIGHT: 227 LBS | HEART RATE: 73 BPM

## 2024-02-22 DIAGNOSIS — N94.9 GENITAL ATROPHY OF FEMALE: Primary | ICD-10-CM

## 2024-02-22 PROCEDURE — 3079F DIAST BP 80-89 MM HG: CPT | Performed by: OBSTETRICS & GYNECOLOGY

## 2024-02-22 PROCEDURE — 3077F SYST BP >= 140 MM HG: CPT | Performed by: OBSTETRICS & GYNECOLOGY

## 2024-02-22 PROCEDURE — 1123F ACP DISCUSS/DSCN MKR DOCD: CPT | Performed by: OBSTETRICS & GYNECOLOGY

## 2024-02-22 PROCEDURE — 99213 OFFICE O/P EST LOW 20 MIN: CPT | Performed by: OBSTETRICS & GYNECOLOGY

## 2024-02-22 RX ORDER — SEMAGLUTIDE 1.34 MG/ML
INJECTION, SOLUTION SUBCUTANEOUS
Qty: 1 ADJUSTABLE DOSE PRE-FILLED PEN SYRINGE | Refills: 0 | COMMUNITY
Start: 2024-02-22

## 2024-02-22 SDOH — ECONOMIC STABILITY: INCOME INSECURITY: HOW HARD IS IT FOR YOU TO PAY FOR THE VERY BASICS LIKE FOOD, HOUSING, MEDICAL CARE, AND HEATING?: PATIENT DECLINED

## 2024-02-22 SDOH — ECONOMIC STABILITY: FOOD INSECURITY: WITHIN THE PAST 12 MONTHS, THE FOOD YOU BOUGHT JUST DIDN'T LAST AND YOU DIDN'T HAVE MONEY TO GET MORE.: PATIENT DECLINED

## 2024-02-22 SDOH — ECONOMIC STABILITY: FOOD INSECURITY: WITHIN THE PAST 12 MONTHS, YOU WORRIED THAT YOUR FOOD WOULD RUN OUT BEFORE YOU GOT MONEY TO BUY MORE.: PATIENT DECLINED

## 2024-02-22 SDOH — ECONOMIC STABILITY: HOUSING INSECURITY
IN THE LAST 12 MONTHS, WAS THERE A TIME WHEN YOU DID NOT HAVE A STEADY PLACE TO SLEEP OR SLEPT IN A SHELTER (INCLUDING NOW)?: PATIENT DECLINED

## 2024-02-22 SDOH — ECONOMIC STABILITY: TRANSPORTATION INSECURITY
IN THE PAST 12 MONTHS, HAS LACK OF TRANSPORTATION KEPT YOU FROM MEETINGS, WORK, OR FROM GETTING THINGS NEEDED FOR DAILY LIVING?: PATIENT DECLINED

## 2024-02-22 ASSESSMENT — ENCOUNTER SYMPTOMS
VOMITING: 0
COLOR CHANGE: 0
BACK PAIN: 0
SHORTNESS OF BREATH: 0
COUGH: 0
CHEST TIGHTNESS: 0
TROUBLE SWALLOWING: 0
VOICE CHANGE: 0
NAUSEA: 0
ABDOMINAL PAIN: 0
SORE THROAT: 0
BLOOD IN STOOL: 0
ABDOMINAL DISTENTION: 0
CONSTIPATION: 0
WHEEZING: 0

## 2024-02-22 NOTE — PROGRESS NOTES
HPI:  Maryellen Avila (: 1955) is a 68 y.o. female, Established patient, here for evaluation of the following chief complaint(s):  Follow-up  Patient has used the vaginal cream intermittently.  Not every night.  She has noticed some improvement in the vaginal dryness.  She is also been using the Lotrisone cream and has marked improvement in the itching and burning in the intertriginous areas from the      SUBJECTIVE/OBJECTIVE:    Past Surgical History:   Procedure Laterality Date    BARIATRIC SURGERY  2004    BREAST BIOPSY Left 14    BREAST BIOPSY Left 12/10/14    BREAST LUMPECTOMY Left     with left SNB  invasive ductal carcinoma    BREAST LUMPECTOMY Left     BREAST LUMPECTOMY Left      SECTION      x4    CHOLECYSTECTOMY  2007    GASTRIC BYPASS SURGERY      HYSTERECTOMY (CERVIX STATUS UNKNOWN)      KNEE ARTHROPLASTY Right     knee reconstruction    KNEE SURGERY      Right knee    LYMPH NODE BIOPSY Left 14    ANDREW AND BSO (CERVIX REMOVED)      Dr. Nguyễn    TONSILLECTOMY  2002    TUBAL LIGATION      UPPER GASTROINTESTINAL ENDOSCOPY  2/18/15    w/bx,polypectomy     UPPER GASTROINTESTINAL ENDOSCOPY N/A 2020    EGD DIAGNOSTIC ONLY performed by Sarkis Herrera MD at [facility]    UVULOPALATOPHARYGOPLASTY          Review of Systems   Constitutional:  Negative for activity change, appetite change, fatigue and unexpected weight change.   HENT:  Negative for dental problem, ear pain, hearing loss, nosebleeds, sore throat, trouble swallowing and voice change.    Eyes:  Negative for visual disturbance.   Respiratory:  Negative for cough, chest tightness, shortness of breath and wheezing.    Cardiovascular:  Negative for chest pain and palpitations.   Gastrointestinal:  Negative for abdominal distention, abdominal pain, blood in stool, constipation, nausea and vomiting.   Endocrine: Negative for cold intolerance, heat intolerance, polydipsia, polyphagia and

## 2024-02-26 ENCOUNTER — PATIENT MESSAGE (OUTPATIENT)
Dept: ENDOCRINOLOGY | Age: 69
End: 2024-02-26

## 2024-02-26 DIAGNOSIS — Z79.4 TYPE 2 DIABETES MELLITUS WITH HYPERGLYCEMIA, WITH LONG-TERM CURRENT USE OF INSULIN (HCC): ICD-10-CM

## 2024-02-26 DIAGNOSIS — E11.65 TYPE 2 DIABETES MELLITUS WITH HYPERGLYCEMIA, WITH LONG-TERM CURRENT USE OF INSULIN (HCC): ICD-10-CM

## 2024-02-26 NOTE — TELEPHONE ENCOUNTER
From: Maryellen Avila  To: Edwardo Maki  Sent: 2/26/2024 4:03 PM EST  Subject: Free Style Rasheed II    Edwardo,Would you please call me in a 1 month supply of the free style rasheed sensors to [facility] pharmacy on [address] ? Thank you

## 2024-03-10 NOTE — PROGRESS NOTES
Subjective:      Patient ID: Maryellen Avila is a 68 y.o. female Established patient, here for evaluation of the following chief complaint(s):  Chief Complaint   Patient presents with    Diabetes    Hypertension       HPI  67 year old female presenting with 9/10 non-radiating pain localized to the left knee .  The patient's pain is under fair control with ibuprofen.      Type 2 diabetes: The patient's A1c is 7.2 on 3/23/2023        Gastroesophageal reflux disease: Compliant with Protonix 40 mg orally daily      Obesity: The patient's body mass index is 43.16      Hip pain : present for 2-3 weeks. Intermittent catching sensation , daily.        Left lower extremity swelling and pain: The patient reports left lower extremity achy persistent nonradiating pain has been present for several days.        BUNNY : compliant with CPAP OF 8        At present he denies polyuria,  Polydipsia, constitutional, sinus, visual, cardiopulmonary, urologic, gastrointestinal, immunologic/hematologic, additional musculoskeletal, neurologic,dermatologic, or psychiatric complaints.        Current Outpatient Medications on File Prior to Visit   Medication Sig Dispense Refill    fluticasone-umeclidin-vilant (TRELEGY ELLIPTA) 200-62.5-25 MCG/ACT AEPB inhaler Inhale 1 puff into the lungs daily 2 each 0    Continuous Blood Gluc Sensor (FREESTYLE GERTRUDE 2 SENSOR) MISC 1 Device by Does not apply route every 14 days 2 each 0    Semaglutide,0.25 or 0.5MG/DOS, (OZEMPIC, 0.25 OR 0.5 MG/DOSE,) 2 MG/1.5ML SOPN Lot #: [id number], Disp: 1, Exp: 1/31/25 1 Adjustable Dose Pre-filled Pen Syringe 0    amLODIPine (NORVASC) 5 MG tablet TAKE 1 TABLET BY MOUTH EVERY DAY 90 tablet 1    albuterol sulfate HFA (PROVENTIL;VENTOLIN;PROAIR) 108 (90 Base) MCG/ACT inhaler USE 2 INHALATIONS FOUR TIMES A DAY AS NEEDED FOR WHEEZING 25.5 g 3    fluconazole (DIFLUCAN) 150 MG tablet TAKE 1 TABLET EVERY 3 DAYS FOR 3 DOSES THEN 1 A WEEK FOR A MONTH 2 tablet 3    estradiol (ESTRACE) 0.1

## 2024-03-12 ENCOUNTER — OFFICE VISIT (OUTPATIENT)
Dept: PULMONOLOGY | Age: 69
End: 2024-03-12
Payer: COMMERCIAL

## 2024-03-12 VITALS
SYSTOLIC BLOOD PRESSURE: 136 MMHG | HEART RATE: 68 BPM | OXYGEN SATURATION: 98 % | DIASTOLIC BLOOD PRESSURE: 80 MMHG | WEIGHT: 227 LBS | BODY MASS INDEX: 41.52 KG/M2

## 2024-03-12 DIAGNOSIS — J45.41 MODERATE PERSISTENT ASTHMA WITH ACUTE EXACERBATION: ICD-10-CM

## 2024-03-12 DIAGNOSIS — E66.01 MORBID OBESITY (HCC): ICD-10-CM

## 2024-03-12 DIAGNOSIS — G47.33 OSA (OBSTRUCTIVE SLEEP APNEA): Primary | ICD-10-CM

## 2024-03-12 PROCEDURE — 3075F SYST BP GE 130 - 139MM HG: CPT | Performed by: INTERNAL MEDICINE

## 2024-03-12 PROCEDURE — 99214 OFFICE O/P EST MOD 30 MIN: CPT | Performed by: INTERNAL MEDICINE

## 2024-03-12 PROCEDURE — 1123F ACP DISCUSS/DSCN MKR DOCD: CPT | Performed by: INTERNAL MEDICINE

## 2024-03-12 PROCEDURE — 3079F DIAST BP 80-89 MM HG: CPT | Performed by: INTERNAL MEDICINE

## 2024-03-12 RX ORDER — FLUTICASONE FUROATE, UMECLIDINIUM BROMIDE AND VILANTEROL TRIFENATATE 200; 62.5; 25 UG/1; UG/1; UG/1
1 POWDER RESPIRATORY (INHALATION) DAILY
Qty: 2 EACH | Refills: 0 | COMMUNITY
Start: 2024-03-12

## 2024-03-12 NOTE — PROGRESS NOTES
cardiomediastinal silhouette is within normal limits.  No pneumothorax, pleural effusion, or consolidation.  Bones of the thorax appear intact.    Impression  No radiographic evidence of acute intrathoracic process.      Results for orders placed during the hospital encounter of 09/24/20    XR CHEST PORTABLE    Narrative  EXAMINATION: XR CHEST PORTABLE    CLINICAL HISTORY: LOWER CHEST PAIN, FEVER    COMPARISONS: NOVEMBER 24, 2014    FINDINGS: Osseous structures are intact. Cardiopericardial silhouette is normal. Pulmonary vasculature is normal. Lungs are clear.    Impression  NO ACUTE CARDIOPULMONARY DISEASE.  ]  No results found for this or any previous visit.  ]  No results found for this or any previous visit.  ]  No results found for this or any previous visit.  ]    Assessment/Plan:     1. BUNNY (obstructive sleep apnea)  She is using CPAP with  8 centimeters of H2O with heated humidity.   She said  check with [employer] about humidify .    Counseling: CPAP/BiPAP uses, She advised to use CPAP at least 5-6 hours every night.    Driving: She is advised for extreme caution when driving or operating machinery if there is a feeling of drowsiness, especially while driving it is preferable to stop driving and take a brief nap.  Sleep hygiene:Avoid supine sleep, sleep on  sides. Avoid  sleep deprivation.  Explained sleep hygiene.  Advice to avoid Alcohol and sedative    2. Moderate persistent asthma with acute exacerbation  She is on trelegy  ellipta 1 puff daily  , She is on albuterol HFA prn and neb  with albuterol BID. C/o shortness of breath with exertion.No  Wheezing, No  Cough. No Rhinorrhea , stuffy nose  + postnasal drip, on Flonase prn     - fluticasone-umeclidin-vilant (TRELEGY ELLIPTA) 200-62.5-25 MCG/ACT AEPB inhaler; Inhale 1 puff into the lungs daily  Dispense: 2 each; Refill: 0    3. Morbid obesity (HCC)  She is advised try to lose weight. obesity related risk explained to the patient ,  Current weight:

## 2024-03-19 ENCOUNTER — OFFICE VISIT (OUTPATIENT)
Dept: FAMILY MEDICINE CLINIC | Age: 69
End: 2024-03-19
Payer: COMMERCIAL

## 2024-03-19 VITALS
HEART RATE: 66 BPM | WEIGHT: 229 LBS | DIASTOLIC BLOOD PRESSURE: 80 MMHG | HEIGHT: 62 IN | BODY MASS INDEX: 42.14 KG/M2 | RESPIRATION RATE: 14 BRPM | SYSTOLIC BLOOD PRESSURE: 160 MMHG | OXYGEN SATURATION: 98 %

## 2024-03-19 DIAGNOSIS — K21.9 GASTROESOPHAGEAL REFLUX DISEASE WITHOUT ESOPHAGITIS: ICD-10-CM

## 2024-03-19 DIAGNOSIS — J45.51 SEVERE PERSISTENT ASTHMA WITH (ACUTE) EXACERBATION: ICD-10-CM

## 2024-03-19 DIAGNOSIS — Z79.4 TYPE 2 DIABETES MELLITUS WITH DIABETIC NEUROPATHY, WITH LONG-TERM CURRENT USE OF INSULIN (HCC): ICD-10-CM

## 2024-03-19 DIAGNOSIS — E11.40 TYPE 2 DIABETES MELLITUS WITH DIABETIC NEUROPATHY, WITH LONG-TERM CURRENT USE OF INSULIN (HCC): ICD-10-CM

## 2024-03-19 DIAGNOSIS — I10 ESSENTIAL HYPERTENSION: Primary | ICD-10-CM

## 2024-03-19 PROCEDURE — 3077F SYST BP >= 140 MM HG: CPT | Performed by: INTERNAL MEDICINE

## 2024-03-19 PROCEDURE — 1123F ACP DISCUSS/DSCN MKR DOCD: CPT | Performed by: INTERNAL MEDICINE

## 2024-03-19 PROCEDURE — 3044F HG A1C LEVEL LT 7.0%: CPT | Performed by: INTERNAL MEDICINE

## 2024-03-19 PROCEDURE — 3079F DIAST BP 80-89 MM HG: CPT | Performed by: INTERNAL MEDICINE

## 2024-03-19 PROCEDURE — 99214 OFFICE O/P EST MOD 30 MIN: CPT | Performed by: INTERNAL MEDICINE

## 2024-03-20 RX ORDER — SEMAGLUTIDE 1.34 MG/ML
0.25 INJECTION, SOLUTION SUBCUTANEOUS WEEKLY
Qty: 1 ADJUSTABLE DOSE PRE-FILLED PEN SYRINGE | Refills: 0 | Status: SHIPPED | COMMUNITY
Start: 2024-03-20

## 2024-04-18 DIAGNOSIS — E11.65 TYPE 2 DIABETES MELLITUS WITH HYPERGLYCEMIA, WITH LONG-TERM CURRENT USE OF INSULIN (HCC): ICD-10-CM

## 2024-04-18 DIAGNOSIS — Z79.4 TYPE 2 DIABETES MELLITUS WITH HYPERGLYCEMIA, WITH LONG-TERM CURRENT USE OF INSULIN (HCC): ICD-10-CM

## 2024-04-18 RX ORDER — PEN NEEDLE, DIABETIC 32 GX 1/4"
NEEDLE, DISPOSABLE MISCELLANEOUS
Qty: 400 EACH | Refills: 3 | Status: SHIPPED | OUTPATIENT
Start: 2024-04-18

## 2024-05-21 ENCOUNTER — OFFICE VISIT (OUTPATIENT)
Dept: OBGYN CLINIC | Age: 69
End: 2024-05-21
Payer: COMMERCIAL

## 2024-05-21 VITALS
SYSTOLIC BLOOD PRESSURE: 134 MMHG | HEART RATE: 67 BPM | BODY MASS INDEX: 41.96 KG/M2 | WEIGHT: 228 LBS | HEIGHT: 62 IN | DIASTOLIC BLOOD PRESSURE: 84 MMHG

## 2024-05-21 DIAGNOSIS — N94.9 GENITAL ATROPHY OF FEMALE: Primary | ICD-10-CM

## 2024-05-21 PROCEDURE — 99213 OFFICE O/P EST LOW 20 MIN: CPT | Performed by: OBSTETRICS & GYNECOLOGY

## 2024-05-21 PROCEDURE — 1123F ACP DISCUSS/DSCN MKR DOCD: CPT | Performed by: OBSTETRICS & GYNECOLOGY

## 2024-05-21 PROCEDURE — 3075F SYST BP GE 130 - 139MM HG: CPT | Performed by: OBSTETRICS & GYNECOLOGY

## 2024-05-21 PROCEDURE — 3079F DIAST BP 80-89 MM HG: CPT | Performed by: OBSTETRICS & GYNECOLOGY

## 2024-05-21 RX ORDER — CLOBETASOL PROPIONATE 0.5 MG/G
OINTMENT TOPICAL
Qty: 60 G | Refills: 2 | Status: SHIPPED | OUTPATIENT
Start: 2024-05-21

## 2024-05-21 ASSESSMENT — ENCOUNTER SYMPTOMS
NAUSEA: 0
SHORTNESS OF BREATH: 0
VOMITING: 0
CONSTIPATION: 0
ABDOMINAL PAIN: 0
VOICE CHANGE: 0
BLOOD IN STOOL: 0
COLOR CHANGE: 0
CHEST TIGHTNESS: 0
ABDOMINAL DISTENTION: 0
TROUBLE SWALLOWING: 0
BACK PAIN: 0
SORE THROAT: 0
COUGH: 0
WHEEZING: 0

## 2024-05-21 NOTE — PROGRESS NOTES
HPI:  Maryellen Avila (: 1955) is a 69 y.o. female, Established patient, here for evaluation of the following chief complaint(s):  Follow-up    Patient complains of vulvar itching.  She also has not been using the Estrace cream as frequently as she should be    SUBJECTIVE/OBJECTIVE:    Past Surgical History:   Procedure Laterality Date    BARIATRIC SURGERY  2004    BREAST BIOPSY Left 14    BREAST BIOPSY Left 12/10/14    BREAST LUMPECTOMY Left     with left SNB  invasive ductal carcinoma    BREAST LUMPECTOMY Left     BREAST LUMPECTOMY Left      SECTION      x4    CHOLECYSTECTOMY  2007    GASTRIC BYPASS SURGERY      HYSTERECTOMY (CERVIX STATUS UNKNOWN)      KNEE ARTHROPLASTY Right     knee reconstruction    KNEE SURGERY      Right knee    LYMPH NODE BIOPSY Left 14    ANDREW AND BSO (CERVIX REMOVED)      Dr. Nguyễn    TONSILLECTOMY  2002    TUBAL LIGATION      UPPER GASTROINTESTINAL ENDOSCOPY  2/18/15    w/bx,polypectomy     UPPER GASTROINTESTINAL ENDOSCOPY N/A 2020    EGD DIAGNOSTIC ONLY performed by [facility]arkis Her[facility] MD at [facility]    UVULOPALATOPHARYGOPLASTY          Review of Systems   Constitutional:  Negative for activity change, appetite change, fatigue and unexpected weight change.   HENT:  Negative for dental problem, ear pain, hearing loss, nosebleeds, sore throat, trouble swallowing and voice change.    Eyes:  Negative for visual disturbance.   Respiratory:  Negative for cough, chest tightness, shortness of breath and wheezing.    Cardiovascular:  Negative for chest pain and palpitations.   Gastrointestinal:  Negative for abdominal distention, abdominal pain, blood in stool, constipation, nausea and vomiting.   Endocrine: Negative for cold intolerance, heat intolerance, polydipsia, polyphagia and polyuria.   Genitourinary:  Negative for difficulty urinating, dyspareunia, dysuria, flank pain, frequency, genital sores, hematuria, menstrual

## 2024-05-28 ENCOUNTER — APPOINTMENT (OUTPATIENT)
Dept: GENERAL RADIOLOGY | Age: 69
End: 2024-05-28
Payer: COMMERCIAL

## 2024-05-28 ENCOUNTER — APPOINTMENT (OUTPATIENT)
Dept: CT IMAGING | Age: 69
End: 2024-05-28
Attending: STUDENT IN AN ORGANIZED HEALTH CARE EDUCATION/TRAINING PROGRAM
Payer: COMMERCIAL

## 2024-05-28 ENCOUNTER — HOSPITAL ENCOUNTER (EMERGENCY)
Age: 69
Discharge: HOME | End: 2024-05-28
Attending: STUDENT IN AN ORGANIZED HEALTH CARE EDUCATION/TRAINING PROGRAM
Payer: COMMERCIAL

## 2024-05-28 VITALS
TEMPERATURE: 98.2 F | SYSTOLIC BLOOD PRESSURE: 116 MMHG | HEIGHT: 62 IN | WEIGHT: 227 LBS | RESPIRATION RATE: 18 BRPM | DIASTOLIC BLOOD PRESSURE: 78 MMHG | OXYGEN SATURATION: 100 % | HEART RATE: 64 BPM | BODY MASS INDEX: 41.77 KG/M2

## 2024-05-28 DIAGNOSIS — S09.90XA CLOSED HEAD INJURY, INITIAL ENCOUNTER: ICD-10-CM
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
## 2024-05-28 DIAGNOSIS — M53.3 COCCYX PAIN: ICD-10-CM

## 2024-05-28 PROCEDURE — 70450 CT HEAD/BRAIN W/O DYE: CPT

## 2024-05-28 PROCEDURE — 6370000000 HC RX 637 (ALT 250 FOR IP): Performed by: STUDENT IN AN ORGANIZED HEALTH CARE EDUCATION/TRAINING PROGRAM

## 2024-05-28 PROCEDURE — 72110 X-RAY EXAM L-2 SPINE 4/>VWS: CPT

## 2024-05-28 PROCEDURE — 99285 EMERGENCY DEPT VISIT HI MDM: CPT

## 2024-05-28 PROCEDURE — 72125 CT NECK SPINE W/O DYE: CPT

## 2024-05-28 RX ORDER — ACETAMINOPHEN 500 MG
1000 TABLET ORAL ONCE
Status: COMPLETED | OUTPATIENT
Start: 2024-05-28 | End: 2024-05-28

## 2024-05-28 RX ADMIN — ACETAMINOPHEN 1000 MG: 500 TABLET ORAL at 13:08

## 2024-05-28 ASSESSMENT — PAIN DESCRIPTION - ORIENTATION: ORIENTATION: RIGHT

## 2024-05-28 ASSESSMENT — LIFESTYLE VARIABLES
HOW MANY STANDARD DRINKS CONTAINING ALCOHOL DO YOU HAVE ON A TYPICAL DAY: PATIENT DOES NOT DRINK
HOW OFTEN DO YOU HAVE A DRINK CONTAINING ALCOHOL: NEVER

## 2024-05-28 ASSESSMENT — PAIN DESCRIPTION - FREQUENCY: FREQUENCY: CONTINUOUS

## 2024-05-28 ASSESSMENT — PAIN DESCRIPTION - PAIN TYPE
TYPE: ACUTE PAIN
TYPE: ACUTE PAIN

## 2024-05-28 ASSESSMENT — PAIN - FUNCTIONAL ASSESSMENT
PAIN_FUNCTIONAL_ASSESSMENT: NONE - DENIES PAIN
PAIN_FUNCTIONAL_ASSESSMENT: PREVENTS OR INTERFERES SOME ACTIVE ACTIVITIES AND ADLS
PAIN_FUNCTIONAL_ASSESSMENT: 0-10

## 2024-05-28 ASSESSMENT — PAIN DESCRIPTION - ONSET: ONSET: ON-GOING

## 2024-05-28 ASSESSMENT — PAIN SCALES - GENERAL: PAINLEVEL_OUTOF10: 7

## 2024-05-28 ASSESSMENT — PAIN DESCRIPTION - DESCRIPTORS: DESCRIPTORS: ACHING

## 2024-05-28 NOTE — ED TRIAGE NOTES
PT C/O FALL THIS MORNING AT 10AM, FROM STANDING, PT STATES SHE WENT BACKWARDS FIRST AND ON THE HER RT SIDE OF BODY, STATES SHE HIT BACK OF HEAD, 0 LOC. PT C/O TAILBONE PAIN AND MILD HEAD PAIN. PT A&OX4, SKIN W/D/PINK, 0 OPEN WOUNDS, PT NOT TAKING BLOOD THINNERS, 0 VISION CHANGES REPORTED. SPEECH CLEAR, 0 N&V. STEADY GAIT NOTED

## 2024-05-28 NOTE — ED PROVIDER NOTES
[facility] ED  Emergency Department Encounter  Emergency Medicine      Pt Name: Maryellen Avila  MRN:[id number]  Birthdate 1955  Date of evaluation: 24  PCP:  Stanley Ernst MD    CHIEF COMPLAINT       Chief Complaint   Patient presents with    Fall     PT C/O FALLING FROM STANDING, 0 LOC       HISTORY OF PRESENT ILLNESS  (Location/Symptom, Timing/Onset, Context/Setting, Quality, Duration, ModifyingFactors, Severity.)      Maryellen Avila is a 69 y.o. female [place] after a fall at work.  She said she was looking over at something and ended up tripping over the carpet and fell and hit the back of her head as well as her coccyx.  She has pain to the tailbone and the back of the head.  No loss of consciousness not on blood thinners no alcohol use.  Denies any visual changes, no dizziness or lightheadedness no numbness or weakness no neck pain or upper back pain, no extremity pain chest pain shortness of breath abdominal pain nausea or vomiting.    PAST MEDICAL / SURGICAL / SOCIAL /FAMILY HISTORY      has a past medical history of Allergic rhinitis, Asthma, Breast cancer (HCC), CAD (coronary artery disease), Cancer (HCC), Colon polyps, Diabetes (HCC), Fibromyalgia, GERD (gastroesophageal reflux disease), HA (headache), Herpes simplex, History of therapeutic radiation, HTN (hypertension), Hx antineoplastic chemo, Obesity, Senile osteoporosis, and Sleep apnea.  No other pertinent PMH on review with patient/guardian.     has a past surgical history that includes Cholecystectomy (2007); Bariatric Surgery (2004); knee surgery ();  section; Tubal ligation; Total abdominal hysterectomy w/ bilateral salpingoophorectomy; Tonsillectomy (); Breast biopsy (Left, 14); lymph node biopsy (Left, 14); Breast biopsy (Left, 12/10/14); Upper gastrointestinal endoscopy (2/18/15); Gastric bypass surgery (); Knee Arthroplasty (Right); Uvulopalatopharygoplasty; Upper gastrointestinal

## 2024-05-28 NOTE — DISCHARGE INSTRUCTIONS
Take your medication as indicated and prescribed.  For pain use acetaminophen (Tylenol).  You can take over the counter acetaminophen tablets (1 - 2 tablets of the 500-mg strength every 6 hours).  Do not take any medication that contains aspirin / ibuprofen or blood thinning properties until seen by your physician.  Do not do any sporting activity (running, playing basketball / football, etc) until you are seen by your physician.    For persistent headache you can try sitting in a cool, dark room and try taking 1 - 2 tabs (25 - 50 mg) of diphenhydramine (Benadryl) to help you relax.    PLEASE RETURN TO THE EMERGENCY DEPARTMENT IMMEDIATELY for worsening symptoms, persistent headache, change in vision / hearing / taste, ringing in your ears, sleeping more than normal, or if you develop any concerning symptoms such as: high fever not relieved by acetaminophen (Tylenol) and/or ibuprofen (Motrin / Advil), chills, shortness of breath, chest pain, feeling of your heart fluttering or racing, persistent nausea and/or vomiting, vomiting up blood, blood in your stool, loss of consciousness, numbness, weakness or tingling in the arms or legs or change in color of the extremities, changes in mental status, blurry vision, loss of bladder / bowel control, unable to follow up with your physician, or other any other care or concern.

## 2024-06-11 DIAGNOSIS — E11.65 TYPE 2 DIABETES MELLITUS WITH HYPERGLYCEMIA, WITH LONG-TERM CURRENT USE OF INSULIN (HCC): ICD-10-CM

## 2024-06-11 DIAGNOSIS — Z79.4 TYPE 2 DIABETES MELLITUS WITH HYPERGLYCEMIA, WITH LONG-TERM CURRENT USE OF INSULIN (HCC): ICD-10-CM

## 2024-06-18 ENCOUNTER — OFFICE VISIT (OUTPATIENT)
Dept: FAMILY MEDICINE CLINIC | Age: 69
End: 2024-06-18
Payer: COMMERCIAL

## 2024-06-18 VITALS
HEIGHT: 62 IN | DIASTOLIC BLOOD PRESSURE: 80 MMHG | RESPIRATION RATE: 14 BRPM | HEART RATE: 67 BPM | SYSTOLIC BLOOD PRESSURE: 138 MMHG | OXYGEN SATURATION: 98 % | WEIGHT: 228 LBS | BODY MASS INDEX: 41.96 KG/M2

## 2024-06-18 DIAGNOSIS — E11.40 TYPE 2 DIABETES MELLITUS WITH DIABETIC NEUROPATHY, WITH LONG-TERM CURRENT USE OF INSULIN (HCC): Primary | ICD-10-CM

## 2024-06-18 DIAGNOSIS — I10 ESSENTIAL HYPERTENSION: ICD-10-CM

## 2024-06-18 DIAGNOSIS — K21.9 GASTROESOPHAGEAL REFLUX DISEASE WITHOUT ESOPHAGITIS: ICD-10-CM

## 2024-06-18 DIAGNOSIS — G47.33 OSA (OBSTRUCTIVE SLEEP APNEA): ICD-10-CM

## 2024-06-18 DIAGNOSIS — Z12.39 ENCOUNTER FOR SCREENING FOR MALIGNANT NEOPLASM OF BREAST, UNSPECIFIED SCREENING MODALITY: ICD-10-CM

## 2024-06-18 DIAGNOSIS — Z79.4 TYPE 2 DIABETES MELLITUS WITH DIABETIC NEUROPATHY, WITH LONG-TERM CURRENT USE OF INSULIN (HCC): Primary | ICD-10-CM

## 2024-06-18 PROCEDURE — 3079F DIAST BP 80-89 MM HG: CPT | Performed by: INTERNAL MEDICINE

## 2024-06-18 PROCEDURE — 3044F HG A1C LEVEL LT 7.0%: CPT | Performed by: INTERNAL MEDICINE

## 2024-06-18 PROCEDURE — 99214 OFFICE O/P EST MOD 30 MIN: CPT | Performed by: INTERNAL MEDICINE

## 2024-06-18 PROCEDURE — 3075F SYST BP GE 130 - 139MM HG: CPT | Performed by: INTERNAL MEDICINE

## 2024-06-18 PROCEDURE — 1123F ACP DISCUSS/DSCN MKR DOCD: CPT | Performed by: INTERNAL MEDICINE

## 2024-06-18 ASSESSMENT — ENCOUNTER SYMPTOMS
RHINORRHEA: 0
RECTAL PAIN: 0
CHEST TIGHTNESS: 0
WHEEZING: 0
COUGH: 0
SINUS PRESSURE: 0
EYE ITCHING: 0
FACIAL SWELLING: 0
DIARRHEA: 0
PHOTOPHOBIA: 0
APNEA: 0
VOMITING: 0
EYE PAIN: 0
NAUSEA: 0
ABDOMINAL DISTENTION: 0
ABDOMINAL PAIN: 0
BACK PAIN: 0
BLOOD IN STOOL: 0
SINUS PAIN: 0
TROUBLE SWALLOWING: 0
CONSTIPATION: 0
COLOR CHANGE: 0
SHORTNESS OF BREATH: 0
EYE DISCHARGE: 0
EYE REDNESS: 0
VOICE CHANGE: 0
SORE THROAT: 0

## 2024-06-18 NOTE — PROGRESS NOTES
External ear normal.      Left Ear: External ear normal.   Eyes:      Conjunctiva/sclera: Conjunctivae normal.   Neck:      Vascular: No JVD.      Trachea: No tracheal deviation.   Cardiovascular:      Rate and Rhythm: Normal rate and regular rhythm.      Heart sounds: Normal heart sounds.   Pulmonary:      Effort: Pulmonary effort is normal. No respiratory distress.      Breath sounds: Normal breath sounds. No wheezing or rales.   Chest:      Chest wall: No tenderness.   Abdominal:      General: Bowel sounds are normal. There is no distension.      Palpations: Abdomen is soft. There is no mass.      Tenderness: There is no abdominal tenderness. There is no guarding or rebound.   Musculoskeletal:         General: No tenderness or deformity.      Cervical back: Neck supple.   Skin:     General: Skin is warm and dry.      Coloration: Skin is not pale.      Findings: No erythema or rash.   Neurological:      Mental Status: She is alert and oriented to person, place, and time.      Motor: No abnormal muscle tone.   Psychiatric:         Thought Content: Thought content normal.         Judgment: Judgment normal.         Assessment:       Diagnosis Orders   1. Type 2 diabetes mellitus with diabetic neuropathy, with long-term current use of insulin ([facility])  Microalbumin / Creatinine Urine Ratio    CBC with Auto Differential    Lipid, Fasting      2. Gastroesophageal reflux disease without esophagitis        3. Encounter for screening for malignant neoplasm of breast, unspecified screening modality  P[facility] DIGITAL DIAGNOSTIC W OR WO CAD BILATERAL      4. Essential hypertension        5. BUNNY (obstructive sleep apnea)               No results found for: \"LIPIDPAN\", \"BMP\", \"CMP\", \"CBC\", \"CBCAUTODIF\"  Plan:      Essential hypertension:   Norvasc 5 mg orally daily.     Type 2 diabetes: Semaglutide, Farxiga and metformin.  Monitoring glucose via continuous glucose monitor.      Gastroesophageal reflux disease:  Protonix 40 mg orally

## 2024-06-19 ENCOUNTER — TELEPHONE (OUTPATIENT)
Dept: FAMILY MEDICINE CLINIC | Age: 69
End: 2024-06-19

## 2024-06-19 RX ORDER — SEMAGLUTIDE 1.34 MG/ML
0.25 INJECTION, SOLUTION SUBCUTANEOUS WEEKLY
Qty: 1 ADJUSTABLE DOSE PRE-FILLED PEN SYRINGE | Refills: 0 | COMMUNITY
Start: 2024-06-19

## 2024-06-19 NOTE — TELEPHONE ENCOUNTER
I spoke to the patient to let her know there is a ozempic sample here for her to . Pt stated that she was at the grocery store and will be right over

## 2024-06-28 ENCOUNTER — HOSPITAL ENCOUNTER (OUTPATIENT)
Dept: WOMENS IMAGING | Age: 69
Discharge: HOME | End: 2024-06-28
Attending: INTERNAL MEDICINE
Payer: COMMERCIAL

## 2024-06-28 DIAGNOSIS — Z12.31 SCREENING MAMMOGRAM FOR BREAST CANCER: ICD-10-CM

## 2024-06-28 DIAGNOSIS — Z12.39 ENCOUNTER FOR SCREENING FOR MALIGNANT NEOPLASM OF BREAST, UNSPECIFIED SCREENING MODALITY: ICD-10-CM

## 2024-06-28 PROCEDURE — 77063 BREAST TOMOSYNTHESIS BI: CPT

## 2024-07-16 ENCOUNTER — OFFICE VISIT (OUTPATIENT)
Dept: PULMONOLOGY | Age: 69
End: 2024-07-16
Payer: COMMERCIAL

## 2024-07-16 VITALS
HEART RATE: 64 BPM | BODY MASS INDEX: 42.43 KG/M2 | SYSTOLIC BLOOD PRESSURE: 146 MMHG | OXYGEN SATURATION: 97 % | WEIGHT: 232 LBS | DIASTOLIC BLOOD PRESSURE: 98 MMHG

## 2024-07-16 DIAGNOSIS — J45.41 MODERATE PERSISTENT ASTHMA WITH ACUTE EXACERBATION: Primary | ICD-10-CM

## 2024-07-16 DIAGNOSIS — G47.33 OSA (OBSTRUCTIVE SLEEP APNEA): ICD-10-CM

## 2024-07-16 DIAGNOSIS — E66.01 MORBID OBESITY (HCC): ICD-10-CM

## 2024-07-16 PROCEDURE — 99214 OFFICE O/P EST MOD 30 MIN: CPT | Performed by: INTERNAL MEDICINE

## 2024-07-16 PROCEDURE — 3080F DIAST BP >= 90 MM HG: CPT | Performed by: INTERNAL MEDICINE

## 2024-07-16 PROCEDURE — 1123F ACP DISCUSS/DSCN MKR DOCD: CPT | Performed by: INTERNAL MEDICINE

## 2024-07-16 PROCEDURE — 3077F SYST BP >= 140 MM HG: CPT | Performed by: INTERNAL MEDICINE

## 2024-07-16 NOTE — PROGRESS NOTES
Risk  (2/10/2023)    Overall Financial Resource Strain (CARDIA)     Difficulty of Paying Living Expenses: Not hard at all   Food Insecurity: Not on file (2/22/2024)   Transportation Needs: Unknown (2/10/2023)    PRAPARE - Transportation     Lack of Transportation (Medical): Not on file     Lack of Transportation (Non-Medical): No   Physical Activity: Inactive (9/26/2023)    Exercise Vital Sign     Days of Exercise per Week: 0 days     Minutes of Exercise per Session: 0 min   Stress: Not on file   Social Connections: Not on file   Intimate Partner Violence: Not on file   Housing Stability: Unknown (2/10/2023)    Housing Stability Vital Sign     Unable to Pay for Housing in the Last Year: Not on file     Number of Places Lived in the Last Year: Not on file     Unstable Housing in the Last Year: No         Review of Systems   Constitutional:  Negative for chills, diaphoresis, fatigue and fever.   HENT:  Positive for congestion and postnasal drip. Negative for mouth sores, nosebleeds, rhinorrhea, sinus pressure, sneezing, sore throat, trouble swallowing and voice change.    Eyes:  Negative for discharge, itching and visual disturbance.   Respiratory:  Positive for shortness of breath. Negative for cough, chest tightness and wheezing.    Cardiovascular:  Negative for chest pain, palpitations and leg swelling.   Gastrointestinal:  Negative for abdominal pain, diarrhea, nausea and vomiting.   Genitourinary:  Negative for difficulty urinating and hematuria.   Musculoskeletal:  Negative for arthralgias, joint swelling and myalgias.   Skin:  Negative for rash.   Allergic/Immunologic: Negative for environmental allergies and food allergies.   Neurological:  Negative for dizziness, tremors, weakness and headaches.   Psychiatric/Behavioral:  Positive for sleep disturbance. Negative for behavioral problems.          :     Vitals:    07/16/24 1539   BP: (!) 146/98   Site: Right Upper Arm   Position: Sitting   Cuff Size: Medium

## 2024-07-17 DIAGNOSIS — K21.9 GASTROESOPHAGEAL REFLUX DISEASE WITHOUT ESOPHAGITIS: ICD-10-CM

## 2024-07-17 DIAGNOSIS — Z87.11 PERSONAL HISTORY OF GASTRIC ULCER: ICD-10-CM

## 2024-07-17 RX ORDER — BUDESONIDE, GLYCOPYRROLATE, AND FORMOTEROL FUMARATE 160; 9; 4.8 UG/1; UG/1; UG/1
2 AEROSOL, METERED RESPIRATORY (INHALATION) 2 TIMES DAILY
Qty: 4 EACH | Refills: 0 | COMMUNITY
Start: 2024-07-17

## 2024-07-17 RX ORDER — CYANOCOBALAMIN 1000 UG/ML
INJECTION, SOLUTION INTRAMUSCULAR; SUBCUTANEOUS
Qty: 3 ML | Refills: 3 | Status: SHIPPED | OUTPATIENT
Start: 2024-07-17

## 2024-07-17 RX ORDER — PANTOPRAZOLE SODIUM 40 MG/1
TABLET, DELAYED RELEASE ORAL
Qty: 90 TABLET | Refills: 3 | Status: SHIPPED | OUTPATIENT
Start: 2024-07-17

## 2024-07-17 RX ORDER — METFORMIN HYDROCHLORIDE 500 MG/1
500 TABLET, EXTENDED RELEASE ORAL
Qty: 180 TABLET | Refills: 3 | Status: SHIPPED | OUTPATIENT
Start: 2024-07-17

## 2024-07-17 NOTE — TELEPHONE ENCOUNTER
Future Appointments    Encounter Information   Provider Department Appt Notes   8/8/2024 Ruben Espinoza DO [facility] Obstetrics and Gynecology 1 mo follow up   8/20/2024 Edwardo Maki PA [facility] Endo 6 month follow up   9/30/2024 Stanley Ernst MD [facility] Primary and Specialty Care 3 Month follow up   11/18/2024 Eduar Dacosta MD [facility] Pulmonology 4M FU     Past Visits    Date Provider Specialty Visit Type Primary Dx   07/16/2024 Eduar Dacosta MD Pulmonology Office Visit BUNNY (obstructive sleep apnea)   06/18/2024 Stanley Ernst MD Family Medicine Office Visit Type 2 diabetes mellitus with diabetic neuropathy, with long-term current use of insulin (HCC

## 2024-08-08 ENCOUNTER — OFFICE VISIT (OUTPATIENT)
Dept: OBGYN CLINIC | Age: 69
End: 2024-08-08
Payer: COMMERCIAL

## 2024-08-08 VITALS
HEART RATE: 64 BPM | BODY MASS INDEX: 42.14 KG/M2 | DIASTOLIC BLOOD PRESSURE: 80 MMHG | SYSTOLIC BLOOD PRESSURE: 124 MMHG | HEIGHT: 62 IN | WEIGHT: 229 LBS

## 2024-08-08 DIAGNOSIS — N94.9 GENITAL ATROPHY OF FEMALE: Primary | ICD-10-CM

## 2024-08-08 PROCEDURE — 3074F SYST BP LT 130 MM HG: CPT | Performed by: OBSTETRICS & GYNECOLOGY

## 2024-08-08 PROCEDURE — 1123F ACP DISCUSS/DSCN MKR DOCD: CPT | Performed by: OBSTETRICS & GYNECOLOGY

## 2024-08-08 PROCEDURE — 99213 OFFICE O/P EST LOW 20 MIN: CPT | Performed by: OBSTETRICS & GYNECOLOGY

## 2024-08-08 PROCEDURE — 3079F DIAST BP 80-89 MM HG: CPT | Performed by: OBSTETRICS & GYNECOLOGY

## 2024-08-08 ASSESSMENT — ENCOUNTER SYMPTOMS
TROUBLE SWALLOWING: 0
ABDOMINAL PAIN: 0
SHORTNESS OF BREATH: 0
WHEEZING: 0
VOICE CHANGE: 0
COLOR CHANGE: 0
BACK PAIN: 0
NAUSEA: 0
SORE THROAT: 0
ABDOMINAL DISTENTION: 0
CONSTIPATION: 0
CHEST TIGHTNESS: 0
COUGH: 0
BLOOD IN STOOL: 0
VOMITING: 0

## 2024-08-08 NOTE — PROGRESS NOTES
pain, blood in stool, constipation, nausea and vomiting.   Endocrine: Negative for cold intolerance, heat intolerance, polydipsia, polyphagia and polyuria.   Genitourinary:  Negative for difficulty urinating, dyspareunia, dysuria, flank pain, frequency, genital sores, hematuria, menstrual problem, pelvic pain, urgency, vaginal bleeding, vaginal discharge and vaginal pain.   Musculoskeletal:  Negative for arthralgias, back pain, joint swelling and myalgias.   Skin:  Negative for color change and rash.   Allergic/Immunologic: Negative for environmental allergies, food allergies and immunocompromised state.   Neurological:  Negative for dizziness, seizures, syncope, speech difficulty, weakness, numbness and headaches.   Hematological:  Negative for adenopathy. Does not bruise/bleed easily.   Psychiatric/Behavioral:  Negative for agitation, behavioral problems, confusion, decreased concentration, dysphoric mood and suicidal ideas. The patient is not nervous/anxious and is not hyperactive.        Physical Exam  Vitals and nursing note reviewed. Exam conducted with a chaperone present.   Genitourinary:            Vitals:    08/08/24 1555   BP: 124/80   Site: Right Upper Arm   Pulse: 64   Weight: 103.9 kg (229 lb)   Height: 1.575 m (5' 2\")         ASSESSMENT/PLAN:     Diagnosis Orders   1. Genital atrophy of female          Lichen sclerosus  PLAN: Patient to use the vaginal estrogen and the clobetasol twice a week and will have her follow-up here in 6      No follow-ups on file.      On this date 8/8/2024 I have spent 20 minutes reviewing previous notes, test results and face to face with the patient discussing the diagnosis and importance of compliance with the treatment plan as well as documenting on the day of the visit.      An electronic signature was used to authenticate this note. Please note this report has been partially produced using speech recognition software and may cause contain errors related to that system

## 2024-08-09 ENCOUNTER — OFFICE VISIT (OUTPATIENT)
Dept: CARDIOLOGY CLINIC | Age: 69
End: 2024-08-09
Payer: COMMERCIAL

## 2024-08-09 VITALS
BODY MASS INDEX: 41.7 KG/M2 | SYSTOLIC BLOOD PRESSURE: 132 MMHG | DIASTOLIC BLOOD PRESSURE: 78 MMHG | HEART RATE: 67 BPM | RESPIRATION RATE: 16 BRPM | WEIGHT: 228 LBS | OXYGEN SATURATION: 97 %

## 2024-08-09 DIAGNOSIS — I25.10 CORONARY ARTERY DISEASE INVOLVING NATIVE CORONARY ARTERY OF NATIVE HEART WITHOUT ANGINA PECTORIS: ICD-10-CM

## 2024-08-09 DIAGNOSIS — R00.2 PALPITATIONS: Primary | ICD-10-CM

## 2024-08-09 PROCEDURE — 99204 OFFICE O/P NEW MOD 45 MIN: CPT | Performed by: INTERNAL MEDICINE

## 2024-08-09 PROCEDURE — 1123F ACP DISCUSS/DSCN MKR DOCD: CPT | Performed by: INTERNAL MEDICINE

## 2024-08-09 PROCEDURE — 93000 ELECTROCARDIOGRAM COMPLETE: CPT | Performed by: INTERNAL MEDICINE

## 2024-08-09 PROCEDURE — 3075F SYST BP GE 130 - 139MM HG: CPT | Performed by: INTERNAL MEDICINE

## 2024-08-09 PROCEDURE — 3078F DIAST BP <80 MM HG: CPT | Performed by: INTERNAL MEDICINE

## 2024-08-09 NOTE — PROGRESS NOTES
Active Problem List   Diagnosis    Asthma    Morbid obesity (HCC)    HA (headache)    Body mass index (BMI) 45.0-49.9, adult (HCC)    CAD (coronary artery disease)    BUNNY (obstructive sleep apnea)    Hypovitaminosis D    Arthritis    Essential hypertension    GERD (gastroesophageal reflux disease)    Type 2 diabetes mellitus with diabetic neuropathy, with long-term current use of insulin (HCC)    Biliary sepsis    Abnormal LFTs    Dilated bile duct    Epigastric pain    Fever    Senile osteoporosis    Obstructive jaundice    Bronchitis    Moderate persistent asthma with acute exacerbation    Severe persistent asthma with exacerbation    Uncontrolled type 2 diabetes mellitus with hyperglycemia (HCC)    Acute bronchitis due to human metapneumovirus    Acute pain of left knee       Past Surgical History:   Procedure Laterality Date    BARIATRIC SURGERY  2004    BREAST BIOPSY Left 2014    BREAST BIOPSY Left 12/10/2014    BREAST LUMPECTOMY Left     with left SNB  invasive ductal carcinoma    BREAST LUMPECTOMY Left     BREAST LUMPECTOMY Left      SECTION      x4    CHOLECYSTECTOMY  2007    GASTRIC BYPASS SURGERY      HYSTERECTOMY (CERVIX STATUS UNKNOWN)      HYSTERECTOMY, TOTAL ABDOMINAL (CERVIX REMOVED)  ?    KNEE ARTHROPLASTY Right     knee reconstruction    KNEE SURGERY      Right knee    LYMPH NODE BIOPSY Left 2014    ANDREW AND BSO (CERVIX REMOVED)      Dr. Nguyễn    TONSILLECTOMY  2002    TUBAL LIGATION      UPPER GASTROINTESTINAL ENDOSCOPY  2015    w/bx,polypectomy     UPPER GASTROINTESTINAL ENDOSCOPY N/A 2020    EGD DIAGNOSTIC ONLY performed by Sarkis Herrera MD at [facility] CENTER    UVULOPALATOPHARYGOPLASTY         Social History     Socioeconomic History    Marital status:    Tobacco Use    Smoking status: Never     Passive exposure: Never    Smokeless tobacco: Never   Vaping Use    Vaping Use: Never used   Substance and Sexual Activity

## 2024-08-20 ENCOUNTER — OFFICE VISIT (OUTPATIENT)
Dept: ENDOCRINOLOGY | Age: 69
End: 2024-08-20
Payer: COMMERCIAL

## 2024-08-20 VITALS
BODY MASS INDEX: 41.96 KG/M2 | HEART RATE: 67 BPM | HEIGHT: 62 IN | WEIGHT: 228 LBS | DIASTOLIC BLOOD PRESSURE: 80 MMHG | SYSTOLIC BLOOD PRESSURE: 127 MMHG | OXYGEN SATURATION: 98 %

## 2024-08-20 DIAGNOSIS — E11.65 TYPE 2 DIABETES MELLITUS WITH HYPERGLYCEMIA, WITH LONG-TERM CURRENT USE OF INSULIN (HCC): Primary | ICD-10-CM

## 2024-08-20 DIAGNOSIS — Z79.4 TYPE 2 DIABETES MELLITUS WITH HYPERGLYCEMIA, WITH LONG-TERM CURRENT USE OF INSULIN (HCC): Primary | ICD-10-CM

## 2024-08-20 LAB
CHP ED QC CHECK: NORMAL
GLUCOSE BLD-MCNC: 126 MG/DL
HBA1C MFR BLD: 5.9 %

## 2024-08-20 PROCEDURE — 3074F SYST BP LT 130 MM HG: CPT | Performed by: PHYSICIAN ASSISTANT

## 2024-08-20 PROCEDURE — 3044F HG A1C LEVEL LT 7.0%: CPT | Performed by: PHYSICIAN ASSISTANT

## 2024-08-20 PROCEDURE — 82962 GLUCOSE BLOOD TEST: CPT | Performed by: PHYSICIAN ASSISTANT

## 2024-08-20 PROCEDURE — 1123F ACP DISCUSS/DSCN MKR DOCD: CPT | Performed by: PHYSICIAN ASSISTANT

## 2024-08-20 PROCEDURE — 83036 HEMOGLOBIN GLYCOSYLATED A1C: CPT | Performed by: PHYSICIAN ASSISTANT

## 2024-08-20 PROCEDURE — 99214 OFFICE O/P EST MOD 30 MIN: CPT | Performed by: PHYSICIAN ASSISTANT

## 2024-08-20 PROCEDURE — 3079F DIAST BP 80-89 MM HG: CPT | Performed by: PHYSICIAN ASSISTANT

## 2024-08-20 RX ORDER — SEMAGLUTIDE 1.34 MG/ML
INJECTION, SOLUTION SUBCUTANEOUS
Qty: 1 ADJUSTABLE DOSE PRE-FILLED PEN SYRINGE | Refills: 0 | Status: SHIPPED | COMMUNITY
Start: 2024-08-20

## 2024-08-20 RX ORDER — SEMAGLUTIDE 1.34 MG/ML
0.25 INJECTION, SOLUTION SUBCUTANEOUS WEEKLY
Qty: 1 ADJUSTABLE DOSE PRE-FILLED PEN SYRINGE | Refills: 2 | Status: CANCELLED | OUTPATIENT
Start: 2024-08-20

## 2024-08-20 RX ORDER — METFORMIN HYDROCHLORIDE 500 MG/1
500 TABLET, EXTENDED RELEASE ORAL
Qty: 180 TABLET | Refills: 3 | Status: CANCELLED | OUTPATIENT
Start: 2024-08-20

## 2024-08-20 RX ORDER — DAPAGLIFLOZIN 10 MG/1
10 TABLET, FILM COATED ORAL EVERY MORNING
Qty: 90 TABLET | Refills: 1 | Status: SHIPPED | OUTPATIENT
Start: 2024-08-20

## 2024-08-20 RX ORDER — INSULIN LISPRO 200 [IU]/ML
INJECTION, SOLUTION SUBCUTANEOUS
Qty: 10 ADJUSTABLE DOSE PRE-FILLED PEN SYRINGE | Refills: 3 | Status: SHIPPED | OUTPATIENT
Start: 2024-08-20

## 2024-08-20 ASSESSMENT — ENCOUNTER SYMPTOMS
VOMITING: 0
COUGH: 0
SHORTNESS OF BREATH: 0
SORE THROAT: 0
NAUSEA: 0
SINUS PRESSURE: 0
WHEEZING: 0
DIARRHEA: 0
RHINORRHEA: 0
ABDOMINAL PAIN: 0

## 2024-08-20 NOTE — PATIENT INSTRUCTIONS
Endocrinology-    Check your blood sugars 4 times a day, before meals and at night  Document these numbers in a blood glucose log and bring them with you to your follow-up appointment.  If you are prescribed insulin, Do not take your mealtime insulin if your blood sugars less than 120   Call our office if you have blood sugars less than 80 or greater then 300 on two or more occasions  Call our office if you have any questions regarding your blood sugars or insulin dosing regiment  Signs of low blood sugar may include tremors, feeling shaky, sweating, dizziness, confusion and weakness. Check your blood sugar immediatly if you have any of these symptoms.     The plan as discussed at your appointment-   Humalog inject 3 times daily before meals- less than 150 no insulin, 150-180 4 units, 181-200 5 units, 201-220 6 units, 221-240 7 units, 241-260 8 units, 261-280 9 units, 281-300 10 units, 301 or higher 11 units  Metformin 500 XR mg twice daily   Farxiga 10 mg daily  Continue Ozempic 0.5 mg injected weekly    Freestyle Rasheed 2 being used  Repeat labs and follow up in 6 months

## 2024-08-20 NOTE — PROGRESS NOTES
Neurological:  Positive for tremors. Negative for dizziness and headaches.   Hematological:  Does not bruise/bleed easily.   Psychiatric/Behavioral:  Negative for dysphoric mood.        Objective:   Physical Exam  Vitals reviewed.   Constitutional:       Appearance: Normal appearance. She is well-developed. She is not ill-appearing.   HENT:      Head: Normocephalic and atraumatic.      Nose: No congestion.   Eyes:      Conjunctiva/sclera: Conjunctivae normal.   Neck:      Comments: No thyromegaly or palpable nodule   Cardiovascular:      Rate and Rhythm: Normal rate and regular rhythm.      Heart sounds: Normal heart sounds.   Pulmonary:      Effort: Pulmonary effort is normal.      Breath sounds: Normal breath sounds.   Abdominal:      General: Bowel sounds are normal.      Palpations: Abdomen is soft.   Musculoskeletal:         General: Normal range of motion.      Cervical back: Normal range of motion and neck supple.   Skin:     General: Skin is warm and dry.   Neurological:      General: No focal deficit present.      Mental Status: She is alert and oriented to person, place, and time.   Psychiatric:         Mood and Affect: Mood normal.

## 2024-09-09 ENCOUNTER — TELEPHONE (OUTPATIENT)
Dept: CARDIOLOGY CLINIC | Age: 69
End: 2024-09-09

## 2024-09-10 ENCOUNTER — HOSPITAL ENCOUNTER (OUTPATIENT)
Age: 69
Discharge: HOME | End: 2024-09-12
Attending: INTERNAL MEDICINE
Payer: COMMERCIAL

## 2024-09-10 VITALS
BODY MASS INDEX: 42.33 KG/M2 | WEIGHT: 230 LBS | DIASTOLIC BLOOD PRESSURE: 80 MMHG | HEIGHT: 62 IN | SYSTOLIC BLOOD PRESSURE: 124 MMHG

## 2024-09-10 DIAGNOSIS — R00.2 PALPITATIONS: ICD-10-CM

## 2024-09-10 PROCEDURE — 93306 TTE W/DOPPLER COMPLETE: CPT

## 2024-09-10 PROCEDURE — 93306 TTE W/DOPPLER COMPLETE: CPT | Performed by: INTERNAL MEDICINE

## 2024-09-11 LAB
ECHO AV AREA PEAK VELOCITY: 2.9 CM2
ECHO AV AREA VTI: 3 CM2
ECHO AV AREA/BSA PEAK VELOCITY: 1.4 CM2/M2
ECHO AV AREA/BSA VTI: 1.5 CM2/M2
ECHO AV CUSP MM: 1.7 CM
ECHO AV MEAN GRADIENT: 3 MMHG
ECHO AV MEAN VELOCITY: 0.8 M/S
ECHO AV PEAK GRADIENT: 6 MMHG
ECHO AV PEAK VELOCITY: 1.2 M/S
ECHO AV VELOCITY RATIO: 0.92
ECHO AV VTI: 27.2 CM
ECHO BSA: 2.14 M2
ECHO EST RA PRESSURE: 3 MMHG
ECHO LA DIAMETER INDEX: 1.53 CM/M2
ECHO LA DIAMETER: 3.1 CM
ECHO LA VOL A-L A2C: 36 ML (ref 22–52)
ECHO LA VOL A-L A4C: 42 ML (ref 22–52)
ECHO LA VOL MOD A2C: 33 ML (ref 22–52)
ECHO LA VOL MOD A4C: 39 ML (ref 22–52)
ECHO LA VOLUME AREA LENGTH: 40 ML
ECHO LA VOLUME INDEX A-L A2C: 18 ML/M2 (ref 16–34)
ECHO LA VOLUME INDEX A-L A4C: 21 ML/M2 (ref 16–34)
ECHO LA VOLUME INDEX AREA LENGTH: 20 ML/M2 (ref 16–34)
ECHO LA VOLUME INDEX MOD A2C: 16 ML/M2 (ref 16–34)
ECHO LA VOLUME INDEX MOD A4C: 19 ML/M2 (ref 16–34)
ECHO LV E' LATERAL VELOCITY: 7 CM/S
ECHO LV E' SEPTAL VELOCITY: 7 CM/S
ECHO LV EDV A2C: 94 ML
ECHO LV EDV A4C: 109 ML
ECHO LV EDV BP: 105 ML (ref 56–104)
ECHO LV EDV INDEX A4C: 54 ML/M2
ECHO LV EDV INDEX BP: 52 ML/M2
ECHO LV EDV NDEX A2C: 46 ML/M2
ECHO LV EJECTION FRACTION A2C: 56 %
ECHO LV EJECTION FRACTION A4C: 56 %
ECHO LV EJECTION FRACTION BIPLANE: 56 % (ref 55–100)
ECHO LV ESV A2C: 42 ML
ECHO LV ESV A4C: 48 ML
ECHO LV ESV BP: 46 ML (ref 19–49)
ECHO LV ESV INDEX A2C: 21 ML/M2
ECHO LV ESV INDEX A4C: 24 ML/M2
ECHO LV ESV INDEX BP: 23 ML/M2
ECHO LV FRACTIONAL SHORTENING: 37 % (ref 28–44)
ECHO LV INTERNAL DIMENSION DIASTOLE INDEX: 2.51 CM/M2
ECHO LV INTERNAL DIMENSION DIASTOLIC: 5.1 CM (ref 3.9–5.3)
ECHO LV INTERNAL DIMENSION SYSTOLIC INDEX: 1.58 CM/M2
ECHO LV INTERNAL DIMENSION SYSTOLIC: 3.2 CM
ECHO LV IVSD: 1.3 CM (ref 0.6–0.9)
ECHO LV IVSS: 1.7 CM
ECHO LV MASS 2D: 241.2 G (ref 67–162)
ECHO LV MASS INDEX 2D: 118.8 G/M2 (ref 43–95)
ECHO LV POSTERIOR WALL DIASTOLIC: 1.1 CM (ref 0.6–0.9)
ECHO LV POSTERIOR WALL SYSTOLIC: 1.7 CM
ECHO LV RELATIVE WALL THICKNESS RATIO: 0.43
ECHO LVOT AREA: 3.1 CM2
ECHO LVOT AV VTI INDEX: 0.99
ECHO LVOT DIAM: 2 CM
ECHO LVOT MEAN GRADIENT: 2 MMHG
ECHO LVOT PEAK GRADIENT: 5 MMHG
ECHO LVOT PEAK VELOCITY: 1.1 M/S
ECHO LVOT STROKE VOLUME INDEX: 41.5 ML/M2
ECHO LVOT SV: 84.2 ML
ECHO LVOT VTI: 26.8 CM
ECHO MV A VELOCITY: 0.73 M/S
ECHO MV AREA VTI: 3 CM2
ECHO MV E DECELERATION TIME (DT): 146.5 MS
ECHO MV E VELOCITY: 0.64 M/S
ECHO MV E/A RATIO: 0.88
ECHO MV E/E' LATERAL: 9.14
ECHO MV E/E' RATIO (AVERAGED): 9.14
ECHO MV E/E' SEPTAL: 9.14
ECHO MV LVOT VTI INDEX: 1.06
ECHO MV MAX VELOCITY: 0.9 M/S
ECHO MV MEAN GRADIENT: 1 MMHG
ECHO MV MEAN VELOCITY: 0.5 M/S
ECHO MV PEAK GRADIENT: 3 MMHG
ECHO MV VTI: 28.4 CM
ECHO PV MAX VELOCITY: 0.8 M/S
ECHO PV PEAK GRADIENT: 2 MMHG
ECHO RIGHT VENTRICULAR SYSTOLIC PRESSURE (RVSP): 21 MMHG
ECHO RV INTERNAL DIMENSION: 2.7 CM
ECHO RV TAPSE: 2.1 CM (ref 1.7–?)
ECHO TV REGURGITANT MAX VELOCITY: 2.12 M/S
ECHO TV REGURGITANT PEAK GRADIENT: 18 MMHG

## 2024-09-28 DIAGNOSIS — E11.65 TYPE 2 DIABETES MELLITUS WITH HYPERGLYCEMIA, WITH LONG-TERM CURRENT USE OF INSULIN (HCC): ICD-10-CM

## 2024-09-28 DIAGNOSIS — Z79.4 TYPE 2 DIABETES MELLITUS WITH HYPERGLYCEMIA, WITH LONG-TERM CURRENT USE OF INSULIN (HCC): ICD-10-CM

## 2024-09-28 DIAGNOSIS — E11.40 TYPE 2 DIABETES MELLITUS WITH DIABETIC NEUROPATHY, WITH LONG-TERM CURRENT USE OF INSULIN (HCC): ICD-10-CM

## 2024-09-28 DIAGNOSIS — N89.8 VAGINAL DISCHARGE: ICD-10-CM

## 2024-09-28 DIAGNOSIS — Z79.4 TYPE 2 DIABETES MELLITUS WITH DIABETIC NEUROPATHY, WITH LONG-TERM CURRENT USE OF INSULIN (HCC): ICD-10-CM

## 2024-09-28 LAB
ALBUMIN SERPL-MCNC: 4 G/DL (ref 3.5–4.6)
ALP SERPL-CCNC: 119 U/L (ref 40–130)
ALT SERPL-CCNC: 11 U/L (ref 0–33)
ANION GAP SERPL CALCULATED.3IONS-SCNC: 9 MEQ/L (ref 9–15)
AST SERPL-CCNC: 15 U/L (ref 0–35)
BASOPHILS # BLD: 0 K/UL (ref 0–0.2)
BASOPHILS NFR BLD: 0.9 %
BILIRUB SERPL-MCNC: 0.6 MG/DL (ref 0.2–0.7)
BILIRUB UR QL STRIP: NEGATIVE
BUN SERPL-MCNC: 11 MG/DL (ref 8–23)
CALCIUM SERPL-MCNC: 8.9 MG/DL (ref 8.5–9.9)
CHLORIDE SERPL-SCNC: 103 MEQ/L (ref 95–107)
CHOLEST SERPL-MCNC: 183 MG/DL (ref 0–199)
CLARITY UR: CLEAR
CO2 SERPL-SCNC: 30 MEQ/L (ref 20–31)
COLOR UR: YELLOW
CREAT SERPL-MCNC: 0.65 MG/DL (ref 0.5–0.9)
CREAT UR-MCNC: 154.4 MG/DL
EOSINOPHIL # BLD: 0.3 K/UL (ref 0–0.7)
EOSINOPHIL NFR BLD: 5.6 %
ERYTHROCYTE [DISTWIDTH] IN BLOOD BY AUTOMATED COUNT: 12.9 % (ref 11.5–14.5)
GLOBULIN SER CALC-MCNC: 2.4 G/DL (ref 2.3–3.5)
GLUCOSE SERPL-MCNC: 129 MG/DL (ref 70–99)
GLUCOSE UR STRIP-MCNC: NEGATIVE MG/DL
HCT VFR BLD AUTO: 38.8 % (ref 37–47)
HDLC SERPL-MCNC: 73 MG/DL (ref 40–59)
HGB BLD-MCNC: 13.3 G/DL (ref 12–16)
HGB UR QL STRIP: NEGATIVE
KETONES UR STRIP-MCNC: NEGATIVE MG/DL
LDL CHOLESTEROL: 91 MG/DL (ref 0–129)
LEUKOCYTE ESTERASE UR QL STRIP: NEGATIVE
LYMPHOCYTES # BLD: 1.2 K/UL (ref 1–4.8)
LYMPHOCYTES NFR BLD: 27.3 %
MCH RBC QN AUTO: 30 PG (ref 27–31.3)
MCHC RBC AUTO-ENTMCNC: 34.3 % (ref 33–37)
MCV RBC AUTO: 87.6 FL (ref 79.4–94.8)
MICROALBUMIN UR-MCNC: <1.2 MG/DL
MICROALBUMIN/CREAT UR-RTO: NORMAL MG/G (ref 0–30)
MONOCYTES # BLD: 0.3 K/UL (ref 0.2–0.8)
MONOCYTES NFR BLD: 7.3 %
NEUTROPHILS # BLD: 2.6 K/UL (ref 1.4–6.5)
NEUTS SEG NFR BLD: 58.5 %
NITRITE UR QL STRIP: NEGATIVE
PH UR STRIP: 7 [PH] (ref 5–9)
PLATELET # BLD AUTO: 217 K/UL (ref 130–400)
POTASSIUM SERPL-SCNC: 4.1 MEQ/L (ref 3.4–4.9)
PROT SERPL-MCNC: 6.4 G/DL (ref 6.3–8)
PROT UR STRIP-MCNC: NEGATIVE MG/DL
RBC # BLD AUTO: 4.43 M/UL (ref 4.2–5.4)
SODIUM SERPL-SCNC: 142 MEQ/L (ref 135–144)
SP GR UR STRIP: 1.02 (ref 1–1.03)
TRIGLYCERIDE, FASTING: 97 MG/DL (ref 0–150)
URINE REFLEX TO CULTURE: NORMAL
UROBILINOGEN UR STRIP-ACNC: 1 E.U./DL
WBC # BLD AUTO: 4.5 K/UL (ref 4.8–10.8)

## 2024-09-29 LAB
ESTIMATED AVERAGE GLUCOSE: 120 MG/DL
HBA1C MFR BLD: 5.8 % (ref 4–6)

## 2024-09-30 ENCOUNTER — OFFICE VISIT (OUTPATIENT)
Dept: FAMILY MEDICINE CLINIC | Age: 69
End: 2024-09-30
Payer: COMMERCIAL

## 2024-09-30 VITALS
RESPIRATION RATE: 14 BRPM | HEIGHT: 62 IN | OXYGEN SATURATION: 98 % | WEIGHT: 226 LBS | BODY MASS INDEX: 41.59 KG/M2 | HEART RATE: 80 BPM | SYSTOLIC BLOOD PRESSURE: 160 MMHG | DIASTOLIC BLOOD PRESSURE: 80 MMHG

## 2024-09-30 DIAGNOSIS — Z79.4 TYPE 2 DIABETES MELLITUS WITH DIABETIC NEUROPATHY, WITH LONG-TERM CURRENT USE OF INSULIN (HCC): ICD-10-CM

## 2024-09-30 DIAGNOSIS — G47.33 OSA (OBSTRUCTIVE SLEEP APNEA): ICD-10-CM

## 2024-09-30 DIAGNOSIS — E11.40 TYPE 2 DIABETES MELLITUS WITH DIABETIC NEUROPATHY, WITH LONG-TERM CURRENT USE OF INSULIN (HCC): ICD-10-CM

## 2024-09-30 DIAGNOSIS — K21.9 GASTROESOPHAGEAL REFLUX DISEASE WITHOUT ESOPHAGITIS: ICD-10-CM

## 2024-09-30 DIAGNOSIS — I10 ESSENTIAL HYPERTENSION: ICD-10-CM

## 2024-09-30 DIAGNOSIS — S39.012A BACK STRAIN, INITIAL ENCOUNTER: Chronic | ICD-10-CM

## 2024-09-30 PROCEDURE — 3079F DIAST BP 80-89 MM HG: CPT | Performed by: INTERNAL MEDICINE

## 2024-09-30 PROCEDURE — 3044F HG A1C LEVEL LT 7.0%: CPT | Performed by: INTERNAL MEDICINE

## 2024-09-30 PROCEDURE — 1123F ACP DISCUSS/DSCN MKR DOCD: CPT | Performed by: INTERNAL MEDICINE

## 2024-09-30 PROCEDURE — 3077F SYST BP >= 140 MM HG: CPT | Performed by: INTERNAL MEDICINE

## 2024-09-30 PROCEDURE — 99214 OFFICE O/P EST MOD 30 MIN: CPT | Performed by: INTERNAL MEDICINE

## 2024-09-30 RX ORDER — SEMAGLUTIDE 1.34 MG/ML
0.25 INJECTION, SOLUTION SUBCUTANEOUS WEEKLY
Qty: 1 ADJUSTABLE DOSE PRE-FILLED PEN SYRINGE | Refills: 0 | Status: SHIPPED | COMMUNITY
Start: 2024-09-30

## 2024-09-30 RX ORDER — METAXALONE 800 MG/1
800 TABLET ORAL 3 TIMES DAILY
Qty: 30 TABLET | Refills: 0 | Status: SHIPPED | OUTPATIENT
Start: 2024-09-30 | End: 2024-10-10

## 2024-09-30 ASSESSMENT — ENCOUNTER SYMPTOMS
EYE PAIN: 0
EYE REDNESS: 0
CONSTIPATION: 0
WHEEZING: 0
SINUS PAIN: 0
COUGH: 0
SORE THROAT: 0
CHEST TIGHTNESS: 0
ABDOMINAL DISTENTION: 0
SHORTNESS OF BREATH: 0
EYE ITCHING: 0
APNEA: 0
SINUS PRESSURE: 0
RHINORRHEA: 0
BLOOD IN STOOL: 0
VOMITING: 0
COLOR CHANGE: 0
TROUBLE SWALLOWING: 0
PHOTOPHOBIA: 0
ABDOMINAL PAIN: 0
EYE DISCHARGE: 0
DIARRHEA: 0
VOICE CHANGE: 0
FACIAL SWELLING: 0
BACK PAIN: 0
RECTAL PAIN: 0
NAUSEA: 0

## 2024-09-30 NOTE — PROGRESS NOTES
Subjective:      Patient ID: Maryellen Avila is a 69 y.o. female Established patient, here for evaluation of the following chief complaint(s):  Chief Complaint   Patient presents with    Diabetes    Hypertension    Lower Back Pain     Pt states the left side of her back has been hurting x 5 days no known injury       HPI  67 year old female presenting with lumbar back pain.      Lumbar back pain: The patient is experiencing achy nonradiating lumbar back pain that is worse with movement.  She is not experiencing weakness of the lower extremity numbness or tingling of her lower extremity.      Essential hypertension: compliant with norvasc 5 mg daily.        Type 2 diabetes: The patient's A1c is 5.8  9/28/2024        Gastroesophageal reflux disease: Compliant with Protonix 40 mg orally daily          Obesity: The patient's body mass index is 41.70        Hip pain : present for 2-3 weeks. Intermittent catching sensation , daily.        Left lower extremity swelling and pain: The patient reports left lower extremity achy persistent nonradiating pain has been present for several days.        BUNNY : compliant with CPAP OF 8        At present he denies polyuria,  Polydipsia, constitutional, sinus, visual, cardiopulmonary, urologic, gastrointestinal, immunologic/hematologic, additional musculoskeletal, neurologic,dermatologic, or psychiatric complaints.        Current Outpatient Medications on File Prior to Visit   Medication Sig Dispense Refill    dapagliflozin (FARXIGA) 10 MG tablet Take 1 tablet by mouth every morning 90 tablet 1    Continuous Glucose Sensor (FREESTYLE GERTRUDE 2 SENSOR) MISC USE 1 SENSOR EVERY 14 DAYS 6 each 3    insulin lispro (HUMALOG KWIKPEN) 200 UNIT/ML SOPN pen inject 3 times daily before meals- 120-140 2 units, 141-160 3 units, 161-180 4 units, 181-200 5 units, 201-220 6 units, 221-240 7 units, 241-260 8 units, 261-280 9 units, 281-300 10 units, 301 or higher 11 units. 10 Adjustable Dose Pre-filled

## 2024-10-18 RX ORDER — AMLODIPINE BESYLATE 5 MG/1
5 TABLET ORAL DAILY
Qty: 90 TABLET | Refills: 1 | Status: SHIPPED | OUTPATIENT
Start: 2024-10-18

## 2024-10-18 NOTE — TELEPHONE ENCOUNTER
Please approve or deny request. Thank you!    Rx requested:  Requested Prescriptions     Pending Prescriptions Disp Refills    amLODIPine (NORVASC) 5 MG tablet [Pharmacy Med Name: AMLODIPINE BESYLATE 5 MG TAB] 90 tablet 1     Sig: TAKE 1 TABLET BY MOUTH EVERY DAY         Last Office Visit:   9/30/2024      Next Visit Date:  Future Appointments   Date Time Provider Department Center   11/18/2024  3:30 PM Eduar Dacosta MD [place] [facility]   1/7/2025  4:00 PM Stanley Ernst MD [facility] DE[facility]   2/6/2025  3:45 PM Ruben Espinoza DO [facility]   2/24/2025  4:15 PM Maki, Edwardo S, PA [place] [facility]

## 2024-11-14 NOTE — DISCHARGE SUMMARY
Discharge Summary    Date: 8/8/2021  Patient Name: Abbey Olivo YOB: 1955 Age: 77 y.o. Admit Date: 8/7/2021  Discharge Date: 8/8/2021  Discharge Condition: Stable    Admission Diagnosis  Acute hepatitis (B17.9); Septicemia ([facility] [address] (A41.9); Pain of upper abdomen (R10.10); Sepsis ([address] (A41.9); Acute pancreatitis, unspecified complication status, unspecified pancreatitis type (K85.90)     Discharge Diagnosis  Active Problems: Biliary sepsis Obstructive jaundiceResolved Problems: * No resolved hospital problems. [facility] Stay  Narrative of Hospital Course:  Patient will be transferred to [facility] to have a ERCP done. There is no GI here today for ERCP , patient is on IV antibiotic for sepsis secondary to possibly ascending cholangitis. Consultants:  IP CONSULT TO INFECTIOUS DISEASESIP CONSULT TO GI    Surgeries/procedures Performed:       Treatments:           Discharge Plan/Disposition:  Home    Hospital/Incidental Findings Requiring Follow Up:    Patient Instructions:    Diet:    Activity:  For number of days (if applicable): Other Instructions:    Provider Follow-Up:   No follow-ups on file.      Significant Diagnostic Studies:    Recent Labs:  Admission on 08/07/2021Sodium                                     Date: 08/07/2021Value: 138         Ref range: 135 - 144 mEq/L    Status: FinalPotassium                                     Date: 08/07/2021Value: 3.5         Ref range: 3.4 - 4.9 mEq/L    Status: FinalChloride                                      Date: 08/07/2021Value: 99          Ref range: 95 - 107 mEq/L     Status: FinalCO2                                           Date: 08/07/2021Value: 23          Ref range: 20 - 31 mEq/L      Status: FinalAnion Gap                                     Date: 08/07/2021Value: 16*         Ref range: 9 - 15 mEq/L       Status: FinalGlucose                                       Date: 08/07/2021Value: 285*        Ref range: 70 - 99 mg/dL      Status: Marvin Kaur    YOB: 1938     Date of Service:  11/14/2024     Chief Complaint   Patient presents with    PE    Results     ECHO        HPI patient has been accompanied by his son to office today.  Recent hospitalization in July for massive PE-presented with syncope and was noted to have saddle embolus with RV strain, status post mechanical thrombectomy on 7/15.  Postoperative complication with left rectus femoris related to the fall.     Patient currently denies any significant shortness of breath or chest pain.  No leg edema.  Improved mobility, uses cane, denies left hip/leg pain.  Continues on anticoagulation-Eliquis has now been switched to Coumadin due to cost.    No Known Allergies  Outpatient Medications Marked as Taking for the 11/14/24 encounter (Office Visit) with Rocky Simpson MD   Medication Sig Dispense Refill    furosemide (LASIX) 20 MG tablet Take 1 tablet by mouth daily      rosuvastatin (CRESTOR) 10 MG tablet Take 1 tablet by mouth daily 90 tablet 1    Blood Glucose Monitoring Suppl (FREESTYLE LITE) LISA Test twice daily 1 each 0    warfarin (COUMADIN) 5 MG tablet Review INR prior to administration.  Hazardous med- See facility policy for handling/disposal 90 tablet 1    oxyCODONE 5 MG capsule Take 1 capsule by mouth every 4 hours as needed for Pain.      midodrine (PROAMATINE) 10 MG tablet Take 1 tablet by mouth 3 times daily as needed (for SBP < 90) 90 tablet 3    candesartan-hydroCHLOROthiazide (ATACAND HCT) 32-12.5 MG per tablet Take 1 tablet by mouth daily 90 tablet 3    omeprazole (PRILOSEC) 20 MG delayed release capsule TAKE 1 CAPSULE BY MOUTH DAILY 90 capsule 3    FreeStyle Lancets MISC Test twice daily 100 each 5    aspirin 81 MG tablet Take 1 tablet by mouth daily      Multiple Vitamins-Minerals (CENTRUM SILVER ADULT 50+ PO) Take 1 tablet by mouth daily      Cholecalciferol (VITAMIN D3) 1000 UNITS CAPS Take 1 capsule by mouth daily         Immunization  J Carlos Kwonher                                           Date: 08/07/2021Value: 10          Ref range: 8 - 23 mg/dL       Status: FinalCREATININE                                    Date: 08/07/2021Value: 0.47*       Ref range: 0.50 - 0.90 mg/dL  Status: FinalGFR Non-                      Date: 08/07/2021Value: >60.0       Ref range: >60                Status: Final              Comment: >60 mL/min/1.73m2 EGFR, calc. for ages 25 and older using theMDRD formula (not corrected for weight), is valid for stablerenal function. GFR                           Date: 08/07/2021Value: >60.0       Ref range: >60                Status: Final              Comment: >60 mL/min/1.73m2 EGFR, calc. for ages 25 and older using theMDRD formula (not corrected for weight), is valid for stablerenal function. Calcium                                       Date: 08/07/2021Value: 8.5         Ref range: 8.5 - 9.9 mg/dL    Status: FinalTotal Protein                                 Date: 08/07/2021Value: 7.3         Ref range: 6.3 - 8.0 g/dL     Status: FinalAlbumin                                       Date: 08/07/2021Value: 4.1         Ref range: 3.5 - 4.6 g/dL     Status: FinalTotal Bilirubin                               Date: 08/07/2021Value: 3.1*        Ref range: 0.2 - 0.7 mg/dL    Status: FinalAlkaline Phosphatase                          Date: 08/07/2021Value: 277*        Ref range: 40 - 130 U/L       Status: FinalALT                                           Date: 08/07/2021Value: 194*        Ref range: 0 - 33 U/L         Status: FinalAST                                           Date: 08/07/2021Value: 427*        Ref range: 0 - 35 U/L         Status: FinalGlobulin                                      Date: 08/07/2021Value: 3.2         Ref range: 2.3 - 3.5 g/dL     Status: [address] [facility]                                           Date: 08/07/2021Value: 9.5         Ref range: 4.8 - 10.8 K/uL    Status: FinalRB Date: 08/07/2021Value: 4.99        Ref range: 4.20 - 5.40 M/uL   Status: FinalHemoglobin                                    Date: 08/07/2021Value: 14.3        Ref range: 12.0 - 16.0 g/dL   Status: FinalHematocrit                                    Date: 08/07/2021Value: 42.7        Ref range: 37.0 - 47.0 %      Status: FinalMCV                                           Date: 08/07/2021Value: 85.7        Ref range: 82.0 - 100.0 fL    Status: [address]                                           Date: 08/07/2021Value: 28.7        Ref range: 27.0 - 31.3 pg     Status: [address]                                          Date: 08/07/2021Value: 33.6        Ref range: 33.0 - 37.0 %      Status: FinalRDW                                           Date: 08/07/2021Value: 13.8        Ref range: 11.5 - 14.5 %      Status: FinalPlatelets                                     Date: 08/07/2021Value: 225         Ref range: 130 - 400 K/uL     Status: FinalPLATELET SLIDE REVIEW                         Date: 08/07/2021Value: Normal        Status: FinalNeutrophils %                                 Date: 08/07/2021Value: 93.4        Ref range: %                  Status: FinalLymphocytes %                                 Date: 08/07/2021Value: 3.3         Ref range: %                  Status: FinalMonocytes %                                   Date: 08/07/2021Value: 2.9         Ref range: %                  Status: FinalEosinophils %                                 Date: 08/07/2021Value: 0.3         Ref range: %                  Status: FinalBasophils %                                   Date: 08/07/2021Value: 0.1         Ref range: %                  Status: FinalNeutrophils Absolute                          Date: 08/07/2021Value: 8.9*        Ref range: 1.4 - 6.5 K/uL     Status: FinalLymphocytes Absolute                          Date: 08/07/2021Value: 0.3*        Ref range: 1.0 - 4.8 K/uL     Status: Date: 08/07/2021Value: Negative    Ref range: Negative mg/dL     Status: FinalUrobilinogen, Urine                           Date: 08/07/2021Value: 1.0         Ref range: <2.0 E.U./dL       Status: FinalNitrite, Urine                                Date: 08/07/2021Value: Negative    Ref range: Negative           Status: FinalLeukocyte Esterase, Urine                     Date: 08/07/2021Value: Negative    Ref range: Negative           Status: FinalUrine Reflex to Culture                       Date: 08/07/2021Value: Not Indicated                     Status: [id number], NAAT                              Date: 08/07/2021Value: Not Detected                   Ref range: Not Detected       Status: Final              Comment: Rapid NAAT:   Negative results should be treated as presumptive and,if inconsistent with clinical signs and symptoms or necessary forpatient management, should be tested with an alternative molecularassay. Negative results do not preclude SARS-CoV-2 infection andshould not be used as the sole basis for patient management decisions. This test has been authorized by the FDA under an Emergency UseAuthorization (EUA) for use by authorized laboratories. Fact sheet for [employer] sheet for Patients: [employer].dk: Isothermal Nucleic Acid AmplificationProcalcitonin                                 Date: 08/07/2021Value: 1.93*       Ref range: 0.00 - 0.15 ng/mL  Status: Final              Comment: Suspected Sepsis:Low likelihood of sepsis  <.50 ng/mLIncreased likelihood of sepsis 0.50-2.00 ng/mLAntibiotics encouragedHigh risk of sepsis/shock   >2.00 ng/mLAntibiotics strongly encouragedSuspected Lower Respiratory Tract Infections:Low likelihood of bacterial infection  <0.24 ng/mLIncreased likelihood of bacterial infection >0.24 ng/mLAntibiotics encouragedWith successful antibiotic therapy, PCT levels should decreaserapidly. (Half-life of 24 to 36 hours. )Procalcitonin values from samples collected within the first6 hours of systemic infection may still be low. Retesting may be indicated. Values from day 1 and day 4 can be entered into the Change inProcalcitonin Calculator to determine the patient'sMortality Risk http://www.mayers.info/. com)In healthy neonates, plasma Procalcitonin (PCT) concentrationsincrease gradually after birth, reaching peak values at about24 hours of age then decrease to normal values below 0.5                        ng/mLby 48-72 hours of age. Lactic Acid                                   Date: 08/07/2021Value: 3.4*        Ref range: 0.5 - 2.2 mmol/L   Status: FinalLactic Acid, Sepsis                           Date: 08/07/2021Value: 2.7*        Ref range: 0.5 - 1.9 mmol/L   Status: FinalLactic Acid, Sepsis                           Date: 08/07/2021Value: 3.0*        Ref range: 0.5 - 1.9 mmol/L   Status: FinalHep A IgM                                     Date: 08/07/2021Value: Non-reactive                     Status: FinalHep B Core Ab, IgM                            Date: 08/07/2021Value: Non-reactive                     Status: FinalHep C Ab Interp                               Date: 08/07/2021Value: Non-reactive                     Status: FinalLipase                                        Date: 08/07/2021Value: 433*        Ref range: 12 - 95 U/L        Status: FinalHemoglobin A1C                                Date: 08/07/2021Value: 7.4*        Ref range: 4.8 - 5.9 %        Status: FinalGI Bacterial Pathogens By PCR                 Date: 08/07/2021Value:               Status: Final                 Value:DNA of organisms below NOT DETECTEDThe following organisms have been tested using nucleic acidtesting technology. Campylobacter speciesSalmonella speciesShigella speciesVibrio speciesYersinia enterocoliticaShigatoxin 1 and 2 (STEC)NorovirusRotavirusNormal Range: Not DetectedPOC Glucose Date: 08/07/2021Value: 243*        Ref range: 60 - 115 mg/dl     Status: FinalPerformed on                                  Date: 08/07/2021Value: ACCU-CHEK     Status: [address] Glucose                                   Date: 08/07/2021Value: 220*        Ref range: 60 - 115 mg/dl     Status: FinalPerformed on                                  Date: 08/07/2021Value: ACCU-CHEK     Status: FinalSodium                                        Date: 08/08/2021Value: 138         Ref range: 135 - 144 mEq/L    Status: FinalPotassium reflex Magnesium                    Date: 08/08/2021Value: 2.9*        Ref range: 3.4 - 4.9 mEq/L    Status: FinalChloride                                      Date: 08/08/2021Value: 105         Ref range: 95 - 107 mEq/L     Status: FinalCO2                                           Date: 08/08/2021Value: 23          Ref range: 20 - 31 mEq/L      Status: FinalAnion Gap                                     Date: 08/08/2021Value: 10          Ref range: 9 - 15 mEq/L       Status: FinalGlucose                                       Date: 08/08/2021Value: 139*        Ref range: 70 - 99 mg/dL      Status: FinalBUN                                           Date: 08/08/2021Value: 8           Ref range: 8 - 23 mg/dL       Status: FinalCREATININE                                    Date: 08/08/2021Value: 0.51        Ref range: 0.50 - 0.90 mg/dL  Status: Final              Comment: Specimen icterus has exceeded the interference as defined byRoche. Result may be affected. GFR Non-                      Date: 08/08/2021Value: >60.0       Ref range: >60                Status: Final              Comment: >60 mL/min/1.73m2 EGFR, calc. for ages 25 and older using theMDRD formula (not corrected for weight), is valid for stablerenal function. GFR                           Date: 08/08/2021Value: >60.0       Ref range: >60                Status: Final this facility use dose modulation, iterative reconstruction, and/or weight based dosing when appropriate to reduce radiation dose to as low as reasonably achievable. XR CHEST PORTABLEResult Date: 8/7/2021No radiographic evidence of acute intrathoracic process. Pending Labs   Order Current Status  Clostridium Difficile Toxin/Antigen In process  Magnesium In process  Culture, Blood 1 Preliminary result  Culture, Blood 2 Preliminary result  HEPATITIS PANEL, ACUTE Preliminary result      Discharge Medications    Current Discharge Medication List    Current Discharge Medication List    Current Discharge Medication ListCONTINUE these medications which have NOT CHANGEDnystatin (MYCOSTATIN) 577827 UNIT/GM powderApply 3 times daily. Qty: 45 g Refills: 5Associated Diagnoses:Yeast dermatitispantoprazole (PROTONIX) 40 MG tabletTake 1 tablet by mouth once dailyQty: 30 tablet Refills: 5Associated Diagnoses:Personal history of gastric ulcer; Gastroesophageal reflux disease without esophagitisamLODIPine (NORVASC) 10 MG tabletTake 1 tablet by mouth once dailyQty: 30 tablet Refills: 5Associated Diagnoses:Essential hypertensionvitamin D (ERGOCALCIFEROL) 1.25 MG (02475 UT) CAPS capsuleTake 1 capsule by mouth once a weekQty: 12 capsule Refills: 1glyBURIDE (DIABETA) 2.5 MG tabletTake 1 tablet by mouth daily (with breakfast)Qty: 30 tablet Refills: 5cholestyramine (QUESTRAN) 4 g packetTake 1 packet by mouth 2 times dailyQty: 180 packet Refills: 5Associated Diagnoses:Irritable bowel syndrome with diarrheadicyclomine (BENTYL) 10 MG capsuleTake 1 capsule by mouth 2 times daily (before meals)Qty: 120 capsule Refills: 3Associated Diagnoses:Epigastric painmetFORMIN (GLUCOPHAGE) 500 MG tabletTake 2 tablets by mouth 2 times daily (with meals)Qty: 360 tablet Refills: 3lisinopril-hydrochlorothiazide (PRINZIDE;ZESTORETIC) 20-12.5 MG per tabletTake 1 tablet by mouth dailyQty: 90 tablet Refills: 3Associated Diagnoses:Essential hypertensionRespiratory Therapy Supplies (FULL KIT NEBULIZER SET) MISC1 Units by Does not apply route 4 times dailyQty: 1 each Refills: 1Associated Diagnoses: Moderate persistent asthma with acute exacerbationalbuterol sulfate  (90 Base) MCG/ACT inhalerInhale 2 puffs into the lungs 4 times daily as needed for HEART OF [facility]: 3 Inhaler Refills: 3Associated Diagnoses: Moderate persistent asthma with acute exacerbationalbuterol (PROVENTIL) (2.5 MG/3ML) 0.083% nebulizer solutionTake 3 mLs by nebulization every 6 hours as needed for HEART OF THE [facility]: 300 vial Refills: 3Associated Diagnoses: Moderate persistent asthma with acute exacerbationCPAP Machine MISC    Current Discharge Medication ListSTOP taking these medicationsLoperamide HCl (IMODIUM PO)Comments:Reason for Stopping:    Time Spent on Discharge:E] minutes were spent in patient examination, evaluation, counseling as well as medication reconciliation, prescriptions for required medications, discharge plan, and follow up.     Electronically signed by Emilie Layne MD on 8/8/21 at 10:31 AM EDT   overtime on dc summary was 45 min

## 2025-01-06 ASSESSMENT — PATIENT HEALTH QUESTIONNAIRE - PHQ9
SUM OF ALL RESPONSES TO PHQ QUESTIONS 1-9: 0
1. LITTLE INTEREST OR PLEASURE IN DOING THINGS: NOT AT ALL
2. FEELING DOWN, DEPRESSED OR HOPELESS: NOT AT ALL
2. FEELING DOWN, DEPRESSED OR HOPELESS: NOT AT ALL
1. LITTLE INTEREST OR PLEASURE IN DOING THINGS: NOT AT ALL
SUM OF ALL RESPONSES TO PHQ QUESTIONS 1-9: 0
SUM OF ALL RESPONSES TO PHQ9 QUESTIONS 1 & 2: 0
SUM OF ALL RESPONSES TO PHQ QUESTIONS 1-9: 0
SUM OF ALL RESPONSES TO PHQ9 QUESTIONS 1 & 2: 0
SUM OF ALL RESPONSES TO PHQ QUESTIONS 1-9: 0

## 2025-01-06 NOTE — PROGRESS NOTES
Maryellen Avila (:  1955) is a 69 y.o. female, Established patient, here for evaluation of the following chief complaint(s):  Hypertension and Diabetes          Subjective   History of Present Illness  The patient presents for evaluation of back pain, light sensitivity, diabetes mellitus, weight gain, and knee pain.    She continues to experience back pain, which has been ongoing since October. The pain is particularly noticeable when lifting objects or carrying bags in and out of the car. She reports that the pain is now localized to the left side, a change from the previous right-sided pain. She has not been engaging in physical therapy due to time constraints and financial considerations. She manages the pain with ibuprofen 800 mg, but only takes it when the pain is severe due to concerns about potential addiction.    She has been off Ozempic since her last visit, as her new insurance does not cover this medication. She has gained 9 pounds, which she attributes to increased snacking during a recent 2-week vacation in December. She plans to resume meal preparation and healthier eating habits starting next week. She is considering trying Trulicity, as suggested by a coworker. She is currently taking metformin.    She reports experiencing foot pain and soreness. She has arthritis in her knees and has sustained injuries to her knees from bumping into her desk twice. She is considering surgery for her knee arthritis in the fall. She has a history of knee surgery and acknowledges that she did not fully adhere to the recommended physical therapy regimen, resulting in limited flexibility.    She has not been using her CPAP machine due to the lack of a mask, which she has not yet purchased. She also canceled her appointment with the provider who manages her CPAP machine.    She has been experiencing light sensitivity since hitting her head in May.    She has not been on any medication for weight loss. She is

## 2025-01-07 ENCOUNTER — OFFICE VISIT (OUTPATIENT)
Age: 70
End: 2025-01-07
Payer: COMMERCIAL

## 2025-01-07 VITALS
WEIGHT: 235 LBS | OXYGEN SATURATION: 96 % | HEART RATE: 66 BPM | HEIGHT: 62 IN | BODY MASS INDEX: 43.24 KG/M2 | RESPIRATION RATE: 14 BRPM | DIASTOLIC BLOOD PRESSURE: 80 MMHG | SYSTOLIC BLOOD PRESSURE: 138 MMHG

## 2025-01-07 DIAGNOSIS — Z79.4 TYPE 2 DIABETES MELLITUS WITH DIABETIC NEUROPATHY, WITH LONG-TERM CURRENT USE OF INSULIN (HCC): Primary | ICD-10-CM

## 2025-01-07 DIAGNOSIS — K21.9 GASTROESOPHAGEAL REFLUX DISEASE WITHOUT ESOPHAGITIS: ICD-10-CM

## 2025-01-07 DIAGNOSIS — E66.813 CLASS 3 SEVERE OBESITY DUE TO EXCESS CALORIES WITH SERIOUS COMORBIDITY AND BODY MASS INDEX (BMI) OF 45.0 TO 49.9 IN ADULT: ICD-10-CM

## 2025-01-07 DIAGNOSIS — E66.01 CLASS 3 SEVERE OBESITY DUE TO EXCESS CALORIES WITH SERIOUS COMORBIDITY AND BODY MASS INDEX (BMI) OF 45.0 TO 49.9 IN ADULT: ICD-10-CM

## 2025-01-07 DIAGNOSIS — E11.40 TYPE 2 DIABETES MELLITUS WITH DIABETIC NEUROPATHY, WITH LONG-TERM CURRENT USE OF INSULIN (HCC): Primary | ICD-10-CM

## 2025-01-07 PROCEDURE — 99214 OFFICE O/P EST MOD 30 MIN: CPT | Performed by: INTERNAL MEDICINE

## 2025-01-07 PROCEDURE — 3079F DIAST BP 80-89 MM HG: CPT | Performed by: INTERNAL MEDICINE

## 2025-01-07 PROCEDURE — 1123F ACP DISCUSS/DSCN MKR DOCD: CPT | Performed by: INTERNAL MEDICINE

## 2025-01-07 PROCEDURE — 3075F SYST BP GE 130 - 139MM HG: CPT | Performed by: INTERNAL MEDICINE

## 2025-01-07 RX ORDER — PHENTERMINE HYDROCHLORIDE 37.5 MG/1
37.5 TABLET ORAL
Qty: 30 TABLET | Refills: 0 | Status: SHIPPED | OUTPATIENT
Start: 2025-01-07 | End: 2025-02-06

## 2025-02-24 ENCOUNTER — OFFICE VISIT (OUTPATIENT)
Dept: ENDOCRINOLOGY | Age: 70
End: 2025-02-24
Payer: COMMERCIAL

## 2025-02-24 VITALS
WEIGHT: 234 LBS | BODY MASS INDEX: 43.06 KG/M2 | HEART RATE: 89 BPM | DIASTOLIC BLOOD PRESSURE: 97 MMHG | HEIGHT: 62 IN | SYSTOLIC BLOOD PRESSURE: 157 MMHG

## 2025-02-24 DIAGNOSIS — Z79.4 TYPE 2 DIABETES MELLITUS WITH HYPERGLYCEMIA, WITH LONG-TERM CURRENT USE OF INSULIN (HCC): Primary | ICD-10-CM

## 2025-02-24 DIAGNOSIS — E11.65 TYPE 2 DIABETES MELLITUS WITH HYPERGLYCEMIA, WITH LONG-TERM CURRENT USE OF INSULIN (HCC): Primary | ICD-10-CM

## 2025-02-24 LAB
CHP ED QC CHECK: NORMAL
GLUCOSE BLD-MCNC: 517 MG/DL
HBA1C MFR BLD: 6.3 %

## 2025-02-24 PROCEDURE — 99214 OFFICE O/P EST MOD 30 MIN: CPT | Performed by: PHYSICIAN ASSISTANT

## 2025-02-24 PROCEDURE — 83036 HEMOGLOBIN GLYCOSYLATED A1C: CPT | Performed by: PHYSICIAN ASSISTANT

## 2025-02-24 PROCEDURE — 3044F HG A1C LEVEL LT 7.0%: CPT | Performed by: PHYSICIAN ASSISTANT

## 2025-02-24 PROCEDURE — 3077F SYST BP >= 140 MM HG: CPT | Performed by: PHYSICIAN ASSISTANT

## 2025-02-24 PROCEDURE — 95251 CONT GLUC MNTR ANALYSIS I&R: CPT | Performed by: PHYSICIAN ASSISTANT

## 2025-02-24 PROCEDURE — 1123F ACP DISCUSS/DSCN MKR DOCD: CPT | Performed by: PHYSICIAN ASSISTANT

## 2025-02-24 PROCEDURE — 82962 GLUCOSE BLOOD TEST: CPT | Performed by: PHYSICIAN ASSISTANT

## 2025-02-24 PROCEDURE — 3079F DIAST BP 80-89 MM HG: CPT | Performed by: PHYSICIAN ASSISTANT

## 2025-02-24 RX ORDER — DAPAGLIFLOZIN 10 MG/1
10 TABLET, FILM COATED ORAL EVERY MORNING
Qty: 90 TABLET | Refills: 1 | Status: SHIPPED | OUTPATIENT
Start: 2025-02-24

## 2025-02-24 RX ORDER — INSULIN LISPRO 200 [IU]/ML
INJECTION, SOLUTION SUBCUTANEOUS
Qty: 15 ML | Refills: 3 | Status: SHIPPED | OUTPATIENT
Start: 2025-02-24

## 2025-02-24 RX ORDER — METFORMIN HYDROCHLORIDE 500 MG/1
500 TABLET, EXTENDED RELEASE ORAL
Qty: 1980 TABLET | Refills: 3 | Status: SHIPPED | OUTPATIENT
Start: 2025-02-24

## 2025-02-24 RX ORDER — AVOBENZONE, HOMOSALATE, OCTISALATE, OCTOCRYLENE 30; 40; 45; 26 MG/ML; MG/ML; MG/ML; MG/ML
CREAM TOPICAL
Qty: 1 EACH | Refills: 5 | Status: SHIPPED | OUTPATIENT
Start: 2025-02-24

## 2025-02-24 RX ORDER — ACYCLOVIR 400 MG/1
1 TABLET ORAL
Qty: 12 EACH | Refills: 10 | Status: SHIPPED | OUTPATIENT
Start: 2025-02-24

## 2025-02-24 ASSESSMENT — ENCOUNTER SYMPTOMS
SORE THROAT: 0
WHEEZING: 0
VOMITING: 0
SINUS PRESSURE: 0
RHINORRHEA: 0
DIARRHEA: 0
SHORTNESS OF BREATH: 0
NAUSEA: 0
ABDOMINAL PAIN: 0
COUGH: 0

## 2025-02-24 NOTE — PROGRESS NOTES
Year: Not on file     Unstable Housing in the Last Year: No     Family History   Problem Relation Age of Onset    Cancer Father         melanoma    Prostate Cancer Father     Hearing Loss Father     Cancer Sister         Melanoma    High Blood Pressure Sister     Diabetes Maternal Aunt     Breast Cancer Maternal Aunt     Cancer Other         Throat    Arthritis Mother     Clotting Disorder Mother         Bleeder    Hearing Loss Mother     High Blood Pressure Mother     Vision Loss Mother         Macular Degeneration    Vision Loss Maternal Grandfather     Colon Cancer Neg Hx      Allergies   Allergen Reactions    Aspirin      History of gastric ulcers    Codeine      Cramps    Imitrex [Sumatriptan]     Theophyllines     Diflucan [Fluconazole] Rash    Nizoral [Ketoconazole] Rash    Zoloft Rash       Current Outpatient Medications:     dulaglutide (TRULICITY) 0.75 MG/0.5ML SOAJ SC injection, Inject 0.5 mLs into the skin once a week (Patient not taking: Reported on 2/24/2025), Disp: 2 mL, Rfl: 0    amLODIPine (NORVASC) 5 MG tablet, TAKE 1 TABLET BY MOUTH EVERY DAY, Disp: 90 tablet, Rfl: 1    dapagliflozin (FARXIGA) 10 MG tablet, Take 1 tablet by mouth every morning, Disp: 90 tablet, Rfl: 1    Continuous Glucose Sensor (FREESTYLE GERTRUDE 2 SENSOR) MISC, USE 1 SENSOR EVERY 14 DAYS, Disp: 6 each, Rfl: 3    insulin lispro (HUMALOG KWIKPEN) 200 UNIT/ML SOPN pen, inject 3 times daily before meals- 120-140 2 units, 141-160 3 units, 161-180 4 units, 181-200 5 units, 201-220 6 units, 221-240 7 units, 241-260 8 units, 261-280 9 units, 281-300 10 units, 301 or higher 11 units., Disp: 10 Adjustable Dose Pre-filled Pen Syringe, Rfl: 3    Budeson-Glycopyrrol-Formoterol (BREZTRI AEROSPHERE) 160-9-4.8 MCG/ACT AERO, Inhale 2 puffs into the lungs in the morning and at bedtime, Disp: 4 each, Rfl: 0    metFORMIN (GLUCOPHAGE-XR) 500 MG extended release tablet, TAKE 1 TABLET DAILY WITH BREAKFAST, Disp: 180 tablet, Rfl: 3    cyanocobalamin

## 2025-02-24 NOTE — PATIENT INSTRUCTIONS
Humalog inject 3 times daily before meals- less than 150 no insulin, 150-180 4 units, 181-200 5 units, 201-220 6 units, 221-240 7 units, 241-260 8 units, 261-280 9 units, 281-300 10 units, 301 or higher 11 units  Start Trulicity 0.75 mg injected weekly   Metformin 500 XR mg twice daily   Farxiga 10 mg daily  Dexcom G7 ordered  Repeat labs and follow up in 6 months

## 2025-04-05 DIAGNOSIS — Z79.4 TYPE 2 DIABETES MELLITUS WITH DIABETIC NEUROPATHY, WITH LONG-TERM CURRENT USE OF INSULIN (HCC): ICD-10-CM

## 2025-04-05 DIAGNOSIS — E11.40 TYPE 2 DIABETES MELLITUS WITH DIABETIC NEUROPATHY, WITH LONG-TERM CURRENT USE OF INSULIN (HCC): ICD-10-CM

## 2025-04-05 LAB
ALBUMIN SERPL-MCNC: 3.8 G/DL (ref 3.5–4.6)
ALP SERPL-CCNC: 138 U/L (ref 40–130)
ALT SERPL-CCNC: 13 U/L (ref 0–33)
ANION GAP SERPL CALCULATED.3IONS-SCNC: 10 MEQ/L (ref 9–15)
AST SERPL-CCNC: 14 U/L (ref 0–35)
BASOPHILS # BLD: 0 K/UL (ref 0–0.2)
BASOPHILS NFR BLD: 0.9 %
BILIRUB SERPL-MCNC: 0.7 MG/DL (ref 0.2–0.7)
BUN SERPL-MCNC: 15 MG/DL (ref 8–23)
CALCIUM SERPL-MCNC: 8.6 MG/DL (ref 8.5–9.9)
CHLORIDE SERPL-SCNC: 102 MEQ/L (ref 95–107)
CO2 SERPL-SCNC: 27 MEQ/L (ref 20–31)
CREAT SERPL-MCNC: 0.49 MG/DL (ref 0.5–0.9)
EOSINOPHIL # BLD: 0.3 K/UL (ref 0–0.7)
EOSINOPHIL NFR BLD: 5.7 %
ERYTHROCYTE [DISTWIDTH] IN BLOOD BY AUTOMATED COUNT: 12.7 % (ref 11.5–14.5)
GLOBULIN SER CALC-MCNC: 2.6 G/DL (ref 2.3–3.5)
GLUCOSE SERPL-MCNC: 161 MG/DL (ref 70–99)
HCT VFR BLD AUTO: 38 % (ref 37–47)
HGB BLD-MCNC: 12.8 G/DL (ref 12–16)
LYMPHOCYTES # BLD: 1.3 K/UL (ref 1–4.8)
LYMPHOCYTES NFR BLD: 28.3 %
MCH RBC QN AUTO: 28.9 PG (ref 27–31.3)
MCHC RBC AUTO-ENTMCNC: 33.7 % (ref 33–37)
MCV RBC AUTO: 85.8 FL (ref 79.4–94.8)
MONOCYTES # BLD: 0.3 K/UL (ref 0.2–0.8)
MONOCYTES NFR BLD: 7.5 %
NEUTROPHILS # BLD: 2.6 K/UL (ref 1.4–6.5)
NEUTS SEG NFR BLD: 57.4 %
PLATELET # BLD AUTO: 226 K/UL (ref 130–400)
POTASSIUM SERPL-SCNC: 3.3 MEQ/L (ref 3.4–4.9)
PROT SERPL-MCNC: 6.4 G/DL (ref 6.3–8)
RBC # BLD AUTO: 4.43 M/UL (ref 4.2–5.4)
SODIUM SERPL-SCNC: 139 MEQ/L (ref 135–144)
WBC # BLD AUTO: 4.5 K/UL (ref 4.8–10.8)

## 2025-04-06 LAB
ESTIMATED AVERAGE GLUCOSE: 143 MG/DL
HBA1C MFR BLD: 6.6 % (ref 4–6)

## 2025-04-06 NOTE — PROGRESS NOTES
General: Skin is warm and dry.      Coloration: Skin is not pale.      Findings: No erythema or rash.   Neurological:      Mental Status: She is alert and oriented to person, place, and time.      Motor: No abnormal muscle tone.   Psychiatric:         Thought Content: Thought content normal.         Judgment: Judgment normal.          Results  Laboratory Studies  A1c was 9.7 in September 2022. Total cholesterol was less than 200. Triglycerides were 97. LDL was 91.      Labs   - A1c: 6.6   - White blood cell count: 4.5       Assessment & Plan          Diarrhea.  - Reports experiencing stomach pain and diarrhea for the past 3 days, with episodes occurring multiple times a day.  - Physical exam findings indicate the need for medication to manage symptoms.  - Discussed the use of Questran to slow down diarrhea and prevent constipation.  - Prescription for Questran will be sent to [email or web address].    Diabetes Mellitus.  - A1c level is currently at 6.6, which is slightly above the target goal of <7.  - Current medication regimen includes Trulicity and metformin.  - Reviewed records and discussed the need to increase the dose of Trulicity to 1.5 mg.  - Prescription for Trulicity 1.5 mg will be sent to [email or web address].    Weight Management.  - Reports difficulty in losing weight due to stress and irregular eating habits.  - Physical exam findings indicate weight gain and the need for dietary monitoring.  - Discussed the importance of regular exercise and monitoring diet closely.  - Prescription for phentermine will be sent to [email or web address].    Potassium Supplementation.  - Reports needing potassium supplementation.  - Physical exam findings and lab results indicate the need for potassium supplements.  - Discussed the importance of taking potassium supplements as prescribed.  - Prescription for potassium supplements will be sent to [email or web address].    Sleep Apnea.  - Has not been using her CPAP machine because

## 2025-04-08 ENCOUNTER — RESULTS FOLLOW-UP (OUTPATIENT)
Age: 70
End: 2025-04-08

## 2025-04-08 ENCOUNTER — OFFICE VISIT (OUTPATIENT)
Age: 70
End: 2025-04-08
Payer: COMMERCIAL

## 2025-04-08 VITALS
SYSTOLIC BLOOD PRESSURE: 138 MMHG | HEIGHT: 62 IN | WEIGHT: 238 LBS | RESPIRATION RATE: 14 BRPM | HEART RATE: 73 BPM | OXYGEN SATURATION: 100 % | BODY MASS INDEX: 43.79 KG/M2 | DIASTOLIC BLOOD PRESSURE: 80 MMHG

## 2025-04-08 DIAGNOSIS — Z79.4 TYPE 2 DIABETES MELLITUS WITH DIABETIC NEUROPATHY, WITH LONG-TERM CURRENT USE OF INSULIN (HCC): ICD-10-CM

## 2025-04-08 DIAGNOSIS — E66.813 CLASS 3 SEVERE OBESITY DUE TO EXCESS CALORIES WITH SERIOUS COMORBIDITY AND BODY MASS INDEX (BMI) OF 45.0 TO 49.9 IN ADULT: Primary | ICD-10-CM

## 2025-04-08 DIAGNOSIS — E11.40 TYPE 2 DIABETES MELLITUS WITH DIABETIC NEUROPATHY, WITH LONG-TERM CURRENT USE OF INSULIN (HCC): ICD-10-CM

## 2025-04-08 DIAGNOSIS — K58.0 IRRITABLE BOWEL SYNDROME WITH DIARRHEA: ICD-10-CM

## 2025-04-08 DIAGNOSIS — I10 ESSENTIAL HYPERTENSION: ICD-10-CM

## 2025-04-08 DIAGNOSIS — G47.33 OSA (OBSTRUCTIVE SLEEP APNEA): ICD-10-CM

## 2025-04-08 PROCEDURE — 3079F DIAST BP 80-89 MM HG: CPT | Performed by: INTERNAL MEDICINE

## 2025-04-08 PROCEDURE — 3075F SYST BP GE 130 - 139MM HG: CPT | Performed by: INTERNAL MEDICINE

## 2025-04-08 PROCEDURE — 99214 OFFICE O/P EST MOD 30 MIN: CPT | Performed by: INTERNAL MEDICINE

## 2025-04-08 PROCEDURE — 1123F ACP DISCUSS/DSCN MKR DOCD: CPT | Performed by: INTERNAL MEDICINE

## 2025-04-08 PROCEDURE — 3044F HG A1C LEVEL LT 7.0%: CPT | Performed by: INTERNAL MEDICINE

## 2025-04-08 RX ORDER — POTASSIUM CHLORIDE 1500 MG/1
40 TABLET, EXTENDED RELEASE ORAL DAILY
Qty: 2 TABLET | Refills: 1 | Status: SHIPPED | OUTPATIENT
Start: 2025-04-08 | End: 2025-04-08 | Stop reason: SDUPTHER

## 2025-04-08 RX ORDER — PHENTERMINE HYDROCHLORIDE 37.5 MG/1
37.5 TABLET ORAL
Qty: 30 TABLET | Refills: 0 | Status: SHIPPED | OUTPATIENT
Start: 2025-04-08 | End: 2025-05-08

## 2025-04-08 RX ORDER — POTASSIUM CHLORIDE 1500 MG/1
40 TABLET, EXTENDED RELEASE ORAL DAILY
Qty: 2 TABLET | Refills: 1 | Status: SHIPPED | OUTPATIENT
Start: 2025-04-08 | End: 2025-04-10

## 2025-04-08 RX ORDER — CHOLESTYRAMINE 4 G/9G
1 POWDER, FOR SUSPENSION ORAL 2 TIMES DAILY
Qty: 180 PACKET | Refills: 5 | Status: SHIPPED | OUTPATIENT
Start: 2025-04-08

## 2025-04-08 SDOH — ECONOMIC STABILITY: FOOD INSECURITY: WITHIN THE PAST 12 MONTHS, THE FOOD YOU BOUGHT JUST DIDN'T LAST AND YOU DIDN'T HAVE MONEY TO GET MORE.: NEVER TRUE

## 2025-04-08 SDOH — ECONOMIC STABILITY: FOOD INSECURITY: WITHIN THE PAST 12 MONTHS, YOU WORRIED THAT YOUR FOOD WOULD RUN OUT BEFORE YOU GOT MONEY TO BUY MORE.: NEVER TRUE

## 2025-04-08 SDOH — ECONOMIC STABILITY: INCOME INSECURITY: IN THE LAST 12 MONTHS, WAS THERE A TIME WHEN YOU WERE NOT ABLE TO PAY THE MORTGAGE OR RENT ON TIME?: NO

## 2025-05-15 ENCOUNTER — OFFICE VISIT (OUTPATIENT)
Dept: OBGYN CLINIC | Age: 70
End: 2025-05-15
Payer: COMMERCIAL

## 2025-05-15 VITALS
WEIGHT: 243 LBS | HEART RATE: 75 BPM | HEIGHT: 62 IN | BODY MASS INDEX: 44.72 KG/M2 | SYSTOLIC BLOOD PRESSURE: 130 MMHG | DIASTOLIC BLOOD PRESSURE: 80 MMHG

## 2025-05-15 DIAGNOSIS — N89.8 VAGINAL DISCHARGE: ICD-10-CM

## 2025-05-15 DIAGNOSIS — N94.9 GENITAL ATROPHY OF FEMALE: Primary | ICD-10-CM

## 2025-05-15 PROCEDURE — 99213 OFFICE O/P EST LOW 20 MIN: CPT | Performed by: OBSTETRICS & GYNECOLOGY

## 2025-05-15 PROCEDURE — 1123F ACP DISCUSS/DSCN MKR DOCD: CPT | Performed by: OBSTETRICS & GYNECOLOGY

## 2025-05-15 PROCEDURE — 3075F SYST BP GE 130 - 139MM HG: CPT | Performed by: OBSTETRICS & GYNECOLOGY

## 2025-05-15 PROCEDURE — 3079F DIAST BP 80-89 MM HG: CPT | Performed by: OBSTETRICS & GYNECOLOGY

## 2025-05-15 RX ORDER — FLUCONAZOLE 150 MG/1
TABLET ORAL
Qty: 7 TABLET | Refills: 0 | Status: SHIPPED | OUTPATIENT
Start: 2025-05-15

## 2025-05-15 ASSESSMENT — ENCOUNTER SYMPTOMS
CONSTIPATION: 0
CHEST TIGHTNESS: 0
NAUSEA: 0
BACK PAIN: 0
COLOR CHANGE: 0
BLOOD IN STOOL: 0
TROUBLE SWALLOWING: 0
WHEEZING: 0
SORE THROAT: 0
ABDOMINAL PAIN: 0
ABDOMINAL DISTENTION: 0
VOMITING: 0
COUGH: 0
SHORTNESS OF BREATH: 0
VOICE CHANGE: 0

## 2025-05-15 NOTE — PROGRESS NOTES
HPI:  Maryellen Avila (: 1955) is a 70 y.o. female, Established patient, here for evaluation of the following chief complaint(s):  Follow-up (Not any better and still has some itching. )    Despite the clobetasol and estrogen vaginal cream the patient continues to have vulvar itching and vaginal itching.  She is a type II diabetic.    SUBJECTIVE/OBJECTIVE:    Past Surgical History:   Procedure Laterality Date    BARIATRIC SURGERY  2004    BREAST BIOPSY Left 2014    BREAST BIOPSY Left 12/10/2014    BREAST LUMPECTOMY Left     with left SNB  invasive ductal carcinoma    BREAST LUMPECTOMY Left     BREAST LUMPECTOMY Left      SECTION      x4    CHOLECYSTECTOMY  2007    GASTRIC BYPASS SURGERY      HYSTERECTOMY (CERVIX STATUS UNKNOWN)      HYSTERECTOMY, TOTAL ABDOMINAL (CERVIX REMOVED)  ?    KNEE ARTHROPLASTY Right     knee reconstruction    KNEE SURGERY      Right knee    LYMPH NODE BIOPSY Left 2014    ANDREW AND BSO (CERVIX REMOVED)      Dr. Nguyễn    TONSILLECTOMY  2002    TUBAL LIGATION      UPPER GASTROINTESTINAL ENDOSCOPY  2015    w/bx,polypectomy     UPPER GASTROINTESTINAL ENDOSCOPY N/A 2020    EGD DIAGNOSTIC ONLY performed by Sarkis Herrera MD at [facility] CENTER    UVULOPALATOPHARYGOPLASTY          Review of Systems   Constitutional:  Negative for activity change, appetite change, fatigue and unexpected weight change.   HENT:  Negative for dental problem, ear pain, hearing loss, nosebleeds, sore throat, trouble swallowing and voice change.    Eyes:  Negative for visual disturbance.   Respiratory:  Negative for cough, chest tightness, shortness of breath and wheezing.    Cardiovascular:  Negative for chest pain and palpitations.   Gastrointestinal:  Negative for abdominal distention, abdominal pain, blood in stool, constipation, nausea and vomiting.   Endocrine: Negative for cold intolerance, heat intolerance, polydipsia, polyphagia and polyuria.

## 2025-05-17 ENCOUNTER — RESULTS FOLLOW-UP (OUTPATIENT)
Dept: OBGYN | Age: 70
End: 2025-05-17

## 2025-05-19 RX ORDER — ESTRADIOL 0.1 MG/G
CREAM VAGINAL
Qty: 42.5 G | Refills: 0 | Status: SHIPPED | OUTPATIENT
Start: 2025-05-19

## 2025-06-09 ENCOUNTER — OFFICE VISIT (OUTPATIENT)
Age: 70
End: 2025-06-09
Payer: COMMERCIAL

## 2025-06-09 VITALS
OXYGEN SATURATION: 99 % | SYSTOLIC BLOOD PRESSURE: 132 MMHG | WEIGHT: 243 LBS | BODY MASS INDEX: 44.72 KG/M2 | HEART RATE: 69 BPM | HEIGHT: 62 IN | DIASTOLIC BLOOD PRESSURE: 88 MMHG | TEMPERATURE: 97 F

## 2025-06-09 DIAGNOSIS — R07.81 RIB PAIN ON RIGHT SIDE: ICD-10-CM

## 2025-06-09 DIAGNOSIS — L03.112 CELLULITIS OF LEFT AXILLA: Primary | ICD-10-CM

## 2025-06-09 PROCEDURE — 3079F DIAST BP 80-89 MM HG: CPT | Performed by: NURSE PRACTITIONER

## 2025-06-09 PROCEDURE — 3075F SYST BP GE 130 - 139MM HG: CPT | Performed by: NURSE PRACTITIONER

## 2025-06-09 PROCEDURE — 1123F ACP DISCUSS/DSCN MKR DOCD: CPT | Performed by: NURSE PRACTITIONER

## 2025-06-09 PROCEDURE — 99214 OFFICE O/P EST MOD 30 MIN: CPT | Performed by: NURSE PRACTITIONER

## 2025-06-09 RX ORDER — TIZANIDINE 2 MG/1
2 TABLET ORAL NIGHTLY PRN
Qty: 7 TABLET | Refills: 0 | Status: SHIPPED | OUTPATIENT
Start: 2025-06-09 | End: 2025-06-16

## 2025-06-09 RX ORDER — CEPHALEXIN 500 MG/1
500 CAPSULE ORAL 2 TIMES DAILY
Qty: 20 CAPSULE | Refills: 0 | Status: SHIPPED | OUTPATIENT
Start: 2025-06-09 | End: 2025-06-19

## 2025-06-09 ASSESSMENT — ENCOUNTER SYMPTOMS
CHEST TIGHTNESS: 0
WHEEZING: 0
STRIDOR: 0
COUGH: 0
COLOR CHANGE: 1
BACK PAIN: 0
SHORTNESS OF BREATH: 0

## 2025-06-09 NOTE — PROGRESS NOTES
Subjective:      Patient ID: Maryellen Avila is a 70 y.o. female who presents today for:  Chief Complaint   Patient presents with    Extremity Pain     Pt states had a fall last week while on vacation, hit her right buttock, and says possible ribs fracture, painful when breathing or coughing.     [place]     States lump under left armpit.        History of Present Illness  The patient presents for evaluation of right rib pain and a lump under her armpit.    She reports an improvement in her condition following a fall at a [employer] race event a week ago. The incident occurred while she was ascending the bleachers, resulting in bruising on her buttocks and arm. She suspects a fracture in her ribs due to the pain experienced during movements such as standing up, sitting down, entering, and exiting her vehicle. Her mobility is limited as she is unable to raise her arm to reach objects. She also reports severe internal pain, although there is no external tenderness or bruising. She has been managing her pain with 800 mg of ibuprofen, taken once at night to aid sleep. She expresses interest in a muscle relaxant for nighttime use but declines any stronger pain medications. She recalls a previous rib fracture on the same side, which was less painful than her current experience.    Additionally, she has noticed a lump under her armpit, which she describes as having two spots on top. She was able to squeeze one of the spots but is uncertain if this indicates an infection. She speculates that the lump may be a result of sweating. She notes that this is the most hair she has had since her chemotherapy treatment in 2015.    SOCIAL HISTORY  She works for a [employer] company.      Past Medical History:   Diagnosis Date    Allergic rhinitis     Asthma     Breast cancer ([facility]) 2014    invasive ductal carcinoma left breast    CAD (coronary artery disease)     Cancer ([facility]) 11/2014    Invasive ductal left breast    Colon polyps     Diabetes

## 2025-06-11 ENCOUNTER — TELEPHONE (OUTPATIENT)
Age: 70
End: 2025-06-11

## 2025-06-11 NOTE — TELEPHONE ENCOUNTER
Pt called to check the status of her x-ray results. I advised her it shows active in process. She is unhappy with how long this is taking.

## 2025-06-13 ENCOUNTER — RESULTS FOLLOW-UP (OUTPATIENT)
Age: 70
End: 2025-06-13

## 2025-06-19 ENCOUNTER — OFFICE VISIT (OUTPATIENT)
Dept: OBGYN CLINIC | Age: 70
End: 2025-06-19
Payer: COMMERCIAL

## 2025-06-19 VITALS
HEIGHT: 62 IN | WEIGHT: 244 LBS | SYSTOLIC BLOOD PRESSURE: 110 MMHG | DIASTOLIC BLOOD PRESSURE: 70 MMHG | HEART RATE: 70 BPM | BODY MASS INDEX: 44.9 KG/M2

## 2025-06-19 DIAGNOSIS — N94.9 GENITAL ATROPHY OF FEMALE: Primary | ICD-10-CM

## 2025-06-19 PROCEDURE — 3074F SYST BP LT 130 MM HG: CPT | Performed by: OBSTETRICS & GYNECOLOGY

## 2025-06-19 PROCEDURE — 99213 OFFICE O/P EST LOW 20 MIN: CPT | Performed by: OBSTETRICS & GYNECOLOGY

## 2025-06-19 PROCEDURE — 3078F DIAST BP <80 MM HG: CPT | Performed by: OBSTETRICS & GYNECOLOGY

## 2025-06-19 PROCEDURE — 1123F ACP DISCUSS/DSCN MKR DOCD: CPT | Performed by: OBSTETRICS & GYNECOLOGY

## 2025-06-19 RX ORDER — FLUCONAZOLE 150 MG/1
150 TABLET ORAL WEEKLY
Qty: 12 TABLET | Refills: 0 | Status: SHIPPED | OUTPATIENT
Start: 2025-06-19 | End: 2025-09-05

## 2025-06-19 ASSESSMENT — ENCOUNTER SYMPTOMS
WHEEZING: 0
VOICE CHANGE: 0
BACK PAIN: 0
BLOOD IN STOOL: 0
NAUSEA: 0
COUGH: 0
COLOR CHANGE: 0
VOMITING: 0
ABDOMINAL DISTENTION: 0
CONSTIPATION: 0
SHORTNESS OF BREATH: 0
CHEST TIGHTNESS: 0
SORE THROAT: 0
TROUBLE SWALLOWING: 0
ABDOMINAL PAIN: 0

## 2025-06-19 NOTE — PROGRESS NOTES
Negative for difficulty urinating, dyspareunia, dysuria, flank pain, frequency, genital sores, hematuria, menstrual problem, pelvic pain, urgency, vaginal bleeding, vaginal discharge and vaginal pain.   Musculoskeletal:  Negative for arthralgias, back pain, joint swelling and myalgias.   Skin:  Negative for color change and rash.   Allergic/Immunologic: Negative for environmental allergies, food allergies and immunocompromised state.   Neurological:  Negative for dizziness, seizures, syncope, speech difficulty, weakness, numbness and headaches.   Hematological:  Negative for adenopathy. Does not bruise/bleed easily.   Psychiatric/Behavioral:  Negative for agitation, behavioral problems, confusion, decreased concentration, dysphoric mood and suicidal ideas. The patient is not nervous/anxious and is not hyperactive.        Physical Exam  Vitals and nursing note reviewed. Exam conducted with a chaperone present.   Genitourinary:     General: Normal vulva.             Vitals:    06/19/25 1510   BP: 110/70   BP Site: Left Upper Arm   Patient Position: Sitting   BP Cuff Size: Large Adult   Pulse: 70   Weight: 110.7 kg (244 lb)   Height: 1.575 m (5' 2\")         ASSESSMENT/PLAN:     Diagnosis Orders   1. Genital atrophy of female        Resolved vulvar erythema due to yeast    PLAN: Recommend that she continue a weekly dose of Diflucan for 3-month and follow-up here in 3 months.      No follow-ups on file.      On this date 6/19/2025 I have spent 20 minutes reviewing previous notes, test results and face to face with the patient discussing the diagnosis and importance of compliance with the treatment plan as well as documenting on the day of the visit.      An electronic signature was used to authenticate this note. Please note this report has been partially produced using speech recognition software and may cause contain errors related to that system including grammar, punctuation and spelling as well as words and phrases

## 2025-07-15 ENCOUNTER — OFFICE VISIT (OUTPATIENT)
Age: 70
End: 2025-07-15
Payer: COMMERCIAL

## 2025-07-15 VITALS
HEART RATE: 80 BPM | BODY MASS INDEX: 44.24 KG/M2 | WEIGHT: 240.4 LBS | DIASTOLIC BLOOD PRESSURE: 80 MMHG | RESPIRATION RATE: 22 BRPM | OXYGEN SATURATION: 98 % | SYSTOLIC BLOOD PRESSURE: 150 MMHG | TEMPERATURE: 98.3 F | HEIGHT: 62 IN

## 2025-07-15 DIAGNOSIS — M79.601 RIGHT ARM PAIN: ICD-10-CM
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
## 2025-07-15 DIAGNOSIS — R29.898 WEAKNESS OF RIGHT HAND: Primary | ICD-10-CM

## 2025-07-15 PROCEDURE — 1123F ACP DISCUSS/DSCN MKR DOCD: CPT | Performed by: STUDENT IN AN ORGANIZED HEALTH CARE EDUCATION/TRAINING PROGRAM

## 2025-07-15 PROCEDURE — 99214 OFFICE O/P EST MOD 30 MIN: CPT | Performed by: STUDENT IN AN ORGANIZED HEALTH CARE EDUCATION/TRAINING PROGRAM

## 2025-07-15 PROCEDURE — 3077F SYST BP >= 140 MM HG: CPT | Performed by: STUDENT IN AN ORGANIZED HEALTH CARE EDUCATION/TRAINING PROGRAM

## 2025-07-15 PROCEDURE — 3079F DIAST BP 80-89 MM HG: CPT | Performed by: STUDENT IN AN ORGANIZED HEALTH CARE EDUCATION/TRAINING PROGRAM

## 2025-07-15 RX ORDER — TIZANIDINE 2 MG/1
2 TABLET ORAL 3 TIMES DAILY PRN
Qty: 30 TABLET | Refills: 0 | Status: SHIPPED | OUTPATIENT
Start: 2025-07-15

## 2025-07-15 NOTE — PROGRESS NOTES
simplex     Nose    History of therapeutic radiation     HTN (hypertension)     Hx antineoplastic chemo     Obesity     Osteoarthritis Dont remember    Senile osteoporosis     Sleep apnea      Past Surgical History:   Procedure Laterality Date    BARIATRIC SURGERY  2004    BREAST BIOPSY Left 2014    BREAST BIOPSY Left 12/10/2014    BREAST LUMPECTOMY Left     with left SNB  invasive ductal carcinoma    BREAST LUMPECTOMY Left     BREAST LUMPECTOMY Left      SECTION      x4    CHOLECYSTECTOMY  2007    GASTRIC BYPASS SURGERY      HYSTERECTOMY (CERVIX STATUS UNKNOWN)      HYSTERECTOMY, TOTAL ABDOMINAL (CERVIX REMOVED)  ?    KNEE ARTHROPLASTY Right     knee reconstruction    KNEE SURGERY      Right knee    LYMPH NODE BIOPSY Left 2014    ANDREW AND BSO (CERVIX REMOVED)      Dr. Nguyễn    TONSILLECTOMY      TUBAL LIGATION      UPPER GASTROINTESTINAL ENDOSCOPY  2015    w/bx,polypectomy     UPPER GASTROINTESTINAL ENDOSCOPY N/A 2020    EGD DIAGNOSTIC ONLY performed by Sarkis Herrera MD at [facility] CENTER    UVULOPALATOPHARYGOPLASTY       Current Outpatient Medications   Medication Sig Dispense Refill    tiZANidine (ZANAFLEX) 2 MG tablet Take 1 tablet by mouth 3 times daily as needed (pain) 30 tablet 0    fluconazole (DIFLUCAN) 150 MG tablet Take 1 tablet by mouth once a week for 12 doses 12 tablet 0    estradiol (ESTRACE) 0.1 MG/GM vaginal cream PLACE 0.5 GRAMS VAGINALLY TWICE WEEKLY 42.5 g 0    fluconazole (DIFLUCAN) 150 MG tablet Take 1 tablet every 3 days for a total of three doses then take 1 tablet a week for 4 weeks 7 tablet 0    cholestyramine (QUESTRAN) 4 g packet Take 1 packet by mouth 2 times daily 180 packet 5    dulaglutide (TRULICITY) 1.5 MG/0.5ML SC injection Inject 0.5 mLs into the skin once a week 6 mL 0    dapagliflozin (FARXIGA) 10 MG tablet Take 1 tablet by mouth every morning 90 tablet 1    Continuous Glucose Sensor (DEXCOM G7 SENSOR) MISC 1

## 2025-07-22 ENCOUNTER — OFFICE VISIT (OUTPATIENT)
Age: 70
End: 2025-07-22
Payer: COMMERCIAL

## 2025-07-22 VITALS
HEIGHT: 62 IN | HEART RATE: 68 BPM | BODY MASS INDEX: 44.72 KG/M2 | DIASTOLIC BLOOD PRESSURE: 80 MMHG | RESPIRATION RATE: 14 BRPM | OXYGEN SATURATION: 100 % | SYSTOLIC BLOOD PRESSURE: 160 MMHG | WEIGHT: 243 LBS

## 2025-07-22 DIAGNOSIS — Z79.4 TYPE 2 DIABETES MELLITUS WITH DIABETIC NEUROPATHY, WITH LONG-TERM CURRENT USE OF INSULIN (HCC): Primary | ICD-10-CM

## 2025-07-22 DIAGNOSIS — G47.33 OSA (OBSTRUCTIVE SLEEP APNEA): ICD-10-CM

## 2025-07-22 DIAGNOSIS — Z87.11 PERSONAL HISTORY OF GASTRIC ULCER: ICD-10-CM

## 2025-07-22 DIAGNOSIS — I10 ESSENTIAL HYPERTENSION: ICD-10-CM

## 2025-07-22 DIAGNOSIS — K21.9 GASTROESOPHAGEAL REFLUX DISEASE WITHOUT ESOPHAGITIS: ICD-10-CM

## 2025-07-22 DIAGNOSIS — E11.40 TYPE 2 DIABETES MELLITUS WITH DIABETIC NEUROPATHY, WITH LONG-TERM CURRENT USE OF INSULIN (HCC): Primary | ICD-10-CM

## 2025-07-22 DIAGNOSIS — M79.671 RIGHT FOOT PAIN: ICD-10-CM

## 2025-07-22 PROBLEM — J45.51 SEVERE PERSISTENT ASTHMA WITH EXACERBATION (HCC): Status: RESOLVED | Noted: 2022-09-07 | Resolved: 2025-07-22

## 2025-07-22 PROCEDURE — 3044F HG A1C LEVEL LT 7.0%: CPT | Performed by: INTERNAL MEDICINE

## 2025-07-22 PROCEDURE — 1123F ACP DISCUSS/DSCN MKR DOCD: CPT | Performed by: INTERNAL MEDICINE

## 2025-07-22 PROCEDURE — 3079F DIAST BP 80-89 MM HG: CPT | Performed by: INTERNAL MEDICINE

## 2025-07-22 PROCEDURE — 3077F SYST BP >= 140 MM HG: CPT | Performed by: INTERNAL MEDICINE

## 2025-07-22 PROCEDURE — 99214 OFFICE O/P EST MOD 30 MIN: CPT | Performed by: INTERNAL MEDICINE

## 2025-07-22 RX ORDER — METFORMIN HYDROCHLORIDE 500 MG/1
500 TABLET, EXTENDED RELEASE ORAL
Qty: 1980 TABLET | Refills: 3 | Status: SHIPPED | OUTPATIENT
Start: 2025-07-22 | End: 2025-07-22

## 2025-07-22 RX ORDER — PANTOPRAZOLE SODIUM 40 MG/1
TABLET, DELAYED RELEASE ORAL
Qty: 90 TABLET | Refills: 3 | Status: SHIPPED | OUTPATIENT
Start: 2025-07-22

## 2025-07-22 RX ORDER — AZITHROMYCIN 250 MG/1
TABLET, FILM COATED ORAL
Qty: 6 TABLET | Refills: 0 | Status: SHIPPED | OUTPATIENT
Start: 2025-07-22 | End: 2025-08-01

## 2025-07-22 RX ORDER — AZITHROMYCIN 250 MG/1
TABLET, FILM COATED ORAL
Qty: 6 TABLET | Refills: 0 | Status: SHIPPED | OUTPATIENT
Start: 2025-07-22 | End: 2025-07-22

## 2025-07-22 RX ORDER — AMLODIPINE BESYLATE 5 MG/1
5 TABLET ORAL DAILY
Qty: 90 TABLET | Refills: 1 | Status: SHIPPED | OUTPATIENT
Start: 2025-07-22

## 2025-07-22 RX ORDER — TRAMADOL HYDROCHLORIDE 50 MG/1
50 TABLET ORAL EVERY 6 HOURS PRN
Qty: 28 TABLET | Refills: 0 | Status: SHIPPED | OUTPATIENT
Start: 2025-07-22 | End: 2025-07-29

## 2025-07-22 RX ORDER — METFORMIN HYDROCHLORIDE 500 MG/1
500 TABLET, EXTENDED RELEASE ORAL
Qty: 180 TABLET | Refills: 3 | Status: SHIPPED | OUTPATIENT
Start: 2025-07-22

## 2025-07-22 NOTE — PROGRESS NOTES
Maryellen Avila (:  1955) is a 70 y.o. female, Established patient, here for evaluation of the following chief complaint(s):  Diabetes          Subjective   History of Present Illness  The patient presents for evaluation of right foot pain, diabetes mellitus, and elevated blood pressure.    She experienced a fall in her garden 10 days ago, resulting in a swollen hand and difficulty holding objects. She sought medical attention from Dr. Mcgee, who conducted x-rays of her shoulder and suggested a possible pinched nerve or rotator cuff issue. However, she reports no shoulder pain or movement restrictions. She is concerned about a potential fracture in her foot, as the pain extends from the front part of her foot to all four toes, which she rates as a 9 out of 10. The pain is constant and located under the toes. She has been taking ibuprofen for pain relief, but it has not been effective. A muscle relaxer was prescribed, which she takes at bedtime due to its sedative effects. She also took 800 mg of medication last night, but it did not alleviate her pain. This is her second fall, with the first occurring in 2025 when she tripped over someone's foot while climbing bleachers, injuring her ribs and the same arm.    She has been experiencing choking and coughing for the past two days, which she describes as sharp and phlegmy. The symptoms subside after eating or drinking but return afterward.    Her last A1c level was 6.6. She is currently on metformin and Trulicity for diabetes management. She has a discount card for Trulicity, which costs her $40 to $45 per month. She does not believe it is as effective as Ozempic. She is also under the care of Dr. Brooke and Dr. Ernst for her diabetes. She has an appointment with Dr. Brooke in 2025.    She is planning to have knee surgery with Dr. Ann.    She is requesting a refill of pantoprazole for her ulcers.    Her blood pressure is elevated today, likely due to

## 2025-07-23 ENCOUNTER — TELEPHONE (OUTPATIENT)
Age: 70
End: 2025-07-23

## 2025-07-25 DIAGNOSIS — M79.671 RIGHT FOOT PAIN: Primary | ICD-10-CM

## 2025-07-28 DIAGNOSIS — S62.302S: Primary | ICD-10-CM

## 2025-08-16 DIAGNOSIS — Z79.4 TYPE 2 DIABETES MELLITUS WITH HYPERGLYCEMIA, WITH LONG-TERM CURRENT USE OF INSULIN (HCC): ICD-10-CM

## 2025-08-16 DIAGNOSIS — E11.65 TYPE 2 DIABETES MELLITUS WITH HYPERGLYCEMIA, WITH LONG-TERM CURRENT USE OF INSULIN (HCC): ICD-10-CM

## 2025-08-16 LAB
ALBUMIN SERPL-MCNC: 4 G/DL (ref 3.5–4.6)
ALP SERPL-CCNC: 138 U/L (ref 40–130)
ALT SERPL-CCNC: 9 U/L (ref 0–33)
ANION GAP SERPL CALCULATED.3IONS-SCNC: 9 MEQ/L (ref 9–15)
AST SERPL-CCNC: 12 U/L (ref 0–35)
BILIRUB SERPL-MCNC: 0.6 MG/DL (ref 0.2–0.7)
BUN SERPL-MCNC: 10 MG/DL (ref 8–23)
CALCIUM SERPL-MCNC: 8.2 MG/DL (ref 8.5–9.9)
CHLORIDE SERPL-SCNC: 101 MEQ/L (ref 95–107)
CO2 SERPL-SCNC: 29 MEQ/L (ref 20–31)
CREAT SERPL-MCNC: 0.54 MG/DL (ref 0.5–0.9)
ESTIMATED AVERAGE GLUCOSE: 154 MG/DL
GLOBULIN SER CALC-MCNC: 2.5 G/DL (ref 2.3–3.5)
GLUCOSE SERPL-MCNC: 167 MG/DL (ref 70–99)
HBA1C MFR BLD: 7 % (ref 4–6)
POTASSIUM SERPL-SCNC: 3.8 MEQ/L (ref 3.4–4.9)
PROT SERPL-MCNC: 6.5 G/DL (ref 6.3–8)
SODIUM SERPL-SCNC: 139 MEQ/L (ref 135–144)

## 2025-08-25 ENCOUNTER — OFFICE VISIT (OUTPATIENT)
Age: 70
End: 2025-08-25
Payer: COMMERCIAL

## 2025-08-25 VITALS
HEART RATE: 77 BPM | HEIGHT: 62 IN | SYSTOLIC BLOOD PRESSURE: 161 MMHG | BODY MASS INDEX: 44.53 KG/M2 | DIASTOLIC BLOOD PRESSURE: 88 MMHG | WEIGHT: 242 LBS

## 2025-08-25 DIAGNOSIS — E55.9 VITAMIN D DEFICIENCY: ICD-10-CM

## 2025-08-25 DIAGNOSIS — E11.65 TYPE 2 DIABETES MELLITUS WITH HYPERGLYCEMIA, WITH LONG-TERM CURRENT USE OF INSULIN (HCC): Primary | ICD-10-CM

## 2025-08-25 DIAGNOSIS — Z79.4 TYPE 2 DIABETES MELLITUS WITH HYPERGLYCEMIA, WITH LONG-TERM CURRENT USE OF INSULIN (HCC): Primary | ICD-10-CM

## 2025-08-25 LAB
CHP ED QC CHECK: NORMAL
GLUCOSE BLD-MCNC: 7 MG/DL

## 2025-08-25 PROCEDURE — 3051F HG A1C>EQUAL 7.0%<8.0%: CPT | Performed by: PHYSICIAN ASSISTANT

## 2025-08-25 PROCEDURE — 82962 GLUCOSE BLOOD TEST: CPT | Performed by: PHYSICIAN ASSISTANT

## 2025-08-25 PROCEDURE — 99214 OFFICE O/P EST MOD 30 MIN: CPT | Performed by: PHYSICIAN ASSISTANT

## 2025-08-25 PROCEDURE — 1123F ACP DISCUSS/DSCN MKR DOCD: CPT | Performed by: PHYSICIAN ASSISTANT

## 2025-08-25 PROCEDURE — 3077F SYST BP >= 140 MM HG: CPT | Performed by: PHYSICIAN ASSISTANT

## 2025-08-25 PROCEDURE — 3079F DIAST BP 80-89 MM HG: CPT | Performed by: PHYSICIAN ASSISTANT

## 2025-08-25 RX ORDER — INSULIN LISPRO 200 [IU]/ML
INJECTION, SOLUTION SUBCUTANEOUS
Qty: 15 ML | Refills: 3 | Status: SHIPPED | OUTPATIENT
Start: 2025-08-25

## 2025-08-25 RX ORDER — GLUCOSAMINE HCL/CHONDROITIN SU 500-400 MG
CAPSULE ORAL
Qty: 150 STRIP | Refills: 3 | Status: SHIPPED | OUTPATIENT
Start: 2025-08-25

## 2025-08-25 RX ORDER — PEN NEEDLE, DIABETIC 32 GX 1/4"
100 NEEDLE, DISPOSABLE MISCELLANEOUS DAILY
Qty: 400 EACH | Refills: 3 | Status: SHIPPED | OUTPATIENT
Start: 2025-08-25

## 2025-08-25 RX ORDER — ACYCLOVIR 400 MG/1
1 TABLET ORAL
Qty: 12 EACH | Refills: 10 | Status: SHIPPED | OUTPATIENT
Start: 2025-08-25

## 2025-08-25 RX ORDER — AVOBENZONE, HOMOSALATE, OCTISALATE, OCTOCRYLENE 30; 40; 45; 26 MG/ML; MG/ML; MG/ML; MG/ML
CREAM TOPICAL
Qty: 1 EACH | Refills: 5 | Status: SHIPPED | OUTPATIENT
Start: 2025-08-25

## 2025-08-25 ASSESSMENT — ENCOUNTER SYMPTOMS
WHEEZING: 0
NAUSEA: 0
VOMITING: 0
COUGH: 0
SORE THROAT: 0
SINUS PRESSURE: 0
ABDOMINAL PAIN: 0
RHINORRHEA: 0
DIARRHEA: 0
SHORTNESS OF BREATH: 0

## (undated) DEVICE — GLOVE ORANGE PI 8   MSG9080

## (undated) DEVICE — ADAPTER FLSH PMP FLD MGMT GI IRRIG OFP 2 DISPOSABLE

## (undated) DEVICE — ENDO CARRY-ON PROCEDURE KIT: Brand: ENDO CARRY-ON PROCEDURE KIT

## (undated) DEVICE — TUBING, SUCTION, 1/4" X 10', STRAIGHT: Brand: MEDLINE

## (undated) DEVICE — BRUSH ENDO CLN L90.5IN SHTH DIA1.7MM BRIST DIA5-7MM 2-6MM

## (undated) DEVICE — TUBE SET 96 MM 64 MM H2O PERISTALTIC STD AUX CHANNEL

## (undated) DEVICE — Device: Brand: ENDO SMARTCAP

## (undated) DEVICE — CONMED SCOPE SAVER BITE BLOCK, 20X27 MM: Brand: SCOPE SAVER

## (undated) DEVICE — 4-PORT MANIFOLD: Brand: NEPTUNE 2